# Patient Record
Sex: FEMALE | Race: WHITE | Employment: OTHER | URBAN - METROPOLITAN AREA
[De-identification: names, ages, dates, MRNs, and addresses within clinical notes are randomized per-mention and may not be internally consistent; named-entity substitution may affect disease eponyms.]

---

## 2018-03-12 ENCOUNTER — TELEPHONE (OUTPATIENT)
Dept: FAMILY MEDICINE CLINIC | Age: 73
End: 2018-03-12

## 2018-05-10 ENCOUNTER — OFFICE VISIT (OUTPATIENT)
Dept: FAMILY MEDICINE CLINIC | Age: 73
End: 2018-05-10
Payer: MEDICARE

## 2018-05-10 VITALS
RESPIRATION RATE: 16 BRPM | SYSTOLIC BLOOD PRESSURE: 130 MMHG | HEART RATE: 87 BPM | HEIGHT: 61 IN | WEIGHT: 117 LBS | BODY MASS INDEX: 22.09 KG/M2 | OXYGEN SATURATION: 98 % | DIASTOLIC BLOOD PRESSURE: 68 MMHG

## 2018-05-10 DIAGNOSIS — F41.9 ANXIETY: ICD-10-CM

## 2018-05-10 DIAGNOSIS — R07.9 CHEST PAIN, UNSPECIFIED TYPE: Primary | ICD-10-CM

## 2018-05-10 DIAGNOSIS — R00.2 PALPITATIONS: ICD-10-CM

## 2018-05-10 DIAGNOSIS — Z12.31 ENCOUNTER FOR SCREENING MAMMOGRAM FOR MALIGNANT NEOPLASM OF BREAST: ICD-10-CM

## 2018-05-10 PROCEDURE — 1090F PRES/ABSN URINE INCON ASSESS: CPT | Performed by: FAMILY MEDICINE

## 2018-05-10 PROCEDURE — 99213 OFFICE O/P EST LOW 20 MIN: CPT | Performed by: FAMILY MEDICINE

## 2018-05-10 PROCEDURE — 4040F PNEUMOC VAC/ADMIN/RCVD: CPT | Performed by: FAMILY MEDICINE

## 2018-05-10 PROCEDURE — 3017F COLORECTAL CA SCREEN DOC REV: CPT | Performed by: FAMILY MEDICINE

## 2018-05-10 PROCEDURE — G8399 PT W/DXA RESULTS DOCUMENT: HCPCS | Performed by: FAMILY MEDICINE

## 2018-05-10 PROCEDURE — G8427 DOCREV CUR MEDS BY ELIG CLIN: HCPCS | Performed by: FAMILY MEDICINE

## 2018-05-10 PROCEDURE — G8420 CALC BMI NORM PARAMETERS: HCPCS | Performed by: FAMILY MEDICINE

## 2018-05-10 PROCEDURE — 1036F TOBACCO NON-USER: CPT | Performed by: FAMILY MEDICINE

## 2018-05-10 PROCEDURE — 1123F ACP DISCUSS/DSCN MKR DOCD: CPT | Performed by: FAMILY MEDICINE

## 2018-05-10 RX ORDER — BUSPIRONE HYDROCHLORIDE 7.5 MG/1
7.5 TABLET ORAL 2 TIMES DAILY
Qty: 60 TABLET | Refills: 5 | Status: SHIPPED | OUTPATIENT
Start: 2018-05-10 | End: 2018-06-07

## 2018-05-10 ASSESSMENT — PATIENT HEALTH QUESTIONNAIRE - PHQ9
2. FEELING DOWN, DEPRESSED OR HOPELESS: 0
SUM OF ALL RESPONSES TO PHQ9 QUESTIONS 1 & 2: 0
1. LITTLE INTEREST OR PLEASURE IN DOING THINGS: 0
SUM OF ALL RESPONSES TO PHQ QUESTIONS 1-9: 0

## 2018-05-15 ASSESSMENT — ENCOUNTER SYMPTOMS
STRIDOR: 0
CHOKING: 0
BLOOD IN STOOL: 0
SORE THROAT: 0
EYE PAIN: 0
CONSTIPATION: 0
RECTAL PAIN: 0
BACK PAIN: 0
VOMITING: 0
EYE ITCHING: 0
ABDOMINAL DISTENTION: 0
WHEEZING: 0
TROUBLE SWALLOWING: 0
APNEA: 0
COLOR CHANGE: 0
PHOTOPHOBIA: 0
ANAL BLEEDING: 0
SHORTNESS OF BREATH: 0
VOICE CHANGE: 0
EYE DISCHARGE: 0
COUGH: 0
EYE REDNESS: 0
CHEST TIGHTNESS: 0
DIARRHEA: 0
ABDOMINAL PAIN: 0
SINUS PRESSURE: 0
RHINORRHEA: 0
NAUSEA: 0
FACIAL SWELLING: 0

## 2018-05-15 ASSESSMENT — PATIENT HEALTH QUESTIONNAIRE - PHQ9
SUM OF ALL RESPONSES TO PHQ9 QUESTIONS 1 & 2: 0
SUM OF ALL RESPONSES TO PHQ QUESTIONS 1-9: 0
1. LITTLE INTEREST OR PLEASURE IN DOING THINGS: 0
2. FEELING DOWN, DEPRESSED OR HOPELESS: 0

## 2018-06-07 ENCOUNTER — OFFICE VISIT (OUTPATIENT)
Dept: CARDIOLOGY CLINIC | Age: 73
End: 2018-06-07
Payer: MEDICARE

## 2018-06-07 VITALS
SYSTOLIC BLOOD PRESSURE: 132 MMHG | HEART RATE: 63 BPM | WEIGHT: 117 LBS | HEIGHT: 61 IN | DIASTOLIC BLOOD PRESSURE: 60 MMHG | BODY MASS INDEX: 22.09 KG/M2

## 2018-06-07 DIAGNOSIS — E78.00 PURE HYPERCHOLESTEROLEMIA: ICD-10-CM

## 2018-06-07 DIAGNOSIS — R07.2 PRECORDIAL PAIN: ICD-10-CM

## 2018-06-07 DIAGNOSIS — R00.2 PALPITATIONS: Primary | ICD-10-CM

## 2018-06-07 PROCEDURE — 99204 OFFICE O/P NEW MOD 45 MIN: CPT | Performed by: INTERNAL MEDICINE

## 2018-06-07 PROCEDURE — 3017F COLORECTAL CA SCREEN DOC REV: CPT | Performed by: INTERNAL MEDICINE

## 2018-06-07 PROCEDURE — 1090F PRES/ABSN URINE INCON ASSESS: CPT | Performed by: INTERNAL MEDICINE

## 2018-06-07 PROCEDURE — 93000 ELECTROCARDIOGRAM COMPLETE: CPT | Performed by: INTERNAL MEDICINE

## 2018-06-07 PROCEDURE — 4040F PNEUMOC VAC/ADMIN/RCVD: CPT | Performed by: INTERNAL MEDICINE

## 2018-06-07 PROCEDURE — 1123F ACP DISCUSS/DSCN MKR DOCD: CPT | Performed by: INTERNAL MEDICINE

## 2018-06-07 PROCEDURE — G8420 CALC BMI NORM PARAMETERS: HCPCS | Performed by: INTERNAL MEDICINE

## 2018-06-07 PROCEDURE — 1036F TOBACCO NON-USER: CPT | Performed by: INTERNAL MEDICINE

## 2018-06-07 PROCEDURE — G8427 DOCREV CUR MEDS BY ELIG CLIN: HCPCS | Performed by: INTERNAL MEDICINE

## 2018-06-07 PROCEDURE — G8399 PT W/DXA RESULTS DOCUMENT: HCPCS | Performed by: INTERNAL MEDICINE

## 2018-06-21 ENCOUNTER — HOSPITAL ENCOUNTER (OUTPATIENT)
Dept: MAMMOGRAPHY | Age: 73
Discharge: HOME | End: 2018-06-23
Payer: MEDICARE

## 2018-06-21 DIAGNOSIS — Z12.31 ENCOUNTER FOR SCREENING MAMMOGRAM FOR MALIGNANT NEOPLASM OF BREAST: ICD-10-CM

## 2018-06-21 PROCEDURE — 77063 BREAST TOMOSYNTHESIS BI: CPT

## 2018-06-26 ENCOUNTER — HOSPITAL ENCOUNTER (OUTPATIENT)
Dept: CARDIOLOGY | Age: 73
Discharge: HOME | End: 2018-06-26
Payer: MEDICARE

## 2018-06-26 VITALS
DIASTOLIC BLOOD PRESSURE: 80 MMHG | HEIGHT: 61 IN | WEIGHT: 117 LBS | HEART RATE: 71 BPM | OXYGEN SATURATION: 97 % | BODY MASS INDEX: 22.09 KG/M2 | SYSTOLIC BLOOD PRESSURE: 142 MMHG

## 2018-06-26 DIAGNOSIS — R00.2 PALPITATIONS: ICD-10-CM

## 2018-06-26 DIAGNOSIS — R07.2 PRECORDIAL PAIN: ICD-10-CM

## 2018-06-26 PROCEDURE — 93017 CV STRESS TEST TRACING ONLY: CPT

## 2018-08-16 ENCOUNTER — OFFICE VISIT (OUTPATIENT)
Dept: FAMILY MEDICINE CLINIC | Age: 73
End: 2018-08-16
Payer: MEDICARE

## 2018-08-16 ENCOUNTER — HOSPITAL ENCOUNTER (OUTPATIENT)
Age: 73
Discharge: HOME | End: 2018-08-18
Payer: MEDICARE

## 2018-08-16 VITALS
HEART RATE: 73 BPM | RESPIRATION RATE: 18 BRPM | DIASTOLIC BLOOD PRESSURE: 68 MMHG | OXYGEN SATURATION: 93 % | HEIGHT: 61 IN | BODY MASS INDEX: 22.28 KG/M2 | SYSTOLIC BLOOD PRESSURE: 132 MMHG | WEIGHT: 118 LBS

## 2018-08-16 DIAGNOSIS — R53.83 FATIGUE, UNSPECIFIED TYPE: ICD-10-CM

## 2018-08-16 DIAGNOSIS — R53.83 FATIGUE, UNSPECIFIED TYPE: Primary | ICD-10-CM

## 2018-08-16 DIAGNOSIS — E78.00 PURE HYPERCHOLESTEROLEMIA: ICD-10-CM

## 2018-08-16 DIAGNOSIS — R73.01 IFG (IMPAIRED FASTING GLUCOSE): ICD-10-CM

## 2018-08-16 LAB
BASOPHILS ABSOLUTE: 0.14 E9/L (ref 0–0.2)
BASOPHILS RELATIVE PERCENT: 1.4 % (ref 0–2)
EOSINOPHILS ABSOLUTE: 0.19 E9/L (ref 0.05–0.5)
EOSINOPHILS RELATIVE PERCENT: 1.9 % (ref 0–6)
HCT VFR BLD CALC: 41.2 % (ref 34–48)
HEMOGLOBIN: 13.1 G/DL (ref 11.5–15.5)
IMMATURE GRANULOCYTES #: 0.04 E9/L
IMMATURE GRANULOCYTES %: 0.4 % (ref 0–5)
LYMPHOCYTES ABSOLUTE: 2 E9/L (ref 1.5–4)
LYMPHOCYTES RELATIVE PERCENT: 20.4 % (ref 20–42)
MCH RBC QN AUTO: 30.3 PG (ref 26–35)
MCHC RBC AUTO-ENTMCNC: 31.8 % (ref 32–34.5)
MCV RBC AUTO: 95.4 FL (ref 80–99.9)
MONOCYTES ABSOLUTE: 0.77 E9/L (ref 0.1–0.95)
MONOCYTES RELATIVE PERCENT: 7.9 % (ref 2–12)
NEUTROPHILS ABSOLUTE: 6.65 E9/L (ref 1.8–7.3)
NEUTROPHILS RELATIVE PERCENT: 68 % (ref 43–80)
PDW BLD-RTO: 13.3 FL (ref 11.5–15)
PLATELET # BLD: 391 E9/L (ref 130–450)
PMV BLD AUTO: 11.2 FL (ref 7–12)
RBC # BLD: 4.32 E12/L (ref 3.5–5.5)
WBC # BLD: 9.8 E9/L (ref 4.5–11.5)

## 2018-08-16 PROCEDURE — 80061 LIPID PANEL: CPT

## 2018-08-16 PROCEDURE — 84443 ASSAY THYROID STIM HORMONE: CPT

## 2018-08-16 PROCEDURE — 1036F TOBACCO NON-USER: CPT | Performed by: FAMILY MEDICINE

## 2018-08-16 PROCEDURE — 1090F PRES/ABSN URINE INCON ASSESS: CPT | Performed by: FAMILY MEDICINE

## 2018-08-16 PROCEDURE — 85025 COMPLETE CBC W/AUTO DIFF WBC: CPT

## 2018-08-16 PROCEDURE — G8420 CALC BMI NORM PARAMETERS: HCPCS | Performed by: FAMILY MEDICINE

## 2018-08-16 PROCEDURE — 80053 COMPREHEN METABOLIC PANEL: CPT

## 2018-08-16 PROCEDURE — 83036 HEMOGLOBIN GLYCOSYLATED A1C: CPT

## 2018-08-16 PROCEDURE — G8427 DOCREV CUR MEDS BY ELIG CLIN: HCPCS | Performed by: FAMILY MEDICINE

## 2018-08-16 PROCEDURE — 4040F PNEUMOC VAC/ADMIN/RCVD: CPT | Performed by: FAMILY MEDICINE

## 2018-08-16 PROCEDURE — G8399 PT W/DXA RESULTS DOCUMENT: HCPCS | Performed by: FAMILY MEDICINE

## 2018-08-16 PROCEDURE — 99213 OFFICE O/P EST LOW 20 MIN: CPT | Performed by: FAMILY MEDICINE

## 2018-08-16 PROCEDURE — 1123F ACP DISCUSS/DSCN MKR DOCD: CPT | Performed by: FAMILY MEDICINE

## 2018-08-16 PROCEDURE — 1101F PT FALLS ASSESS-DOCD LE1/YR: CPT | Performed by: FAMILY MEDICINE

## 2018-08-16 PROCEDURE — 3017F COLORECTAL CA SCREEN DOC REV: CPT | Performed by: FAMILY MEDICINE

## 2018-08-16 ASSESSMENT — ENCOUNTER SYMPTOMS
VOICE CHANGE: 0
CONSTIPATION: 0
ANAL BLEEDING: 0
NAUSEA: 0
PHOTOPHOBIA: 0
STRIDOR: 0
FACIAL SWELLING: 0
RECTAL PAIN: 0
TROUBLE SWALLOWING: 0
EYE PAIN: 0
RHINORRHEA: 0
BACK PAIN: 0
EYE ITCHING: 0
EYE REDNESS: 0
CHOKING: 0
SORE THROAT: 0
VOMITING: 0
COLOR CHANGE: 0
SHORTNESS OF BREATH: 0
SINUS PRESSURE: 0
ABDOMINAL DISTENTION: 0
WHEEZING: 0
BLOOD IN STOOL: 0
ABDOMINAL PAIN: 0
EYE DISCHARGE: 0
COUGH: 0
DIARRHEA: 0
APNEA: 0
CHEST TIGHTNESS: 1

## 2018-08-16 ASSESSMENT — PATIENT HEALTH QUESTIONNAIRE - PHQ9
2. FEELING DOWN, DEPRESSED OR HOPELESS: 0
2. FEELING DOWN, DEPRESSED OR HOPELESS: 0
1. LITTLE INTEREST OR PLEASURE IN DOING THINGS: 0
SUM OF ALL RESPONSES TO PHQ9 QUESTIONS 1 & 2: 0
SUM OF ALL RESPONSES TO PHQ QUESTIONS 1-9: 0
2. FEELING DOWN, DEPRESSED OR HOPELESS: 0
SUM OF ALL RESPONSES TO PHQ QUESTIONS 1-9: 0
1. LITTLE INTEREST OR PLEASURE IN DOING THINGS: 0
SUM OF ALL RESPONSES TO PHQ QUESTIONS 1-9: 0
SUM OF ALL RESPONSES TO PHQ9 QUESTIONS 1 & 2: 0
SUM OF ALL RESPONSES TO PHQ9 QUESTIONS 1 & 2: 0
SUM OF ALL RESPONSES TO PHQ QUESTIONS 1-9: 0
1. LITTLE INTEREST OR PLEASURE IN DOING THINGS: 0

## 2018-08-17 LAB
ALBUMIN SERPL-MCNC: 4 G/DL (ref 3.5–5.2)
ALP BLD-CCNC: 69 U/L (ref 35–104)
ALT SERPL-CCNC: 13 U/L (ref 0–32)
ANION GAP SERPL CALCULATED.3IONS-SCNC: 14 MMOL/L (ref 7–16)
AST SERPL-CCNC: 19 U/L (ref 0–31)
BILIRUB SERPL-MCNC: <0.2 MG/DL (ref 0–1.2)
BUN BLDV-MCNC: 12 MG/DL (ref 8–23)
CALCIUM SERPL-MCNC: 8.8 MG/DL (ref 8.6–10.2)
CHLORIDE BLD-SCNC: 97 MMOL/L (ref 98–107)
CHOLESTEROL, TOTAL: 252 MG/DL (ref 0–199)
CO2: 25 MMOL/L (ref 22–29)
CREAT SERPL-MCNC: 0.6 MG/DL (ref 0.5–1)
GFR AFRICAN AMERICAN: >60
GFR NON-AFRICAN AMERICAN: >60 ML/MIN/1.73
GLUCOSE BLD-MCNC: 106 MG/DL (ref 74–109)
HBA1C MFR BLD: 5.6 % (ref 4–5.6)
HDLC SERPL-MCNC: 50 MG/DL
LDL CHOLESTEROL CALCULATED: 159 MG/DL (ref 0–99)
POTASSIUM SERPL-SCNC: 3.9 MMOL/L (ref 3.5–5)
SODIUM BLD-SCNC: 136 MMOL/L (ref 132–146)
TOTAL PROTEIN: 7 G/DL (ref 6.4–8.3)
TRIGL SERPL-MCNC: 216 MG/DL (ref 0–149)
TSH SERPL DL<=0.05 MIU/L-ACNC: 2.19 UIU/ML (ref 0.27–4.2)
VLDLC SERPL CALC-MCNC: 43 MG/DL

## 2018-08-17 NOTE — PROGRESS NOTES
Subjective:      Patient ID: Anthony Valenzuela is a 74 y.o. female. Fatigue   This is a recurrent problem. The current episode started more than 1 year ago. The problem occurs intermittently. The problem has been rapidly improving. Associated symptoms include fatigue. Pertinent negatives include no abdominal pain, arthralgias, chest pain, chills, congestion, coughing, diaphoresis, fever, headaches, joint swelling, myalgias, nausea, neck pain, numbness, rash, sore throat, vomiting or weakness. Nothing aggravates the symptoms. She has tried nothing for the symptoms. The treatment provided no relief. Other   This is a new (anxiety) problem. The current episode started 1 to 4 weeks ago. The problem occurs intermittently. Associated symptoms include fatigue. Pertinent negatives include no abdominal pain, arthralgias, chest pain, chills, congestion, coughing, diaphoresis, fever, headaches, joint swelling, myalgias, nausea, neck pain, numbness, rash, sore throat, vomiting or weakness. Nothing aggravates the symptoms. She has tried nothing for the symptoms. The treatment provided no relief. Review of Systems   Constitutional: Positive for fatigue. Negative for activity change, appetite change, chills, diaphoresis, fever and unexpected weight change. HENT: Negative for congestion, dental problem, drooling, ear discharge, ear pain, facial swelling, hearing loss, mouth sores, nosebleeds, postnasal drip, rhinorrhea, sinus pressure, sneezing, sore throat, tinnitus, trouble swallowing and voice change. Eyes: Negative for photophobia, pain, discharge, redness, itching and visual disturbance. Respiratory: Positive for chest tightness. Negative for apnea, cough, choking, shortness of breath, wheezing and stridor. Cardiovascular: Negative for chest pain, palpitations and leg swelling.    Gastrointestinal: Negative for abdominal distention, abdominal pain, anal bleeding, blood in stool, constipation, diarrhea, nausea, rectal pain and vomiting. Endocrine: Negative for cold intolerance, heat intolerance, polydipsia, polyphagia and polyuria. Genitourinary: Negative for decreased urine volume, difficulty urinating, dyspareunia, dysuria, enuresis, flank pain, frequency, genital sores, hematuria, menstrual problem, pelvic pain, urgency, vaginal bleeding, vaginal discharge and vaginal pain. Musculoskeletal: Negative for arthralgias, back pain, gait problem, joint swelling, myalgias, neck pain and neck stiffness. Skin: Negative for color change, pallor, rash and wound. Allergic/Immunologic: Negative for environmental allergies, food allergies and immunocompromised state. Neurological: Negative for dizziness, tremors, seizures, syncope, facial asymmetry, speech difficulty, weakness, light-headedness, numbness and headaches. Hematological: Negative for adenopathy. Does not bruise/bleed easily. Psychiatric/Behavioral: Negative for agitation, behavioral problems, confusion, decreased concentration, dysphoric mood, hallucinations, self-injury, sleep disturbance and suicidal ideas. The patient is nervous/anxious. The patient is not hyperactive. Past Medical History:   Diagnosis Date    Arthritis     Asymptomatic PVCs 4/2012    pt felt flutter, no other sx, holter + pvc's, few runs of SVT   [facility] [place]    Hyperlipidemia     Macular degeneration     Nausea & vomiting     Pelvic prolapse        Social History   Substance Use Topics    Smoking status: Former Smoker     Quit date: 6/17/1994    Smokeless tobacco: Never Used    Alcohol use No      Comment: Coffee 1 cup a day        History reviewed. No pertinent family history.     Current Outpatient Prescriptions   Medication Sig Dispense Refill    levothyroxine (SYNTHROID) 25 MCG tablet Take 1 tablet by mouth  daily 90 tablet 5    sertraline (ZOLOFT) 50 MG tablet Take 1 tablet by mouth daily 90 tablet 5    Red Yeast Rice Extract (RED YEAST RICE PO) Take by mouth daily       therapeutic multivitamin-minerals (THERAGRAN-M) tablet Take 1 tablet by mouth daily.  Cranberry 300 MG TABS Take 1 tablet by mouth daily        No current facility-administered medications for this visit. No Known Allergies    I have reviewed Alanna's allergies, medications, problem list, medical, social and family history and have updated as needed in the electronic medical record. ROS: negative other what was mentioned above. Objective:   Physical Exam   Constitutional: She is oriented to person, place, and time. She appears well-developed and well-nourished. No distress. HENT:   Head: Normocephalic and atraumatic. Right Ear: External ear normal.   Left Ear: External ear normal.   Nose: Nose normal.   Mouth/Throat: Oropharynx is clear and moist. No oropharyngeal exudate. Eyes: Pupils are equal, round, and reactive to light. Conjunctivae and EOM are normal. Right eye exhibits no discharge. Left eye exhibits no discharge. No scleral icterus. Neck: Normal range of motion. Neck supple. No JVD present. No tracheal deviation present. No thyromegaly present. Cardiovascular: Normal rate, regular rhythm, normal heart sounds and intact distal pulses. Exam reveals no gallop and no friction rub. No murmur heard. Pulmonary/Chest: Effort normal and breath sounds normal. No stridor. No respiratory distress. She has no wheezes. She has no rales. She exhibits no tenderness. Abdominal: Soft. Bowel sounds are normal. She exhibits no distension and no mass. There is no tenderness. There is no rebound and no guarding. Genitourinary:   Genitourinary Comments: Will follow with own gynecologist for gynecological and breast care. Musculoskeletal: Normal range of motion. She exhibits no edema or tenderness. Lymphadenopathy:     She has no cervical adenopathy. Neurological: She is alert and oriented to person, place, and time. She has normal reflexes. No cranial nerve deficit.

## 2019-09-12 ENCOUNTER — OFFICE VISIT (OUTPATIENT)
Dept: FAMILY MEDICINE CLINIC | Age: 74
End: 2019-09-12
Payer: MEDICARE

## 2019-09-12 VITALS
BODY MASS INDEX: 22.47 KG/M2 | OXYGEN SATURATION: 90 % | HEIGHT: 61 IN | HEART RATE: 59 BPM | SYSTOLIC BLOOD PRESSURE: 126 MMHG | WEIGHT: 119 LBS | RESPIRATION RATE: 18 BRPM | DIASTOLIC BLOOD PRESSURE: 78 MMHG

## 2019-09-12 DIAGNOSIS — H81.13 BENIGN PAROXYSMAL POSITIONAL VERTIGO DUE TO BILATERAL VESTIBULAR DISORDER: ICD-10-CM

## 2019-09-12 DIAGNOSIS — F34.1 DYSTHYMIA: ICD-10-CM

## 2019-09-12 DIAGNOSIS — R73.01 IFG (IMPAIRED FASTING GLUCOSE): ICD-10-CM

## 2019-09-12 DIAGNOSIS — R53.83 FATIGUE, UNSPECIFIED TYPE: ICD-10-CM

## 2019-09-12 DIAGNOSIS — Z00.00 ROUTINE GENERAL MEDICAL EXAMINATION AT A HEALTH CARE FACILITY: ICD-10-CM

## 2019-09-12 DIAGNOSIS — M76.32 ILIOTIBIAL BAND SYNDROME AFFECTING LEFT LOWER LEG: ICD-10-CM

## 2019-09-12 DIAGNOSIS — E03.9 ACQUIRED HYPOTHYROIDISM: Primary | ICD-10-CM

## 2019-09-12 PROCEDURE — 1123F ACP DISCUSS/DSCN MKR DOCD: CPT | Performed by: FAMILY MEDICINE

## 2019-09-12 PROCEDURE — 3017F COLORECTAL CA SCREEN DOC REV: CPT | Performed by: FAMILY MEDICINE

## 2019-09-12 PROCEDURE — 4040F PNEUMOC VAC/ADMIN/RCVD: CPT | Performed by: FAMILY MEDICINE

## 2019-09-12 PROCEDURE — G0438 PPPS, INITIAL VISIT: HCPCS | Performed by: FAMILY MEDICINE

## 2019-09-12 RX ORDER — SERTRALINE HYDROCHLORIDE 100 MG/1
100 TABLET, FILM COATED ORAL DAILY
Qty: 90 TABLET | Refills: 1
Start: 2019-09-12 | End: 2019-10-28 | Stop reason: SDUPTHER

## 2019-09-12 RX ORDER — MECLIZINE HYDROCHLORIDE 25 MG/1
25 TABLET ORAL NIGHTLY
Qty: 90 TABLET | Refills: 5 | Status: SHIPPED | OUTPATIENT
Start: 2019-09-12 | End: 2019-10-12

## 2019-09-12 ASSESSMENT — PATIENT HEALTH QUESTIONNAIRE - PHQ9
1. LITTLE INTEREST OR PLEASURE IN DOING THINGS: 0
2. FEELING DOWN, DEPRESSED OR HOPELESS: 0
SUM OF ALL RESPONSES TO PHQ9 QUESTIONS 1 & 2: 0
SUM OF ALL RESPONSES TO PHQ QUESTIONS 1-9: 0
SUM OF ALL RESPONSES TO PHQ QUESTIONS 1-9: 0

## 2019-09-14 NOTE — PROGRESS NOTES
TSH without Reflex; Future  -     T4, Free; Future  -     MICROALBUMIN, UR; Future    Dysthymia  -     sertraline (ZOLOFT) 100 MG tablet; Take 1 tablet by mouth daily  -     Hemoglobin A1C; Future  -     Comprehensive Metabolic Panel; Future  -     CBC Auto Differential; Future  -     Lipid Panel; Future  -     Vitamin B12 & Folate; Future  -     TSH without Reflex; Future  -     T4, Free; Future  -     MICROALBUMIN, UR; Future    IFG (impaired fasting glucose)  -     Hemoglobin A1C; Future  -     Comprehensive Metabolic Panel; Future  -     CBC Auto Differential; Future  -     Lipid Panel; Future  -     Vitamin B12 & Folate; Future  -     TSH without Reflex; Future  -     T4, Free; Future  -     MICROALBUMIN, UR; Future    Iliotibial band syndrome affecting left lower leg  -     diclofenac sodium 1 % GEL; Apply 4 g topically 4 times daily    Benign paroxysmal positional vertigo due to bilateral vestibular disorder  -     meclizine (ANTIVERT) 25 MG tablet; Take 1 tablet by mouth nightly    Routine general medical examination at a health care facility              reviewed health maintenance report. Patient is aware of deficiencies and suggested preventative tests.

## 2019-09-16 ENCOUNTER — HOSPITAL ENCOUNTER (OUTPATIENT)
Age: 74
Discharge: HOME | End: 2019-09-18
Payer: MEDICARE

## 2019-09-16 DIAGNOSIS — R53.83 FATIGUE, UNSPECIFIED TYPE: ICD-10-CM

## 2019-09-16 DIAGNOSIS — R73.01 IFG (IMPAIRED FASTING GLUCOSE): ICD-10-CM

## 2019-09-16 DIAGNOSIS — E03.9 ACQUIRED HYPOTHYROIDISM: ICD-10-CM

## 2019-09-16 DIAGNOSIS — F34.1 DYSTHYMIA: ICD-10-CM

## 2019-09-16 LAB
ALBUMIN SERPL-MCNC: 4.3 G/DL (ref 3.5–5.2)
ALP BLD-CCNC: 62 U/L (ref 35–104)
ALT SERPL-CCNC: 11 U/L (ref 0–32)
ANION GAP SERPL CALCULATED.3IONS-SCNC: 15 MMOL/L (ref 7–16)
AST SERPL-CCNC: 20 U/L (ref 0–31)
BASOPHILS ABSOLUTE: 0.13 E9/L (ref 0–0.2)
BASOPHILS RELATIVE PERCENT: 1.6 % (ref 0–2)
BILIRUB SERPL-MCNC: 0.3 MG/DL (ref 0–1.2)
BUN BLDV-MCNC: 13 MG/DL (ref 8–23)
CALCIUM SERPL-MCNC: 9 MG/DL (ref 8.6–10.2)
CHLORIDE BLD-SCNC: 97 MMOL/L (ref 98–107)
CHOLESTEROL, TOTAL: 252 MG/DL (ref 0–199)
CO2: 24 MMOL/L (ref 22–29)
CREAT SERPL-MCNC: 0.8 MG/DL (ref 0.5–1)
EOSINOPHILS ABSOLUTE: 0.21 E9/L (ref 0.05–0.5)
EOSINOPHILS RELATIVE PERCENT: 2.6 % (ref 0–6)
FOLATE: 17.2 NG/ML (ref 4.8–24.2)
GFR AFRICAN AMERICAN: >60
GFR NON-AFRICAN AMERICAN: >60 ML/MIN/1.73
GLUCOSE BLD-MCNC: 82 MG/DL (ref 74–99)
HBA1C MFR BLD: 5.4 % (ref 4–5.6)
HCT VFR BLD CALC: 43.7 % (ref 34–48)
HDLC SERPL-MCNC: 58 MG/DL
HEMOGLOBIN: 13.9 G/DL (ref 11.5–15.5)
IMMATURE GRANULOCYTES #: 0.02 E9/L
IMMATURE GRANULOCYTES %: 0.3 % (ref 0–5)
LDL CHOLESTEROL CALCULATED: 170 MG/DL (ref 0–99)
LYMPHOCYTES ABSOLUTE: 2.01 E9/L (ref 1.5–4)
LYMPHOCYTES RELATIVE PERCENT: 25.3 % (ref 20–42)
MCH RBC QN AUTO: 31.2 PG (ref 26–35)
MCHC RBC AUTO-ENTMCNC: 31.8 % (ref 32–34.5)
MCV RBC AUTO: 98 FL (ref 80–99.9)
MICROALBUMIN UR-MCNC: <12 MG/L
MONOCYTES ABSOLUTE: 0.62 E9/L (ref 0.1–0.95)
MONOCYTES RELATIVE PERCENT: 7.8 % (ref 2–12)
NEUTROPHILS ABSOLUTE: 4.97 E9/L (ref 1.8–7.3)
NEUTROPHILS RELATIVE PERCENT: 62.4 % (ref 43–80)
PDW BLD-RTO: 13.4 FL (ref 11.5–15)
PLATELET # BLD: 383 E9/L (ref 130–450)
PMV BLD AUTO: 11 FL (ref 7–12)
POTASSIUM SERPL-SCNC: 4.3 MMOL/L (ref 3.5–5)
RBC # BLD: 4.46 E12/L (ref 3.5–5.5)
SODIUM BLD-SCNC: 136 MMOL/L (ref 132–146)
T4 FREE: 1.06 NG/DL (ref 0.93–1.7)
TOTAL PROTEIN: 7.6 G/DL (ref 6.4–8.3)
TRIGL SERPL-MCNC: 118 MG/DL (ref 0–149)
TSH SERPL DL<=0.05 MIU/L-ACNC: 2.37 UIU/ML (ref 0.27–4.2)
VITAMIN B-12: 609 PG/ML (ref 211–946)
VLDLC SERPL CALC-MCNC: 24 MG/DL
WBC # BLD: 8 E9/L (ref 4.5–11.5)

## 2019-09-16 PROCEDURE — 80053 COMPREHEN METABOLIC PANEL: CPT

## 2019-09-16 PROCEDURE — 84443 ASSAY THYROID STIM HORMONE: CPT

## 2019-09-16 PROCEDURE — 83036 HEMOGLOBIN GLYCOSYLATED A1C: CPT

## 2019-09-16 PROCEDURE — 82044 UR ALBUMIN SEMIQUANTITATIVE: CPT

## 2019-09-16 PROCEDURE — 85025 COMPLETE CBC W/AUTO DIFF WBC: CPT

## 2019-09-16 PROCEDURE — 80061 LIPID PANEL: CPT

## 2019-09-16 PROCEDURE — 82607 VITAMIN B-12: CPT

## 2019-09-16 PROCEDURE — 82746 ASSAY OF FOLIC ACID SERUM: CPT

## 2019-09-16 PROCEDURE — 36415 COLL VENOUS BLD VENIPUNCTURE: CPT

## 2019-09-16 PROCEDURE — 84439 ASSAY OF FREE THYROXINE: CPT

## 2019-10-28 ENCOUNTER — PATIENT MESSAGE (OUTPATIENT)
Dept: FAMILY MEDICINE CLINIC | Age: 74
End: 2019-10-28

## 2019-10-28 DIAGNOSIS — F34.1 DYSTHYMIA: ICD-10-CM

## 2019-10-28 RX ORDER — SERTRALINE HYDROCHLORIDE 100 MG/1
100 TABLET, FILM COATED ORAL DAILY
Qty: 90 TABLET | Refills: 1 | Status: SHIPPED
Start: 2019-10-28 | End: 2020-03-11

## 2020-03-11 RX ORDER — SERTRALINE HYDROCHLORIDE 100 MG/1
100 TABLET, FILM COATED ORAL DAILY
Qty: 90 TABLET | Refills: 1 | Status: SHIPPED
Start: 2020-03-11 | End: 2020-08-20

## 2020-08-21 RX ORDER — SERTRALINE HYDROCHLORIDE 100 MG/1
100 TABLET, FILM COATED ORAL DAILY
Qty: 90 TABLET | Refills: 0 | Status: SHIPPED
Start: 2020-08-21 | End: 2020-11-28

## 2020-09-10 ENCOUNTER — TELEPHONE (OUTPATIENT)
Dept: ADMINISTRATIVE | Age: 75
End: 2020-09-10

## 2020-09-16 ENCOUNTER — OFFICE VISIT (OUTPATIENT)
Dept: FAMILY MEDICINE CLINIC | Age: 75
End: 2020-09-16
Payer: MEDICARE

## 2020-09-16 ENCOUNTER — HOSPITAL ENCOUNTER (OUTPATIENT)
Age: 75
Discharge: HOME | End: 2020-09-18
Payer: MEDICARE

## 2020-09-16 VITALS
HEIGHT: 61 IN | DIASTOLIC BLOOD PRESSURE: 68 MMHG | RESPIRATION RATE: 18 BRPM | SYSTOLIC BLOOD PRESSURE: 118 MMHG | WEIGHT: 116 LBS | OXYGEN SATURATION: 95 % | HEART RATE: 60 BPM | BODY MASS INDEX: 21.9 KG/M2

## 2020-09-16 LAB
ALBUMIN SERPL-MCNC: 4.5 G/DL (ref 3.5–5.2)
ALP BLD-CCNC: 73 U/L (ref 35–104)
ALT SERPL-CCNC: 11 U/L (ref 0–32)
ANION GAP SERPL CALCULATED.3IONS-SCNC: 13 MMOL/L (ref 7–16)
AST SERPL-CCNC: 19 U/L (ref 0–31)
BILIRUB SERPL-MCNC: 0.2 MG/DL (ref 0–1.2)
BUN BLDV-MCNC: 15 MG/DL (ref 8–23)
CALCIUM SERPL-MCNC: 9.6 MG/DL (ref 8.6–10.2)
CHLORIDE BLD-SCNC: 98 MMOL/L (ref 98–107)
CO2: 26 MMOL/L (ref 22–29)
CREAT SERPL-MCNC: 0.7 MG/DL (ref 0.5–1)
GFR AFRICAN AMERICAN: >60
GFR NON-AFRICAN AMERICAN: >60 ML/MIN/1.73
GLUCOSE BLD-MCNC: 80 MG/DL (ref 74–99)
POTASSIUM SERPL-SCNC: 4.1 MMOL/L (ref 3.5–5)
SODIUM BLD-SCNC: 137 MMOL/L (ref 132–146)
TOTAL PROTEIN: 7.8 G/DL (ref 6.4–8.3)

## 2020-09-16 PROCEDURE — 36415 COLL VENOUS BLD VENIPUNCTURE: CPT

## 2020-09-16 PROCEDURE — 99214 OFFICE O/P EST MOD 30 MIN: CPT | Performed by: FAMILY MEDICINE

## 2020-09-16 PROCEDURE — 80053 COMPREHEN METABOLIC PANEL: CPT

## 2020-09-16 PROCEDURE — 1111F DSCHRG MED/CURRENT MED MERGE: CPT | Performed by: FAMILY MEDICINE

## 2020-09-16 RX ORDER — ASPIRIN 81 MG/1
TABLET ORAL
Qty: 90 TABLET | Refills: 5 | Status: SHIPPED
Start: 2020-09-16 | End: 2021-08-16 | Stop reason: ALTCHOICE

## 2020-09-16 RX ORDER — ATORVASTATIN CALCIUM 20 MG/1
TABLET, FILM COATED ORAL
COMMUNITY
Start: 2020-09-09 | End: 2020-09-16 | Stop reason: SDUPTHER

## 2020-09-16 RX ORDER — ATORVASTATIN CALCIUM 20 MG/1
TABLET, FILM COATED ORAL
Qty: 90 TABLET | Refills: 5 | Status: SHIPPED
Start: 2020-09-16 | End: 2020-10-19 | Stop reason: SDUPTHER

## 2020-09-16 RX ORDER — ASPIRIN 81 MG/1
TABLET, COATED ORAL
COMMUNITY
Start: 2020-09-09 | End: 2020-09-16 | Stop reason: SDUPTHER

## 2020-09-16 ASSESSMENT — PATIENT HEALTH QUESTIONNAIRE - PHQ9
SUM OF ALL RESPONSES TO PHQ QUESTIONS 1-9: 0
2. FEELING DOWN, DEPRESSED OR HOPELESS: 0
1. LITTLE INTEREST OR PLEASURE IN DOING THINGS: 0
SUM OF ALL RESPONSES TO PHQ QUESTIONS 1-9: 0
SUM OF ALL RESPONSES TO PHQ9 QUESTIONS 1 & 2: 0

## 2020-09-27 NOTE — PROGRESS NOTES
Post-Discharge Transitional Care Management Services or Hospital Follow Up      Cristopher Wilson   YOB: 1945    Date of Office Visit:  9/16/2020  Date of Hospital Admission: 9/4/13  Date of Hospital Discharge: 9/6/13  Risk of hospital readmission (high >=14%. Medium >=10%) :No data recorded    Care management risk score Rising risk (score 2-5) and Complex Care (Scores >=6): 0     Non face to face  following discharge, date last encounter closed (first attempt may have been earlier): *No documented post hospital discharge outreach found in the last 14 days    Call initiated 2 business days of discharge: *No response recorded in the last 14 days    Patient Active Problem List   Diagnosis    Pelvic prolapse    Hyperlipidemia    GERD (gastroesophageal reflux disease)    PUD (peptic ulcer disease)    Urinary incontinence    Osteoarthritis    Palpitations    Precordial pain       No Known Allergies    Medications listed as ordered at the time of discharge from hospital   [id number] Medication Instructions CHANDRAKANT:    Printed on:09/27/20 2255   Medication Information                      aspirin (ASPIRIN LOW DOSE) 81 MG EC tablet  TAKE ONE TABLET BY MOUTH DAILY             atorvastatin (LIPITOR) 20 MG tablet  TAKE ONE TABLET BY MOUTH EVERY EVENING             Cranberry 300 MG TABS  Take 1 tablet by mouth daily              diclofenac sodium 1 % GEL  Apply 4 g topically 4 times daily             levothyroxine (SYNTHROID) 25 MCG tablet  Take 1 tablet by mouth Daily             sertraline (ZOLOFT) 100 MG tablet  TAKE 1 TABLET BY MOUTH  DAILY             therapeutic multivitamin-minerals (THERAGRAN-M) tablet  Take 1 tablet by mouth daily.                    Medications marked \"taking\" at this time  Outpatient Medications Marked as Taking for the 9/16/20 encounter (Office Visit) with Shun Turner DO   Medication Sig Dispense Refill    aspirin (ASPIRIN LOW DOSE) 81 MG EC tablet TAKE ONE TABLET BY MOUTH DAILY 90 tablet 5    atorvastatin (LIPITOR) 20 MG tablet TAKE ONE TABLET BY MOUTH EVERY EVENING 90 tablet 5    sertraline (ZOLOFT) 100 MG tablet TAKE 1 TABLET BY MOUTH  DAILY 90 tablet 0    levothyroxine (SYNTHROID) 25 MCG tablet Take 1 tablet by mouth Daily 90 tablet 5    diclofenac sodium 1 % GEL Apply 4 g topically 4 times daily 3 Tube 5    therapeutic multivitamin-minerals (THERAGRAN-M) tablet Take 1 tablet by mouth daily.  Cranberry 300 MG TABS Take 1 tablet by mouth daily           Medications patient taking as of now reconciled against medications ordered at time of hospital discharge: Yes    Chief Complaint   Patient presents with   [address] from Hospital       History of Present illness - Follow up of Hospital diagnosis(es): TIA    Inpatient course: Discharge summary reviewed- see chart. Interval history/Current status: improved    A comprehensive review of systems was negative except for what was noted in the HPI. Vitals:    09/16/20 1601   BP: 118/68   Site: Left Upper Arm   Position: Sitting   Pulse: 60   Resp: 18   SpO2: 95%   Weight: 116 lb (52.6 kg)   Height: 5' 1\" (1.549 m)     Body mass index is 21.92 kg/m².    Wt Readings from Last 3 Encounters:   09/16/20 116 lb (52.6 kg)   09/12/19 119 lb (54 kg)   08/16/18 118 lb (53.5 kg)     BP Readings from Last 3 Encounters:   09/16/20 118/68   09/12/19 126/78   08/16/18 132/68        Physical Exam:  General Appearance: alert and oriented to person, place and time, well developed and well- nourished, in no acute distress  Skin: warm and dry, no rash or erythema  Head: normocephalic and atraumatic  Eyes: pupils equal, round, and reactive to light, extraocular eye movements intact, conjunctivae normal  ENT: tympanic membrane, external ear and ear canal normal bilaterally, nose without deformity, nasal mucosa and turbinates normal without polyps  Neck: supple and non-tender without mass, no thyromegaly or thyroid nodules, no cervical lymphadenopathy  Pulmonary/Chest: clear to auscultation bilaterally- no wheezes, rales or rhonchi, normal air movement, no respiratory distress  Cardiovascular: normal rate, regular rhythm, normal S1 and S2, no murmurs, rubs, clicks, or gallops, distal pulses intact, no carotid bruits  Abdomen: soft, non-tender, non-distended, normal bowel sounds, no masses or organomegaly  Extremities: no cyanosis, clubbing or edema  Musculoskeletal: normal range of motion, no joint swelling, deformity or tenderness  Neurologic: reflexes normal and symmetric, no cranial nerve deficit, gait, coordination and speech normal    Assessment/Plan:  1. TIA (transient ischemic attack)    - aspirin (ASPIRIN LOW DOSE) 81 MG EC tablet; TAKE ONE TABLET BY MOUTH DAILY  Dispense: 90 tablet; Refill: 5  - atorvastatin (LIPITOR) 20 MG tablet; TAKE ONE TABLET BY MOUTH EVERY EVENING  Dispense: 90 tablet; Refill: 5  - COMPREHENSIVE METABOLIC PANEL; Future  - Thrombophilia Panel  - CTA HEAD W CONTRAST; Future  - CTA NECK W CONTRAST;  Future  - Jazzy Traylor MD, Neurology, Dania Herndon MD, Cardiology, Sharyle Lis  - NV DISCHARGE MEDS RECONCILED W/ CURRENT OUTPATIENT MED LIST        Medical Decision Making: moderate complexity

## 2020-10-06 ENCOUNTER — HOSPITAL ENCOUNTER (OUTPATIENT)
Dept: CT IMAGING | Age: 75
Discharge: HOME | End: 2020-10-06
Payer: MEDICARE

## 2020-10-06 PROCEDURE — 70496 CT ANGIOGRAPHY HEAD: CPT

## 2020-10-06 PROCEDURE — 6360000004 HC RX CONTRAST MEDICATION: Performed by: RADIOLOGY

## 2020-10-06 PROCEDURE — 70498 CT ANGIOGRAPHY NECK: CPT

## 2020-10-06 RX ADMIN — IOPAMIDOL 75 ML: 755 INJECTION, SOLUTION INTRAVENOUS at 09:52

## 2020-10-08 ENCOUNTER — NURSE ONLY (OUTPATIENT)
Dept: CARDIOLOGY CLINIC | Age: 75
End: 2020-10-08

## 2020-10-08 ENCOUNTER — OFFICE VISIT (OUTPATIENT)
Dept: CARDIOLOGY CLINIC | Age: 75
End: 2020-10-08
Payer: MEDICARE

## 2020-10-08 VITALS
HEIGHT: 61 IN | WEIGHT: 118 LBS | SYSTOLIC BLOOD PRESSURE: 128 MMHG | RESPIRATION RATE: 16 BRPM | DIASTOLIC BLOOD PRESSURE: 64 MMHG | HEART RATE: 66 BPM | BODY MASS INDEX: 22.28 KG/M2

## 2020-10-08 PROCEDURE — G8427 DOCREV CUR MEDS BY ELIG CLIN: HCPCS | Performed by: INTERNAL MEDICINE

## 2020-10-08 PROCEDURE — 1090F PRES/ABSN URINE INCON ASSESS: CPT | Performed by: INTERNAL MEDICINE

## 2020-10-08 PROCEDURE — G8484 FLU IMMUNIZE NO ADMIN: HCPCS | Performed by: INTERNAL MEDICINE

## 2020-10-08 PROCEDURE — 99214 OFFICE O/P EST MOD 30 MIN: CPT | Performed by: INTERNAL MEDICINE

## 2020-10-08 PROCEDURE — 1123F ACP DISCUSS/DSCN MKR DOCD: CPT | Performed by: INTERNAL MEDICINE

## 2020-10-08 PROCEDURE — G8420 CALC BMI NORM PARAMETERS: HCPCS | Performed by: INTERNAL MEDICINE

## 2020-10-08 PROCEDURE — 4040F PNEUMOC VAC/ADMIN/RCVD: CPT | Performed by: INTERNAL MEDICINE

## 2020-10-08 PROCEDURE — 93000 ELECTROCARDIOGRAM COMPLETE: CPT | Performed by: INTERNAL MEDICINE

## 2020-10-08 PROCEDURE — 1036F TOBACCO NON-USER: CPT | Performed by: INTERNAL MEDICINE

## 2020-10-08 PROCEDURE — G8399 PT W/DXA RESULTS DOCUMENT: HCPCS | Performed by: INTERNAL MEDICINE

## 2020-10-08 NOTE — PROGRESS NOTES
OFFICE VISIT     PRIMARY CARE PHYSICIAN:      Hope Dickson DO       ALLERGIES / SENSITIVITIES:      No Known Allergies       REVIEWED MEDICATIONS:        Current Outpatient Medications:     aspirin (ASPIRIN LOW DOSE) 81 MG EC tablet, TAKE ONE TABLET BY MOUTH DAILY, Disp: 90 tablet, Rfl: 5    atorvastatin (LIPITOR) 20 MG tablet, TAKE ONE TABLET BY MOUTH EVERY EVENING, Disp: 90 tablet, Rfl: 5    sertraline (ZOLOFT) 100 MG tablet, TAKE 1 TABLET BY MOUTH  DAILY, Disp: 90 tablet, Rfl: 0    levothyroxine (SYNTHROID) 25 MCG tablet, Take 1 tablet by mouth Daily, Disp: 90 tablet, Rfl: 5    therapeutic multivitamin-minerals (THERAGRAN-M) tablet, Take 1 tablet by mouth daily. , Disp: , Rfl:     Cranberry 300 MG TABS, Take 1 tablet by mouth daily , Disp: , Rfl:     diclofenac sodium 1 % GEL, Apply 4 g topically 4 times daily (Patient not taking: Reported on 10/8/2020), Disp: 3 Tube, Rfl: 5      S: REASON FOR VISIT:       Chief Complaint   Patient presents with    Transient Ischemic Attack          History of Present Illness:       Consult for cardiac evaluation due to 'TIA\"   66 yr pt with dyslipidemia, palpitations came for evaluation   Seen by Dr Lianet Dhillon in 2018. Had TIA in September with slurred speech, weakness on right, treated at [facility] had VHD; , / treated with tPA   Had CTA head, neck done 10/6/20   C/o occ heart racing, 1-2 a month, No dizzy or syncope   No hospitalizations or surgeries since last visit   Patient is compliant with all medications   Faizan any exertional chest pain or short of breath   No dizzy or syncope.    Active at home   No orthopnea   Try to watch diet          Past Medical History:   Diagnosis Date    Arthritis     Asymptomatic PVCs 4/2012    pt felt flutter, no other sx, holter + pvc's, few runs of SVT   [facility] [place]    Hyperlipidemia     Macular degeneration     Nausea & vomiting     Pelvic prolapse             Past Surgical History:   Procedure Laterality Date    COLONOSCOPY     ENDOSCOPY, COLON, DIAGNOSTIC      EYE SURGERY      macular hole    HERNIA REPAIR      inguinal    HYSTERECTOMY      OTHER SURGICAL HISTORY  2013    ant elevatetransobturator sling rectocele and cystocele enterocele    SKIN BIOPSY      arms        History reviewed. No pertinent family history. Social History     Tobacco Use    Smoking status: Former Smoker     Last attempt to quit: 1994     Years since quittin.3    Smokeless tobacco: Never Used   Substance Use Topics    Alcohol use: No     Comment: Coffee 1 cup a day          Review of Systems:  Constitutional: negative for fever and chills, or significant weight loss  HEENT: negative for acute visual symptoms or auditory problems, no dysphagia  Respiratory: negative for cough, wheezing, or hemoptysis  Cardiovascular: negative for chest pain, palpitations, and dyspnea  Gastrointestinal: negative for abdominal pain, diarrhea, nausea and vomiting  Endocrine: Negative for polyuria and polydyspsia  Genitourinary:negative for dysuria and hematuria  Derm: negative for rash and skin lesion(s)  Neurological: negative for tingling, numbness, weakness, seizures and tremors  Endocrine: negative for polydipsia and polyuria  Musculoskeletal: negative for pain or tenderness  Psychiatric: negative for anxiety, depression, or suicidal ideations         O:  COMPLETE PHYSICAL EXAM:       /64   Pulse 66   Resp 16   Ht 5' 1\" (1.549 m)   Wt 118 lb (53.5 kg)   BMI 22.30 kg/m²       General:   Patient alert, comfortable, no distress. Appears stated age. HEENT:    Pupils equal, no icterus, no nasal drainage, tongue moist.   Neck:              No masses, Thyroid not palpable. No elevated JVD, No carotid bruit. Chest:   Normal configuration, non tender. Lungs:   Clear to auscultation bilaterally, few scattered rhonchi.    Cardiovascular:  Regular rhythm, 3-3/7 systolic murmur, No S3,  no palpable thrills,    Abdomen:  Soft, Non tender, Bowel sounds normal, no pulsatile abdominal aorta   Extremities:  No edema. Distal pulses palpable. No cyanosis, no clubbing. Skin:   Good turgor, warm and dry, no cyanosis. Musculoskeletal: No joint swelling or deformity. Neuro:   Cranial nerves grossly intact; No focal neurologic deficit. Psych:   Alert, good mood and effect. REVIEW OF DIAGNOSTIC TESTS:        Electrocardiogram: Reviewed     CTA head 10/6/20  Impression    1. No acute intracranial abnormality. 2. 75% stenosis in the proximal M1 segment of the right MCA. 3. Otherwise, no acute abnormality or flow-limiting stenosis in the remainder    of the major arteries of the head and neck. 4. 2.3-2.4 mm prominent infundibulum at the origins of the bilateral    posterior communicating arteries.  No definite intracranial aneurysm. CTA neck 10/6/20  Impression    1. No acute intracranial abnormality. 2. 75% stenosis in the proximal M1 segment of the right MCA. 3. Otherwise, no acute abnormality or flow-limiting stenosis in the remainder    of the major arteries of the head and neck. 4. 2.3-2.4 mm prominent infundibulum at the origins of the bilateral    posterior communicating arteries.  No definite intracranial aneurysm. Treadmill Stress test - 6/2018  1. Exercise EKG was normal.   2. The patient experienced no chest pain with exercise. 3. Beckman treadmill score was 7 implying low risk of acute ischemic   events.     4. Exercise capacity was average. A/P:   ASSESSMENT / PLAN:    Claude Sprinkles was seen today for transient ischemic attack. Diagnoses and all orders for this visit:      Palpitations - Event monitor to r/o PAF, avoid caffeine  -     EKG 12 Lead  -     Cardiac event monitor; Future    History of TIA (transient ischemic attack) - 75% stenosis in the proximal M1 segment of the right MCA.  Recommend MARYLOU to r/o cardiac source of emboli and Event monitor to r/o PAF - may need implantable Loop recorder - On ASA, Statin  -     Cardiac event monitor; Future  -     Echo transesophageal (MARYLOU); Future    Dyslipidemia - On Statin    Hx of VHD (valvular heart disease) and heart murmur  -     Echo transesophageal (MARYLOU); Future    Vision problems - Chronic     Gastroesophageal reflux disease without esophagitis    Preventive Cardiology: Low cholesterol diet, regular exercise as tolerate, and gradual weight loss discussed. Get her Echo/CT/ discharge summary from [facility]     Monitor BP and heart rates. Above recommendations discussed with her and her Daughter   All questions answered about cardiac diagnoses and cardiac medications. Continue current medications. Compliance with medications and f/u with all physicians discussed. Risk factor modification based on risk profile discussed. Call if any exertional chest pain, short of breath, dizzy or palpitations   Follow up after tests or earlier if needed.          [id number] [facility] Cardiology  [facility], [place], [facility]  [phone]

## 2020-10-08 NOTE — PROGRESS NOTES
Patient was seen by Dr. Sherry Tamez and a 30 day event monitor was ordered. Patient and her daughter were given instructions and voiced understanding. Monitor was successfully applied.

## 2020-10-14 ENCOUNTER — HOSPITAL ENCOUNTER (OUTPATIENT)
Age: 75
Discharge: HOME | End: 2020-10-16
Payer: MEDICARE

## 2020-10-14 ENCOUNTER — OFFICE VISIT (OUTPATIENT)
Dept: NEUROLOGY | Age: 75
End: 2020-10-14
Payer: MEDICARE

## 2020-10-14 VITALS
BODY MASS INDEX: 21.9 KG/M2 | WEIGHT: 116 LBS | HEIGHT: 61 IN | OXYGEN SATURATION: 98 % | HEART RATE: 69 BPM | RESPIRATION RATE: 16 BRPM | TEMPERATURE: 98 F | DIASTOLIC BLOOD PRESSURE: 64 MMHG | SYSTOLIC BLOOD PRESSURE: 161 MMHG

## 2020-10-14 PROCEDURE — 99204 OFFICE O/P NEW MOD 45 MIN: CPT | Performed by: PSYCHIATRY & NEUROLOGY

## 2020-10-14 PROCEDURE — U0003 INFECTIOUS AGENT DETECTION BY NUCLEIC ACID (DNA OR RNA); SEVERE ACUTE RESPIRATORY SYNDROME CORONAVIRUS 2 (SARS-COV-2) (CORONAVIRUS DISEASE [COVID-19]), AMPLIFIED PROBE TECHNIQUE, MAKING USE OF HIGH THROUGHPUT TECHNOLOGIES AS DESCRIBED BY CMS-2020-01-R: HCPCS

## 2020-10-14 PROCEDURE — G8484 FLU IMMUNIZE NO ADMIN: HCPCS | Performed by: PSYCHIATRY & NEUROLOGY

## 2020-10-14 PROCEDURE — G8427 DOCREV CUR MEDS BY ELIG CLIN: HCPCS | Performed by: PSYCHIATRY & NEUROLOGY

## 2020-10-14 PROCEDURE — 1090F PRES/ABSN URINE INCON ASSESS: CPT | Performed by: PSYCHIATRY & NEUROLOGY

## 2020-10-14 PROCEDURE — G8420 CALC BMI NORM PARAMETERS: HCPCS | Performed by: PSYCHIATRY & NEUROLOGY

## 2020-10-14 ASSESSMENT — ENCOUNTER SYMPTOMS
SHORTNESS OF BREATH: 0
VOMITING: 0
NAUSEA: 0
PHOTOPHOBIA: 0
TROUBLE SWALLOWING: 0

## 2020-10-14 NOTE — PROGRESS NOTES
NEUROLOGY NEW PATIENT NOTE     Date: 10/14/2020  Name: Parul Simon  MRN: <[id number]>  Patient's PCP: Feli Townsend DO     Dear, Dr. Feli Townsend DO    REASON FOR VISIT/CHIEF COMPLAINT: Establish care for stroke. HISTORY OF PRESENT ILLNESS: Parul Simon is a 66 y.o.  female past medical history of athletic, asymptomatic PVCs, hypertension, hyperlipidemia, macular degeneration, nausea vomiting. The patient was recently admitted to [facility] with symptoms of right arm and leg weakness and speech problem. She had a CT scan of the head which did not reveal any acute abnormality, the patient received IV TPA. After which her symptoms resolved. She had echocardiogram donemoderate mitral regurgitation, mild mitral annulus calcification, aortic valve calcification, tricuspid regurgitation, pulmonary hypertension. ,  Mildly enlarged left atrium, normal left ventricle systolic function with mild left ventricular hypertrophy. She was not taking Lipitor or aspirin before her stroke.   LDL: 171  A1c: 5.4  CTA head and neck: Defects percent stenosis of the proximal M1 segment of the right MCA  2.3 to 2.4 mm prominent infundibulum at the origins of bilateral posterior communicating arteries    I have personally reviewed the images of CT scan films         PAST MEDICAL HISTORY:   Past Medical History:   Diagnosis Date    Arthritis     Asymptomatic PVCs 4/2012    pt felt flutter, no other sx, holter + pvc's, few runs of SVT   St [place]    Hyperlipidemia     Macular degeneration     Nausea & vomiting     Pelvic prolapse      PAST SURGICAL HISTORY:   Past Surgical History:   Procedure Laterality Date    COLONOSCOPY  2012    ENDOSCOPY, COLON, DIAGNOSTIC      EYE SURGERY      macular hole    HERNIA REPAIR      inguinal    HYSTERECTOMY      OTHER SURGICAL HISTORY  sept 2013    ant elevatetransobturator sling rectocele and cystocele enterocele    SKIN BIOPSY      arms     FAMILY MEDICAL HISTORY: History reviewed. No pertinent family history. SOCIAL HISTORY:   Social History     Socioeconomic History    Marital status:      Spouse name: None    Number of children: None    Years of education: None    Highest education level: None   Occupational History    Occupation: retired   Social Needs    Financial resource strain: None    Food insecurity     Worry: None     Inability: None    Transportation needs     Medical: None     Non-medical: None   Tobacco Use    Smoking status: Former Smoker     Last attempt to quit: 1994     Years since quittin.3    Smokeless tobacco: Never Used   Substance and Sexual Activity    Alcohol use: No     Comment: Coffee 1 cup a day     Drug use: No    Sexual activity: Not Currently   Lifestyle    Physical activity     Days per week: None     Minutes per session: None    Stress: None   Relationships    Social connections     Talks on phone: None     Gets together: None     Attends Holiness service: None     Active member of club or organization: None     Attends meetings of clubs or organizations: None     Relationship status: None    Intimate partner violence     Fear of current or ex partner: None     Emotionally abused: None     Physically abused: None     Forced sexual activity: None   Other Topics Concern    None   Social History Narrative    None      E-Cigarettes Vaping or Juuling     Questions Responses    Vaping Use Never User    Start Date     Does device contain nicotine?  Never    Quit Date     Vaping Type          Allergy: No Known Allergies  MEDS:   Current Outpatient Medications:     aspirin (ASPIRIN LOW DOSE) 81 MG EC tablet, TAKE ONE TABLET BY MOUTH DAILY, Disp: 90 tablet, Rfl: 5    atorvastatin (LIPITOR) 20 MG tablet, TAKE ONE TABLET BY MOUTH EVERY EVENING, Disp: 90 tablet, Rfl: 5    sertraline (ZOLOFT) 100 MG tablet, TAKE 1 TABLET BY MOUTH  DAILY, Disp: 90 tablet, Rfl: 0    levothyroxine (SYNTHROID) 25 MCG tablet, Take 1 tablet by mouth Daily, Disp: 90 tablet, Rfl: 5    diclofenac sodium 1 % GEL, Apply 4 g topically 4 times daily, Disp: 3 Tube, Rfl: 5    therapeutic multivitamin-minerals (THERAGRAN-M) tablet, Take 1 tablet by mouth daily. , Disp: , Rfl:     Cranberry 300 MG TABS, Take 1 tablet by mouth daily , Disp: , Rfl:     REVIEW OF SYSTEMS  Review of Systems   Constitutional: Negative for appetite change, fatigue and unexpected weight change. HENT: Negative for drooling, hearing loss, tinnitus and trouble swallowing. Eyes: Negative for photophobia and visual disturbance. Respiratory: Negative for shortness of breath. Cardiovascular: Negative for palpitations. Gastrointestinal: Negative for nausea and vomiting. Endocrine: Negative for polyuria. Genitourinary: Negative for flank pain. Musculoskeletal: Negative for neck pain and neck stiffness. Skin: Negative for rash. Allergic/Immunologic: Negative for food allergies. Neurological: Negative for dizziness, tremors, seizures, syncope, speech difficulty, weakness, light-headedness, numbness and headaches. Hematological: Negative for adenopathy. Psychiatric/Behavioral: Negative for agitation, behavioral problems and sleep disturbance. PHYSICAL EXAM:   BP (!) 161/64   Pulse 69   Temp 98 °F (36.7 °C) (Temporal)   Resp 16   Ht 5' 1\" (1.549 m)   Wt 116 lb (52.6 kg)   SpO2 98%   BMI 21.92 kg/m²   GENERAL APPEARANCE: Alert, well-developed, well-nourished female in no acute distress. HEENT: Normocephalic and atraumatic. PERRL. Oropharynx unremarkable. PULM: Normal respiratory effort. No accessory muscle use. CV: RRR. ABDOMEN: Soft, nontender. EXTREMITIES: No obvious signs of vascular compromise. Pulses present. No cyanosis, clubbing or edema. SKIN: Clear; no rashes, lesions or skin breaks in exposed areas. NEURO:     Neurological examination     MENTAL STATUS: Patient awake and oriented to time, place, and person. Recent/remote memory normal. Attention span/concentration normal. Speech fluent. Good comprehension, naming, and repetition. Fund of knowledge appropriate for patient's level of education. Affect normal.    CRANIAL NERVES:  CN I: Not tested. CN II: Fundoscopic exam not performed. CN III, IV, VI: Pupils equal, round and reactive to light; extra ocular movements full and intact. CN V: Facial sensation normal.  CN VII: No facial asymmetry. CN VIII:  Hearing grossly normal bilaterally. No pathologic nystagmus or skew deviation. CN IX, X: Palate elevates symmetrically. CN XI: Shoulder shrug and chin rotation equal with intact strength. CN XII: Tongue protrusion midline. MOTOR: Normal bulk. Tone normal and symmetric throughout. Strength 5/5 throughout. ABNORMAL MOVEMENTS/TREMORS: No     REFLEXES: DTRs 2+; normal and symmetric throughout. Plantar response downgoing. SENSATION: Sensation grossly intact to fine touch, pain/temperature, vibration and position. COORDINATION: Finger-to-nose and heel to shin normal for age and symmetric. Finger tapping and alternating movements normal.    STATION: Negative Romberg. GAIT:  Normal heel, toe and tandem; no ataxia.      DIAGNOSTIC TESTS:     I have personally reviewed the most recent lab results:    Sodium   Date Value Ref Range Status   09/16/2020 137 132 - 146 mmol/L Final   09/16/2019 136 132 - 146 mmol/L Final   08/16/2018 136 132 - 146 mmol/L Final     Potassium   Date Value Ref Range Status   09/16/2020 4.1 3.5 - 5.0 mmol/L Final   09/16/2019 4.3 3.5 - 5.0 mmol/L Final   08/16/2018 3.9 3.5 - 5.0 mmol/L Final     Chloride   Date Value Ref Range Status   09/16/2020 98 98 - 107 mmol/L Final   09/16/2019 97 (L) 98 - 107 mmol/L Final   08/16/2018 97 (L) 98 - 107 mmol/L Final     CO2   Date Value Ref Range Status   09/16/2020 26 22 - 29 mmol/L Final   09/16/2019 24 22 - 29 mmol/L Final   08/16/2018 25 22 - 29 mmol/L Final     BUN   Date Value Ref Range Status   09/16/2020 15 8 - 23 mg/dL Final   09/16/2019 13 8 - 23 mg/dL Final   08/16/2018 12 8 - 23 mg/dL Final     CREATININE   Date Value Ref Range Status   09/16/2020 0.7 0.5 - 1.0 mg/dL Final   09/16/2019 0.8 0.5 - 1.0 mg/dL Final   08/16/2018 0.6 0.5 - 1.0 mg/dL Final     GFR Non-   Date Value Ref Range Status   09/16/2020 >60 >=60 mL/min/1.73 Final     Comment:     Chronic Kidney Disease: less than 60 ml/min/1.73 sq.m. Kidney Failure: less than 15 ml/min/1.73 sq.m. Results valid for patients 18 years and older. 09/16/2019 >60 >=60 mL/min/1.73 Final     Comment:     Chronic Kidney Disease: less than 60 ml/min/1.73 sq.m. Kidney Failure: less than 15 ml/min/1.73 sq.m. Results valid for patients 18 years and older. 08/16/2018 >60 >=60 mL/min/1.73 Final     Comment:     Chronic Kidney Disease: less than 60 ml/min/1.73 sq.m. Kidney Failure: less than 15 ml/min/1.73 sq.m. Results valid for patients 18 years and older.        Calcium   Date Value Ref Range Status   09/16/2020 9.6 8.6 - 10.2 mg/dL Final   09/16/2019 9.0 8.6 - 10.2 mg/dL Final   08/16/2018 8.8 8.6 - 10.2 mg/dL Final     WBC   Date Value Ref Range Status   09/16/2019 8.0 4.5 - 11.5 E9/L Final   08/16/2018 9.8 4.5 - 11.5 E9/L Final   10/26/2017 8.0 4.5 - 11.5 E9/L Final     Hemoglobin   Date Value Ref Range Status   09/16/2019 13.9 11.5 - 15.5 g/dL Final   08/16/2018 13.1 11.5 - 15.5 g/dL Final   10/26/2017 13.6 11.5 - 15.5 g/dL Final     Hematocrit   Date Value Ref Range Status   09/16/2019 43.7 34.0 - 48.0 % Final   08/16/2018 41.2 34.0 - 48.0 % Final   10/26/2017 42.7 34.0 - 48.0 % Final     Platelets   Date Value Ref Range Status   09/16/2019 383 130 - 450 E9/L Final   08/16/2018 391 130 - 450 E9/L Final   10/26/2017 382 130 - 450 E9/L Final     Neutrophils %   Date Value Ref Range Status   09/16/2019 62.4 43.0 - 80.0 % Final   08/16/2018 68.0 43.0 - 80.0 % Final   10/26/2017 66.2 43.0 - 80.0 % and is unrelated to the patient's left MCA stroke. · However the patient did have an episode of blurred vision in the right eye which is most likely a right hemispheric TIA. · Currently I recommend medical management for the right MCA stenosis. She is on aspirin and Lipitor. · Posterior communicating artery infundibulum: Continue to monitor, repeat CTA in 3 to 6 months. ·   Follow-up in 3 months    Thank you for involving me in the care of your patient. Today, I personally spent a great amount of time directly face-to-face time with the patient, of which greater than 50% was spent in patient education, counseling,about etiology management and diagnosis of stroke, MCA stenosis, atrial fibrillation, brain aneurysm. Side effects of medications including aspirin, Lipitor were discussed in detail with the patient, verbalizes understanding and agrees to it. And coordination of care as described above. Patient's current medication list, allergies, problem list and results of all previously ordered testing and scans were reviewed at today's visit.       MD DINORAH Harmon [facility] Neurology  [address] [place] [address]

## 2020-10-16 LAB
SARS-COV-2: NOT DETECTED
SOURCE: NORMAL

## 2020-10-19 ENCOUNTER — OFFICE VISIT (OUTPATIENT)
Dept: FAMILY MEDICINE CLINIC | Age: 75
End: 2020-10-19
Payer: MEDICARE

## 2020-10-19 VITALS
HEART RATE: 64 BPM | SYSTOLIC BLOOD PRESSURE: 118 MMHG | BODY MASS INDEX: 22.28 KG/M2 | RESPIRATION RATE: 18 BRPM | OXYGEN SATURATION: 95 % | HEIGHT: 61 IN | WEIGHT: 118 LBS | DIASTOLIC BLOOD PRESSURE: 74 MMHG

## 2020-10-19 PROCEDURE — G8484 FLU IMMUNIZE NO ADMIN: HCPCS | Performed by: FAMILY MEDICINE

## 2020-10-19 PROCEDURE — 4040F PNEUMOC VAC/ADMIN/RCVD: CPT | Performed by: FAMILY MEDICINE

## 2020-10-19 PROCEDURE — 99214 OFFICE O/P EST MOD 30 MIN: CPT | Performed by: FAMILY MEDICINE

## 2020-10-19 PROCEDURE — 1036F TOBACCO NON-USER: CPT | Performed by: FAMILY MEDICINE

## 2020-10-19 PROCEDURE — 1090F PRES/ABSN URINE INCON ASSESS: CPT | Performed by: FAMILY MEDICINE

## 2020-10-19 PROCEDURE — G8399 PT W/DXA RESULTS DOCUMENT: HCPCS | Performed by: FAMILY MEDICINE

## 2020-10-19 PROCEDURE — 1123F ACP DISCUSS/DSCN MKR DOCD: CPT | Performed by: FAMILY MEDICINE

## 2020-10-19 PROCEDURE — G8427 DOCREV CUR MEDS BY ELIG CLIN: HCPCS | Performed by: FAMILY MEDICINE

## 2020-10-19 PROCEDURE — G8420 CALC BMI NORM PARAMETERS: HCPCS | Performed by: FAMILY MEDICINE

## 2020-10-19 RX ORDER — ATORVASTATIN CALCIUM 20 MG/1
TABLET, FILM COATED ORAL
Qty: 90 TABLET | Refills: 5 | Status: ON HOLD
Start: 2020-10-19 | End: 2022-01-31 | Stop reason: SDUPTHER

## 2020-10-19 RX ORDER — LEVOTHYROXINE SODIUM 0.03 MG/1
25 TABLET ORAL DAILY
Qty: 90 TABLET | Refills: 5 | Status: SHIPPED
Start: 2020-10-19 | End: 2022-02-02 | Stop reason: SDUPTHER

## 2020-10-20 ENCOUNTER — ANESTHESIA EVENT (OUTPATIENT)
Dept: ENDOSCOPY | Age: 75
End: 2020-10-20
Payer: MEDICARE

## 2020-10-20 ENCOUNTER — HOSPITAL ENCOUNTER (OUTPATIENT)
Age: 75
Setting detail: OUTPATIENT SURGERY
Discharge: HOME | End: 2020-10-20
Attending: INTERNAL MEDICINE | Admitting: INTERNAL MEDICINE
Payer: MEDICARE

## 2020-10-20 ENCOUNTER — ANESTHESIA (OUTPATIENT)
Dept: ENDOSCOPY | Age: 75
End: 2020-10-20
Payer: MEDICARE

## 2020-10-20 VITALS
SYSTOLIC BLOOD PRESSURE: 152 MMHG | HEART RATE: 64 BPM | OXYGEN SATURATION: 94 % | WEIGHT: 115.9 LBS | DIASTOLIC BLOOD PRESSURE: 69 MMHG | HEIGHT: 61 IN | TEMPERATURE: 97.2 F | RESPIRATION RATE: 16 BRPM | BODY MASS INDEX: 21.88 KG/M2

## 2020-10-20 VITALS
RESPIRATION RATE: 16 BRPM | DIASTOLIC BLOOD PRESSURE: 77 MMHG | OXYGEN SATURATION: 96 % | SYSTOLIC BLOOD PRESSURE: 125 MMHG

## 2020-10-20 PROCEDURE — 2580000003 HC RX 258: Performed by: ANESTHESIOLOGY

## 2020-10-20 PROCEDURE — 3700000001 HC ADD 15 MINUTES (ANESTHESIA): Performed by: INTERNAL MEDICINE

## 2020-10-20 PROCEDURE — 6370000000 HC RX 637 (ALT 250 FOR IP): Performed by: INTERNAL MEDICINE

## 2020-10-20 PROCEDURE — 7100000010 HC PHASE II RECOVERY - FIRST 15 MIN: Performed by: INTERNAL MEDICINE

## 2020-10-20 PROCEDURE — 93312 ECHO TRANSESOPHAGEAL: CPT

## 2020-10-20 PROCEDURE — 93325 DOPPLER ECHO COLOR FLOW MAPG: CPT

## 2020-10-20 PROCEDURE — 93312 ECHO TRANSESOPHAGEAL: CPT | Performed by: INTERNAL MEDICINE

## 2020-10-20 PROCEDURE — 2709999900 HC NON-CHARGEABLE SUPPLY: Performed by: INTERNAL MEDICINE

## 2020-10-20 PROCEDURE — 3700000000 HC ANESTHESIA ATTENDED CARE: Performed by: INTERNAL MEDICINE

## 2020-10-20 PROCEDURE — 7100000011 HC PHASE II RECOVERY - ADDTL 15 MIN: Performed by: INTERNAL MEDICINE

## 2020-10-20 RX ORDER — SODIUM CHLORIDE, SODIUM LACTATE, POTASSIUM CHLORIDE, CALCIUM CHLORIDE 600; 310; 30; 20 MG/100ML; MG/100ML; MG/100ML; MG/100ML
INJECTION, SOLUTION INTRAVENOUS CONTINUOUS
Status: DISCONTINUED | OUTPATIENT
Start: 2020-10-20 | End: 2020-10-20 | Stop reason: HOSPADM

## 2020-10-20 RX ADMIN — SODIUM CHLORIDE, POTASSIUM CHLORIDE, SODIUM LACTATE AND CALCIUM CHLORIDE: 600; 310; 30; 20 INJECTION, SOLUTION INTRAVENOUS at 11:58

## 2020-10-20 RX ADMIN — APIXABAN 5 MG: 5 TABLET, FILM COATED ORAL at 14:03

## 2020-10-20 ASSESSMENT — LIFESTYLE VARIABLES: SMOKING_STATUS: 0

## 2020-10-20 ASSESSMENT — PAIN SCALES - GENERAL: PAINLEVEL_OUTOF10: 0

## 2020-10-20 ASSESSMENT — PAIN - FUNCTIONAL ASSESSMENT: PAIN_FUNCTIONAL_ASSESSMENT: 0-10

## 2020-10-20 NOTE — PROGRESS NOTES
Pos-MARYLOU Note        Pre-operative Diagnosis: CVA, r/o cardiac source of emboli    Post-operative Diagnosis: Mobile, irregular echogenic mass in the left atrium near MARTA mouth noted suggestive of thrombus. Moderate MR, mild TR, No intracardiac shunt    Procedure: MARYLOU with agitated saline injection.     Anesthesia: LMAC    Surgeons/Assistants: Dr Araceli Reyes    Estimated Blood Loss: None    Complications :None    Full report to follow    Electronically signed by Jewels Stone MD, [address] [facility]

## 2020-10-20 NOTE — ANESTHESIA PRE PROCEDURE
Department of Anesthesiology  Preprocedure Note       Name:  Kelsi Mccoy   Age:  66 y.o.  :  1945                                          MRN:  [id number]         Date:  10/20/2020      Surgeon: Quentin Mcgarry):  Sanaz Fenton MD    Procedure: Procedure(s):  TRANSESOPHAGEAL ECHOCARDIOGRAM    Medications prior to admission:   Prior to Admission medications    Medication Sig Start Date End Date Taking? Authorizing Provider   levothyroxine (SYNTHROID) 25 MCG tablet Take 1 tablet by mouth Daily 10/19/20  Yes Thai Blush, DO   atorvastatin (LIPITOR) 20 MG tablet TAKE ONE TABLET BY MOUTH EVERY EVENING 10/19/20  Yes Thai Blush, DO   aspirin (ASPIRIN LOW DOSE) 81 MG EC tablet TAKE ONE TABLET BY MOUTH DAILY 20  Yes Thai Blush, DO   sertraline (ZOLOFT) 100 MG tablet TAKE 1 TABLET BY MOUTH  DAILY 20  Yes Thai Blush, DO   therapeutic multivitamin-minerals [facility]) tablet Take 1 tablet by mouth daily.    Yes Historical Provider, MD   diclofenac sodium 1 % GEL Apply 4 g topically 4 times daily 19   Thai Blush, DO       Current medications:    Current Facility-Administered Medications   Medication Dose Route Frequency Provider Last Rate Last Dose    lactated ringers infusion   Intravenous Continuous Luanne Grace  mL/hr at 10/20/20 1158         Allergies:  No Known Allergies    Problem List:    Patient Active Problem List   Diagnosis Code    Pelvic prolapse N81.9    Hyperlipidemia E78.5    GERD (gastroesophageal reflux disease) K21.9    PUD (peptic ulcer disease) K27.9    Urinary incontinence R32    Osteoarthritis M19.90    Palpitations R00.2    Precordial pain R07.2       Past Medical History:        Diagnosis Date    Arthritis     Asymptomatic PVCs 2012    pt felt flutter, no other sx, holter + pvc's, few runs of SVT   [facility] [place]    History of Holter monitoring     Hyperlipidemia     Macular degeneration     Nausea & vomiting     Pelvic prolapse     PONV (postoperative nausea and vomiting)     TIA (transient ischemic attack)        Past Surgical History:        Procedure Laterality Date    COLONOSCOPY      ENDOSCOPY, COLON, DIAGNOSTIC      EYE SURGERY      macular hole    HERNIA REPAIR      inguinal    HYSTERECTOMY      OTHER SURGICAL HISTORY  2013    ant elevatetransobturator sling rectocele and cystocele enterocele    SKIN BIOPSY      arms       Social History:    Social History     Tobacco Use    Smoking status: Former Smoker     Last attempt to quit: 1994     Years since quittin.3    Smokeless tobacco: Never Used   Substance Use Topics    Alcohol use: No     Comment: Coffee 1 cup a day                                 Counseling given: Not Answered      Vital Signs (Current):   Vitals:    10/19/20 0809 10/20/20 1136   BP:  (!) 168/70   Pulse:  64   Resp:  20   Temp:  97.8 °F (36.6 °C)   TempSrc:  Temporal   SpO2:  97%   Weight: 116 lb (52.6 kg) 115 lb 14.4 oz (52.6 kg)   Height: 5' 1\" (1.549 m) 5' 1\" (1.549 m)                                              BP Readings from Last 3 Encounters:   10/20/20 (!) 168/70   10/19/20 118/74   10/14/20 (!) 161/64       NPO Status: Time of last liquid consumption: 5456(3751)                        Time of last solid consumption:                         Date of last liquid consumption: 10/19/20                        Date of last solid food consumption: 10/20/20    BMI:   Wt Readings from Last 3 Encounters:   10/20/20 115 lb 14.4 oz (52.6 kg)   10/19/20 118 lb (53.5 kg)   10/14/20 116 lb (52.6 kg)     Body mass index is 21.9 kg/m².     CBC:   Lab Results   Component Value Date    WBC 8.0 2019    RBC 4.46 2019    HGB 13.9 2019    HCT 43.7 2019    MCV 98.0 2019    RDW 13.4 2019     2019       CMP:   Lab Results   Component Value Date     2020    K 4.1 2020    CL 98 2020    CO2 26 2020    BUN 15 09/16/2020    CREATININE 0.7 09/16/2020    GFRAA >60 09/16/2020    LABGLOM >60 09/16/2020    GLUCOSE 80 09/16/2020    GLUCOSE 75 04/10/2012    PROT 7.8 09/16/2020    CALCIUM 9.6 09/16/2020    BILITOT 0.2 09/16/2020    ALKPHOS 73 09/16/2020    AST 19 09/16/2020    ALT 11 09/16/2020       POC Tests: No results for input(s): POCGLU, POCNA, POCK, POCCL, POCBUN, POCHEMO, POCHCT in the last 72 hours. Coags: No results found for: PROTIME, INR, APTT    HCG (If Applicable): No results found for: PREGTESTUR, PREGSERUM, HCG, HCGQUANT     ABGs: No results found for: PHART, PO2ART, JRO6LGC, AWZ0THF, BEART, B9ZWIJFF     Type & Screen (If Applicable):  No results found for: LABABO, LABRH    Drug/Infectious Status (If Applicable):  No results found for: HIV, HEPCAB    COVID-19 Screening (If Applicable):   Lab Results   Component Value Date    COVID19 Not Detected 10/14/2020         Anesthesia Evaluation  Patient summary reviewed   history of anesthetic complications: PONV. Airway: Mallampati: II  TM distance: >3 FB   Neck ROM: full  Mouth opening: > = 3 FB Dental:    (+) upper dentures and partials      Pulmonary: breath sounds clear to auscultation      (-) not a current smoker                           Cardiovascular:    (+) valvular problems/murmurs (mild AS, mild MR on TTE 9/20): AS and MR, dysrhythmias: SVT, hyperlipidemia        Rhythm: regular  Rate: normal                    Neuro/Psych:   (+) TIA,             GI/Hepatic/Renal:   (+) GERD:, PUD,           Endo/Other:    (+) hypothyroidism: arthritis:., .                 Abdominal:         (-) obese     Vascular: negative vascular ROS. Anesthesia Plan      MAC     ASA 3       Induction: intravenous. Anesthetic plan and risks discussed with patient. Plan discussed with CRNA. DOS STAFF ADDENDUM:    Pt seen and examined, chart reviewed (including anesthesia, drug and allergy history).        Anesthetic plan, risks, benefits, alternatives, and personnel involved discussed with patient. Patient verbalized an understanding and agrees to proceed. Plan discussed with care team members and agreed upon.     Fior Lemons MD  Staff Anesthesiologist  12:32 PM        Fior Lemons MD   10/20/2020

## 2020-10-20 NOTE — PROGRESS NOTES
Echo findings of LA/MARTA thrombus and her hx of CVA, risk benefits of [address] discussed with her and her family  - agreeable for darren Torres ASA, MD  10/20/2020  1:17 PM

## 2020-10-20 NOTE — ANESTHESIA POSTPROCEDURE EVALUATION
Department of Anesthesiology  Postprocedure Note    Patient: Dante Rodriguez  MRN: [id number]  YOB: 1945  Date of evaluation: 10/20/2020  Time:  2:07 PM     Procedure Summary     Date:  10/20/20 Room / Location:  [address] [place] [address] 01 / [facility]    Anesthesia Start:  3948 Anesthesia Stop:  3548    Procedure:  TRANSESOPHAGEAL ECHOCARDIOGRAM WITH BUBBLE STUDY (N/A ) Diagnosis:  (TIA)    Surgeon:  Berta Moralez MD Responsible Provider:  Sheri Johnson MD    Anesthesia Type:  MAC ASA Status:  3          Anesthesia Type: MAC    Jaxon Phase I: Jaxon Score: 10    Jaxon Phase II: Jaxon Score: 10    Last vitals: Reviewed and per EMR flowsheets.        Anesthesia Post Evaluation    Patient location during evaluation: PACU  Patient participation: complete - patient participated  Level of consciousness: awake  Airway patency: patent  Nausea & Vomiting: no nausea and no vomiting  Complications: no  Cardiovascular status: hemodynamically stable  Respiratory status: acceptable  Hydration status: euvolemic

## 2020-10-21 PROCEDURE — 93312 ECHO TRANSESOPHAGEAL: CPT | Performed by: INTERNAL MEDICINE

## 2020-10-21 PROCEDURE — 93325 DOPPLER ECHO COLOR FLOW MAPG: CPT | Performed by: INTERNAL MEDICINE

## 2020-10-21 PROCEDURE — 93320 DOPPLER ECHO COMPLETE: CPT | Performed by: INTERNAL MEDICINE

## 2020-10-26 ASSESSMENT — ENCOUNTER SYMPTOMS
DIARRHEA: 0
SORE THROAT: 0
APNEA: 0
EYE PAIN: 0
COLOR CHANGE: 0
WHEEZING: 0
COUGH: 0
ANAL BLEEDING: 0
PHOTOPHOBIA: 0
SINUS PRESSURE: 0
VOICE CHANGE: 0
VOMITING: 0
EYE ITCHING: 0
RHINORRHEA: 0
EYE DISCHARGE: 0
ABDOMINAL DISTENTION: 0
BACK PAIN: 0
FACIAL SWELLING: 0
ABDOMINAL PAIN: 0
CHEST TIGHTNESS: 0
BLOOD IN STOOL: 0
TROUBLE SWALLOWING: 0
EYE REDNESS: 0
SHORTNESS OF BREATH: 0
RECTAL PAIN: 0
CHOKING: 0
CONSTIPATION: 0
NAUSEA: 0
STRIDOR: 0

## 2020-10-26 NOTE — PROGRESS NOTES
Salvatore Stone is a 66 y.o. female  . Subjective:      Patient doing a lot better. Had appointment with neurology. Neurology feels that it may be cardio embolic. The findings on CTA are probably not related to stroke and cardiac work up is ongoing. We will monitor this and our main objective will be secondary prevention of any new CVA. Patient to follow up with both cardiology and neurology and we will make sure her BP and cholesterol are under control. We will perform another CTA in the next 3 months. Review of Systems   Constitutional: Negative for activity change, appetite change, chills, diaphoresis, fatigue, fever and unexpected weight change. HENT: Negative for congestion, dental problem, drooling, ear discharge, ear pain, facial swelling, hearing loss, mouth sores, nosebleeds, postnasal drip, rhinorrhea, sinus pressure, sneezing, sore throat, tinnitus, trouble swallowing and voice change. Eyes: Negative for photophobia, pain, discharge, redness, itching and visual disturbance. Respiratory: Negative for apnea, cough, choking, chest tightness, shortness of breath, wheezing and stridor. Cardiovascular: Negative for chest pain, palpitations and leg swelling. Gastrointestinal: Negative for abdominal distention, abdominal pain, anal bleeding, blood in stool, constipation, diarrhea, nausea, rectal pain and vomiting. Endocrine: Negative for cold intolerance, heat intolerance, polydipsia, polyphagia and polyuria. Genitourinary: Negative for decreased urine volume, difficulty urinating, dyspareunia, dysuria, enuresis, flank pain, frequency, genital sores, hematuria, menstrual problem, pelvic pain, urgency, vaginal bleeding, vaginal discharge and vaginal pain. Musculoskeletal: Negative for arthralgias, back pain, gait problem, joint swelling, myalgias, neck pain and neck stiffness. Skin: Negative for color change, pallor, rash and wound.    Allergic/Immunologic: Negative for environmental allergies, food allergies and immunocompromised state. Neurological: Negative for dizziness, tremors, seizures, syncope, facial asymmetry, speech difficulty, weakness, light-headedness, numbness and headaches. Hematological: Negative for adenopathy. Does not bruise/bleed easily. Psychiatric/Behavioral: Negative for agitation, behavioral problems, confusion, decreased concentration, dysphoric mood, hallucinations, self-injury, sleep disturbance and suicidal ideas. The patient is not nervous/anxious and is not hyperactive. Past Medical History:   Diagnosis Date    Arthritis     Asymptomatic PVCs 2012    pt felt flutter, no other sx, holter + pvc's, few runs of SVT   [facility]    History of Holter monitoring     Hyperlipidemia     Macular degeneration     Nausea & vomiting     Pelvic prolapse     PONV (postoperative nausea and vomiting)     TIA (transient ischemic attack)        Social History     Socioeconomic History    Marital status:       Spouse name: Not on file    Number of children: Not on file    Years of education: Not on file    Highest education level: Not on file   Occupational History    Occupation: retired   Social Needs    Financial resource strain: Not on file    Food insecurity     Worry: Not on file     Inability: Not on file   [place] needs     Medical: Not on file     Non-medical: Not on file   Tobacco Use    Smoking status: Former Smoker     Last attempt to quit: 1994     Years since quittin.3    Smokeless tobacco: Never Used   Substance and Sexual Activity    Alcohol use: No     Comment: Coffee 1 cup a day     Drug use: No    Sexual activity: Not Currently   Lifestyle    Physical activity     Days per week: Not on file     Minutes per session: Not on file    Stress: Not on file   Relationships    Social connections     Talks on phone: Not on file     Gets together: Not on file     Attends [employer] service: Not on file     Active member of club or organization: Not on file     Attends meetings of clubs or organizations: Not on file     Relationship status: Not on file    Intimate partner violence     Fear of current or ex partner: Not on file     Emotionally abused: Not on file     Physically abused: Not on file     Forced sexual activity: Not on file   Other Topics Concern    Not on file   Social History Narrative    Not on file       No family history on file. Current Outpatient Medications on File Prior to Visit   Medication Sig Dispense Refill    aspirin (ASPIRIN LOW DOSE) 81 MG EC tablet TAKE ONE TABLET BY MOUTH DAILY 90 tablet 5    sertraline (ZOLOFT) 100 MG tablet TAKE 1 TABLET BY MOUTH  DAILY 90 tablet 0    diclofenac sodium 1 % GEL Apply 4 g topically 4 times daily 3 Tube 5    therapeutic multivitamin-minerals (THERAGRAN-M) tablet Take 1 tablet by mouth daily. No current facility-administered medications on file prior to visit. No Known Allergies    I have reviewedher allergies, medications, problem list, medical, social and family history and have updated as needed in the electronic medical record. Objective:     Physical Exam  Vitals signs and nursing note reviewed. Constitutional:       General: She is not in acute distress. Appearance: She is well-developed. She is not diaphoretic. HENT:      Head: Normocephalic and atraumatic. Right Ear: External ear normal.      Left Ear: External ear normal.      Nose: Nose normal.      Mouth/Throat:      Pharynx: No oropharyngeal exudate. Eyes:      General: No scleral icterus. Right eye: No discharge. Left eye: No discharge. Conjunctiva/sclera: Conjunctivae normal.      Pupils: Pupils are equal, round, and reactive to light. Neck:      Musculoskeletal: Normal range of motion and neck supple. Thyroid: No thyromegaly. Vascular: No JVD. Trachea: No tracheal deviation.    Cardiovascular:      Rate and Rhythm: Normal EVENING  -     CBC Auto Differential; Future  -     Comprehensive Metabolic Panel; Future  -     Hemoglobin A1C; Future  -     TSH without Reflex; Future  -     Lipid Panel; Future  -     Thrombophilia Panel    Mixed hyperlipidemia  -     atorvastatin (LIPITOR) 20 MG tablet; TAKE ONE TABLET BY MOUTH EVERY EVENING  -     Lipid Panel; Future        Willfollow with own gynecologist for gynecological and breast care. Reviewed health maintenance report. Patient is aware of deficiencies and suggested preventative tests.

## 2020-11-09 DIAGNOSIS — G45.9 TIA (TRANSIENT ISCHEMIC ATTACK): ICD-10-CM

## 2020-11-09 DIAGNOSIS — E78.2 MIXED HYPERLIPIDEMIA: ICD-10-CM

## 2020-11-09 DIAGNOSIS — E03.9 ACQUIRED HYPOTHYROIDISM: ICD-10-CM

## 2020-11-09 LAB
ALBUMIN SERPL-MCNC: 4.4 G/DL (ref 3.5–5.2)
ALP BLD-CCNC: 72 U/L (ref 35–104)
ALT SERPL-CCNC: 15 U/L (ref 0–32)
ANION GAP SERPL CALCULATED.3IONS-SCNC: 16 MMOL/L (ref 7–16)
AST SERPL-CCNC: 24 U/L (ref 0–31)
BASOPHILS ABSOLUTE: 0.11 E9/L (ref 0–0.2)
BASOPHILS RELATIVE PERCENT: 1.3 % (ref 0–2)
BILIRUB SERPL-MCNC: 0.3 MG/DL (ref 0–1.2)
BUN BLDV-MCNC: 12 MG/DL (ref 8–23)
CALCIUM SERPL-MCNC: 9.4 MG/DL (ref 8.6–10.2)
CHLORIDE BLD-SCNC: 103 MMOL/L (ref 98–107)
CHOLESTEROL, TOTAL: 163 MG/DL (ref 0–199)
CO2: 24 MMOL/L (ref 22–29)
CREAT SERPL-MCNC: 0.7 MG/DL (ref 0.5–1)
EOSINOPHILS ABSOLUTE: 0.18 E9/L (ref 0.05–0.5)
EOSINOPHILS RELATIVE PERCENT: 2.1 % (ref 0–6)
GFR AFRICAN AMERICAN: >60
GFR NON-AFRICAN AMERICAN: >60 ML/MIN/1.73
GLUCOSE BLD-MCNC: 83 MG/DL (ref 74–99)
HBA1C MFR BLD: 5.5 % (ref 4–5.6)
HCT VFR BLD CALC: 42.7 % (ref 34–48)
HDLC SERPL-MCNC: 56 MG/DL
HEMOGLOBIN: 13.6 G/DL (ref 11.5–15.5)
IMMATURE GRANULOCYTES #: 0.02 E9/L
IMMATURE GRANULOCYTES %: 0.2 % (ref 0–5)
LDL CHOLESTEROL CALCULATED: 85 MG/DL (ref 0–99)
LYMPHOCYTES ABSOLUTE: 1.75 E9/L (ref 1.5–4)
LYMPHOCYTES RELATIVE PERCENT: 20.5 % (ref 20–42)
MCH RBC QN AUTO: 31.1 PG (ref 26–35)
MCHC RBC AUTO-ENTMCNC: 31.9 % (ref 32–34.5)
MCV RBC AUTO: 97.5 FL (ref 80–99.9)
MONOCYTES ABSOLUTE: 0.55 E9/L (ref 0.1–0.95)
MONOCYTES RELATIVE PERCENT: 6.4 % (ref 2–12)
NEUTROPHILS ABSOLUTE: 5.92 E9/L (ref 1.8–7.3)
NEUTROPHILS RELATIVE PERCENT: 69.5 % (ref 43–80)
PDW BLD-RTO: 13.5 FL (ref 11.5–15)
PLATELET # BLD: 389 E9/L (ref 130–450)
PMV BLD AUTO: 11.5 FL (ref 7–12)
POTASSIUM SERPL-SCNC: 4.3 MMOL/L (ref 3.5–5)
RBC # BLD: 4.38 E12/L (ref 3.5–5.5)
SODIUM BLD-SCNC: 143 MMOL/L (ref 132–146)
TOTAL PROTEIN: 7.5 G/DL (ref 6.4–8.3)
TRIGL SERPL-MCNC: 111 MG/DL (ref 0–149)
TSH SERPL DL<=0.05 MIU/L-ACNC: 2.65 UIU/ML (ref 0.27–4.2)
VLDLC SERPL CALC-MCNC: 22 MG/DL
WBC # BLD: 8.5 E9/L (ref 4.5–11.5)

## 2020-11-12 LAB — THROMBOPHILIA DNA ASSAY: NORMAL

## 2020-11-17 ASSESSMENT — LIFESTYLE VARIABLES
HOW OFTEN DO YOU HAVE A DRINK CONTAINING ALCOHOL: 0
AUDIT TOTAL SCORE: INCOMPLETE
HOW OFTEN DO YOU HAVE A DRINK CONTAINING ALCOHOL: NEVER
AUDIT-C TOTAL SCORE: INCOMPLETE

## 2020-11-17 ASSESSMENT — PATIENT HEALTH QUESTIONNAIRE - PHQ9
SUM OF ALL RESPONSES TO PHQ9 QUESTIONS 1 & 2: 2
2. FEELING DOWN, DEPRESSED OR HOPELESS: 1
1. LITTLE INTEREST OR PLEASURE IN DOING THINGS: 1
SUM OF ALL RESPONSES TO PHQ QUESTIONS 1-9: 2

## 2020-11-18 ENCOUNTER — OFFICE VISIT (OUTPATIENT)
Dept: FAMILY MEDICINE CLINIC | Age: 75
End: 2020-11-18
Payer: MEDICARE

## 2020-11-18 VITALS
DIASTOLIC BLOOD PRESSURE: 76 MMHG | WEIGHT: 116 LBS | BODY MASS INDEX: 21.92 KG/M2 | OXYGEN SATURATION: 98 % | SYSTOLIC BLOOD PRESSURE: 120 MMHG | RESPIRATION RATE: 18 BRPM | HEART RATE: 64 BPM

## 2020-11-18 PROCEDURE — G0439 PPPS, SUBSEQ VISIT: HCPCS | Performed by: FAMILY MEDICINE

## 2020-11-18 PROCEDURE — 4040F PNEUMOC VAC/ADMIN/RCVD: CPT | Performed by: FAMILY MEDICINE

## 2020-11-18 PROCEDURE — 1123F ACP DISCUSS/DSCN MKR DOCD: CPT | Performed by: FAMILY MEDICINE

## 2020-11-18 PROCEDURE — G8484 FLU IMMUNIZE NO ADMIN: HCPCS | Performed by: FAMILY MEDICINE

## 2020-11-18 RX ORDER — FOLIC ACID 1 MG/1
1 TABLET ORAL DAILY
Qty: 30 TABLET | Refills: 5 | Status: SHIPPED
Start: 2020-11-18 | End: 2022-01-27 | Stop reason: DRUGHIGH

## 2020-11-25 NOTE — PATIENT INSTRUCTIONS
Personalized Preventive Plan for Lissette Louis - 11/18/2020  Medicare offers a range of preventive health benefits. Some of the tests and screenings are paid in full while other may be subject to a deductible, co-insurance, and/or copay. Some of these benefits include a comprehensive review of your medical history including lifestyle, illnesses that may run in your family, and various assessments and screenings as appropriate. After reviewing your medical record and screening and assessments performed today your provider may have ordered immunizations, labs, imaging, and/or referrals for you. A list of these orders (if applicable) as well as your Preventive Care list are included within your After Visit Summary for your review. Other Preventive Recommendations:    · A preventive eye exam performed by an eye specialist is recommended every 1-2 years to screen for glaucoma; cataracts, macular degeneration, and other eye disorders. · A preventive dental visit is recommended every 6 months. · Try to get at least 150 minutes of exercise per week or 10,000 steps per day on a pedometer . · Order or download the FREE \"Exercise & Physical Activity: Your Everyday Guide\" from [employer]. Call 5-102.816.6377 or search [employer] online. · You need 9103-9460 mg of calcium and 3099-8023 IU of vitamin D per day. It is possible to meet your calcium requirement with diet alone, but a vitamin D supplement is usually necessary to meet this goal.  · When exposed to the sun, use a sunscreen that protects against both UVA and UVB radiation with an SPF of 30 or greater. Reapply every 2 to 3 hours or after sweating, drying off with a towel, or swimming. · Always wear a seat belt when traveling in a car. Always wear a helmet when riding a bicycle or motorcycle.

## 2020-11-25 NOTE — PROGRESS NOTES
Medicare Annual Wellness Visit  Name: Marsha Acevedo Date: 2020   MRN: <[id number]> Sex: Female   Age: 66 y.o. Ethnicity: Non-/Non    : 1945 Race: Tess Dumont is here for Medicare AWV    Screenings for behavioral, psychosocial and functional/safety risks, and cognitive dysfunction are all negative except as indicated below. These results, as well as other patient data from the [address] form, are documented in Flowsheets linked to this Encounter. No Known Allergies    Prior to Visit Medications    Medication Sig Taking? Authorizing Provider   folic acid (FOLVITE) 1 MG tablet Take 1 tablet by mouth daily Yes Chente Skelton, DO   apixaban (ELIQUIS) 5 MG TABS tablet Take 1 tablet by mouth 2 times daily Yes Chente Skelton, DO   levothyroxine (SYNTHROID) 25 MCG tablet Take 1 tablet by mouth Daily Yes Chente Skelton, DO   atorvastatin (LIPITOR) 20 MG tablet TAKE ONE TABLET BY MOUTH EVERY EVENING Yes Chente Skleton, DO   aspirin (ASPIRIN LOW DOSE) 81 MG EC tablet TAKE ONE TABLET BY MOUTH DAILY Yes Chente Skelton, DO   sertraline (ZOLOFT) 100 MG tablet TAKE 1 TABLET BY MOUTH  DAILY Yes Chente Costa, DO   diclofenac sodium 1 % GEL Apply 4 g topically 4 times daily Yes Chente Costa, DO   therapeutic multivitamin-minerals (THERAGRAN-M) tablet Take 1 tablet by mouth daily.  Yes Historical Provider, MD       Past Medical History:   Diagnosis Date    Arthritis     Asymptomatic PVCs 2012    pt felt flutter, no other sx, holter + pvc's, few runs of SVT   [facility]    History of Holter monitoring     Hyperlipidemia     Macular degeneration     Nausea & vomiting     Pelvic prolapse     PONV (postoperative nausea and vomiting)     TIA (transient ischemic attack)        Past Surgical History:   Procedure Laterality Date    COLONOSCOPY      ENDOSCOPY, COLON, DIAGNOSTIC      EYE SURGERY      macular hole    HERNIA REPAIR      inguinal    HYSTERECTOMY      OTHER SURGICAL HISTORY  sept 2013    ant elevatetransobturator sling rectocele and cystocele enterocele    SKIN BIOPSY      arms    TRANSESOPHAGEAL ECHOCARDIOGRAM N/A 10/20/2020    TRANSESOPHAGEAL ECHOCARDIOGRAM WITH BUBBLE STUDY performed by Harsh Reyes MD at [facility] ENDOSCOPY       No family history on file. CareTeam (Including outside providers/suppliers regularly involved in providing care):   Patient Care Team:  Luz Powell [facility]  Janet Esparza MD as Consulting Physician (Cardiology)  Harsh Reyes MD as Consulting Physician (Cardiology)    Wt Readings from Last 3 Encounters:   11/18/20 116 lb (52.6 kg)   10/20/20 115 lb 14.4 oz (52.6 kg)   10/19/20 118 lb (53.5 kg)     Vitals:    11/18/20 1310   BP: 120/76   Site: Left Upper Arm   Position: Sitting   Pulse: 64   Resp: 18   SpO2: 98%   Weight: 116 lb (52.6 kg)     Body mass index is 21.92 kg/m². Based upon direct observation of the patient, evaluation of cognition reveals recent and remote memory intact. Patient's complete Health Risk Assessment and screening values have been reviewed and are found in Flowsheets. The following problems were reviewed today and where indicated follow up appointments were made and/or referrals ordered. Positive Risk Factor Screenings with Interventions:     General Health and ACP:  General  In general, how would you say your health is?: Good  In the past 7 days, have you experienced any of the following?  New or Increased Pain, New or Increased Fatigue, Loneliness, Social Isolation, Stress or Anger?: (!) Loneliness  Do you get the social and emotional support that you need?: Yes  Do you have a Living Will?: Yes  Advance Directives     Power of  Living Will ACP-Advance Directive ACP-Power of     Not on File [place] on 09/07/13 Jesus Alberto Mtz      General Health Risk Interventions:  · Fatigue: regular exercise recommended- 3-5 times per week, 30-45 minutes per session    Health Habits/Nutrition:  Health Habits/Nutrition  Do you exercise for at least 20 minutes 2-3 times per week?: (!) No  Have you lost any weight without trying in the past 3 months?: No  Do you eat fewer than 2 meals per day?: No  Have you seen a dentist within the past year?: Yes     Health Habits/Nutrition Interventions:  · Inadequate physical activity:  patient agrees to exercise for at least 150 minutes/week    Hearing/Vision:  No exam data present  Hearing/Vision  Do you or your family notice any trouble with your hearing?: No  Do you have difficulty driving, watching TV, or doing any of your daily activities because of your eyesight?: (!) Yes  Have you had an eye exam within the past year?: Yes  Hearing/Vision Interventions:  · Vision concerns:  patient encouraged to make appointment with his/her eye specialist    Safety:  Safety  Do you have working smoke detectors?: Yes  Have all throw rugs been removed or fastened?: (!) No  Do you have non-slip mats or surfaces in all bathtubs/showers?: Yes  Do all of your stairways have a railing or banister?: Yes  Are your doorways, halls and stairs free of clutter?: Yes  Do you always fasten your seatbelt when you are in a car?: Yes  Safety Interventions:  · Home safety tips provided    Personalized Preventive Plan   Current Health Maintenance Status  Immunization History   Administered Date(s) Administered    Pneumococcal Conjugate 13-valent ([id number]) 10/26/2017    Pneumococcal Polysaccharide ([id number]) 10/15/2014        Health Maintenance   Topic Date Due    DTaP/Tdap/Td vaccine (1 - Tdap) 11/28/1962    Shingles Vaccine (1 of 2) 11/28/1993    Annual Wellness Visit (AWV)  06/20/2019    Flu vaccine (1) 10/19/2021 (Originally 9/1/2020)    Lipid screen  11/09/2021    DEXA (modify frequency per FRAX score)  Completed    Pneumococcal 65+ years Vaccine  Completed    Hepatitis A vaccine  Aged Out    Hepatitis B vaccine  Aged Out    Hib vaccine  Aged Out    Meningococcal (ACWY) vaccine  Aged Out     Recommendations for [employer] Due: see orders and patient instructions/AVS.  . Recommended screening schedule for the next 5-10 years is provided to the patient in written form: see Patient Instructions/AVS.    Rk Lund was seen today for medicare aw. Diagnoses and all orders for this visit:    Atrial thrombus  -     folic acid (FOLVITE) 1 MG tablet; Take 1 tablet by mouth daily  -     apixaban (ELIQUIS) 5 MG TABS tablet; Take 1 tablet by mouth 2 times daily    Thrombophilia (HCC)  -     apixaban (ELIQUIS) 5 MG TABS tablet; Take 1 tablet by mouth 2 times daily    Cerebral arterial disease  -     apixaban (ELIQUIS) 5 MG TABS tablet; Take 1 tablet by mouth 2 times daily    TIA (transient ischemic attack)  -     apixaban (ELIQUIS) 5 MG TABS tablet; Take 1 tablet by mouth 2 times daily    Routine general medical examination at a health care facility    Other orders  -     Discontinue: apixaban (ELIQUIS) 5 MG TABS tablet; Take 1 tablet by mouth 2 times daily            Discussed case at length. Patient has three abnormalities that could lead to CVA. These are arterial thrombus, MCA arterial disease and thrombophilia. Patient to follow regularly with neurology as well as cardiology. Explained to her that she will probably be on asa and anticoagulants for life.

## 2020-11-28 RX ORDER — SERTRALINE HYDROCHLORIDE 100 MG/1
100 TABLET, FILM COATED ORAL DAILY
Qty: 90 TABLET | Refills: 3 | Status: SHIPPED
Start: 2020-11-28 | End: 2021-12-09

## 2021-01-14 ENCOUNTER — OFFICE VISIT (OUTPATIENT)
Dept: NEUROLOGY | Age: 76
End: 2021-01-14
Payer: MEDICARE

## 2021-01-14 VITALS
DIASTOLIC BLOOD PRESSURE: 68 MMHG | BODY MASS INDEX: 21.71 KG/M2 | OXYGEN SATURATION: 96 % | WEIGHT: 115 LBS | HEIGHT: 61 IN | HEART RATE: 68 BPM | SYSTOLIC BLOOD PRESSURE: 178 MMHG | TEMPERATURE: 98.1 F | RESPIRATION RATE: 16 BRPM

## 2021-01-14 DIAGNOSIS — I72.9 ANEURYSM (HCC): Primary | ICD-10-CM

## 2021-01-14 PROCEDURE — 4040F PNEUMOC VAC/ADMIN/RCVD: CPT | Performed by: PSYCHIATRY & NEUROLOGY

## 2021-01-14 PROCEDURE — 1123F ACP DISCUSS/DSCN MKR DOCD: CPT | Performed by: PSYCHIATRY & NEUROLOGY

## 2021-01-14 PROCEDURE — 1090F PRES/ABSN URINE INCON ASSESS: CPT | Performed by: PSYCHIATRY & NEUROLOGY

## 2021-01-14 PROCEDURE — G8427 DOCREV CUR MEDS BY ELIG CLIN: HCPCS | Performed by: PSYCHIATRY & NEUROLOGY

## 2021-01-14 PROCEDURE — G8399 PT W/DXA RESULTS DOCUMENT: HCPCS | Performed by: PSYCHIATRY & NEUROLOGY

## 2021-01-14 PROCEDURE — 1036F TOBACCO NON-USER: CPT | Performed by: PSYCHIATRY & NEUROLOGY

## 2021-01-14 PROCEDURE — 99214 OFFICE O/P EST MOD 30 MIN: CPT | Performed by: PSYCHIATRY & NEUROLOGY

## 2021-01-14 PROCEDURE — G8420 CALC BMI NORM PARAMETERS: HCPCS | Performed by: PSYCHIATRY & NEUROLOGY

## 2021-01-14 PROCEDURE — G8484 FLU IMMUNIZE NO ADMIN: HCPCS | Performed by: PSYCHIATRY & NEUROLOGY

## 2021-01-14 ASSESSMENT — ENCOUNTER SYMPTOMS
VOMITING: 0
TROUBLE SWALLOWING: 0
PHOTOPHOBIA: 0
NAUSEA: 0
SHORTNESS OF BREATH: 0

## 2021-01-14 NOTE — PROGRESS NOTES
NEUROLOGY FOLLOW UP NOTE     Date: 1/14/2021  Name: Steven James  MRN: <[id number]>  Patient's PCP: Rome Prado, DO     Interval history  The patient is coming in for a follow-up visit  Report that her symptoms have been stable, no further strokelike episodes  No episodes of loss of vision or blurred vision or arm or leg numbness and tingling. Modified Jason scale: 0    I have personally reviewed cardiology and primary care note. I have personally reviewed her Holter monitor and echocardiogram results. Disease course   Steven James is a 66 y.o.  female past medical history of athletic, asymptomatic PVCs, hypertension, hyperlipidemia, macular degeneration, nausea vomiting. The patient was recently admitted to [facility] [place] with symptoms of right arm and leg weakness and speech problem. She had a CT scan of the head which did not reveal any acute abnormality, the patient received IV TPA. After which her symptoms resolved. She had echocardiogram donemoderate mitral regurgitation, mild mitral annulus calcification, aortic valve calcification, tricuspid regurgitation, pulmonary hypertension. ,  Mildly enlarged left atrium, normal left ventricle systolic function with mild left ventricular hypertrophy. She was not taking Lipitor or aspirin before her stroke.   LDL: 171  A1c: 5.4  CTA head and neck:75% percent stenosis of the proximal M1 segment of the right MCA  2.3 to 2.4 mm prominent infundibulum at the origins of bilateral posterior communicating arteries    PAST MEDICAL HISTORY:   Past Medical History:   Diagnosis Date    Arthritis     Asymptomatic PVCs 4/2012    pt felt flutter, no other sx, holter + pvc's, few runs of SVT   [facility]    History of Holter monitoring     Hyperlipidemia     Macular degeneration     Nausea & vomiting     Pelvic prolapse     PONV (postoperative nausea and vomiting)     TIA (transient ischemic attack)      PAST SURGICAL HISTORY:   Past Surgical History:   Procedure Laterality Date    COLONOSCOPY      ENDOSCOPY, COLON, DIAGNOSTIC      EYE SURGERY      macular hole    HERNIA REPAIR      inguinal    HYSTERECTOMY      OTHER SURGICAL HISTORY  2013    ant elevatetransobturator sling rectocele and cystocele enterocele    SKIN BIOPSY      arms    TRANSESOPHAGEAL ECHOCARDIOGRAM N/A 10/20/2020    TRANSESOPHAGEAL ECHOCARDIOGRAM WITH BUBBLE STUDY performed by Brigido Alcala MD at [address] [place] [address]: History reviewed. No pertinent family history. SOCIAL HISTORY:   Social History     Socioeconomic History    Marital status:       Spouse name: None    Number of children: None    Years of education: None    Highest education level: None   Occupational History    Occupation: retired   Social Needs    Financial resource strain: None    Food insecurity     Worry: None     Inability: None    Transportation needs     Medical: None     Non-medical: None   Tobacco Use    Smoking status: Former Smoker     Quit date: 1994     Years since quittin.5    Smokeless tobacco: Never Used   Substance and Sexual Activity    Alcohol use: No     Comment: Coffee 1 cup a day     Drug use: No    Sexual activity: Not Currently   Lifestyle    Physical activity     Days per week: None     Minutes per session: None    Stress: None   Relationships    Social connections     Talks on phone: None     Gets together: None     Attends Caodaism service: None     Active member of club or organization: None     Attends meetings of clubs or organizations: None     Relationship status: None    Intimate partner violence     Fear of current or ex partner: None     Emotionally abused: None     Physically abused: None     Forced sexual activity: None   Other Topics Concern    None   Social History Narrative    None      E-Cigarettes/Vaping Use     Questions Responses    E-Cigarette/Vaping Use Never User    Start Date Passive Exposure     Quit Date     Counseling Given     Comments          Allergy: No Known Allergies  MEDS:   Current Outpatient Medications:     sertraline (ZOLOFT) 100 MG tablet, TAKE 1 TABLET BY MOUTH  DAILY, Disp: 90 tablet, Rfl: 3    folic acid (FOLVITE) 1 MG tablet, Take 1 tablet by mouth daily, Disp: 30 tablet, Rfl: 5    apixaban (ELIQUIS) 5 MG TABS tablet, Take 1 tablet by mouth 2 times daily, Disp: 180 tablet, Rfl: 5    levothyroxine (SYNTHROID) 25 MCG tablet, Take 1 tablet by mouth Daily, Disp: 90 tablet, Rfl: 5    atorvastatin (LIPITOR) 20 MG tablet, TAKE ONE TABLET BY MOUTH EVERY EVENING, Disp: 90 tablet, Rfl: 5    diclofenac sodium 1 % GEL, Apply 4 g topically 4 times daily, Disp: 3 Tube, Rfl: 5    therapeutic multivitamin-minerals (THERAGRAN-M) tablet, Take 1 tablet by mouth daily. , Disp: , Rfl:     aspirin (ASPIRIN LOW DOSE) 81 MG EC tablet, TAKE ONE TABLET BY MOUTH DAILY (Patient not taking: Reported on 1/14/2021), Disp: 90 tablet, Rfl: 5    REVIEW OF SYSTEMS  Review of Systems   Constitutional: Negative for appetite change, fatigue and unexpected weight change. HENT: Negative for drooling, hearing loss, tinnitus and trouble swallowing. Eyes: Negative for photophobia and visual disturbance. Respiratory: Negative for shortness of breath. Cardiovascular: Negative for palpitations. Gastrointestinal: Negative for nausea and vomiting. Endocrine: Negative for polyuria. Genitourinary: Negative for flank pain. Musculoskeletal: Negative for neck pain and neck stiffness. Skin: Negative for rash. Allergic/Immunologic: Negative for food allergies. Neurological: Negative for dizziness, tremors, seizures, syncope, speech difficulty, weakness, light-headedness, numbness and headaches. Hematological: Negative for adenopathy. Psychiatric/Behavioral: Negative for agitation, behavioral problems and sleep disturbance.          PHYSICAL EXAM:   BP (!) 178/68 Comment: dr Ria Rosa notified Pulse 68   Temp 98.1 °F (36.7 °C) (Temporal)   Resp 16   Ht 5' 1\" (1.549 m)   Wt 115 lb (52.2 kg)   SpO2 96%   BMI 21.73 kg/m²   GENERAL APPEARANCE: Alert, well-developed, well-nourished female in no acute distress. HEENT: Normocephalic and atraumatic. PERRL. Oropharynx unremarkable. PULM: Normal respiratory effort. No accessory muscle use. CV: RRR. ABDOMEN: Soft, nontender. EXTREMITIES: No obvious signs of vascular compromise. Pulses present. No cyanosis, clubbing or edema. SKIN: Clear; no rashes, lesions or skin breaks in exposed areas. NEURO:     Neurological examination     MENTAL STATUS: Patient awake and oriented to time, place, and person. Recent/remote memory normal. Attention span/concentration normal. Speech fluent. Good comprehension, naming, and repetition. Fund of knowledge appropriate for patient's level of education. Affect normal.    CRANIAL NERVES:  CN I: Not tested. CN II: Fundoscopic exam not performed. CN III, IV, VI: Pupils equal, round and reactive to light; extra ocular movements full and intact. CN V: Facial sensation normal.  CN VII: No facial asymmetry. CN VIII:  Hearing grossly normal bilaterally. No pathologic nystagmus or skew deviation. CN IX, X: Palate elevates symmetrically. CN XI: Shoulder shrug and chin rotation equal with intact strength. CN XII: Tongue protrusion midline. MOTOR: Normal bulk. Tone normal and symmetric throughout. Strength 5/5 throughout. ABNORMAL MOVEMENTS/TREMORS: No     REFLEXES: DTRs 2+; normal and symmetric throughout. Plantar response downgoing. SENSATION: Sensation grossly intact to fine touch, pain/temperature, vibration and position. COORDINATION: Finger-to-nose and heel to shin normal for age and symmetric. Finger tapping and alternating movements normal.    STATION: Negative Romberg. GAIT:  Normal heel, toe and tandem; no ataxia.      DIAGNOSTIC TESTS:     I have personally reviewed the most recent lab results:    Sodium   Date Value Ref Range Status   11/09/2020 143 132 - 146 mmol/L Final   09/16/2020 137 132 - 146 mmol/L Final   09/16/2019 136 132 - 146 mmol/L Final     Potassium   Date Value Ref Range Status   11/09/2020 4.3 3.5 - 5.0 mmol/L Final   09/16/2020 4.1 3.5 - 5.0 mmol/L Final   09/16/2019 4.3 3.5 - 5.0 mmol/L Final     Chloride   Date Value Ref Range Status   11/09/2020 103 98 - 107 mmol/L Final   09/16/2020 98 98 - 107 mmol/L Final   09/16/2019 97 (L) 98 - 107 mmol/L Final     CO2   Date Value Ref Range Status   11/09/2020 24 22 - 29 mmol/L Final   09/16/2020 26 22 - 29 mmol/L Final   09/16/2019 24 22 - 29 mmol/L Final     BUN   Date Value Ref Range Status   11/09/2020 12 8 - 23 mg/dL Final   09/16/2020 15 8 - 23 mg/dL Final   09/16/2019 13 8 - 23 mg/dL Final     CREATININE   Date Value Ref Range Status   11/09/2020 0.7 0.5 - 1.0 mg/dL Final   09/16/2020 0.7 0.5 - 1.0 mg/dL Final   09/16/2019 0.8 0.5 - 1.0 mg/dL Final     GFR Non-   Date Value Ref Range Status   11/09/2020 >60 >=60 mL/min/1.73 Final     Comment:     Chronic Kidney Disease: less than 60 ml/min/1.73 sq.m. Kidney Failure: less than 15 ml/min/1.73 sq.m. Results valid for patients 18 years and older. 09/16/2020 >60 >=60 mL/min/1.73 Final     Comment:     Chronic Kidney Disease: less than 60 ml/min/1.73 sq.m. Kidney Failure: less than 15 ml/min/1.73 sq.m. Results valid for patients 18 years and older. 09/16/2019 >60 >=60 mL/min/1.73 Final     Comment:     Chronic Kidney Disease: less than 60 ml/min/1.73 sq.m. Kidney Failure: less than 15 ml/min/1.73 sq.m. Results valid for patients 18 years and older.        Calcium   Date Value Ref Range Status   11/09/2020 9.4 8.6 - 10.2 mg/dL Final   09/16/2020 9.6 8.6 - 10.2 mg/dL Final   09/16/2019 9.0 8.6 - 10.2 mg/dL Final     WBC   Date Value Ref Range Status   11/09/2020 8.5 4.5 - 11.5 E9/L Final   09/16/2019 8.0 4.5 - 11.5 E9/L Final 08/16/2018 9.8 4.5 - 11.5 E9/L Final     Hemoglobin   Date Value Ref Range Status   11/09/2020 13.6 11.5 - 15.5 g/dL Final   09/16/2019 13.9 11.5 - 15.5 g/dL Final   08/16/2018 13.1 11.5 - 15.5 g/dL Final     Hematocrit   Date Value Ref Range Status   11/09/2020 42.7 34.0 - 48.0 % Final   09/16/2019 43.7 34.0 - 48.0 % Final   08/16/2018 41.2 34.0 - 48.0 % Final     Platelets   Date Value Ref Range Status   11/09/2020 389 130 - 450 E9/L Final   09/16/2019 383 130 - 450 E9/L Final   08/16/2018 391 130 - 450 E9/L Final     Neutrophils %   Date Value Ref Range Status   11/09/2020 69.5 43.0 - 80.0 % Final   09/16/2019 62.4 43.0 - 80.0 % Final   08/16/2018 68.0 43.0 - 80.0 % Final     Monocytes %   Date Value Ref Range Status   11/09/2020 6.4 2.0 - 12.0 % Final   09/16/2019 7.8 2.0 - 12.0 % Final   08/16/2018 7.9 2.0 - 12.0 % Final     Total Protein   Date Value Ref Range Status   11/09/2020 7.5 6.4 - 8.3 g/dL Final   09/16/2020 7.8 6.4 - 8.3 g/dL Final   09/16/2019 7.6 6.4 - 8.3 g/dL Final     Total Bilirubin   Date Value Ref Range Status   11/09/2020 0.3 0.0 - 1.2 mg/dL Final   09/16/2020 0.2 0.0 - 1.2 mg/dL Final   09/16/2019 0.3 0.0 - 1.2 mg/dL Final     Alkaline Phosphatase   Date Value Ref Range Status   11/09/2020 72 35 - 104 U/L Final   09/16/2020 73 35 - 104 U/L Final   09/16/2019 62 35 - 104 U/L Final     ALT   Date Value Ref Range Status   11/09/2020 15 0 - 32 U/L Final   09/16/2020 11 0 - 32 U/L Final   09/16/2019 11 0 - 32 U/L Final     AST   Date Value Ref Range Status   11/09/2020 24 0 - 31 U/L Final   09/16/2020 19 0 - 31 U/L Final   09/16/2019 20 0 - 31 U/L Final     No results found for: PTT, INR  Lab Results   Component Value Date    TRIG 111 11/09/2020    HDL 56 11/09/2020     No components found for: HGBA1C  No results found for: PROTEINCSF, GLUCCSF, WBCCSF    Controlled Substance Monitoring:    Acute and Chronic Pain Monitoring:   No flowsheet data found.     MEDICAL DECISION

## 2021-01-28 DIAGNOSIS — I72.9 ANEURYSM (HCC): ICD-10-CM

## 2021-01-28 LAB
ANION GAP SERPL CALCULATED.3IONS-SCNC: 12 MMOL/L (ref 7–16)
BUN BLDV-MCNC: 10 MG/DL (ref 8–23)
CALCIUM SERPL-MCNC: 9.1 MG/DL (ref 8.6–10.2)
CHLORIDE BLD-SCNC: 104 MMOL/L (ref 98–107)
CO2: 24 MMOL/L (ref 22–29)
CREAT SERPL-MCNC: 0.7 MG/DL (ref 0.5–1)
GFR AFRICAN AMERICAN: >60
GFR NON-AFRICAN AMERICAN: >60 ML/MIN/1.73
GLUCOSE BLD-MCNC: 83 MG/DL (ref 74–99)
POTASSIUM SERPL-SCNC: 4.8 MMOL/L (ref 3.5–5)
SODIUM BLD-SCNC: 140 MMOL/L (ref 132–146)

## 2021-02-01 ENCOUNTER — HOSPITAL ENCOUNTER (OUTPATIENT)
Dept: CT IMAGING | Age: 76
Discharge: HOME | End: 2021-02-01
Payer: MEDICARE

## 2021-02-01 DIAGNOSIS — I72.9 ANEURYSM (HCC): ICD-10-CM

## 2021-02-01 PROCEDURE — 6360000004 HC RX CONTRAST MEDICATION: Performed by: RADIOLOGY

## 2021-02-01 PROCEDURE — 70496 CT ANGIOGRAPHY HEAD: CPT

## 2021-02-01 RX ADMIN — IOPAMIDOL 75 ML: 755 INJECTION, SOLUTION INTRAVENOUS at 09:08

## 2021-02-02 ENCOUNTER — TELEPHONE (OUTPATIENT)
Dept: NEUROLOGY | Age: 76
End: 2021-02-02

## 2021-02-02 NOTE — TELEPHONE ENCOUNTER
----- Message from Sj Godoy MD sent at 2/1/2021 12:44 PM EST -----  Inform the patient that her CT is stable compared to the last and there is no change.

## 2021-02-02 NOTE — TELEPHONE ENCOUNTER
Left message for patient and informed her that her CT is stable compared to the last and there is no change.

## 2021-02-15 ENCOUNTER — OFFICE VISIT (OUTPATIENT)
Dept: FAMILY MEDICINE CLINIC | Age: 76
End: 2021-02-15
Payer: MEDICARE

## 2021-02-15 VITALS
DIASTOLIC BLOOD PRESSURE: 84 MMHG | WEIGHT: 116 LBS | RESPIRATION RATE: 18 BRPM | HEART RATE: 69 BPM | SYSTOLIC BLOOD PRESSURE: 136 MMHG | HEIGHT: 61 IN | OXYGEN SATURATION: 98 % | BODY MASS INDEX: 21.9 KG/M2

## 2021-02-15 DIAGNOSIS — D68.59 THROMBOPHILIA (HCC): ICD-10-CM

## 2021-02-15 DIAGNOSIS — I51.3 ATRIAL THROMBUS: ICD-10-CM

## 2021-02-15 DIAGNOSIS — E78.2 MIXED HYPERLIPIDEMIA: ICD-10-CM

## 2021-02-15 DIAGNOSIS — I67.9 CEREBRAL ARTERIAL DISEASE: Primary | ICD-10-CM

## 2021-02-15 PROCEDURE — G8420 CALC BMI NORM PARAMETERS: HCPCS | Performed by: FAMILY MEDICINE

## 2021-02-15 PROCEDURE — G8427 DOCREV CUR MEDS BY ELIG CLIN: HCPCS | Performed by: FAMILY MEDICINE

## 2021-02-15 PROCEDURE — 1090F PRES/ABSN URINE INCON ASSESS: CPT | Performed by: FAMILY MEDICINE

## 2021-02-15 PROCEDURE — 99214 OFFICE O/P EST MOD 30 MIN: CPT | Performed by: FAMILY MEDICINE

## 2021-02-15 PROCEDURE — G8484 FLU IMMUNIZE NO ADMIN: HCPCS | Performed by: FAMILY MEDICINE

## 2021-02-15 PROCEDURE — 1036F TOBACCO NON-USER: CPT | Performed by: FAMILY MEDICINE

## 2021-02-15 PROCEDURE — 1123F ACP DISCUSS/DSCN MKR DOCD: CPT | Performed by: FAMILY MEDICINE

## 2021-02-15 PROCEDURE — G8399 PT W/DXA RESULTS DOCUMENT: HCPCS | Performed by: FAMILY MEDICINE

## 2021-02-15 PROCEDURE — 4040F PNEUMOC VAC/ADMIN/RCVD: CPT | Performed by: FAMILY MEDICINE

## 2021-02-15 ASSESSMENT — PATIENT HEALTH QUESTIONNAIRE - PHQ9
SUM OF ALL RESPONSES TO PHQ9 QUESTIONS 1 & 2: 0
2. FEELING DOWN, DEPRESSED OR HOPELESS: 0

## 2021-02-16 ASSESSMENT — ENCOUNTER SYMPTOMS
PHOTOPHOBIA: 0
WHEEZING: 0
SINUS PRESSURE: 0
RHINORRHEA: 0
ABDOMINAL DISTENTION: 0
CHOKING: 0
SORE THROAT: 0
ABDOMINAL PAIN: 0
DIARRHEA: 0
SHORTNESS OF BREATH: 0
BACK PAIN: 0
COLOR CHANGE: 0
ANAL BLEEDING: 0
VOMITING: 0
BLOOD IN STOOL: 0
TROUBLE SWALLOWING: 0
CONSTIPATION: 0
RECTAL PAIN: 0
CHEST TIGHTNESS: 0
STRIDOR: 0
VOICE CHANGE: 0
EYE PAIN: 0
FACIAL SWELLING: 0
NAUSEA: 0
EYE ITCHING: 0
EYE REDNESS: 0
COUGH: 0
APNEA: 0
EYE DISCHARGE: 0

## 2021-02-16 NOTE — PROGRESS NOTES
Hussein Goff is a 66 y.o. female  . Subjective:      Patient has recently followed up with neurology. He recommends to stay on eliquis . Patient is to take fall precautions due to eliquis . Patient will follow up with cardiology. Patient has had no TIA type symptoms. We will continue secondary prevention of stroke. Fatigue  This is a chronic problem. The current episode started more than 1 year ago. The problem occurs daily. The problem has been waxing and waning. Associated symptoms include fatigue. Pertinent negatives include no abdominal pain, arthralgias, chest pain, chills, congestion, coughing, diaphoresis, fever, headaches, joint swelling, myalgias, nausea, neck pain, numbness, rash, sore throat, vomiting or weakness. Exacerbated by: hypothyroidism  Treatments tried: thyroid treatment. The treatment provided moderate relief. Hyperlipidemia  This is a chronic problem. The current episode started more than 1 year ago. The problem is resistant. Recent lipid tests were reviewed and are variable. Pertinent negatives include no chest pain, myalgias or shortness of breath. Current antihyperlipidemic treatment includes statins. The current treatment provides no improvement of lipids. There are no compliance problems. Risk factors for coronary artery disease include dyslipidemia and post-menopausal.   Other  Chronicity: Right MCA stenosis  Associated symptoms include fatigue. Pertinent negatives include no abdominal pain, arthralgias, chest pain, chills, congestion, coughing, diaphoresis, fever, headaches, joint swelling, myalgias, nausea, neck pain, numbness, rash, sore throat, vomiting or weakness. Review of Systems   Constitutional: Positive for fatigue. Negative for activity change, appetite change, chills, diaphoresis, fever and unexpected weight change.    HENT: Negative for congestion, dental problem, drooling, ear discharge, ear pain, facial swelling, hearing loss, mouth sores, nosebleeds, postnasal drip, rhinorrhea, sinus pressure, sneezing, sore throat, tinnitus, trouble swallowing and voice change. Eyes: Negative for photophobia, pain, discharge, redness, itching and visual disturbance. Respiratory: Negative for apnea, cough, choking, chest tightness, shortness of breath, wheezing and stridor. Cardiovascular: Negative for chest pain, palpitations and leg swelling. Gastrointestinal: Negative for abdominal distention, abdominal pain, anal bleeding, blood in stool, constipation, diarrhea, nausea, rectal pain and vomiting. Endocrine: Negative for cold intolerance, heat intolerance, polydipsia, polyphagia and polyuria. Genitourinary: Negative for decreased urine volume, difficulty urinating, dyspareunia, dysuria, enuresis, flank pain, frequency, genital sores, hematuria, menstrual problem, pelvic pain, urgency, vaginal bleeding, vaginal discharge and vaginal pain. Musculoskeletal: Negative for arthralgias, back pain, gait problem, joint swelling, myalgias, neck pain and neck stiffness. Skin: Negative for color change, pallor, rash and wound. Allergic/Immunologic: Negative for environmental allergies, food allergies and immunocompromised state. Neurological: Negative for dizziness, tremors, seizures, syncope, facial asymmetry, speech difficulty, weakness, light-headedness, numbness and headaches. Hematological: Negative for adenopathy. Does not bruise/bleed easily. Psychiatric/Behavioral: Negative for agitation, behavioral problems, confusion, decreased concentration, dysphoric mood, hallucinations, self-injury, sleep disturbance and suicidal ideas. The patient is not nervous/anxious and is not hyperactive.         Past Medical History:   Diagnosis Date    Arthritis     Asymptomatic PVCs 4/2012    pt felt flutter, no other sx, holter + pvc's, few runs of SVT   [facility]    History of Holter monitoring     Hyperlipidemia     Macular degeneration     Nausea & vomiting     Pelvic prolapse     PONV (postoperative nausea and vomiting)     TIA (transient ischemic attack)        Social History     Socioeconomic History    Marital status:      Spouse name: Not on file    Number of children: Not on file    Years of education: Not on file    Highest education level: Not on file   Occupational History    Occupation: retired   Social Needs    Financial resource strain: Not on file    Food insecurity     Worry: Not on file     Inability: Not on file   [employer] needs     Medical: Not on file     Non-medical: Not on file   Tobacco Use    Smoking status: Former Smoker     Quit date: 1994     Years since quittin.6    Smokeless tobacco: Never Used   Substance and Sexual Activity    Alcohol use: No     Comment: Coffee 1 cup a day     Drug use: No    Sexual activity: Not Currently   Lifestyle    Physical activity     Days per week: Not on file     Minutes per session: Not on file    Stress: Not on file   Relationships    Social connections     Talks on phone: Not on file     Gets together: Not on file     Attends Gnosticism service: Not on file     Active member of club or organization: Not on file     Attends meetings of clubs or organizations: Not on file     Relationship status: Not on file    Intimate partner violence     Fear of current or ex partner: Not on file     Emotionally abused: Not on file     Physically abused: Not on file     Forced sexual activity: Not on file   Other Topics Concern    Not on file   Social History Narrative    Not on file       No family history on file.     Current Outpatient Medications on File Prior to Visit   Medication Sig Dispense Refill    sertraline (ZOLOFT) 100 MG tablet TAKE 1 TABLET BY MOUTH  DAILY 90 tablet 3    folic acid (FOLVITE) 1 MG tablet Take 1 tablet by mouth daily 30 tablet 5    apixaban (ELIQUIS) 5 MG TABS tablet Take 1 tablet by mouth 2 times daily 180 tablet 5    levothyroxine (SYNTHROID) 25 MCG tablet Take 1 tablet by mouth Daily 90 tablet 5    atorvastatin (LIPITOR) 20 MG tablet TAKE ONE TABLET BY MOUTH EVERY EVENING 90 tablet 5    aspirin (ASPIRIN LOW DOSE) 81 MG EC tablet TAKE ONE TABLET BY MOUTH DAILY 90 tablet 5    diclofenac sodium 1 % GEL Apply 4 g topically 4 times daily 3 Tube 5    therapeutic multivitamin-minerals (THERAGRAN-M) tablet Take 1 tablet by mouth daily. No current facility-administered medications on file prior to visit. No Known Allergies    I have reviewedher allergies, medications, problem list, medical, social and family history and have updated as needed in the electronic medical record. Objective:     Physical Exam  Constitutional:       General: She is not in acute distress. Appearance: Normal appearance. She is normal weight. She is not ill-appearing, toxic-appearing or diaphoretic. HENT:      Head: Normocephalic and atraumatic. Right Ear: Tympanic membrane normal.      Left Ear: Tympanic membrane normal.      Nose: Nose normal.      Mouth/Throat:      Mouth: Mucous membranes are dry. Pharynx: Oropharynx is clear. Eyes:      Extraocular Movements: Extraocular movements intact. Conjunctiva/sclera: Conjunctivae normal.      Pupils: Pupils are equal, round, and reactive to light. Neck:      Musculoskeletal: Normal range of motion and neck supple. Cardiovascular:      Rate and Rhythm: Normal rate and regular rhythm. Pulses: Normal pulses. Heart sounds: Normal heart sounds. Pulmonary:      Effort: Pulmonary effort is normal. No respiratory distress. Breath sounds: Normal breath sounds. No stridor. No wheezing, rhonchi or rales. Chest:      Chest wall: No tenderness. Musculoskeletal: Normal range of motion. Skin:     General: Skin is warm and dry. Neurological:      General: No focal deficit present. Mental Status: She is alert and oriented to person, place, and time.    Psychiatric:         Mood and Affect: Mood normal.         Behavior: Behavior normal.         Thought Content: Thought content normal.         Judgment: Judgment normal.         Assessment / Plan:   Sugar Bejarano was seen today for 3 month follow-up. Diagnoses and all orders for this visit:    Cerebral arterial disease    Thrombophilia (HCC)  -     apixaban (ELIQUIS) 5 MG TABS tablet; Take 1 tablet by mouth 2 times daily    Mixed hyperlipidemia    Atrial thrombus      Patient to follow with cardiology. We will perform CTA again in one year. Will monitor lipids and BP. Will perform blood work prior to 6 month appointment. Discussed covid vaccine and prevention of dementia at length. Extended visit. Over 50% of time spent counseling patient. Willfollow with own gynecologist for gynecological and breast care. Reviewed health maintenance report. Patient is aware of deficiencies and suggested preventative tests.

## 2021-03-12 ENCOUNTER — IMMUNIZATION (OUTPATIENT)
Dept: PRIMARY CARE CLINIC | Age: 76
End: 2021-03-12
Payer: MEDICARE

## 2021-03-12 PROCEDURE — 91301 COVID-19, MODERNA VACCINE 100MCG/0.5ML DOSE: CPT | Performed by: NURSE PRACTITIONER

## 2021-03-12 PROCEDURE — 0011A COVID-19, MODERNA VACCINE 100MCG/0.5ML DOSE: CPT | Performed by: NURSE PRACTITIONER

## 2021-04-12 ENCOUNTER — IMMUNIZATION (OUTPATIENT)
Dept: PRIMARY CARE CLINIC | Age: 76
End: 2021-04-12
Payer: MEDICARE

## 2021-04-12 PROCEDURE — 0012A COVID-19, MODERNA VACCINE 100MCG/0.5ML DOSE: CPT | Performed by: NURSE PRACTITIONER

## 2021-04-12 PROCEDURE — 91301 COVID-19, MODERNA VACCINE 100MCG/0.5ML DOSE: CPT | Performed by: NURSE PRACTITIONER

## 2021-08-03 DIAGNOSIS — E78.00 PURE HYPERCHOLESTEROLEMIA: Primary | ICD-10-CM

## 2021-08-03 DIAGNOSIS — I10 BENIGN ESSENTIAL HTN: ICD-10-CM

## 2021-08-03 DIAGNOSIS — R73.01 IFG (IMPAIRED FASTING GLUCOSE): ICD-10-CM

## 2021-08-03 DIAGNOSIS — R53.82 CHRONIC FATIGUE: ICD-10-CM

## 2021-08-03 DIAGNOSIS — E61.1 IRON DEFICIENCY: ICD-10-CM

## 2021-08-04 DIAGNOSIS — I10 BENIGN ESSENTIAL HTN: ICD-10-CM

## 2021-08-04 DIAGNOSIS — R53.82 CHRONIC FATIGUE: ICD-10-CM

## 2021-08-04 DIAGNOSIS — E61.1 IRON DEFICIENCY: ICD-10-CM

## 2021-08-04 DIAGNOSIS — R73.01 IFG (IMPAIRED FASTING GLUCOSE): ICD-10-CM

## 2021-08-04 DIAGNOSIS — E78.00 PURE HYPERCHOLESTEROLEMIA: ICD-10-CM

## 2021-08-04 LAB
ALBUMIN SERPL-MCNC: 4.2 G/DL (ref 3.5–5.2)
ALP BLD-CCNC: 72 U/L (ref 35–104)
ALT SERPL-CCNC: 11 U/L (ref 0–32)
ANION GAP SERPL CALCULATED.3IONS-SCNC: 11 MMOL/L (ref 7–16)
AST SERPL-CCNC: 22 U/L (ref 0–31)
BASOPHILS ABSOLUTE: 0.11 E9/L (ref 0–0.2)
BASOPHILS RELATIVE PERCENT: 1.3 % (ref 0–2)
BILIRUB SERPL-MCNC: 0.2 MG/DL (ref 0–1.2)
BUN BLDV-MCNC: 11 MG/DL (ref 6–23)
CALCIUM SERPL-MCNC: 8.9 MG/DL (ref 8.6–10.2)
CHLORIDE BLD-SCNC: 101 MMOL/L (ref 98–107)
CHOLESTEROL, TOTAL: 235 MG/DL (ref 0–199)
CO2: 25 MMOL/L (ref 22–29)
CREAT SERPL-MCNC: 0.7 MG/DL (ref 0.5–1)
EOSINOPHILS ABSOLUTE: 0.19 E9/L (ref 0.05–0.5)
EOSINOPHILS RELATIVE PERCENT: 2.3 % (ref 0–6)
FOLATE: 14.5 NG/ML (ref 4.8–24.2)
GFR AFRICAN AMERICAN: >60
GFR NON-AFRICAN AMERICAN: >60 ML/MIN/1.73
GLUCOSE BLD-MCNC: 85 MG/DL (ref 74–99)
HBA1C MFR BLD: 5.2 % (ref 4–5.6)
HCT VFR BLD CALC: 42.8 % (ref 34–48)
HDLC SERPL-MCNC: 54 MG/DL
HEMOGLOBIN: 13.7 G/DL (ref 11.5–15.5)
IMMATURE GRANULOCYTES #: 0.02 E9/L
IMMATURE GRANULOCYTES %: 0.2 % (ref 0–5)
IRON SATURATION: 50 % (ref 15–50)
IRON: 95 MCG/DL (ref 37–145)
LDL CHOLESTEROL CALCULATED: 160 MG/DL (ref 0–99)
LYMPHOCYTES ABSOLUTE: 2.15 E9/L (ref 1.5–4)
LYMPHOCYTES RELATIVE PERCENT: 26.1 % (ref 20–42)
MCH RBC QN AUTO: 30.9 PG (ref 26–35)
MCHC RBC AUTO-ENTMCNC: 32 % (ref 32–34.5)
MCV RBC AUTO: 96.4 FL (ref 80–99.9)
MICROALBUMIN UR-MCNC: 51.3 MG/L
MONOCYTES ABSOLUTE: 0.73 E9/L (ref 0.1–0.95)
MONOCYTES RELATIVE PERCENT: 8.8 % (ref 2–12)
NEUTROPHILS ABSOLUTE: 5.05 E9/L (ref 1.8–7.3)
NEUTROPHILS RELATIVE PERCENT: 61.3 % (ref 43–80)
PDW BLD-RTO: 13.5 FL (ref 11.5–15)
PLATELET # BLD: 384 E9/L (ref 130–450)
PMV BLD AUTO: 11.3 FL (ref 7–12)
POTASSIUM SERPL-SCNC: 4.8 MMOL/L (ref 3.5–5)
RBC # BLD: 4.44 E12/L (ref 3.5–5.5)
SODIUM BLD-SCNC: 137 MMOL/L (ref 132–146)
TOTAL IRON BINDING CAPACITY: 191 MCG/DL (ref 250–450)
TOTAL PROTEIN: 7.3 G/DL (ref 6.4–8.3)
TRIGL SERPL-MCNC: 104 MG/DL (ref 0–149)
TSH SERPL DL<=0.05 MIU/L-ACNC: 2.9 UIU/ML (ref 0.27–4.2)
VITAMIN B-12: 469 PG/ML (ref 211–946)
VLDLC SERPL CALC-MCNC: 21 MG/DL
WBC # BLD: 8.3 E9/L (ref 4.5–11.5)

## 2021-08-11 ENCOUNTER — TELEPHONE (OUTPATIENT)
Dept: FAMILY MEDICINE CLINIC | Age: 76
End: 2021-08-11

## 2021-08-11 NOTE — TELEPHONE ENCOUNTER
Patient's Son-In-Law, Bina Fisher, called asking if Dr. Sunday Fothergill would call him or his wife, Bernardo Alcantara, regarding patient flying. Binakrish Pardok stated patient has an aneurysm and they are concerned about patient flying because of her condition. Bina Fisher stated they are aware of patient's appt 8/16/21, but don't know if this will be discussed.   [phone]    Last seen 2/15/2021  Next appt 8/16/2021

## 2021-08-16 ENCOUNTER — OFFICE VISIT (OUTPATIENT)
Dept: FAMILY MEDICINE CLINIC | Age: 76
End: 2021-08-16
Payer: MEDICARE

## 2021-08-16 VITALS
BODY MASS INDEX: 22.09 KG/M2 | OXYGEN SATURATION: 95 % | SYSTOLIC BLOOD PRESSURE: 118 MMHG | HEART RATE: 64 BPM | RESPIRATION RATE: 18 BRPM | WEIGHT: 117 LBS | HEIGHT: 61 IN | DIASTOLIC BLOOD PRESSURE: 76 MMHG

## 2021-08-16 DIAGNOSIS — F01.50 VASCULAR DEMENTIA WITHOUT BEHAVIORAL DISTURBANCE (HCC): Primary | ICD-10-CM

## 2021-08-16 DIAGNOSIS — R80.9 MICROALBUMINURIA: ICD-10-CM

## 2021-08-16 DIAGNOSIS — R73.01 IFG (IMPAIRED FASTING GLUCOSE): ICD-10-CM

## 2021-08-16 DIAGNOSIS — I51.3 ATRIAL THROMBUS: ICD-10-CM

## 2021-08-16 DIAGNOSIS — E78.2 MIXED HYPERLIPIDEMIA: ICD-10-CM

## 2021-08-16 DIAGNOSIS — D68.59 THROMBOPHILIA (HCC): ICD-10-CM

## 2021-08-16 DIAGNOSIS — I67.9 CEREBRAL ARTERIAL DISEASE: ICD-10-CM

## 2021-08-16 DIAGNOSIS — R53.82 CHRONIC FATIGUE: ICD-10-CM

## 2021-08-16 DIAGNOSIS — R41.3 MEMORY LOSS: ICD-10-CM

## 2021-08-16 DIAGNOSIS — B07.0 PLANTAR WART: ICD-10-CM

## 2021-08-16 PROBLEM — G30.9 ALZHEIMER'S DISEASE, UNSPECIFIED (CODE) (HCC): Status: ACTIVE | Noted: 2021-08-16

## 2021-08-16 PROCEDURE — 1036F TOBACCO NON-USER: CPT | Performed by: FAMILY MEDICINE

## 2021-08-16 PROCEDURE — 1090F PRES/ABSN URINE INCON ASSESS: CPT | Performed by: FAMILY MEDICINE

## 2021-08-16 PROCEDURE — G8399 PT W/DXA RESULTS DOCUMENT: HCPCS | Performed by: FAMILY MEDICINE

## 2021-08-16 PROCEDURE — 99215 OFFICE O/P EST HI 40 MIN: CPT | Performed by: FAMILY MEDICINE

## 2021-08-16 PROCEDURE — G8427 DOCREV CUR MEDS BY ELIG CLIN: HCPCS | Performed by: FAMILY MEDICINE

## 2021-08-16 PROCEDURE — 4040F PNEUMOC VAC/ADMIN/RCVD: CPT | Performed by: FAMILY MEDICINE

## 2021-08-16 PROCEDURE — G8420 CALC BMI NORM PARAMETERS: HCPCS | Performed by: FAMILY MEDICINE

## 2021-08-16 PROCEDURE — 1123F ACP DISCUSS/DSCN MKR DOCD: CPT | Performed by: FAMILY MEDICINE

## 2021-08-16 RX ORDER — DONEPEZIL HYDROCHLORIDE 5 MG/1
5 TABLET, FILM COATED ORAL NIGHTLY
Qty: 30 TABLET | Refills: 5 | Status: SHIPPED
Start: 2021-08-16 | End: 2021-11-11 | Stop reason: SDUPTHER

## 2021-08-17 ASSESSMENT — ENCOUNTER SYMPTOMS
VOICE CHANGE: 0
COLOR CHANGE: 0
EYE ITCHING: 0
ANAL BLEEDING: 0
BACK PAIN: 1
NAUSEA: 0
WHEEZING: 0
ABDOMINAL DISTENTION: 0
COUGH: 0
RECTAL PAIN: 0
PHOTOPHOBIA: 0
ABDOMINAL PAIN: 0
STRIDOR: 0
VOMITING: 0
SINUS PRESSURE: 0
TROUBLE SWALLOWING: 0
SHORTNESS OF BREATH: 0
CHOKING: 0
ROS SKIN COMMENTS: PLANTAR WART
BLOOD IN STOOL: 0
DIARRHEA: 0
APNEA: 0
RHINORRHEA: 0
CHEST TIGHTNESS: 0
CONSTIPATION: 0
EYE PAIN: 0
SORE THROAT: 0
EYE DISCHARGE: 0
EYE REDNESS: 0
FACIAL SWELLING: 0

## 2021-08-17 NOTE — PROGRESS NOTES
Maciej Chávez is a 66 y.o. female  . Subjective:      Discussed cerebrovascular disease and secondary prevention of stroke . Discussed last neurology consult, we will repeat CTA in February  head and she will follow with neurology. They discussed MCA stenosis, atrial thrombosis and possible cerebral aneurysm. Dr Odalys Stanley agreed with Eliquis. She is to report any TIA symptoms. She did show some cerebrovascular white matter disease and volume loss. Patient complains of worsening of short term memory. This may indeed be early vascular dementia. . Blood pressure has been perfect and again patient is on Eliquis. We will start low dosage of Aricept. Memory issues are minimal and this may help. Patient was concerned about flying. This should not be an issue, although there is higher risk of hypoxia, I feel one flight should be fine, I don't recommend frequent traveling however. If Aricept does not help at this dose, we will increase and even consider Namenda as add on. I recommend that she follow with cardiology and repeat Echo .patient complains of what appears to be calloused plantar wart. Feels like walking on a stone. We will consult podiatry. Due for blood work. Will consider increasing statin if no improvement in 3 months      Review of Systems   Constitutional: Positive for fatigue. Negative for activity change, appetite change, chills, diaphoresis, fever and unexpected weight change. HENT: Negative for congestion, dental problem, drooling, ear discharge, ear pain, facial swelling, hearing loss, mouth sores, nosebleeds, postnasal drip, rhinorrhea, sinus pressure, sneezing, sore throat, tinnitus, trouble swallowing and voice change. Eyes: Negative for photophobia, pain, discharge, redness, itching and visual disturbance. Respiratory: Negative for apnea, cough, choking, chest tightness, shortness of breath, wheezing and stridor. Cardiovascular: Negative for chest pain, palpitations and leg swelling. Gastrointestinal: Negative for abdominal distention, abdominal pain, anal bleeding, blood in stool, constipation, diarrhea, nausea, rectal pain and vomiting. Endocrine: Negative for cold intolerance, heat intolerance, polydipsia, polyphagia and polyuria. Genitourinary: Negative for decreased urine volume, difficulty urinating, dyspareunia, dysuria, enuresis, flank pain, frequency, genital sores, hematuria, menstrual problem, pelvic pain, urgency, vaginal bleeding, vaginal discharge and vaginal pain. Musculoskeletal: Positive for arthralgias and back pain. Negative for gait problem, joint swelling, myalgias, neck pain and neck stiffness. Skin: Negative for color change, pallor, rash and wound. Plantar wart    Allergic/Immunologic: Negative for environmental allergies, food allergies and immunocompromised state. Neurological: Negative for dizziness, tremors, seizures, syncope, facial asymmetry, speech difficulty, weakness, light-headedness, numbness and headaches. Memory issues   Hematological: Negative for adenopathy. Does not bruise/bleed easily. Psychiatric/Behavioral: Negative for agitation, behavioral problems, confusion, decreased concentration, dysphoric mood, hallucinations, self-injury, sleep disturbance and suicidal ideas. The patient is not nervous/anxious and is not hyperactive. Past Medical History:   Diagnosis Date    Arthritis     Asymptomatic PVCs 4/2012    pt felt flutter, no other sx, holter + pvc's, few runs of SVT   [facility]    History of Holter monitoring     Hyperlipidemia     Macular degeneration     Nausea & vomiting     Pelvic prolapse     PONV (postoperative nausea and vomiting)     TIA (transient ischemic attack)        Social History     Socioeconomic History    Marital status:       Spouse name: Not on file    Number of children: Not on file    Years of education: Not on file    Highest education level: Not on file   Occupational History  Occupation: retired   Tobacco Use    Smoking status: Former Smoker     Quit date: 1994     Years since quittin.1    Smokeless tobacco: Never Used   Vaping Use    Vaping Use: Never used   Substance and Sexual Activity    Alcohol use: No     Comment: Coffee 1 cup a day     Drug use: No    Sexual activity: Not Currently   Other Topics Concern    Not on file   Social History Narrative    Not on file     Social Determinants of Health     Financial Resource Strain:     Difficulty of Paying Living Expenses:    Food Insecurity:     Worried About Running Out of Food in the Last Year:     [address] in the Last Year:    Transportation Needs:     Lack of Transportation (Medical):  Lack of Transportation (Non-Medical):    Physical Activity:     Days of Exercise per Week:     Minutes of Exercise per Session:    Stress:     Feeling of Stress :    Social Connections:     Frequency of Communication with Friends and Family:     Frequency of Social Gatherings with Friends and Family:     Attends [employer] Services:     Active Member of Clubs or Organizations:     Attends Club or Organization Meetings:     Marital Status:    Intimate Partner Violence:     Fear of Current or Ex-Partner:     Emotionally Abused:     Physically Abused:     Sexually Abused:        History reviewed. No pertinent family history.     Current Outpatient Medications on File Prior to Visit   Medication Sig Dispense Refill    apixaban (ELIQUIS) 5 MG TABS tablet Take 1 tablet by mouth 2 times daily 180 tablet 5    sertraline (ZOLOFT) 100 MG tablet TAKE 1 TABLET BY MOUTH  DAILY 90 tablet 3    folic acid (FOLVITE) 1 MG tablet Take 1 tablet by mouth daily 30 tablet 5    apixaban (ELIQUIS) 5 MG TABS tablet Take 1 tablet by mouth 2 times daily 180 tablet 5    levothyroxine (SYNTHROID) 25 MCG tablet Take 1 tablet by mouth Daily 90 tablet 5    atorvastatin (LIPITOR) 20 MG tablet TAKE ONE TABLET BY MOUTH EVERY EVENING 90 tablet 5    diclofenac sodium 1 % GEL Apply 4 g topically 4 times daily 3 Tube 5    therapeutic multivitamin-minerals (THERAGRAN-M) tablet Take 1 tablet by mouth daily. No current facility-administered medications on file prior to visit. No Known Allergies    I have reviewedher allergies, medications, problem list, medical, social and family history and have updated as needed in the electronic medical record. Objective:     Physical Exam  Vitals and nursing note reviewed. Constitutional:       General: She is not in acute distress. Appearance: She is well-developed. She is not diaphoretic. HENT:      Head: Normocephalic and atraumatic. Right Ear: External ear normal.      Left Ear: External ear normal.      Nose: Nose normal.      Mouth/Throat:      Pharynx: No oropharyngeal exudate. Eyes:      General: No scleral icterus. Right eye: No discharge. Left eye: No discharge. Conjunctiva/sclera: Conjunctivae normal.      Pupils: Pupils are equal, round, and reactive to light. Neck:      Thyroid: No thyromegaly. Vascular: No JVD. Trachea: No tracheal deviation. Cardiovascular:      Rate and Rhythm: Normal rate and regular rhythm. Heart sounds: Normal heart sounds. No murmur heard. No friction rub. No gallop. Pulmonary:      Effort: Pulmonary effort is normal. No respiratory distress. Breath sounds: Normal breath sounds. No stridor. No wheezing or rales. Chest:      Chest wall: No tenderness. Abdominal:      General: Bowel sounds are normal. There is no distension. Palpations: Abdomen is soft. There is no mass. Tenderness: There is no abdominal tenderness. There is no guarding or rebound. Genitourinary:     Comments: Will follow with own gynecologist for gynecological and breast care. Musculoskeletal:         General: No tenderness. Normal range of motion. Cervical back: Normal range of motion and neck supple. Lymphadenopathy:      Cervical: No cervical adenopathy. Skin:     General: Skin is warm and dry. Coloration: Skin is not pale. Findings: No erythema or rash. Comments: Calloused plantar wart suspect   Neurological:      Mental Status: She is alert and oriented to person, place, and time. Cranial Nerves: No cranial nerve deficit. Motor: No abnormal muscle tone. Coordination: Coordination normal.      Deep Tendon Reflexes: Reflexes are normal and symmetric. Reflexes normal.   Psychiatric:         Behavior: Behavior normal.         Thought Content: Thought content normal.         Judgment: Judgment normal.         Assessment / Plan:   Melissa Newell was seen today for 6 month follow-up. Diagnoses and all orders for this visit:    Vascular dementia without behavioral disturbance ([facility] [address]  -     donepezil (ARICEPT) 5 MG tablet; Take 1 tablet by mouth nightly    Plantar wart  -     [facility] [place], Podiatry, Solomon Pulling    Memory loss  -     donepezil (ARICEPT) 5 MG tablet; Take 1 tablet by mouth nightly  -     Lipid Panel; Future  -     Comprehensive Metabolic Panel; Future  -     CBC Auto Differential; Future  -     TSH without Reflex; Future    Microalbuminuria    Mixed hyperlipidemia  -     Lipid Panel; Future    Thrombophilia (HCC)    Atrial thrombus    Cerebral arterial disease  -     Lipid Panel; Future    IFG (impaired fasting glucose)  -     Hemoglobin A1C; Future  -     MICROALBUMIN, UR; Future    Chronic fatigue  -     Comprehensive Metabolic Panel; Future  -     CBC Auto Differential; Future  -     TSH without Reflex; Future  -     Hemoglobin A1C; Future     Extended visit. Over 50% of time spent counseling patient and daughter    Melina Alvarez with own gynecologist for gynecological and breast care. Reviewed health maintenance report. Patient is aware of deficiencies and suggested preventative tests.

## 2021-08-26 ENCOUNTER — OFFICE VISIT (OUTPATIENT)
Dept: PODIATRY | Age: 76
End: 2021-08-26
Payer: MEDICARE

## 2021-08-26 VITALS — BODY MASS INDEX: 22.09 KG/M2 | WEIGHT: 117 LBS | HEIGHT: 61 IN

## 2021-08-26 DIAGNOSIS — I73.9 PVD (PERIPHERAL VASCULAR DISEASE) (HCC): ICD-10-CM

## 2021-08-26 DIAGNOSIS — D23.71 BENIGN NEOPLASM OF SKIN OF RIGHT FOOT: Primary | ICD-10-CM

## 2021-08-26 DIAGNOSIS — M79.671 PAIN OF RIGHT FOOT: ICD-10-CM

## 2021-08-26 PROCEDURE — G8420 CALC BMI NORM PARAMETERS: HCPCS | Performed by: PODIATRIST

## 2021-08-26 PROCEDURE — 4040F PNEUMOC VAC/ADMIN/RCVD: CPT | Performed by: PODIATRIST

## 2021-08-26 PROCEDURE — 1036F TOBACCO NON-USER: CPT | Performed by: PODIATRIST

## 2021-08-26 PROCEDURE — 1090F PRES/ABSN URINE INCON ASSESS: CPT | Performed by: PODIATRIST

## 2021-08-26 PROCEDURE — 99203 OFFICE O/P NEW LOW 30 MIN: CPT | Performed by: PODIATRIST

## 2021-08-26 PROCEDURE — G8427 DOCREV CUR MEDS BY ELIG CLIN: HCPCS | Performed by: PODIATRIST

## 2021-08-26 PROCEDURE — G8399 PT W/DXA RESULTS DOCUMENT: HCPCS | Performed by: PODIATRIST

## 2021-08-26 PROCEDURE — 1123F ACP DISCUSS/DSCN MKR DOCD: CPT | Performed by: PODIATRIST

## 2021-08-26 NOTE — PROGRESS NOTES
Patient is in today for evaluation of right foot plantar wart.  Patient has had them in the past. pcp is Maru Loja DO  Last ov 8/16/21

## 2021-08-26 NOTE — LETTER
[address] [place] [address] Brina Arnett  Phone: [phone]  Fax: [phone]    Rickey So     Linette Fisher  <[id number]>   1945 8/26/2021      Dear Medford Habermann,    I would like to thank you for the kind referral of Linette Fisher. She presented to the office today for evaluation regarding painful neoplasm right foot. We did discuss multiple conservative care options with patient today. We did utilize a topical medication to treat the neoplasm site. We will have continued follow-up until issues are resolved. If you should have any questions concerning her visit today, please do not hesitate to contact me.     Sincerely,    Codi Carreno DPM

## 2021-08-26 NOTE — PROGRESS NOTES
21     Lissette Finder    : 1945 Sex: female   Age: 66 y.o. Patient was referred by: Ekta Gonsales DO  Patient's PCP/Provider is:  Ekta Gonsales DO    Subjective:    Patient is seen today for evaluation regarding painful neoplasm right foot. Chief Complaint   Patient presents with    Foot Pain     right foot- plantar wart        HPI: Stated the issues been present for several years and progressively gotten worse. She states with certain activities during the day she does get a sharp pain in the area. Previous conservative options have not improved symptoms. Patient wanted to discuss other potential treatment options available at this time. No other additional abnormalities noted. ROS:  Const: Positives and pertinent negatives as per HPI. Musculo: Denies symptoms other than stated above. Neuro: Denies symptoms other than stated above. Skin: Denies symptoms other than stated above. Current Medications:    Current Outpatient Medications:     donepezil (ARICEPT) 5 MG tablet, Take 1 tablet by mouth nightly, Disp: 30 tablet, Rfl: 5    apixaban (ELIQUIS) 5 MG TABS tablet, Take 1 tablet by mouth 2 times daily, Disp: 180 tablet, Rfl: 5    sertraline (ZOLOFT) 100 MG tablet, TAKE 1 TABLET BY MOUTH  DAILY, Disp: 90 tablet, Rfl: 3    folic acid (FOLVITE) 1 MG tablet, Take 1 tablet by mouth daily, Disp: 30 tablet, Rfl: 5    apixaban (ELIQUIS) 5 MG TABS tablet, Take 1 tablet by mouth 2 times daily, Disp: 180 tablet, Rfl: 5    levothyroxine (SYNTHROID) 25 MCG tablet, Take 1 tablet by mouth Daily, Disp: 90 tablet, Rfl: 5    atorvastatin (LIPITOR) 20 MG tablet, TAKE ONE TABLET BY MOUTH EVERY EVENING, Disp: 90 tablet, Rfl: 5    diclofenac sodium 1 % GEL, Apply 4 g topically 4 times daily, Disp: 3 Tube, Rfl: 5    therapeutic multivitamin-minerals (THERAGRAN-M) tablet, Take 1 tablet by mouth daily. , Disp: , Rfl:     Allergies:  No Known Allergies    Vitals:    21 1134   Weight: 117 lb (53.1 kg)   Height: 5' 1\" (1.549 m)        Past Medical History:   Diagnosis Date    Arthritis     Asymptomatic PVCs 2012    pt felt flutter, no other sx, holter + pvc's, few runs of SVT   [facility]    History of Holter monitoring     Hyperlipidemia     Macular degeneration     Nausea & vomiting     Pelvic prolapse     PONV (postoperative nausea and vomiting)     TIA (transient ischemic attack)      No family history on file. Past Surgical History:   Procedure Laterality Date    COLONOSCOPY      ENDOSCOPY, COLON, DIAGNOSTIC      EYE SURGERY      macular hole    HERNIA REPAIR      inguinal    HYSTERECTOMY      OTHER SURGICAL HISTORY  2013    ant elevatetransobturator sling rectocele and cystocele enterocele    SKIN BIOPSY      arms    TRANSESOPHAGEAL ECHOCARDIOGRAM N/A 10/20/2020    TRANSESOPHAGEAL ECHOCARDIOGRAM WITH BUBBLE STUDY performed by Kumar Esteban MD at [address] History     Tobacco Use    Smoking status: Former Smoker     Quit date: 1994     Years since quittin.2    Smokeless tobacco: Never Used   Vaping Use    Vaping Use: Never used   Substance Use Topics    Alcohol use: No     Comment: Coffee 1 cup a day     Drug use: No           Diagnostic studies:    No results found. Procedures:    None    Exam:  VASCULAR: Pedal pulses palpable but diminished to palpation right foot. Capillary refill time delayed digits 1 through 5 right foot. Minimal hair growth noted right lower extremity  NEUROLOGICAL: Epicritic sensations intact right foot  DERMATOLOGICAL: Neoplasm noted plantar right second MTPJ region measuring approximately 0.3 cm in diameter. Tenderness noted to direct palpation. No edema or ecchymotic skin changes present plantar right forefoot region. No signs of infection noted right foot. MUSCULOSKELETAL: Adequate range of motion digital regions MTPJ areas right foot.     Plan Per Assessment  Valentine was seen today for foot pain.    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 New patient evaluation and management  2. Partial thickness, nonexcisional debridement performed neoplasm site right foot to patient tolerance. Topical medication was applied which patient was advised on utilizing daily. 3. Patient was given additional products to be utilized daily to treat the neoplasm site. Shoe recommendations were discussed with patient today as well. 4. Patient will be followed up in 1 week's time for continued evaluation and care. Seen By:    Benitez Patterson DPM    Electronically signed by Benitez Patterson DPM on 8/26/2021 at 12:09 PM      This note was created using voice recognition software. The note was reviewed however may contain grammatical errors.

## 2021-09-02 ENCOUNTER — OFFICE VISIT (OUTPATIENT)
Dept: PODIATRY | Age: 76
End: 2021-09-02
Payer: MEDICARE

## 2021-09-02 VITALS — HEIGHT: 61 IN | WEIGHT: 117 LBS | BODY MASS INDEX: 22.09 KG/M2

## 2021-09-02 DIAGNOSIS — I73.9 PVD (PERIPHERAL VASCULAR DISEASE) (HCC): ICD-10-CM

## 2021-09-02 DIAGNOSIS — D23.71 BENIGN NEOPLASM OF SKIN OF RIGHT FOOT: Primary | ICD-10-CM

## 2021-09-02 PROCEDURE — 99213 OFFICE O/P EST LOW 20 MIN: CPT | Performed by: PODIATRIST

## 2021-09-02 PROCEDURE — G8427 DOCREV CUR MEDS BY ELIG CLIN: HCPCS | Performed by: PODIATRIST

## 2021-09-02 PROCEDURE — 1123F ACP DISCUSS/DSCN MKR DOCD: CPT | Performed by: PODIATRIST

## 2021-09-02 PROCEDURE — G8420 CALC BMI NORM PARAMETERS: HCPCS | Performed by: PODIATRIST

## 2021-09-02 PROCEDURE — 1036F TOBACCO NON-USER: CPT | Performed by: PODIATRIST

## 2021-09-02 PROCEDURE — 4040F PNEUMOC VAC/ADMIN/RCVD: CPT | Performed by: PODIATRIST

## 2021-09-02 PROCEDURE — G8399 PT W/DXA RESULTS DOCUMENT: HCPCS | Performed by: PODIATRIST

## 2021-09-02 PROCEDURE — 1090F PRES/ABSN URINE INCON ASSESS: CPT | Performed by: PODIATRIST

## 2021-09-02 RX ORDER — AMMONIUM LACTATE 12 G/100G
LOTION TOPICAL
Qty: 222 ML | Refills: 5 | Status: SHIPPED
Start: 2021-09-02 | End: 2022-01-27 | Stop reason: ALTCHOICE

## 2021-09-02 NOTE — PROGRESS NOTES
Patient is in today for 1 week follow up of right foot plantar wart. Patient has no other issues at this time.  pcp is Roly Catalan DO  Last ov 8/16/21

## 2021-09-03 NOTE — PROGRESS NOTES
21     Osito Blandon    : 1945   Sex: female    Age: 66 y.o. Patient's PCP/Provider is:  Maciej Bobo DO    Subjective:  Patient is seen today for follow-up regarding continued care regarding painful neoplasm right foot. Overall patient has noticed significant improvement with current care. She denies any additional issues at this time. Chief Complaint   Patient presents with    Foot Pain     right foot- plantar wart       ROS:  Const: Positives and pertinent negatives as per HPI. Musculo: Denies symptoms other than stated above. Neuro: Denies symptoms other than stated above. Skin: Denies symptoms other than stated above. Current Medications:    Current Outpatient Medications:     ammonium lactate (LAC-HYDRIN) 12 % lotion, Apply topically daily. , Disp: 222 mL, Rfl: 5    donepezil (ARICEPT) 5 MG tablet, Take 1 tablet by mouth nightly, Disp: 30 tablet, Rfl: 5    apixaban (ELIQUIS) 5 MG TABS tablet, Take 1 tablet by mouth 2 times daily, Disp: 180 tablet, Rfl: 5    sertraline (ZOLOFT) 100 MG tablet, TAKE 1 TABLET BY MOUTH  DAILY, Disp: 90 tablet, Rfl: 3    folic acid (FOLVITE) 1 MG tablet, Take 1 tablet by mouth daily, Disp: 30 tablet, Rfl: 5    apixaban (ELIQUIS) 5 MG TABS tablet, Take 1 tablet by mouth 2 times daily, Disp: 180 tablet, Rfl: 5    levothyroxine (SYNTHROID) 25 MCG tablet, Take 1 tablet by mouth Daily, Disp: 90 tablet, Rfl: 5    atorvastatin (LIPITOR) 20 MG tablet, TAKE ONE TABLET BY MOUTH EVERY EVENING, Disp: 90 tablet, Rfl: 5    diclofenac sodium 1 % GEL, Apply 4 g topically 4 times daily, Disp: 3 Tube, Rfl: 5    therapeutic multivitamin-minerals (THERAGRAN-M) tablet, Take 1 tablet by mouth daily. , Disp: , Rfl:     Allergies:  No Known Allergies    Vitals:    21 1445   Weight: 117 lb (53.1 kg)   Height: 5' 1\" (1.549 m)       Exam:  Neurovascular status unchanged. Neoplasm site resolved after partial thickness debridement performed with a #15 blade. No underlying ulceration or any signs of infection noted right foot. Diagnostic Studies:     No results found. Procedures:    None    Plan Per Assessment  Maira Marsh was seen today for foot pain. 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 PVD (peripheral vascular disease) ([facility] [address]      1. Evaluation and management  2. Advised the patient on appropriate skin care techniques to prevent reoccurrence. 3. Patient was given prescription for topical Lac-Hydrin lotion to be applied daily as needed. 4. Patient will be followed up at a later date if any further Podiatric issues arise. Seen By:    Kushal Cyr DPM    Electronically signed by Kushal Cyr DPM on 9/2/2021 at 9:07 PM    This note was created using voice recognition software. The note was reviewed however may contain grammatical errors.

## 2021-11-03 DIAGNOSIS — R73.01 IFG (IMPAIRED FASTING GLUCOSE): ICD-10-CM

## 2021-11-03 DIAGNOSIS — E78.2 MIXED HYPERLIPIDEMIA: ICD-10-CM

## 2021-11-03 DIAGNOSIS — R41.3 MEMORY LOSS: ICD-10-CM

## 2021-11-03 DIAGNOSIS — R53.82 CHRONIC FATIGUE: ICD-10-CM

## 2021-11-03 DIAGNOSIS — I67.9 CEREBRAL ARTERIAL DISEASE: ICD-10-CM

## 2021-11-03 LAB
ALBUMIN SERPL-MCNC: 4 G/DL (ref 3.5–5.2)
ALP BLD-CCNC: 83 U/L (ref 35–104)
ALT SERPL-CCNC: 15 U/L (ref 0–32)
ANION GAP SERPL CALCULATED.3IONS-SCNC: 9 MMOL/L (ref 7–16)
AST SERPL-CCNC: 23 U/L (ref 0–31)
BASOPHILS ABSOLUTE: 0.13 E9/L (ref 0–0.2)
BASOPHILS RELATIVE PERCENT: 1.4 % (ref 0–2)
BILIRUB SERPL-MCNC: 0.3 MG/DL (ref 0–1.2)
BUN BLDV-MCNC: 13 MG/DL (ref 6–23)
CALCIUM SERPL-MCNC: 8.7 MG/DL (ref 8.6–10.2)
CHLORIDE BLD-SCNC: 101 MMOL/L (ref 98–107)
CHOLESTEROL, TOTAL: 182 MG/DL (ref 0–199)
CO2: 27 MMOL/L (ref 22–29)
CREAT SERPL-MCNC: 0.7 MG/DL (ref 0.5–1)
EOSINOPHILS ABSOLUTE: 0.25 E9/L (ref 0.05–0.5)
EOSINOPHILS RELATIVE PERCENT: 2.7 % (ref 0–6)
GFR AFRICAN AMERICAN: >60
GFR NON-AFRICAN AMERICAN: >60 ML/MIN/1.73
GLUCOSE BLD-MCNC: 81 MG/DL (ref 74–99)
HBA1C MFR BLD: 5.5 % (ref 4–5.6)
HCT VFR BLD CALC: 40.6 % (ref 34–48)
HDLC SERPL-MCNC: 56 MG/DL
HEMOGLOBIN: 12.8 G/DL (ref 11.5–15.5)
IMMATURE GRANULOCYTES #: 0.04 E9/L
IMMATURE GRANULOCYTES %: 0.4 % (ref 0–5)
LDL CHOLESTEROL CALCULATED: 106 MG/DL (ref 0–99)
LYMPHOCYTES ABSOLUTE: 2.32 E9/L (ref 1.5–4)
LYMPHOCYTES RELATIVE PERCENT: 24.9 % (ref 20–42)
MCH RBC QN AUTO: 30.2 PG (ref 26–35)
MCHC RBC AUTO-ENTMCNC: 31.5 % (ref 32–34.5)
MCV RBC AUTO: 95.8 FL (ref 80–99.9)
MICROALBUMIN UR-MCNC: <12 MG/L
MONOCYTES ABSOLUTE: 0.75 E9/L (ref 0.1–0.95)
MONOCYTES RELATIVE PERCENT: 8.1 % (ref 2–12)
NEUTROPHILS ABSOLUTE: 5.82 E9/L (ref 1.8–7.3)
NEUTROPHILS RELATIVE PERCENT: 62.5 % (ref 43–80)
PDW BLD-RTO: 13.8 FL (ref 11.5–15)
PLATELET # BLD: 402 E9/L (ref 130–450)
PMV BLD AUTO: 11.4 FL (ref 7–12)
POTASSIUM SERPL-SCNC: 4.7 MMOL/L (ref 3.5–5)
RBC # BLD: 4.24 E12/L (ref 3.5–5.5)
SODIUM BLD-SCNC: 137 MMOL/L (ref 132–146)
TOTAL PROTEIN: 6.6 G/DL (ref 6.4–8.3)
TRIGL SERPL-MCNC: 98 MG/DL (ref 0–149)
TSH SERPL DL<=0.05 MIU/L-ACNC: 2.6 UIU/ML (ref 0.27–4.2)
VLDLC SERPL CALC-MCNC: 20 MG/DL
WBC # BLD: 9.3 E9/L (ref 4.5–11.5)

## 2021-11-11 ENCOUNTER — OFFICE VISIT (OUTPATIENT)
Dept: FAMILY MEDICINE CLINIC | Age: 76
End: 2021-11-11
Payer: MEDICARE

## 2021-11-11 VITALS
OXYGEN SATURATION: 93 % | SYSTOLIC BLOOD PRESSURE: 114 MMHG | RESPIRATION RATE: 18 BRPM | HEART RATE: 55 BPM | DIASTOLIC BLOOD PRESSURE: 74 MMHG | WEIGHT: 113 LBS | BODY MASS INDEX: 21.34 KG/M2 | HEIGHT: 61 IN

## 2021-11-11 DIAGNOSIS — R41.3 MEMORY LOSS: ICD-10-CM

## 2021-11-11 DIAGNOSIS — R25.2 NOCTURNAL FOOT CRAMPS: Primary | ICD-10-CM

## 2021-11-11 DIAGNOSIS — F01.50 VASCULAR DEMENTIA WITHOUT BEHAVIORAL DISTURBANCE (HCC): ICD-10-CM

## 2021-11-11 PROCEDURE — G8427 DOCREV CUR MEDS BY ELIG CLIN: HCPCS | Performed by: FAMILY MEDICINE

## 2021-11-11 PROCEDURE — 99213 OFFICE O/P EST LOW 20 MIN: CPT | Performed by: FAMILY MEDICINE

## 2021-11-11 PROCEDURE — 1090F PRES/ABSN URINE INCON ASSESS: CPT | Performed by: FAMILY MEDICINE

## 2021-11-11 PROCEDURE — G8420 CALC BMI NORM PARAMETERS: HCPCS | Performed by: FAMILY MEDICINE

## 2021-11-11 PROCEDURE — 1036F TOBACCO NON-USER: CPT | Performed by: FAMILY MEDICINE

## 2021-11-11 PROCEDURE — G8484 FLU IMMUNIZE NO ADMIN: HCPCS | Performed by: FAMILY MEDICINE

## 2021-11-11 PROCEDURE — 1123F ACP DISCUSS/DSCN MKR DOCD: CPT | Performed by: FAMILY MEDICINE

## 2021-11-11 PROCEDURE — 4040F PNEUMOC VAC/ADMIN/RCVD: CPT | Performed by: FAMILY MEDICINE

## 2021-11-11 PROCEDURE — G8399 PT W/DXA RESULTS DOCUMENT: HCPCS | Performed by: FAMILY MEDICINE

## 2021-11-11 RX ORDER — DONEPEZIL HYDROCHLORIDE 10 MG/1
10 TABLET, FILM COATED ORAL DAILY
Qty: 30 TABLET | Refills: 5 | Status: SHIPPED
Start: 2021-11-11 | End: 2021-12-13 | Stop reason: SDUPTHER

## 2021-11-11 SDOH — ECONOMIC STABILITY: FOOD INSECURITY: WITHIN THE PAST 12 MONTHS, YOU WORRIED THAT YOUR FOOD WOULD RUN OUT BEFORE YOU GOT MONEY TO BUY MORE.: NEVER TRUE

## 2021-11-11 SDOH — ECONOMIC STABILITY: FOOD INSECURITY: WITHIN THE PAST 12 MONTHS, THE FOOD YOU BOUGHT JUST DIDN'T LAST AND YOU DIDN'T HAVE MONEY TO GET MORE.: NEVER TRUE

## 2021-11-11 ASSESSMENT — SOCIAL DETERMINANTS OF HEALTH (SDOH): HOW HARD IS IT FOR YOU TO PAY FOR THE VERY BASICS LIKE FOOD, HOUSING, MEDICAL CARE, AND HEATING?: NOT HARD AT ALL

## 2021-11-14 ASSESSMENT — ENCOUNTER SYMPTOMS
BACK PAIN: 1
ABDOMINAL PAIN: 0
EYE DISCHARGE: 0
CHEST TIGHTNESS: 0
SHORTNESS OF BREATH: 0
NAUSEA: 0
BLOOD IN STOOL: 0
PHOTOPHOBIA: 0
WHEEZING: 0
ROS SKIN COMMENTS: PLANTAR WART
FACIAL SWELLING: 0
COUGH: 0
RHINORRHEA: 0
COLOR CHANGE: 0
EYE REDNESS: 0
VOICE CHANGE: 0
CONSTIPATION: 0
STRIDOR: 0
VOMITING: 0
DIARRHEA: 0
APNEA: 0
SINUS PRESSURE: 0
ANAL BLEEDING: 0
EYE ITCHING: 0
RECTAL PAIN: 0
CHOKING: 0
SORE THROAT: 0
EYE PAIN: 0
ABDOMINAL DISTENTION: 0
TROUBLE SWALLOWING: 0

## 2021-11-14 NOTE — PROGRESS NOTES
Samy Tidwell is a 66 y.o. female  . Subjective:      Discussed blood work at length . Discussed starting medication for memeory loss. Complains of leg cramps at night. We will start some magnesium. Patient to call if no improvement. Review of Systems   Constitutional: Positive for fatigue. Negative for activity change, appetite change, chills, diaphoresis, fever and unexpected weight change. HENT: Negative for congestion, dental problem, drooling, ear discharge, ear pain, facial swelling, hearing loss, mouth sores, nosebleeds, postnasal drip, rhinorrhea, sinus pressure, sneezing, sore throat, tinnitus, trouble swallowing and voice change. Eyes: Negative for photophobia, pain, discharge, redness, itching and visual disturbance. Respiratory: Negative for apnea, cough, choking, chest tightness, shortness of breath, wheezing and stridor. Cardiovascular: Negative for chest pain, palpitations and leg swelling. Gastrointestinal: Negative for abdominal distention, abdominal pain, anal bleeding, blood in stool, constipation, diarrhea, nausea, rectal pain and vomiting. Endocrine: Negative for cold intolerance, heat intolerance, polydipsia, polyphagia and polyuria. Genitourinary: Negative for decreased urine volume, difficulty urinating, dyspareunia, dysuria, enuresis, flank pain, frequency, genital sores, hematuria, menstrual problem, pelvic pain, urgency, vaginal bleeding, vaginal discharge and vaginal pain. Musculoskeletal: Positive for arthralgias and back pain. Negative for gait problem, joint swelling, myalgias, neck pain and neck stiffness. Skin: Negative for color change, pallor, rash and wound. Plantar wart    Allergic/Immunologic: Negative for environmental allergies, food allergies and immunocompromised state. Neurological: Negative for dizziness, tremors, seizures, syncope, facial asymmetry, speech difficulty, weakness, light-headedness, numbness and headaches.         Memory issues   Hematological: Negative for adenopathy. Does not bruise/bleed easily. Psychiatric/Behavioral: Negative for agitation, behavioral problems, confusion, decreased concentration, dysphoric mood, hallucinations, self-injury, sleep disturbance and suicidal ideas. The patient is not nervous/anxious and is not hyperactive. Past Medical History:   Diagnosis Date    Arthritis     Asymptomatic PVCs 2012    pt felt flutter, no other sx, holter + pvc's, few runs of SVT   [facility]    History of Holter monitoring     Hyperlipidemia     Macular degeneration     Nausea & vomiting     Pelvic prolapse     PONV (postoperative nausea and vomiting)     TIA (transient ischemic attack)        Social History     Socioeconomic History    Marital status:      Spouse name: Not on file    Number of children: Not on file    Years of education: Not on file    Highest education level: Not on file   Occupational History    Occupation: retired   Tobacco Use    Smoking status: Former Smoker     Quit date: 1994     Years since quittin.4    Smokeless tobacco: Never Used   Vaping Use    Vaping Use: Never used   Substance and Sexual Activity    Alcohol use: No     Comment: Coffee 1 cup a day     Drug use: No    Sexual activity: Not Currently   Other Topics Concern    Not on file   Social History Narrative    Not on file     Social Determinants of Health     Financial Resource Strain: Low Risk     Difficulty of Paying Living Expenses: Not hard at all   Food Insecurity: No Food Insecurity    Worried About [address] in the Last Year: Never true    [address] in the Last Year: Never true   Transportation Needs:     Lack of Transportation (Medical): Not on file    Lack of Transportation (Non-Medical):  Not on file   Physical Activity:     Days of Exercise per Week: Not on file    Minutes of Exercise per Session: Not on file   Stress:     Feeling of Stress : Not on file   Social Connections:     Frequency of Communication with Friends and Family: Not on file    Frequency of Social Gatherings with Friends and Family: Not on file    Attends Restorationist Services: Not on file    Active Member of Clubs or Organizations: Not on file    Attends Club or Organization Meetings: Not on file    Marital Status: Not on file   Intimate Partner Violence:     Fear of Current or Ex-Partner: Not on file    Emotionally Abused: Not on file    Physically Abused: Not on file    Sexually Abused: Not on file   Housing Stability:     Unable to Pay for Housing in the Last Year: Not on file    Number of [place] in the Last Year: Not on file    Unstable Housing in the Last Year: Not on file       No family history on file. Current Outpatient Medications on File Prior to Visit   Medication Sig Dispense Refill    ammonium lactate (LAC-HYDRIN) 12 % lotion Apply topically daily. 222 mL 5    apixaban (ELIQUIS) 5 MG TABS tablet Take 1 tablet by mouth 2 times daily 180 tablet 5    sertraline (ZOLOFT) 100 MG tablet TAKE 1 TABLET BY MOUTH  DAILY 90 tablet 3    folic acid (FOLVITE) 1 MG tablet Take 1 tablet by mouth daily 30 tablet 5    apixaban (ELIQUIS) 5 MG TABS tablet Take 1 tablet by mouth 2 times daily 180 tablet 5    levothyroxine (SYNTHROID) 25 MCG tablet Take 1 tablet by mouth Daily 90 tablet 5    atorvastatin (LIPITOR) 20 MG tablet TAKE ONE TABLET BY MOUTH EVERY EVENING 90 tablet 5    diclofenac sodium 1 % GEL Apply 4 g topically 4 times daily 3 Tube 5    therapeutic multivitamin-minerals (THERAGRAN-M) tablet Take 1 tablet by mouth daily. No current facility-administered medications on file prior to visit. No Known Allergies    I have reviewedher allergies, medications, problem list, medical, social and family history and have updated as needed in the electronic medical record. Objective:     Physical Exam  Vitals and nursing note reviewed.    Constitutional:       General: She is not in acute distress. Appearance: She is well-developed. She is not diaphoretic. HENT:      Head: Normocephalic and atraumatic. Right Ear: External ear normal.      Left Ear: External ear normal.      Nose: Nose normal.      Mouth/Throat:      Pharynx: No oropharyngeal exudate. Eyes:      General: No scleral icterus. Right eye: No discharge. Left eye: No discharge. Conjunctiva/sclera: Conjunctivae normal.      Pupils: Pupils are equal, round, and reactive to light. Neck:      Thyroid: No thyromegaly. Vascular: No JVD. Trachea: No tracheal deviation. Cardiovascular:      Rate and Rhythm: Normal rate and regular rhythm. Heart sounds: Normal heart sounds. No murmur heard. No friction rub. No gallop. Pulmonary:      Effort: Pulmonary effort is normal. No respiratory distress. Breath sounds: Normal breath sounds. No stridor. No wheezing or rales. Chest:      Chest wall: No tenderness. Abdominal:      General: Bowel sounds are normal. There is no distension. Palpations: Abdomen is soft. There is no mass. Tenderness: There is no abdominal tenderness. There is no guarding or rebound. Genitourinary:     Comments: Will follow with own gynecologist for gynecological and breast care. Musculoskeletal:         General: No tenderness. Normal range of motion. Cervical back: Normal range of motion and neck supple. Lymphadenopathy:      Cervical: No cervical adenopathy. Skin:     General: Skin is warm and dry. Coloration: Skin is not pale. Findings: No erythema or rash. Comments: Calloused plantar wart suspect   Neurological:      Mental Status: She is alert and oriented to person, place, and time. Cranial Nerves: No cranial nerve deficit. Motor: No abnormal muscle tone. Coordination: Coordination normal.      Deep Tendon Reflexes: Reflexes are normal and symmetric.  Reflexes normal.   Psychiatric:         Behavior: Behavior normal.         Thought Content: Thought content normal.         Judgment: Judgment normal.         Assessment / Plan:   Becky Theodore was seen today for follow-up. Diagnoses and all orders for this visit:    Nocturnal foot cramps  -     Magnesium 100 MG CAPS; Take 200 mg by mouth nightly    Memory loss  -     donepezil (ARICEPT) 10 MG tablet; Take 1 tablet by mouth daily    Vascular dementia without behavioral disturbance (HCC)  -     donepezil (ARICEPT) 10 MG tablet; Take 1 tablet by mouth daily      Will repeat CVA of MRA at next visit . Patient to follow with cardiology. Willfollow with own gynecologist for gynecological and breast care. Reviewed health maintenance report. Patient is aware of deficiencies and suggested preventative tests.

## 2021-12-09 DIAGNOSIS — F34.1 DYSTHYMIA: ICD-10-CM

## 2021-12-09 RX ORDER — SERTRALINE HYDROCHLORIDE 100 MG/1
100 TABLET, FILM COATED ORAL DAILY
Qty: 90 TABLET | Refills: 3 | Status: ON HOLD
Start: 2021-12-09 | End: 2022-02-23 | Stop reason: HOSPADM

## 2021-12-13 DIAGNOSIS — F01.50 VASCULAR DEMENTIA WITHOUT BEHAVIORAL DISTURBANCE (HCC): ICD-10-CM

## 2021-12-13 DIAGNOSIS — R41.3 MEMORY LOSS: ICD-10-CM

## 2021-12-13 RX ORDER — DONEPEZIL HYDROCHLORIDE 10 MG/1
10 TABLET, FILM COATED ORAL DAILY
Qty: 90 TABLET | Refills: 1 | Status: SHIPPED
Start: 2021-12-13 | End: 2022-02-02 | Stop reason: SDUPTHER

## 2021-12-23 ENCOUNTER — APPOINTMENT (OUTPATIENT)
Dept: GENERAL RADIOLOGY | Age: 76
DRG: 309 | End: 2021-12-23
Payer: MEDICARE

## 2021-12-23 ENCOUNTER — OFFICE VISIT (OUTPATIENT)
Dept: FAMILY MEDICINE CLINIC | Age: 76
End: 2021-12-23
Payer: MEDICARE

## 2021-12-23 ENCOUNTER — HOSPITAL ENCOUNTER (INPATIENT)
Age: 76
LOS: 4 days | Discharge: HOME | DRG: 309 | End: 2021-12-27
Attending: STUDENT IN AN ORGANIZED HEALTH CARE EDUCATION/TRAINING PROGRAM | Admitting: INTERNAL MEDICINE
Payer: MEDICARE

## 2021-12-23 VITALS
HEART RATE: 147 BPM | WEIGHT: 113 LBS | DIASTOLIC BLOOD PRESSURE: 76 MMHG | HEIGHT: 61 IN | SYSTOLIC BLOOD PRESSURE: 135 MMHG | TEMPERATURE: 97.2 F | OXYGEN SATURATION: 95 % | RESPIRATION RATE: 17 BRPM | BODY MASS INDEX: 21.34 KG/M2

## 2021-12-23 DIAGNOSIS — R06.02 SHORTNESS OF BREATH: ICD-10-CM

## 2021-12-23 DIAGNOSIS — I48.91 ATRIAL FIBRILLATION WITH RVR (HCC): Primary | ICD-10-CM

## 2021-12-23 DIAGNOSIS — I49.9 IRREGULAR HEART BEAT: ICD-10-CM

## 2021-12-23 DIAGNOSIS — I48.91 NEW ONSET A-FIB (HCC): Primary | ICD-10-CM

## 2021-12-23 LAB
ALBUMIN SERPL-MCNC: 3.9 G/DL (ref 3.5–5.2)
ALP BLD-CCNC: 80 U/L (ref 35–104)
ALT SERPL-CCNC: 17 U/L (ref 0–32)
ANION GAP SERPL CALCULATED.3IONS-SCNC: 11 MMOL/L (ref 7–16)
AST SERPL-CCNC: 21 U/L (ref 0–31)
BASOPHILS ABSOLUTE: 0.1 E9/L (ref 0–0.2)
BASOPHILS RELATIVE PERCENT: 0.8 % (ref 0–2)
BILIRUB SERPL-MCNC: 0.4 MG/DL (ref 0–1.2)
BUN BLDV-MCNC: 13 MG/DL (ref 6–23)
CALCIUM SERPL-MCNC: 8.3 MG/DL (ref 8.6–10.2)
CHLORIDE BLD-SCNC: 102 MMOL/L (ref 98–107)
CO2: 23 MMOL/L (ref 22–29)
CREAT SERPL-MCNC: 0.8 MG/DL (ref 0.5–1)
EKG ATRIAL RATE: 136 BPM
EKG Q-T INTERVAL: 312 MS
EKG QRS DURATION: 86 MS
EKG QTC CALCULATION (BAZETT): 477 MS
EKG R AXIS: 21 DEGREES
EKG T AXIS: 11 DEGREES
EKG VENTRICULAR RATE: 141 BPM
EOSINOPHILS ABSOLUTE: 0.16 E9/L (ref 0.05–0.5)
EOSINOPHILS RELATIVE PERCENT: 1.2 % (ref 0–6)
GFR AFRICAN AMERICAN: >60
GFR NON-AFRICAN AMERICAN: >60 ML/MIN/1.73
GLUCOSE BLD-MCNC: 88 MG/DL (ref 74–99)
HCT VFR BLD CALC: 38.3 % (ref 34–48)
HEMOGLOBIN: 12.4 G/DL (ref 11.5–15.5)
IMMATURE GRANULOCYTES #: 0.04 E9/L
IMMATURE GRANULOCYTES %: 0.3 % (ref 0–5)
INFLUENZA A BY PCR: NOT DETECTED
INFLUENZA B BY PCR: NOT DETECTED
LYMPHOCYTES ABSOLUTE: 1.87 E9/L (ref 1.5–4)
LYMPHOCYTES RELATIVE PERCENT: 14.4 % (ref 20–42)
MAGNESIUM: 2.2 MG/DL (ref 1.6–2.6)
MCH RBC QN AUTO: 30.7 PG (ref 26–35)
MCHC RBC AUTO-ENTMCNC: 32.4 % (ref 32–34.5)
MCV RBC AUTO: 94.8 FL (ref 80–99.9)
MONOCYTES ABSOLUTE: 1.27 E9/L (ref 0.1–0.95)
MONOCYTES RELATIVE PERCENT: 9.8 % (ref 2–12)
NEUTROPHILS ABSOLUTE: 9.58 E9/L (ref 1.8–7.3)
NEUTROPHILS RELATIVE PERCENT: 73.5 % (ref 43–80)
PDW BLD-RTO: 13.2 FL (ref 11.5–15)
PLATELET # BLD: 361 E9/L (ref 130–450)
PMV BLD AUTO: 11.1 FL (ref 7–12)
POTASSIUM REFLEX MAGNESIUM: 3.9 MMOL/L (ref 3.5–5)
PRO-BNP: 4986 PG/ML (ref 0–450)
RBC # BLD: 4.04 E12/L (ref 3.5–5.5)
SODIUM BLD-SCNC: 136 MMOL/L (ref 132–146)
TOTAL PROTEIN: 6.8 G/DL (ref 6.4–8.3)
TROPONIN, HIGH SENSITIVITY: 10 NG/L (ref 0–9)
TROPONIN, HIGH SENSITIVITY: 13 NG/L (ref 0–9)
TSH SERPL DL<=0.05 MIU/L-ACNC: 2.23 UIU/ML (ref 0.27–4.2)
WBC # BLD: 13 E9/L (ref 4.5–11.5)

## 2021-12-23 PROCEDURE — 93000 ELECTROCARDIOGRAM COMPLETE: CPT | Performed by: NURSE PRACTITIONER

## 2021-12-23 PROCEDURE — 1090F PRES/ABSN URINE INCON ASSESS: CPT | Performed by: NURSE PRACTITIONER

## 2021-12-23 PROCEDURE — 96374 THER/PROPH/DIAG INJ IV PUSH: CPT

## 2021-12-23 PROCEDURE — 4040F PNEUMOC VAC/ADMIN/RCVD: CPT | Performed by: NURSE PRACTITIONER

## 2021-12-23 PROCEDURE — 84443 ASSAY THYROID STIM HORMONE: CPT

## 2021-12-23 PROCEDURE — 2500000003 HC RX 250 WO HCPCS: Performed by: STUDENT IN AN ORGANIZED HEALTH CARE EDUCATION/TRAINING PROGRAM

## 2021-12-23 PROCEDURE — G8427 DOCREV CUR MEDS BY ELIG CLIN: HCPCS | Performed by: NURSE PRACTITIONER

## 2021-12-23 PROCEDURE — G8420 CALC BMI NORM PARAMETERS: HCPCS | Performed by: NURSE PRACTITIONER

## 2021-12-23 PROCEDURE — 80053 COMPREHEN METABOLIC PANEL: CPT

## 2021-12-23 PROCEDURE — 0202U NFCT DS 22 TRGT SARS-COV-2: CPT

## 2021-12-23 PROCEDURE — 99284 EMERGENCY DEPT VISIT MOD MDM: CPT

## 2021-12-23 PROCEDURE — 71045 X-RAY EXAM CHEST 1 VIEW: CPT

## 2021-12-23 PROCEDURE — 87502 INFLUENZA DNA AMP PROBE: CPT

## 2021-12-23 PROCEDURE — 96376 TX/PRO/DX INJ SAME DRUG ADON: CPT

## 2021-12-23 PROCEDURE — 93005 ELECTROCARDIOGRAM TRACING: CPT | Performed by: STUDENT IN AN ORGANIZED HEALTH CARE EDUCATION/TRAINING PROGRAM

## 2021-12-23 PROCEDURE — G8484 FLU IMMUNIZE NO ADMIN: HCPCS | Performed by: NURSE PRACTITIONER

## 2021-12-23 PROCEDURE — 1036F TOBACCO NON-USER: CPT | Performed by: NURSE PRACTITIONER

## 2021-12-23 PROCEDURE — 96361 HYDRATE IV INFUSION ADD-ON: CPT

## 2021-12-23 PROCEDURE — 2060000000 HC ICU INTERMEDIATE R&B

## 2021-12-23 PROCEDURE — 83735 ASSAY OF MAGNESIUM: CPT

## 2021-12-23 PROCEDURE — 6370000000 HC RX 637 (ALT 250 FOR IP): Performed by: INTERNAL MEDICINE

## 2021-12-23 PROCEDURE — 2580000003 HC RX 258: Performed by: INTERNAL MEDICINE

## 2021-12-23 PROCEDURE — 83880 ASSAY OF NATRIURETIC PEPTIDE: CPT

## 2021-12-23 PROCEDURE — 85025 COMPLETE CBC W/AUTO DIFF WBC: CPT

## 2021-12-23 PROCEDURE — 99213 OFFICE O/P EST LOW 20 MIN: CPT | Performed by: NURSE PRACTITIONER

## 2021-12-23 PROCEDURE — 1123F ACP DISCUSS/DSCN MKR DOCD: CPT | Performed by: NURSE PRACTITIONER

## 2021-12-23 PROCEDURE — G8399 PT W/DXA RESULTS DOCUMENT: HCPCS | Performed by: NURSE PRACTITIONER

## 2021-12-23 PROCEDURE — 84484 ASSAY OF TROPONIN QUANT: CPT

## 2021-12-23 PROCEDURE — 2580000003 HC RX 258: Performed by: STUDENT IN AN ORGANIZED HEALTH CARE EDUCATION/TRAINING PROGRAM

## 2021-12-23 RX ORDER — DONEPEZIL HYDROCHLORIDE 10 MG/1
10 TABLET, FILM COATED ORAL DAILY
Status: DISCONTINUED | OUTPATIENT
Start: 2021-12-24 | End: 2021-12-27 | Stop reason: HOSPADM

## 2021-12-23 RX ORDER — POLYETHYLENE GLYCOL 3350 17 G/17G
17 POWDER, FOR SOLUTION ORAL DAILY PRN
Status: DISCONTINUED | OUTPATIENT
Start: 2021-12-23 | End: 2021-12-27 | Stop reason: HOSPADM

## 2021-12-23 RX ORDER — NICOTINE POLACRILEX 4 MG
15 LOZENGE BUCCAL PRN
Status: DISCONTINUED | OUTPATIENT
Start: 2021-12-23 | End: 2021-12-27 | Stop reason: HOSPADM

## 2021-12-23 RX ORDER — SODIUM CHLORIDE 9 MG/ML
25 INJECTION, SOLUTION INTRAVENOUS PRN
Status: DISCONTINUED | OUTPATIENT
Start: 2021-12-23 | End: 2021-12-27 | Stop reason: HOSPADM

## 2021-12-23 RX ORDER — SODIUM CHLORIDE 0.9 % (FLUSH) 0.9 %
5-40 SYRINGE (ML) INJECTION EVERY 12 HOURS SCHEDULED
Status: DISCONTINUED | OUTPATIENT
Start: 2021-12-23 | End: 2021-12-27 | Stop reason: HOSPADM

## 2021-12-23 RX ORDER — ACETAMINOPHEN 325 MG/1
650 TABLET ORAL EVERY 6 HOURS PRN
Status: DISCONTINUED | OUTPATIENT
Start: 2021-12-23 | End: 2021-12-27 | Stop reason: HOSPADM

## 2021-12-23 RX ORDER — ACETAMINOPHEN 650 MG/1
650 SUPPOSITORY RECTAL EVERY 6 HOURS PRN
Status: DISCONTINUED | OUTPATIENT
Start: 2021-12-23 | End: 2021-12-27 | Stop reason: HOSPADM

## 2021-12-23 RX ORDER — LEVOTHYROXINE SODIUM 0.03 MG/1
25 TABLET ORAL DAILY
Status: DISCONTINUED | OUTPATIENT
Start: 2021-12-24 | End: 2021-12-27 | Stop reason: HOSPADM

## 2021-12-23 RX ORDER — DILTIAZEM HYDROCHLORIDE 120 MG/1
120 CAPSULE, COATED, EXTENDED RELEASE ORAL DAILY
Qty: 7 CAPSULE | Refills: 0 | Status: SHIPPED | OUTPATIENT
Start: 2021-12-23 | End: 2021-12-27 | Stop reason: HOSPADM

## 2021-12-23 RX ORDER — 0.9 % SODIUM CHLORIDE 0.9 %
1000 INTRAVENOUS SOLUTION INTRAVENOUS ONCE
Status: COMPLETED | OUTPATIENT
Start: 2021-12-23 | End: 2021-12-23

## 2021-12-23 RX ORDER — SERTRALINE HYDROCHLORIDE 100 MG/1
100 TABLET, FILM COATED ORAL DAILY
Status: DISCONTINUED | OUTPATIENT
Start: 2021-12-24 | End: 2021-12-23 | Stop reason: CLARIF

## 2021-12-23 RX ORDER — SODIUM CHLORIDE 0.9 % (FLUSH) 0.9 %
5-40 SYRINGE (ML) INJECTION PRN
Status: DISCONTINUED | OUTPATIENT
Start: 2021-12-23 | End: 2021-12-27 | Stop reason: HOSPADM

## 2021-12-23 RX ORDER — DILTIAZEM HYDROCHLORIDE 5 MG/ML
10 INJECTION INTRAVENOUS ONCE
Status: COMPLETED | OUTPATIENT
Start: 2021-12-23 | End: 2021-12-23

## 2021-12-23 RX ORDER — DEXTROSE MONOHYDRATE 25 G/50ML
12.5 INJECTION, SOLUTION INTRAVENOUS PRN
Status: DISCONTINUED | OUTPATIENT
Start: 2021-12-23 | End: 2021-12-27 | Stop reason: HOSPADM

## 2021-12-23 RX ORDER — DEXTROSE MONOHYDRATE 50 MG/ML
100 INJECTION, SOLUTION INTRAVENOUS PRN
Status: DISCONTINUED | OUTPATIENT
Start: 2021-12-23 | End: 2021-12-27 | Stop reason: HOSPADM

## 2021-12-23 RX ADMIN — APIXABAN 5 MG: 5 TABLET, FILM COATED ORAL at 22:42

## 2021-12-23 RX ADMIN — DILTIAZEM HYDROCHLORIDE 10 MG: 5 INJECTION, SOLUTION INTRAVENOUS at 21:27

## 2021-12-23 RX ADMIN — Medication 10 ML: at 22:43

## 2021-12-23 RX ADMIN — DILTIAZEM HYDROCHLORIDE 10 MG: 5 INJECTION, SOLUTION INTRAVENOUS at 19:19

## 2021-12-23 RX ADMIN — SODIUM CHLORIDE 1000 ML: 9 INJECTION, SOLUTION INTRAVENOUS at 18:53

## 2021-12-23 RX ADMIN — SODIUM CHLORIDE 1000 ML: 9 INJECTION, SOLUTION INTRAVENOUS at 16:03

## 2021-12-23 RX ADMIN — DILTIAZEM HYDROCHLORIDE 5 MG/HR: 5 INJECTION INTRAVENOUS at 21:34

## 2021-12-23 ASSESSMENT — PATIENT HEALTH QUESTIONNAIRE - PHQ9
SUM OF ALL RESPONSES TO PHQ9 QUESTIONS 1 & 2: 0
SUM OF ALL RESPONSES TO PHQ QUESTIONS 1-9: 0
1. LITTLE INTEREST OR PLEASURE IN DOING THINGS: 0
SUM OF ALL RESPONSES TO PHQ QUESTIONS 1-9: 0
SUM OF ALL RESPONSES TO PHQ QUESTIONS 1-9: 0
SUM OF ALL RESPONSES TO PHQ9 QUESTIONS 1 & 2: 0
2. FEELING DOWN, DEPRESSED OR HOPELESS: 0
SUM OF ALL RESPONSES TO PHQ QUESTIONS 1-9: 0
1. LITTLE INTEREST OR PLEASURE IN DOING THINGS: 0
2. FEELING DOWN, DEPRESSED OR HOPELESS: 0

## 2021-12-23 ASSESSMENT — ENCOUNTER SYMPTOMS
DIARRHEA: 0
WHEEZING: 0
NAUSEA: 0
SHORTNESS OF BREATH: 1
CONSTIPATION: 0
CHEST TIGHTNESS: 1
VOMITING: 0
COUGH: 0

## 2021-12-23 NOTE — PROGRESS NOTES
Chief Complaint   Patient presents with    Cough     sinus congestion and drainage , chest tightness and shortness of breath, low grade fever started yesterday        HPI:  Patient presents today for  Complaints of chest tightness, occasional shortness of breath. She also has a cough and shortness with laying down flat. She reports that this started 2 days ago. Also reports runny nose and a low grade fever. Prior to Visit Medications    Medication Sig Taking? Authorizing Provider   donepezil (ARICEPT) 10 MG tablet Take 1 tablet by mouth daily  Ta Meraz DO   sertraline (ZOLOFT) 100 MG tablet TAKE 1 TABLET BY MOUTH  DAILY  Ta Meraz DO   Magnesium 100 MG CAPS Take 200 mg by mouth nightly  Ta Meraz,    ammonium lactate (LAC-HYDRIN) 12 % lotion Apply topically daily. Eddi Hurtado., DPM   apixaban (ELIQUIS) 5 MG TABS tablet Take 1 tablet by mouth 2 times daily  Ta Meraz DO   folic acid (FOLVITE) 1 MG tablet Take 1 tablet by mouth daily  Ta Meraz DO   levothyroxine (SYNTHROID) 25 MCG tablet Take 1 tablet by mouth Daily  Ta Meraz DO   atorvastatin (LIPITOR) 20 MG tablet TAKE ONE TABLET BY MOUTH EVERY EVENING  Ta Meraz DO   diclofenac sodium 1 % GEL Apply 4 g topically 4 times daily  Ta Meraz DO   therapeutic multivitamin-minerals (THERAGRAN-M) tablet Take 1 tablet by mouth daily. Historical Provider, MD         No Known Allergies      Review of Systems  Review of Systems   Constitutional: Negative for chills and fever. HENT: Negative for congestion and nosebleeds. Respiratory: Positive for chest tightness and shortness of breath. Negative for cough and wheezing. Cardiovascular: Negative for chest pain, palpitations and leg swelling. Gastrointestinal: Negative for constipation, diarrhea, nausea and vomiting. Genitourinary: Negative for dysuria and urgency. Musculoskeletal: Negative for neck pain.    Neurological: Negative for headaches. VS:  /76 (Site: Left Upper Arm, Position: Sitting, Cuff Size: Medium Adult)   Pulse 147   Temp 97.2 °F (36.2 °C) (Temporal)   Resp 17   Ht 5' 1\" (1.549 m)   Wt 113 lb (51.3 kg)   SpO2 95%   BMI 21.35 kg/m²     Patient's medical, social, and family history reviewed      Physical Exam  Physical Exam  Constitutional:       Appearance: She is well-developed. HENT:      Head: Normocephalic. Eyes:      Pupils: Pupils are equal, round, and reactive to light. Neck:      Thyroid: No thyromegaly. Cardiovascular:      Rate and Rhythm: Normal rate and regular rhythm. Pulmonary:      Effort: Pulmonary effort is normal.      Breath sounds: Normal breath sounds. Abdominal:      General: Bowel sounds are normal.      Palpations: Abdomen is soft. Musculoskeletal:         General: Normal range of motion. Cervical back: Normal range of motion and neck supple. Lymphadenopathy:      Cervical: No cervical adenopathy. Skin:     General: Skin is warm and dry. Neurological:      Mental Status: She is alert and oriented to person, place, and time. Psychiatric:         Behavior: Behavior normal.           Assessment/Plan:      1. New onset a-fib [facility])  Patient on eliquis, however symptomatic due to HR  Further labs need done to evaluate for myocardial injury    2. Shortness of breath    - EKG 12 lead    3. Irregular heart beat    - EKG 12 lead    Patient instructed to go to ED for further work-up of this. Patient verbalized understanding. Daughter verbalized understanding. Return if symptoms worsen or fail to improve.         Arely Quijano, APRN - CNP

## 2021-12-23 NOTE — ED PROVIDER NOTES
26-year-old female presents today to the emergency department for evaluation of tachycardia. Symptoms are moderate in severity,, constant in nature and have been progressively getting worse. She states that her symptoms onset was today. She reports associated palpitations. She denies any associated shortness of breath. She denies any aggravating or alleviating factors. She states that she was seen earlier today at urgent care for evaluation of URI symptoms. At that time her main complaint was cough and congestion. She is also complaining of body aches and feeling fatigued over the past couple days. Patient is vaccinated for COVID-19. She was told to come to the emergency department due to an EKG that was done at urgent care that showed A. fib. Patient denies a history of A. fib. She is on blood thinners but is not on any rate control medication. Review of Systems   Constitutional: Negative for diaphoresis and fever. HENT: Negative for ear pain, hearing loss, rhinorrhea, sinus pain, sore throat and trouble swallowing. Eyes: Negative for pain. Respiratory: Negative for cough, shortness of breath and wheezing. Cardiovascular: Positive for palpitations. Negative for chest pain. Gastrointestinal: Negative for abdominal pain, diarrhea, nausea and vomiting. Endocrine: Negative for polyuria. Genitourinary: Negative for flank pain, frequency, hematuria and urgency. Musculoskeletal: Negative for back pain, neck pain and neck stiffness. Neurological: Negative for dizziness, speech difficulty, weakness, light-headedness and numbness. Psychiatric/Behavioral: Negative for confusion. The patient is not nervous/anxious. Physical Exam  Constitutional:       General: She is not in acute distress. Appearance: Normal appearance. She is not ill-appearing, toxic-appearing or diaphoretic. HENT:      Head: Normocephalic and atraumatic. Nose: No rhinorrhea.    Eyes: General: No scleral icterus. Extraocular Movements: Extraocular movements intact. Pupils: Pupils are equal, round, and reactive to light. Cardiovascular:      Rate and Rhythm: Tachycardia present. Rhythm irregular. Heart sounds: Normal heart sounds. No murmur heard. No friction rub. No gallop. Pulmonary:      Breath sounds: Normal breath sounds. No wheezing, rhonchi or rales. Abdominal:      Palpations: Abdomen is soft. Tenderness: There is no abdominal tenderness. Musculoskeletal:         General: No swelling. Cervical back: Normal range of motion. No rigidity or tenderness. Right lower leg: No edema. Left lower leg: No edema. Lymphadenopathy:      Cervical: No cervical adenopathy. Skin:     General: Skin is warm and dry. Coloration: Skin is not jaundiced. Neurological:      General: No focal deficit present. Mental Status: She is alert and oriented to person, place, and time. Cranial Nerves: No cranial nerve deficit. Sensory: No sensory deficit. Motor: No weakness. Psychiatric:         Mood and Affect: Mood normal.         Behavior: Behavior normal.         Thought Content: Thought content normal.         Judgment: Judgment normal.          Procedures     MDM  Number of Diagnoses or Management Options  Atrial fibrillation with RVR ([facility] [address]  Diagnosis management comments: This is a 25-year-old female who presents today from urgent care for evaluation of tachycardia. Pt had an EKG at urgent care which showed A. fib while in urgent care. Pt is already anticoagulated but is not on any rate control medication  Patient was complaining of cough, congestion, body aches. Patient had a negative Covid test.  CBC was ordered and is unremarkable CMP is also unremarkable. Magnesium is normal.  EKG shows A. fib RVR with a heart rate of 141. Patient was given a dose of diltiazem which did not improve her symptoms.   She was given another dose which did not improve her symptoms as well. She was started on a Cardizem drip. Patient was admitted to medicine for uncontrolled A. fib. ED Course as of 12/28/21 1614   Thu Dec 23, 2021   1555 ATTENDING PROVIDER ATTESTATION:     I have personally performed and/or participated in the history, exam, medical decision making, and procedures and agree with all pertinent clinical information unless otherwise noted. I have also reviewed and agree with the past medical, family and social history unless otherwise noted. I have discussed this patient in detail with the resident, and provided the instruction and education regarding the patient. My findings/plan:   64year old female who presents for evaluation of concern for atrial fibrillation. The patient states she had not been feeling well the last few days she has had cough, body aches, muscle aches. She was at urgent care where she was found to be in atrial fibrillation. She was told to present here for further evaluation and treatment. Patient is laying in bed in no acute distress. She denies any palpitations. She is irregularly irregular,   Lungs clear to auscultation,  Abdomen, soft, non-distended, non-tender. Plan for EKG, labs, imaging    Patient denies any history of atrial fibrillation, she is however anticoagulated secondary to history of strokes in the past.       [BB]   1759 Evaluated the patient. She is still tachycardic in the 130s to 140s. Discussed with the patient that her initial EKG was indeterminate. [NS]   1936 Bernice with Dr. Janny Desai, cardiologist who agreed to starting the patient on diltiazem. She will follow up with him in his office. [NS]   2116 EKG: This EKG is signed and interpreted by me.     Rate: 141  Rhythm: Atrial fibrillation  Interpretation: non-specific EKG, no ST-Elevations or Depressions; QRS 86, QTc 477  Comparison: changes compared to previous EKG    [BB]   2135 Patient given second dose of cardizem, continues to be in RVR.  Drip started. [BB]      ED Course User Index  [BB] Lela Nielson DO  [NS] Vishal StantontitaDO     ED Course as of 12/28/21 1614   Th Dec 23, 2021   1557 ATTENDING PROVIDER ATTESTATION:     I have personally performed and/or participated in the history, exam, medical decision making, and procedures and agree with all pertinent clinical information unless otherwise noted. I have also reviewed and agree with the past medical, family and social history unless otherwise noted. I have discussed this patient in detail with the resident, and provided the instruction and education regarding the patient. My findings/plan:   64year old female who presents for evaluation of concern for atrial fibrillation. The patient states she had not been feeling well the last few days she has had cough, body aches, muscle aches. She was at urgent care where she was found to be in atrial fibrillation. She was told to present here for further evaluation and treatment. Patient is laying in bed in no acute distress. She denies any palpitations. She is irregularly irregular,   Lungs clear to auscultation,  Abdomen, soft, non-distended, non-tender. Plan for EKG, labs, imaging    Patient denies any history of atrial fibrillation, she is however anticoagulated secondary to history of strokes in the past.       [BB]   1759 Evaluated the patient. She is still tachycardic in the 130s to 140s. Discussed with the patient that her initial EKG was indeterminate. [NS]   1936 Bernice with Dr. Avis Garibay, cardiologist who agreed to starting the patient on diltiazem. She will follow up with him in his office. [NS]   2116 EKG: This EKG is signed and interpreted by me. Rate: 141  Rhythm: Atrial fibrillation  Interpretation: non-specific EKG, no ST-Elevations or Depressions; QRS 86, QTc 477  Comparison: changes compared to previous EKG    [BB]   2135 Patient given second dose of cardizem, continues to be in RVR. Drip started.  [BB]      ED Course User Index  [BB] Eriktylor Ehsan   [NS] Rosalynd Lanes, DO       --------------------------------------------- PAST HISTORY ---------------------------------------------  Past Medical History:  has a past medical history of Arthritis, Asymptomatic PVCs, History of Holter monitoring, Hyperlipidemia, Macular degeneration, Nausea & vomiting, Pelvic prolapse, PONV (postoperative nausea and vomiting), and TIA (transient ischemic attack). Past Surgical History:  has a past surgical history that includes Colonoscopy (2012); Endoscopy, colon, diagnostic; hernia repair; Hysterectomy; eye surgery; skin biopsy; other surgical history (sept 2013); and transesophageal echocardiogram (N/A, 10/20/2020). Social History:  reports that she quit smoking about 27 years ago. She smoked 0.50 packs per day. She has never used smokeless tobacco. She reports that she does not drink alcohol and does not use drugs. Family History: family history is not on file. The patients home medications have been reviewed. Allergies: Patient has no known allergies.     -------------------------------------------------- RESULTS -------------------------------------------------    LABS:  Results for orders placed or performed during the hospital encounter of 12/23/21   RAPID INFLUENZA A/B ANTIGENS    Specimen: Nasopharyngeal   Result Value Ref Range    Influenza A by PCR Not Detected Not Detected    Influenza B by PCR Not Detected Not Detected   Respiratory Panel, Molecular, with COVID-19 (Restricted: peds pts or suitable admitted adults)    Specimen: Nasopharyngeal   Result Value Ref Range    Adenovirus by PCR Not Detected Not Detected    Bordetella parapertussis by PCR Not Detected Not Detected    Bordetella pertussis by PCR Not Detected Not Detected    Chlamydophilia pneumoniae by PCR Not Detected Not Detected    Coronavirus 229E by PCR Not Detected Not Detected    Coronavirus HKU1 by PCR Not Detected Not Detected    Coronavirus NL63 by PCR Not Detected Not Detected    Coronavirus OC43 by PCR Not Detected Not Detected    SARS-CoV-2, PCR Not Detected Not Detected    Human Metapneumovirus by PCR Not Detected Not Detected    Human Rhinovirus/Enterovirus by PCR Not Detected Not Detected    Influenza A by PCR Not Detected Not Detected    Influenza B by PCR Not Detected Not Detected    Mycoplasma pneumoniae by PCR Not Detected Not Detected    Parainfluenza Virus 1 by PCR Not Detected Not Detected    Parainfluenza Virus 2 by PCR Not Detected Not Detected    Parainfluenza Virus 3 by PCR Not Detected Not Detected    Parainfluenza Virus 4 by PCR Not Detected Not Detected    Respiratory Syncytial Virus by PCR Not Detected Not Detected   Culture, Blood 1    Specimen: Blood   Result Value Ref Range    Blood Culture, Routine 24 Hours no growth    Culture, Blood 2    Specimen: Blood   Result Value Ref Range    Culture, Blood 2 24 Hours no growth    CBC Auto Differential   Result Value Ref Range    WBC 13.0 (H) 4.5 - 11.5 E9/L    RBC 4.04 3.50 - 5.50 E12/L    Hemoglobin 12.4 11.5 - 15.5 g/dL    Hematocrit 38.3 34.0 - 48.0 %    MCV 94.8 80.0 - 99.9 fL    MCH 30.7 26.0 - 35.0 pg    MCHC 32.4 32.0 - 34.5 %    RDW 13.2 11.5 - 15.0 fL    Platelets 544 837 - 532 E9/L    MPV 11.1 7.0 - 12.0 fL    Neutrophils % 73.5 43.0 - 80.0 %    Immature Granulocytes % 0.3 0.0 - 5.0 %    Lymphocytes % 14.4 (L) 20.0 - 42.0 %    Monocytes % 9.8 2.0 - 12.0 %    Eosinophils % 1.2 0.0 - 6.0 %    Basophils % 0.8 0.0 - 2.0 %    Neutrophils Absolute 9.58 (H) 1.80 - 7.30 E9/L    Immature Granulocytes # 0.04 E9/L    Lymphocytes Absolute 1.87 1.50 - 4.00 E9/L    Monocytes Absolute 1.27 (H) 0.10 - 0.95 E9/L    Eosinophils Absolute 0.16 0.05 - 0.50 E9/L    Basophils Absolute 0.10 0.00 - 0.20 E9/L   Comprehensive Metabolic Panel w/ Reflex to MG   Result Value Ref Range    Sodium 136 132 - 146 mmol/L    Potassium reflex Magnesium 3.9 3.5 - 5.0 mmol/L    Chloride 102 98 - 107 mmol/L    CO2 23 22 - 29 mmol/L    Anion Gap 11 7 - 16 mmol/L    Glucose 88 74 - 99 mg/dL    BUN 13 6 - 23 mg/dL    CREATININE 0.8 0.5 - 1.0 mg/dL    GFR Non-African American >60 >=60 mL/min/1.73    GFR African American >60     Calcium 8.3 (L) 8.6 - 10.2 mg/dL    Total Protein 6.8 6.4 - 8.3 g/dL    Albumin 3.9 3.5 - 5.2 g/dL    Total Bilirubin 0.4 0.0 - 1.2 mg/dL    Alkaline Phosphatase 80 35 - 104 U/L    ALT 17 0 - 32 U/L    AST 21 0 - 31 U/L   Troponin   Result Value Ref Range    Troponin, High Sensitivity 13 (H) 0 - 9 ng/L   Brain Natriuretic Peptide   Result Value Ref Range    Pro-BNP 4,986 (H) 0 - 450 pg/mL   Magnesium   Result Value Ref Range    Magnesium 2.2 1.6 - 2.6 mg/dL   Troponin   Result Value Ref Range    Troponin, High Sensitivity 10 (H) 0 - 9 ng/L   TSH without Reflex   Result Value Ref Range    TSH 2.230 0.270 - 4.200 uIU/mL   T4, Free   Result Value Ref Range    T4 Free 1.22 0.93 - 1.70 ng/dL   Phosphorus   Result Value Ref Range    Phosphorus 2.7 2.5 - 4.5 mg/dL   Magnesium   Result Value Ref Range    Magnesium 2.0 1.6 - 2.6 mg/dL   CBC Auto Differential   Result Value Ref Range    WBC 10.9 4.5 - 11.5 E9/L    RBC 3.80 3.50 - 5.50 E12/L    Hemoglobin 11.7 11.5 - 15.5 g/dL    Hematocrit 36.2 34.0 - 48.0 %    MCV 95.3 80.0 - 99.9 fL    MCH 30.8 26.0 - 35.0 pg    MCHC 32.3 32.0 - 34.5 %    RDW 13.4 11.5 - 15.0 fL    Platelets 529 623 - 650 E9/L    MPV 11.3 7.0 - 12.0 fL    Neutrophils % 76.3 43.0 - 80.0 %    Immature Granulocytes % 0.3 0.0 - 5.0 %    Lymphocytes % 12.3 (L) 20.0 - 42.0 %    Monocytes % 9.1 2.0 - 12.0 %    Eosinophils % 1.3 0.0 - 6.0 %    Basophils % 0.7 0.0 - 2.0 %    Neutrophils Absolute 8.33 (H) 1.80 - 7.30 E9/L    Immature Granulocytes # 0.03 E9/L    Lymphocytes Absolute 1.34 (L) 1.50 - 4.00 E9/L    Monocytes Absolute 0.99 (H) 0.10 - 0.95 E9/L    Eosinophils Absolute 0.14 0.05 - 0.50 E9/L    Basophils Absolute 0.08 0.00 - 0.20 E9/L   Comprehensive Metabolic Panel   Result Value Ref Range    Sodium mL/min/1.73    GFR African American >60     Calcium 8.5 (L) 8.6 - 10.2 mg/dL    Total Protein 6.9 6.4 - 8.3 g/dL    Albumin 3.8 3.5 - 5.2 g/dL    Total Bilirubin 0.4 0.0 - 1.2 mg/dL    Alkaline Phosphatase 75 35 - 104 U/L    ALT 16 0 - 32 U/L    AST 20 0 - 31 U/L   CBC Auto Differential   Result Value Ref Range    WBC 11.0 4.5 - 11.5 E9/L    RBC 4.22 3.50 - 5.50 E12/L    Hemoglobin 12.9 11.5 - 15.5 g/dL    Hematocrit 40.1 34.0 - 48.0 %    MCV 95.0 80.0 - 99.9 fL    MCH 30.6 26.0 - 35.0 pg    MCHC 32.2 32.0 - 34.5 %    RDW 13.2 11.5 - 15.0 fL    Platelets 376 284 - 860 E9/L    MPV 11.4 7.0 - 12.0 fL    Neutrophils % 74.7 43.0 - 80.0 %    Immature Granulocytes % 0.5 0.0 - 5.0 %    Lymphocytes % 13.6 (L) 20.0 - 42.0 %    Monocytes % 7.7 2.0 - 12.0 %    Eosinophils % 2.4 0.0 - 6.0 %    Basophils % 1.1 0.0 - 2.0 %    Neutrophils Absolute 8.20 (H) 1.80 - 7.30 E9/L    Immature Granulocytes # 0.05 E9/L    Lymphocytes Absolute 1.49 (L) 1.50 - 4.00 E9/L    Monocytes Absolute 0.85 0.10 - 0.95 E9/L    Eosinophils Absolute 0.26 0.05 - 0.50 E9/L    Basophils Absolute 0.12 0.00 - 0.20 E9/L   Comprehensive Metabolic Panel   Result Value Ref Range    Sodium 136 132 - 146 mmol/L    Potassium 4.6 3.5 - 5.0 mmol/L    Chloride 101 98 - 107 mmol/L    CO2 24 22 - 29 mmol/L    Anion Gap 11 7 - 16 mmol/L    Glucose 90 74 - 99 mg/dL    BUN 9 6 - 23 mg/dL    CREATININE 0.7 0.5 - 1.0 mg/dL    GFR Non-African American >60 >=60 mL/min/1.73    GFR African American >60     Calcium 8.8 8.6 - 10.2 mg/dL    Total Protein 6.7 6.4 - 8.3 g/dL    Albumin 3.8 3.5 - 5.2 g/dL    Total Bilirubin 0.4 0.0 - 1.2 mg/dL    Alkaline Phosphatase 72 35 - 104 U/L    ALT 15 0 - 32 U/L    AST 26 0 - 31 U/L   CBC Auto Differential   Result Value Ref Range    WBC 9.5 4.5 - 11.5 E9/L    RBC 4.39 3.50 - 5.50 E12/L    Hemoglobin 13.4 11.5 - 15.5 g/dL    Hematocrit 41.4 34.0 - 48.0 %    MCV 94.3 80.0 - 99.9 fL    MCH 30.5 26.0 - 35.0 pg    MCHC 32.4 32.0 - 34.5 %    RDW 12.9 11.5 - 15.0 fL    Platelets 112 (H) 513 - 450 E9/L    MPV 11.0 7.0 - 12.0 fL    Neutrophils % 71.8 43.0 - 80.0 %    Immature Granulocytes % 0.4 0.0 - 5.0 %    Lymphocytes % 17.5 (L) 20.0 - 42.0 %    Monocytes % 7.3 2.0 - 12.0 %    Eosinophils % 2.0 0.0 - 6.0 %    Basophils % 1.0 0.0 - 2.0 %    Neutrophils Absolute 6.79 1.80 - 7.30 E9/L    Immature Granulocytes # 0.04 E9/L    Lymphocytes Absolute 1.66 1.50 - 4.00 E9/L    Monocytes Absolute 0.69 0.10 - 0.95 E9/L    Eosinophils Absolute 0.19 0.05 - 0.50 E9/L    Basophils Absolute 0.09 0.00 - 0.20 E9/L   Comprehensive Metabolic Panel   Result Value Ref Range    Sodium 135 132 - 146 mmol/L    Potassium 4.2 3.5 - 5.0 mmol/L    Chloride 99 98 - 107 mmol/L    CO2 25 22 - 29 mmol/L    Anion Gap 11 7 - 16 mmol/L    Glucose 85 74 - 99 mg/dL    BUN 12 6 - 23 mg/dL    CREATININE 0.7 0.5 - 1.0 mg/dL    GFR Non-African American >60 >=60 mL/min/1.73    GFR African American >60     Calcium 8.7 8.6 - 10.2 mg/dL    Total Protein 6.6 6.4 - 8.3 g/dL    Albumin 3.6 3.5 - 5.2 g/dL    Total Bilirubin 0.4 0.0 - 1.2 mg/dL    Alkaline Phosphatase 68 35 - 104 U/L    ALT 16 0 - 32 U/L    AST 20 0 - 31 U/L   EKG 12 Lead   Result Value Ref Range    Ventricular Rate 141 BPM    Atrial Rate 136 BPM    QRS Duration 86 ms    Q-T Interval 312 ms    QTc Calculation (Bazett) 477 ms    R Axis 21 degrees    T Axis 11 degrees   EKG 12 Lead   Result Value Ref Range    Ventricular Rate 136 BPM    Atrial Rate 136 BPM    QRS Duration 88 ms    Q-T Interval 326 ms    QTc Calculation (Bazett) 490 ms    R Axis 7 degrees    T Axis 41 degrees       RADIOLOGY:  NM Cardiac Stress Test Nuclear Imaging   Final Result      The myocardial perfusion imaging was normal.      Overall left ventricular systolic function was normal without regional   wall motion abnormalities. Low risk study. XR CHEST 1 VIEW   Final Result   Coarsened markings at the bases which may relate to either acute or chronic   disease. See above. EKG:  This EKG is signed and interpreted by me. Rate: 141  Rhythm: Atrial fibrillation  Interpretation: No ST elevations or ST depressions. Comparison: no previous EKG available      ------------------------- NURSING NOTES AND VITALS REVIEWED ---------------------------  Date / Time Roomed:  12/23/2021  3:23 PM  ED Bed Assignment:  [id number]    The nursing notes within the ED encounter and vital signs as below have been reviewed. No data found. Oxygen Saturation Interpretation: Normal    ------------------------------------------ PROGRESS NOTES ------------------------------------------  Re-evaluation(s):  Time: 2135  Patients symptoms show no change  Repeat physical examination is not changed    Counseling:  I have spoken with the patient and discussed todays results, in addition to providing specific details for the plan of care and counseling regarding the diagnosis and prognosis. Their questions are answered at this time and they are agreeable with the plan of admission.    --------------------------------- ADDITIONAL PROVIDER NOTES ---------------------------------  Consultations: This patient's ED course included: a personal history and physicial examination, re-evaluation prior to disposition, multiple bedside re-evaluations, IV medications, cardiac monitoring, continuous pulse oximetry and complex medical decision making and emergency management    This patient has remained hemodynamically stable during their ED course. Diagnosis:  1. Atrial fibrillation with RVR (HCC)        Disposition:  Patient's disposition: Admit to med/surg floor  Patient's condition is stable.          Margarette Serna DO  Resident  12/29/21 [id number]

## 2021-12-24 LAB
ADENOVIRUS BY PCR: NOT DETECTED
ALBUMIN SERPL-MCNC: 3.5 G/DL (ref 3.5–5.2)
ALP BLD-CCNC: 65 U/L (ref 35–104)
ALT SERPL-CCNC: 15 U/L (ref 0–32)
ANION GAP SERPL CALCULATED.3IONS-SCNC: 11 MMOL/L (ref 7–16)
AST SERPL-CCNC: 34 U/L (ref 0–31)
BASOPHILS ABSOLUTE: 0.08 E9/L (ref 0–0.2)
BASOPHILS RELATIVE PERCENT: 0.7 % (ref 0–2)
BILIRUB SERPL-MCNC: 0.6 MG/DL (ref 0–1.2)
BORDETELLA PARAPERTUSSIS BY PCR: NOT DETECTED
BORDETELLA PERTUSSIS BY PCR: NOT DETECTED
BUN BLDV-MCNC: 9 MG/DL (ref 6–23)
CALCIUM SERPL-MCNC: 8.4 MG/DL (ref 8.6–10.2)
CHLAMYDOPHILIA PNEUMONIAE BY PCR: NOT DETECTED
CHLORIDE BLD-SCNC: 106 MMOL/L (ref 98–107)
CO2: 21 MMOL/L (ref 22–29)
CORONAVIRUS 229E BY PCR: NOT DETECTED
CORONAVIRUS HKU1 BY PCR: NOT DETECTED
CORONAVIRUS NL63 BY PCR: NOT DETECTED
CORONAVIRUS OC43 BY PCR: NOT DETECTED
CREAT SERPL-MCNC: 0.7 MG/DL (ref 0.5–1)
D DIMER: 219 NG/ML DDU
EKG ATRIAL RATE: 136 BPM
EKG Q-T INTERVAL: 326 MS
EKG QRS DURATION: 88 MS
EKG QTC CALCULATION (BAZETT): 490 MS
EKG R AXIS: 7 DEGREES
EKG T AXIS: 41 DEGREES
EKG VENTRICULAR RATE: 136 BPM
EOSINOPHILS ABSOLUTE: 0.14 E9/L (ref 0.05–0.5)
EOSINOPHILS RELATIVE PERCENT: 1.3 % (ref 0–6)
GFR AFRICAN AMERICAN: >60
GFR NON-AFRICAN AMERICAN: >60 ML/MIN/1.73
GLUCOSE BLD-MCNC: 95 MG/DL (ref 74–99)
HCT VFR BLD CALC: 36.2 % (ref 34–48)
HEMOGLOBIN: 11.7 G/DL (ref 11.5–15.5)
HUMAN METAPNEUMOVIRUS BY PCR: NOT DETECTED
HUMAN RHINOVIRUS/ENTEROVIRUS BY PCR: NOT DETECTED
IMMATURE GRANULOCYTES #: 0.03 E9/L
IMMATURE GRANULOCYTES %: 0.3 % (ref 0–5)
INFLUENZA A BY PCR: NOT DETECTED
INFLUENZA B BY PCR: NOT DETECTED
INR BLD: 1.5
LYMPHOCYTES ABSOLUTE: 1.34 E9/L (ref 1.5–4)
LYMPHOCYTES RELATIVE PERCENT: 12.3 % (ref 20–42)
MAGNESIUM: 2 MG/DL (ref 1.6–2.6)
MCH RBC QN AUTO: 30.8 PG (ref 26–35)
MCHC RBC AUTO-ENTMCNC: 32.3 % (ref 32–34.5)
MCV RBC AUTO: 95.3 FL (ref 80–99.9)
MONOCYTES ABSOLUTE: 0.99 E9/L (ref 0.1–0.95)
MONOCYTES RELATIVE PERCENT: 9.1 % (ref 2–12)
MYCOPLASMA PNEUMONIAE BY PCR: NOT DETECTED
NEUTROPHILS ABSOLUTE: 8.33 E9/L (ref 1.8–7.3)
NEUTROPHILS RELATIVE PERCENT: 76.3 % (ref 43–80)
PARAINFLUENZA VIRUS 1 BY PCR: NOT DETECTED
PARAINFLUENZA VIRUS 2 BY PCR: NOT DETECTED
PARAINFLUENZA VIRUS 3 BY PCR: NOT DETECTED
PARAINFLUENZA VIRUS 4 BY PCR: NOT DETECTED
PDW BLD-RTO: 13.4 FL (ref 11.5–15)
PHOSPHORUS: 2.7 MG/DL (ref 2.5–4.5)
PLATELET # BLD: 349 E9/L (ref 130–450)
PMV BLD AUTO: 11.3 FL (ref 7–12)
POTASSIUM SERPL-SCNC: 4.1 MMOL/L (ref 3.5–5)
PROCALCITONIN: 0.05 NG/ML (ref 0–0.08)
PROTHROMBIN TIME: 17.3 SEC (ref 9.3–12.4)
RBC # BLD: 3.8 E12/L (ref 3.5–5.5)
RESPIRATORY SYNCYTIAL VIRUS BY PCR: NOT DETECTED
SARS-COV-2, PCR: NOT DETECTED
SODIUM BLD-SCNC: 138 MMOL/L (ref 132–146)
T4 FREE: 1.22 NG/DL (ref 0.93–1.7)
TOTAL PROTEIN: 6.3 G/DL (ref 6.4–8.3)
WBC # BLD: 10.9 E9/L (ref 4.5–11.5)

## 2021-12-24 PROCEDURE — 6370000000 HC RX 637 (ALT 250 FOR IP): Performed by: INTERNAL MEDICINE

## 2021-12-24 PROCEDURE — 85610 PROTHROMBIN TIME: CPT

## 2021-12-24 PROCEDURE — 93010 ELECTROCARDIOGRAM REPORT: CPT | Performed by: INTERNAL MEDICINE

## 2021-12-24 PROCEDURE — 87040 BLOOD CULTURE FOR BACTERIA: CPT

## 2021-12-24 PROCEDURE — 2060000000 HC ICU INTERMEDIATE R&B

## 2021-12-24 PROCEDURE — 80053 COMPREHEN METABOLIC PANEL: CPT

## 2021-12-24 PROCEDURE — 85025 COMPLETE CBC W/AUTO DIFF WBC: CPT

## 2021-12-24 PROCEDURE — 6360000002 HC RX W HCPCS: Performed by: INTERNAL MEDICINE

## 2021-12-24 PROCEDURE — 84100 ASSAY OF PHOSPHORUS: CPT

## 2021-12-24 PROCEDURE — 2580000003 HC RX 258: Performed by: INTERNAL MEDICINE

## 2021-12-24 PROCEDURE — 99222 1ST HOSP IP/OBS MODERATE 55: CPT | Performed by: INTERNAL MEDICINE

## 2021-12-24 PROCEDURE — 2500000003 HC RX 250 WO HCPCS: Performed by: INTERNAL MEDICINE

## 2021-12-24 PROCEDURE — 83735 ASSAY OF MAGNESIUM: CPT

## 2021-12-24 PROCEDURE — 85378 FIBRIN DEGRADE SEMIQUANT: CPT

## 2021-12-24 PROCEDURE — 84145 PROCALCITONIN (PCT): CPT

## 2021-12-24 PROCEDURE — 84439 ASSAY OF FREE THYROXINE: CPT

## 2021-12-24 RX ORDER — METOPROLOL SUCCINATE 50 MG/1
50 TABLET, EXTENDED RELEASE ORAL 2 TIMES DAILY
Status: DISCONTINUED | OUTPATIENT
Start: 2021-12-24 | End: 2021-12-25

## 2021-12-24 RX ORDER — DIGOXIN 0.25 MG/ML
250 INJECTION INTRAMUSCULAR; INTRAVENOUS ONCE
Status: COMPLETED | OUTPATIENT
Start: 2021-12-24 | End: 2021-12-24

## 2021-12-24 RX ADMIN — LEVOTHYROXINE SODIUM 25 MCG: 25 TABLET ORAL at 07:47

## 2021-12-24 RX ADMIN — METOPROLOL SUCCINATE 50 MG: 50 TABLET, EXTENDED RELEASE ORAL at 12:13

## 2021-12-24 RX ADMIN — DILTIAZEM HYDROCHLORIDE 7.5 MG/HR: 5 INJECTION INTRAVENOUS at 19:40

## 2021-12-24 RX ADMIN — DONEPEZIL HYDROCHLORIDE 10 MG: 10 TABLET, FILM COATED ORAL at 09:56

## 2021-12-24 RX ADMIN — APIXABAN 5 MG: 5 TABLET, FILM COATED ORAL at 19:59

## 2021-12-24 RX ADMIN — METOPROLOL SUCCINATE 50 MG: 50 TABLET, EXTENDED RELEASE ORAL at 19:59

## 2021-12-24 RX ADMIN — DIGOXIN 250 MCG: 250 INJECTION, SOLUTION INTRAMUSCULAR; INTRAVENOUS; PARENTERAL at 11:37

## 2021-12-24 RX ADMIN — APIXABAN 5 MG: 5 TABLET, FILM COATED ORAL at 07:47

## 2021-12-24 RX ADMIN — DILTIAZEM HYDROCHLORIDE 5 MG/HR: 5 INJECTION INTRAVENOUS at 20:16

## 2021-12-24 RX ADMIN — Medication 10 ML: at 07:34

## 2021-12-24 RX ADMIN — DILTIAZEM HYDROCHLORIDE 2.5 MG/HR: 5 INJECTION INTRAVENOUS at 21:23

## 2021-12-24 RX ADMIN — DILTIAZEM HYDROCHLORIDE 10 MG/HR: 5 INJECTION INTRAVENOUS at 12:07

## 2021-12-24 ASSESSMENT — PAIN SCALES - GENERAL
PAINLEVEL_OUTOF10: 0

## 2021-12-24 NOTE — PROGRESS NOTES
Internal Medicine Progress Note    [facility]    [facility]. Dash Barragan, CHRISTIANO.A.CJinnyOJinnyI. Gerson Rabago D.O., PATY Gallagher D.O. Vitaly Adams, MSN, APRN, NP-C  Jeneen Schilder. Kirstin Mota, MSN, APRN-CNP     Primary Care Physician: Sherry Hsieh DO   Admitting Physician:  Shawn Goff DO  Admission date and time: 12/23/2021  3:23 PM    Room:  [address] [place] [address]  Admitting diagnosis: Atrial fibrillation with RVR [facility]) [I48.91]    Patient Name: Samy Tidwell  MRN: [id number]    Date of Service: 12/24/2021     Subjective:  Becky Theodore is a 64 y.o. female who was seen and examined today,12/24/2021, at the bedside. Patient heart rate still tachycardic. Underlying atrial fibrillation on Cardizem drip. We will add beta-blocker and dose Lanoxin. Continue anticoagulant therapy. Cardiology consult pending. Patient denied chest pain. Does not appear to be dyspneic        No family present during my examination. Review of System:   Constitutional:   Denies fever or chills, weight loss or gain, fatigue or malaise. HEENT:   Denies ear pain, sore throat, sinus or eye problems. Cardiovascular:   Complains of palpitation irregular heartbeat  Respiratory:   Denies shortness of breath, coughing, sputum production, hemoptysis, or wheezing. Gastrointestinal:   Denies nausea, vomiting, diarrhea, or constipation. Denies any abdominal pain. Genitourinary:    Denies any urgency, frequency, hematuria. Voiding  without difficulty. Extremities:   Denies lower extremity swelling, edema or cyanosis. Neurology:    Denies any headache or focal neurological deficits, Denies generalized weakness or memory difficulty. Psch:   Denies being anxious or depressed. Musculoskeletal:    Denies  myalgias, joint complaints or back pain. Integumentary:   Denies any rashes, ulcers, or excoriations. Denies bruising. Hematologic/Lymphatic:  Denies bruising or bleeding.     Physical Exam:  No intake/output data recorded. No intake or output data in the 24 hours ending 12/24/21 1430No intake/output data recorded. Patient Vitals for the past 96 hrs (Last 3 readings):   Weight   12/24/21 0645 120 lb 9.5 oz (54.7 kg)     Vital Signs:   Blood pressure 126/60, pulse 98, temperature 97.6 °F (36.4 °C), temperature source Oral, resp. rate 18, height 5' 1\" (1.549 m), weight 120 lb 9.5 oz (54.7 kg), SpO2 94 %, not currently breastfeeding. General appearance:  Alert, responsive, oriented to person, place, and time. Well preserved, alert, no distress. Head:  Normocephalic. No masses, lesions or tenderness. Eyes:  PERRLA. EOMI. Sclera clear. Buccal mucosa moist.  ENT:  Ears normal. Mucosa normal.  Neck:    Supple. Trachea midline. No thyromegaly. No JVD. No bruits. Heart:    Irregular rhythm, tachycardic 1/6 systolic ejection murmur. Lungs:    Symmetrical. Clear to auscultation bilaterally. No wheezes. No rhonchi. No rales. Abdomen:   Soft. Non-tender. Non-distended. Bowel sounds positive. No organomegaly or masses. No pain on palpation. Extremities:    Peripheral pulses present. No peripheral edema. No ulcers. No cyanosis. No clubbing. Neurologic:    Alert x 3. No focal deficit. Cranial nerves grossly intact. No focal weakness. Psych:   Behavior is normal. Mood appears normal. Speech is not rapid and/or pressured. Musculoskeletal:   Spine ROM normal. Muscular strength intact. Gait not assessed. Integumentary:  No rashes  Skin normal color and texture.   Genitalia/Breast:  Deferred    Medication:  Scheduled Meds:   metoprolol succinate  50 mg Oral BID    apixaban  5 mg Oral BID    donepezil  10 mg Oral Daily    levothyroxine  25 mcg Oral Daily    sodium chloride flush  5-40 mL IntraVENous 2 times per day    [Held by provider] sertraline  100 mg Oral Daily     Continuous Infusions:   dilTIAZem 10 mg/hr (12/24/21 1207)    dextrose      sodium chloride         Objective Data:  CBC with Differential:    Lab Results   Component Value Date    WBC 10.9 12/24/2021    RBC 3.80 12/24/2021    HGB 11.7 12/24/2021    HCT 36.2 12/24/2021     12/24/2021    MCV 95.3 12/24/2021    MCH 30.8 12/24/2021    MCHC 32.3 12/24/2021    RDW 13.4 12/24/2021    SEGSPCT 81 09/06/2013    LYMPHOPCT 12.3 12/24/2021    MONOPCT 9.1 12/24/2021    BASOPCT 0.7 12/24/2021    MONOSABS 0.99 12/24/2021    LYMPHSABS 1.34 12/24/2021    EOSABS 0.14 12/24/2021    BASOSABS 0.08 12/24/2021     CMP:    Lab Results   Component Value Date     12/24/2021    K 4.1 12/24/2021    K 3.9 12/23/2021     12/24/2021    CO2 21 12/24/2021    BUN 9 12/24/2021    CREATININE 0.7 12/24/2021    GFRAA >60 12/24/2021    LABGLOM >60 12/24/2021    GLUCOSE 95 12/24/2021    GLUCOSE 75 04/10/2012    PROT 6.3 12/24/2021    LABALBU 3.5 12/24/2021    LABALBU 4.7 04/10/2012    CALCIUM 8.4 12/24/2021    BILITOT 0.6 12/24/2021    ALKPHOS 65 12/24/2021    AST 34 12/24/2021    ALT 15 12/24/2021       Wound Documentation:   Incision 09/04/13 Perineum (Active)   Number of days: 6751       Assessment:    · Chronic persistent atrial fibrillation rapid ventricular response  · Chest pain possibly secondary to demand ischemia  · Hyperlipidemia on statin agent  · Macular degeneration  · Moderate to severe mitral regurgitation  · History of tobacco abuse  · History of TIAs  · Gastroesophageal reflux disease              Plan:       · Heart rate still tachycardic. Pulse dose Lanoxin. [place] beta-blockers and wean Cardizem  · Continue anticoagulant therapy  · Cycle troponin level  · Continue statin agents  · Behavior modification  · Outpatient cardioversion    More than 50% of my  time was spent at the bedside counseling/coordinating care with the patient and/or family with face to face contact. This time was spent reviewing notes and laboratory data as well as instructing and counseling the patient.  Time I spent with the family or surrogate(s) is included only

## 2021-12-24 NOTE — CONSULTS
CHIEF COMPLAINT: Afib    HISTORY OF PRESENT ILLNESS: Patient is a 64 y.o. female seen at the request of Gilford Saba, DO and followed at our office by Dr. Elizabeth Castillo. Patient presented with chest symptoms. Noted to be in afib with RVR. Chest symptoms resolved with rate controlling her afib. Known PAF. On eliquis. Currently no CP or SOB.      Past Medical History:   Diagnosis Date    Arthritis     Asymptomatic PVCs 4/2012    pt felt flutter, no other sx, holter + pvc's, few runs of SVT   [facility]    History of Holter monitoring     Hyperlipidemia     Macular degeneration     Nausea & vomiting     Pelvic prolapse     PONV (postoperative nausea and vomiting)     TIA (transient ischemic attack)        Patient Active Problem List   Diagnosis    Pelvic prolapse    Hyperlipidemia    GERD (gastroesophageal reflux disease)    PUD (peptic ulcer disease)    Urinary incontinence    Osteoarthritis    Palpitations    Precordial pain    Thrombophilia ([facility] [address]    Alzheimer's disease, unspecified    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, DO 10 mL/hr at 12/24/21 0648 10 mg/hr at 12/24/21 0648    apixaban (ELIQUIS) tablet 5 mg  5 mg Oral BID [employer], DO   5 mg at 12/24/21 0747    donepezil (ARICEPT) tablet 10 mg  10 mg Oral Daily [employer], DO   10 mg at 12/24/21 4319    levothyroxine (SYNTHROID) tablet 25 mcg  25 mcg Oral Daily [employer], DO   25 mcg at 12/24/21 0747    glucose (GLUTOSE) 40 % oral gel 15 g  15 g Oral PRN [employer], DO        dextrose 50 % IV solution  12.5 g IntraVENous PRN [employer], DO        glucagon (rDNA) injection 1 mg  1 mg IntraMUSCular PRN Eddy Langford, DO        dextrose 5 % solution  100 mL/hr IntraVENous PRN Eddy Langford DO        sodium chloride flush 0.9 % injection 5-40 mL  5-40 mL IntraVENous 2 times per day Eddy Langford DO   10 mL at 21 0734    sodium chloride flush 0.9 % injection 5-40 mL  5-40 mL IntraVENous PRN Eddy Langford, DO        0.9 % sodium chloride infusion  25 mL IntraVENous PRN Eddy Langford, DO        polyethylene glycol (GLYCOLAX) packet 17 g  17 g Oral Daily PRN Eddy Langford, DO        acetaminophen (TYLENOL) tablet 650 mg  650 mg Oral Q6H PRN Eddy Langford DO        Or    acetaminophen (TYLENOL) suppository 650 mg  650 mg Rectal Q6H PRN Eddy Langford, DO        [Held by provider] sertraline (ZOLOFT) tablet 100 mg  100 mg Oral Daily Eddy Langford, DO           Social History     Socioeconomic History    Marital status:      Spouse name: Not on file    Number of children: Not on file    Years of education: Not on file    Highest education level: Not on file   Occupational History    Occupation: retired   Tobacco Use    Smoking status: Former Smoker     Packs/day: 0.50     Quit date: 1994     Years since quittin.5    Smokeless tobacco: Never Used   Vaping Use    Vaping Use: Never used   Substance and Sexual Activity    Alcohol use: No     Comment: Coffee 1 cup a day     Drug use: No    Sexual activity: Not Currently   Other Topics Concern    Not on file   Social History Narrative    Not on file     Social Determinants of Health     Financial Resource Strain: Low Risk     Difficulty of Paying Living Expenses: Not hard at all   Food Insecurity: No Food Insecurity    Worried About [address] in the Last Year: Never true    [facility] in the Last Year: Never true   Transportation Needs:     Lack of Transportation (Medical): Not on file    Lack of Transportation (Non-Medical):  Not on file   Physical Activity:     Days of Exercise per Week: Not on file   [employer] of Exercise per Session: Not on file   Stress:     Feeling of Stress : Not on file   Social Connections:     Frequency of Communication with Friends and Family: Not on file    Frequency of Social Gatherings with Friends and Family: Not on file    Attends [employer]ist Services: Not on file    Active Member of Clubs or Organizations: Not on file    Attends Club or Organization Meetings: Not on file    Marital Status: Not on file   Intimate Partner Violence:     Fear of Current or Ex-Partner: Not on file    Emotionally Abused: Not on file    Physically Abused: Not on file    Sexually Abused: Not on file   Housing Stability:     Unable to Pay for Housing in the Last Year: Not on file    Number of [place] in the Last Year: Not on file    Unstable Housing in the Last Year: Not on file       History reviewed. No pertinent family history. Review of Systems:   Heart: as above   Lungs: as above   Eyes: denies changes in vision or discharge. Ears: denies changes in hearing or pain. Nose: denies epistaxis or masses   Throat: denies sore throat or trouble swallowing. Neuro: denies numbness, tingling, tremors. Skin: denies rashes or itching. : denies hematuria, dysuria   GI: denies vomiting, diarrhea   Psych: denies mood changed, anxiety, depression. all others negative. Physical Exam   /60   Pulse 98   Temp 97.6 °F (36.4 °C) (Oral)   Resp 18   Ht 5' 1\" (1.549 m)   Wt 120 lb 9.5 oz (54.7 kg)   SpO2 94%   BMI 22.79 kg/m²   Constitutional: Oriented to person, place, and time. Well-developed and well-nourished. No distress. Head: Normocephalic and atraumatic. Eyes: EOM are normal. Pupils are equal, round, and reactive to light. Neck: Normal range of motion. Neck supple. No hepatojugular reflux and no JVD present. Carotid bruit is not present. No tracheal deviation present. No thyromegaly present.    Cardiovascular: Normal rate, regular rhythm, normal heart sounds severe central jet of mitral regurgitation - ERO 0.3cm2, PISA 0.6cm, RV 76cc. Normal left ventricular chamber size. Normal left ventricular systolic function. Left atrium is enlarged. Interatrial septum intact. Agitated saline injection showed no right to left shunt. Normal right ventricle size and function. No mitral valve prolapse. There is trace aortic regurgitation. There is mild tricuspid regurgitation. Normal aortic root size. No evidence of pericardial effusion. Pericardium appears normal.   No comparison study available in the local [facility]. 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 Afib: On eliquis. Rate control. Start and titrate BB. Wean off cardizem gtt. Dig dosed. If reasonable rate control can be achieved patient can be discharged on BB and eliquis for outpatient cardioversion.      2. Chest symptoms: In context of afib with RVR. Labs and EKGs reviewed. Troponin marginal.    Low risk ETT 6/26/2018. 3. VHD: Moderate to severe MR by MARYLOU 10/20/2020. Medically manage. 4. Hx of TIA: Eliquis/statin. 5. Lipids: Stain. 6. GERD     Krishna Delvalle D.O.   Cardiologist  Cardiology, [facility]

## 2021-12-24 NOTE — ED NOTES
Bed: 13  Expected date:   Expected time:   Means of arrival:   Comments:  Pt in [facility], [facility]  12/23/21 4808

## 2021-12-24 NOTE — H&P
Department of Internal Medicine  History and Physical    PCP: Bryce Frankel DO  Admitting Physician: Dr. Jamia Rubio  Consultants: Dr. Madie Conklin  Date of Service: 12/23/2021    CHIEF COMPLAINT:  Chest pain     HISTORY OF PRESENT ILLNESS:    Patient is 78-year-old female presented due to chest pain. Patient states that she had not been feeling well for the last few days. She has admits to generalized body aches and pain. Patient experience chest discomfort. States that she started with Covid. Associated that evaluated as an outpatient. However she was found to be in A. fib RVR. States she was sent over to the ED for further evaluation management. Patient states she has no new rate control medication patient has noticed on episodes of a racing heartbeat/palpitations in the past.  She is diagnosed to atrial fibrillation and is on Eliquis. Otherwise she states she has not changed her left cell significantly. She does admit to caffeine intake which is usual for her. PAST MEDICAL Hx:  Past Medical History:   Diagnosis Date    Arthritis     Asymptomatic PVCs 4/2012    pt felt flutter, no other sx, holter + pvc's, few runs of SVT   [facility]    History of Holter monitoring     Hyperlipidemia     Macular degeneration     Nausea & vomiting     Pelvic prolapse     PONV (postoperative nausea and vomiting)     TIA (transient ischemic attack)        PAST SURGICAL Hx:   Past Surgical History:   Procedure Laterality Date    COLONOSCOPY  2012    ENDOSCOPY, COLON, DIAGNOSTIC      EYE SURGERY      macular hole    HERNIA REPAIR      inguinal    HYSTERECTOMY      OTHER SURGICAL HISTORY  sept 2013    ant elevatetransobturator sling rectocele and cystocele enterocele    SKIN BIOPSY      arms    TRANSESOPHAGEAL ECHOCARDIOGRAM N/A 10/20/2020    TRANSESOPHAGEAL ECHOCARDIOGRAM WITH BUBBLE STUDY performed by Aileen Greenwood MD at [facility] Hx:  History reviewed.  No pertinent family Not on file   Social History Narrative    Not on file     Social Determinants of Health     Financial Resource Strain: Low Risk     Difficulty of Paying Living Expenses: Not hard at all   Food Insecurity: No Food Insecurity    Worried About Running Out of Food in the Last Year: Never true    [address] in the Last Year: Never true   Transportation Needs:     Lack of Transportation (Medical): Not on file    Lack of Transportation (Non-Medical): Not on file   Physical Activity:     Days of Exercise per Week: Not on file    Minutes of Exercise per Session: Not on file   Stress:     Feeling of Stress : Not on file   Social Connections:     Frequency of Communication with Friends and Family: Not on file    Frequency of Social Gatherings with Friends and Family: Not on file    Attends Voodoo Services: Not on file    Active Member of [address] [place] [address] or Organizations: Not on file    Attends Club or Organization Meetings: Not on file    Marital Status: Not on file   Intimate Partner Violence:     Fear of Current or Ex-Partner: Not on file    Emotionally Abused: Not on file    Physically Abused: Not on file    Sexually Abused: Not on file   Housing Stability:     Unable to Pay for Housing in the Last Year: Not on file    Number of [place] in the Last Year: Not on file    Unstable Housing in the Last Year: Not on file       ROS: Positive in bold  General:   Denies chills, fatigue, fever, malaise, night sweats or weight loss    Psychological:   Denies anxiety, disorientation or hallucinations    ENT:    Denies epistaxis, headaches, vertigo or visual changes    Cardiovascular:   Denies any chest pain, irregular heartbeats, or palpitations. No paroxysmal nocturnal dyspnea. Respiratory:   Denies shortness of breath, coughing, sputum production, hemoptysis, or wheezing. No orthopnea. Gastrointestinal:   Denies nausea, vomiting, diarrhea, or constipation. Denies any abdominal pain.   Denies change in bowel habits or stools. Genito-Urinary:    Denies any urgency, frequency, hematuria. Voiding without difficulty. Musculoskeletal:   Denies joint pain, joint stiffness, joint swelling or muscle pain    Neurology:    Denies any headache or focal neurological deficits. No weakness or paresthesia. Derm:    Denies any rashes, ulcers, or excoriations. Denies bruising. Extremities:   Denies any lower extremity swelling or edema. PHYSICAL EXAM: Abnormal findings noted  VITALS:  Vitals:    12/24/21 0124   BP: 120/78   Pulse: 115   Resp: 17   Temp:    SpO2: 94%         CONSTITUTIONAL:    Awake, alert, cooperative, no apparent distress, and appears stated age    EYES:     EOMI, sclera clear, conjunctiva normal    ENT:    Normocephalic, atraumatic,  External ears without lesions. NECK:    Supple, symmetrical, trachea midline, , no JVD    HEMATOLOGIC/LYMPHATICS:    No cervical lymphadenopathy and no supraclavicular lymphadenopathy    LUNGS:    Symmetric. No increased work of breathing, good air exchange, clear to auscultation bilaterally, no wheezes, rhonchi, or rales,     CARDIOVASCULAR:    Normal apical impulse, regular rate and rhythm, normal S1 and S2, no S3 or S4, and no murmur noted    ABDOMEN:    soft, non-distended, non-tender    MUSCULOSKELETAL:    There is no redness, warmth, or swelling of the joints. NEUROLOGIC:    Awake, alert, oriented to name, place and time. SKIN:    No bruising or bleeding. No redness, warmth, or swelling    EXTREMITIES:    Peripheral pulses present. No edema, cyanosis, or swelling.     LINES/CATHETERS     LABORATORY DATA:  CBC with Differential:    Lab Results   Component Value Date    WBC 13.0 12/23/2021    RBC 4.04 12/23/2021    HGB 12.4 12/23/2021    HCT 38.3 12/23/2021     12/23/2021    MCV 94.8 12/23/2021    MCH 30.7 12/23/2021    MCHC 32.4 12/23/2021    RDW 13.2 12/23/2021    SEGSPCT 81 09/06/2013    LYMPHOPCT 14.4 12/23/2021    MONOPCT 9.8 12/23/2021    BASOPCT 0.8 12/23/2021    MONOSABS 1.27 12/23/2021    LYMPHSABS 1.87 12/23/2021    EOSABS 0.16 12/23/2021    BASOSABS 0.10 12/23/2021     CMP:    Lab Results   Component Value Date     12/23/2021    K 3.9 12/23/2021     12/23/2021    CO2 23 12/23/2021    BUN 13 12/23/2021    CREATININE 0.8 12/23/2021    GFRAA >60 12/23/2021    LABGLOM >60 12/23/2021    GLUCOSE 88 12/23/2021    GLUCOSE 75 04/10/2012    PROT 6.8 12/23/2021    LABALBU 3.9 12/23/2021    LABALBU 4.7 04/10/2012    CALCIUM 8.3 12/23/2021    BILITOT 0.4 12/23/2021    ALKPHOS 80 12/23/2021    AST 21 12/23/2021    ALT 17 12/23/2021       ASSESSMENT/PLAN:  1. Atrial fibrillation with RVR. 2. Chest pain  3. Leukocytosis  4. History of left atrial appendage thrombus  5. Hyperlipidemia  6. Macular degeneration  7. History of TIA  8. History of tobacco abuse  9. Moderate severe mitral valve regurgitation      Patient presented due to chest discomfort. She was found to have a history of A. fib and is a RVR. She received 2 boluses of Cardizem. However his mood was not improved. She was placed on Cardizem drip for rate control. Cardiology will be consulted. Patient will resume Eliquis. There are findings on chest x-ray which apparently was chronic. This will be evaluated further. Obtain procalcitonin. she will have respiratory viral panel obtained.       Kaleb Ellison  4:25 AM  12/24/2021    Electronically signed by Jose Guadalupe Ellis DO on 12/23/21 at 9:44 PM EST

## 2021-12-25 LAB
ALBUMIN SERPL-MCNC: 3.8 G/DL (ref 3.5–5.2)
ALP BLD-CCNC: 75 U/L (ref 35–104)
ALT SERPL-CCNC: 16 U/L (ref 0–32)
ANION GAP SERPL CALCULATED.3IONS-SCNC: 9 MMOL/L (ref 7–16)
AST SERPL-CCNC: 20 U/L (ref 0–31)
BASOPHILS ABSOLUTE: 0.11 E9/L (ref 0–0.2)
BASOPHILS RELATIVE PERCENT: 0.9 % (ref 0–2)
BILIRUB SERPL-MCNC: 0.4 MG/DL (ref 0–1.2)
BUN BLDV-MCNC: 8 MG/DL (ref 6–23)
CALCIUM SERPL-MCNC: 8.5 MG/DL (ref 8.6–10.2)
CHLORIDE BLD-SCNC: 103 MMOL/L (ref 98–107)
CO2: 24 MMOL/L (ref 22–29)
CREAT SERPL-MCNC: 0.7 MG/DL (ref 0.5–1)
EOSINOPHILS ABSOLUTE: 0.26 E9/L (ref 0.05–0.5)
EOSINOPHILS RELATIVE PERCENT: 2.2 % (ref 0–6)
GFR AFRICAN AMERICAN: >60
GFR NON-AFRICAN AMERICAN: >60 ML/MIN/1.73
GLUCOSE BLD-MCNC: 97 MG/DL (ref 74–99)
HCT VFR BLD CALC: 41.2 % (ref 34–48)
HEMOGLOBIN: 13.2 G/DL (ref 11.5–15.5)
IMMATURE GRANULOCYTES #: 0.04 E9/L
IMMATURE GRANULOCYTES %: 0.3 % (ref 0–5)
LYMPHOCYTES ABSOLUTE: 1.89 E9/L (ref 1.5–4)
LYMPHOCYTES RELATIVE PERCENT: 16.1 % (ref 20–42)
MCH RBC QN AUTO: 31.1 PG (ref 26–35)
MCHC RBC AUTO-ENTMCNC: 32 % (ref 32–34.5)
MCV RBC AUTO: 96.9 FL (ref 80–99.9)
MONOCYTES ABSOLUTE: 0.94 E9/L (ref 0.1–0.95)
MONOCYTES RELATIVE PERCENT: 8 % (ref 2–12)
NEUTROPHILS ABSOLUTE: 8.53 E9/L (ref 1.8–7.3)
NEUTROPHILS RELATIVE PERCENT: 72.5 % (ref 43–80)
PDW BLD-RTO: 13.5 FL (ref 11.5–15)
PLATELET # BLD: 409 E9/L (ref 130–450)
PMV BLD AUTO: 11.4 FL (ref 7–12)
POTASSIUM SERPL-SCNC: 4.5 MMOL/L (ref 3.5–5)
RBC # BLD: 4.25 E12/L (ref 3.5–5.5)
SODIUM BLD-SCNC: 136 MMOL/L (ref 132–146)
TOTAL PROTEIN: 6.9 G/DL (ref 6.4–8.3)
WBC # BLD: 11.8 E9/L (ref 4.5–11.5)

## 2021-12-25 PROCEDURE — 6370000000 HC RX 637 (ALT 250 FOR IP): Performed by: INTERNAL MEDICINE

## 2021-12-25 PROCEDURE — 85025 COMPLETE CBC W/AUTO DIFF WBC: CPT

## 2021-12-25 PROCEDURE — 2580000003 HC RX 258: Performed by: INTERNAL MEDICINE

## 2021-12-25 PROCEDURE — 36415 COLL VENOUS BLD VENIPUNCTURE: CPT

## 2021-12-25 PROCEDURE — 6360000002 HC RX W HCPCS: Performed by: INTERNAL MEDICINE

## 2021-12-25 PROCEDURE — 2060000000 HC ICU INTERMEDIATE R&B

## 2021-12-25 PROCEDURE — 99233 SBSQ HOSP IP/OBS HIGH 50: CPT | Performed by: INTERNAL MEDICINE

## 2021-12-25 PROCEDURE — 80053 COMPREHEN METABOLIC PANEL: CPT

## 2021-12-25 RX ORDER — ONDANSETRON 2 MG/ML
2 INJECTION INTRAMUSCULAR; INTRAVENOUS EVERY 8 HOURS PRN
Status: DISCONTINUED | OUTPATIENT
Start: 2021-12-25 | End: 2021-12-27 | Stop reason: HOSPADM

## 2021-12-25 RX ORDER — DIGOXIN 0.25 MG/ML
500 INJECTION INTRAMUSCULAR; INTRAVENOUS ONCE
Status: COMPLETED | OUTPATIENT
Start: 2021-12-25 | End: 2021-12-25

## 2021-12-25 RX ORDER — METOPROLOL SUCCINATE 50 MG/1
100 TABLET, EXTENDED RELEASE ORAL 2 TIMES DAILY
Status: DISCONTINUED | OUTPATIENT
Start: 2021-12-25 | End: 2021-12-27 | Stop reason: HOSPADM

## 2021-12-25 RX ADMIN — LEVOTHYROXINE SODIUM 25 MCG: 25 TABLET ORAL at 06:23

## 2021-12-25 RX ADMIN — Medication 10 ML: at 20:58

## 2021-12-25 RX ADMIN — APIXABAN 5 MG: 5 TABLET, FILM COATED ORAL at 09:13

## 2021-12-25 RX ADMIN — METOPROLOL SUCCINATE 100 MG: 50 TABLET, EXTENDED RELEASE ORAL at 20:58

## 2021-12-25 RX ADMIN — SERTRALINE 100 MG: 50 TABLET, FILM COATED ORAL at 09:13

## 2021-12-25 RX ADMIN — DONEPEZIL HYDROCHLORIDE 10 MG: 10 TABLET, FILM COATED ORAL at 09:13

## 2021-12-25 RX ADMIN — ONDANSETRON 2 MG: 2 INJECTION INTRAMUSCULAR; INTRAVENOUS at 12:12

## 2021-12-25 RX ADMIN — METOPROLOL SUCCINATE 75 MG: 25 TABLET, FILM COATED, EXTENDED RELEASE ORAL at 09:13

## 2021-12-25 RX ADMIN — DIGOXIN 500 MCG: 250 INJECTION, SOLUTION INTRAMUSCULAR; INTRAVENOUS; PARENTERAL at 12:12

## 2021-12-25 RX ADMIN — APIXABAN 5 MG: 5 TABLET, FILM COATED ORAL at 20:58

## 2021-12-25 ASSESSMENT — PAIN SCALES - GENERAL
PAINLEVEL_OUTOF10: 0
PAINLEVEL_OUTOF10: 0

## 2021-12-25 NOTE — PROGRESS NOTES
IntraVENous PRN Ples Salvage, DO        glucagon (rDNA) injection 1 mg  1 mg IntraMUSCular PRN Ples Salvage, DO        dextrose 5 % solution  100 mL/hr IntraVENous PRN Ples Salvage, DO        sodium chloride flush 0.9 % injection 5-40 mL  5-40 mL IntraVENous 2 times per day Ples Salvage, DO   10 mL at 21 0734    sodium chloride flush 0.9 % injection 5-40 mL  5-40 mL IntraVENous PRN Ples Salvage, DO        0.9 % sodium chloride infusion  25 mL IntraVENous PRN Ples Salvage, DO        polyethylene glycol (GLYCOLAX) packet 17 g  17 g Oral Daily PRN Ples Salvage, DO        acetaminophen (TYLENOL) tablet 650 mg  650 mg Oral Q6H PRN Ples Salvage, DO        Or    acetaminophen (TYLENOL) suppository 650 mg  650 mg Rectal Q6H PRN Ples Salvage, DO        sertraline (ZOLOFT) tablet 100 mg  100 mg Oral Daily Ples Salvage, DO   100 mg at 21 9544       Social History     Socioeconomic History    Marital status:      Spouse name: Not on file    Number of children: Not on file    Years of education: Not on file    Highest education level: Not on file   Occupational History    Occupation: retired   Tobacco Use    Smoking status: Former Smoker     Packs/day: 0.50     Quit date: 1994     Years since quittin.5    Smokeless tobacco: Never Used   Vaping Use    Vaping Use: Never used   Substance and Sexual Activity    Alcohol use: No     Comment: Coffee 1 cup a day     Drug use: No    Sexual activity: Not Currently   Other Topics Concern    Not on file   Social History Narrative    Not on file     Social Determinants of Health     Financial Resource Strain: Low Risk     Difficulty of Paying Living Expenses: Not hard at all   Food Insecurity: No Food Insecurity    Worried About [address] [employer] in the Last Year: Never true    [facility] in the Last Year: Never true   Transportation Needs:     Lack of Transportation (Medical):  Not on file    Lack of Transportation (Non-Medical): Not on file   Physical Activity:     Days of Exercise per Week: Not on file    Minutes of Exercise per Session: Not on file   Stress:     Feeling of Stress : Not on file   Social Connections:     Frequency of Communication with Friends and Family: Not on file    Frequency of Social Gatherings with Friends and Family: Not on file    Attends Jain Services: Not on file    Active Member of [employer] or Organizations: Not on file    Attends Club or Organization Meetings: Not on file    Marital Status: Not on file   Intimate Partner Violence:     Fear of Current or Ex-Partner: Not on file    Emotionally Abused: Not on file    Physically Abused: Not on file    Sexually Abused: Not on file   Housing Stability:     Unable to Pay for Housing in the Last Year: Not on file    Number of [place] in the Last Year: Not on file    Unstable Housing in the Last Year: Not on file       History reviewed. No pertinent family history. Review of Systems:   Heart: as above   Lungs: as above   Eyes: denies changes in vision or discharge. Ears: denies changes in hearing or pain. Nose: denies epistaxis or masses   Throat: denies sore throat or trouble swallowing. Neuro: denies numbness, tingling, tremors. Skin: denies rashes or itching. : denies hematuria, dysuria   GI: denies vomiting, diarrhea   Psych: denies mood changed, anxiety, depression. all others negative. Physical Exam   /71   Pulse 111   Temp 97.7 °F (36.5 °C) (Oral)   Resp 22   Ht 5' 1\" (1.549 m)   Wt 120 lb 9.5 oz (54.7 kg)   SpO2 95%   BMI 22.79 kg/m²   Constitutional: Oriented to person, place, and time. Well-developed and well-nourished. No distress. Head: Normocephalic and atraumatic. Eyes: EOM are normal. Pupils are equal, round, and reactive to light. Neck: Normal range of motion. Neck supple. No hepatojugular reflux and no JVD present. Carotid bruit is not present.  No tracheal noted in the left atrial appendage   and left atrium near mouth of MARTA suggestive of thrombus. Moderate to severe central jet of mitral regurgitation - ERO 0.3cm2, PISA 0.6cm, RV 76cc. Normal left ventricular chamber size. Normal left ventricular systolic function. Left atrium is enlarged. Interatrial septum intact. Agitated saline injection showed no right to left shunt. Normal right ventricle size and function. No mitral valve prolapse. There is trace aortic regurgitation. There is mild tricuspid regurgitation. Normal aortic root size. No evidence of pericardial effusion. Pericardium appears normal.   No comparison study available in the local [facility]. ASSESSMENT AND PLAN:  Patient Active Problem List   Diagnosis    Pelvic prolapse    Hyperlipidemia    GERD (gastroesophageal reflux disease)    PUD (peptic ulcer disease)    Urinary incontinence    Osteoarthritis    Palpitations    Precordial pain    Thrombophilia ([address]    Alzheimer's disease, unspecified    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    Pain of right foot    Atrial fibrillation with RVR ([address]     1. Afib: On eliquis. Rate control. Titrate BB. Wean off cardizem gtt. Dig dosed.      2. Chest symptoms: In context of afib with RVR. Labs and EKGs reviewed. Troponin marginal.    Low risk ETT 6/26/2018. Pharm stress in am.    3. VHD: Moderate to severe MR by MARYLOU 10/20/2020. Medically manage. 4. Hx of TIA: Eliquis/statin. 5. Lipids: Stain. 6. JAH Mcmahon D.O.   Cardiologist  Cardiology, [facility]

## 2021-12-25 NOTE — PROGRESS NOTES
Internal Medicine Progress Note    [facility]    Storm Cosme. [facility]., F.A.C.OJinnyI. Haydee Sheridan D.O., SHANTANUO.I. [employer], D.O. Leonora Huger, MSN, APRN, NP-C  Rahul Trotter. Dinh Barreto, MSN, APRN-CNP     Primary Care Physician: Bryce Frankel DO   Admitting Physician:  Bunny Hatchet, DO  Admission date and time: 12/23/2021  3:23 PM    Room:  [address] [place] [address]  Admitting diagnosis: Atrial fibrillation with RVR [facility]) [I48.91]    Patient Name: Espinoza Harris  MRN: [id number]    Date of Service: 12/25/2021     Subjective:  Oneida Shi is a 64 y.o. female who was seen and examined today,12/25/2021, at the bedside. Patient heart rate still tachycardic. Beta-blockers were started yesterday and currently off Cardizem drip. Will increase beta-blocker due to tachycardia. Denies chest pain or shortness of breath. Otherwise feels fine      No family present during my examination. Review of System:   Constitutional:   Denies fever or chills, weight loss or gain, fatigue or malaise. HEENT:   Denies ear pain, sore throat, sinus or eye problems. Cardiovascular:   Complains of palpitation irregular heartbeat  Respiratory:   Denies shortness of breath, coughing, sputum production, hemoptysis, or wheezing. Gastrointestinal:   Denies nausea, vomiting, diarrhea, or constipation. Denies any abdominal pain. Genitourinary:    Denies any urgency, frequency, hematuria. Voiding  without difficulty. Extremities:   Denies lower extremity swelling, edema or cyanosis. Neurology:    Denies any headache or focal neurological deficits, Denies generalized weakness or memory difficulty. Psch:   Denies being anxious or depressed. Musculoskeletal:    Denies  myalgias, joint complaints or back pain. Integumentary:   Denies any rashes, ulcers, or excoriations. Denies bruising. Hematologic/Lymphatic:  Denies bruising or bleeding. Physical Exam:  No intake/output data recorded.   No WBC 11.8 12/25/2021    RBC 4.25 12/25/2021    HGB 13.2 12/25/2021    HCT 41.2 12/25/2021     12/25/2021    MCV 96.9 12/25/2021    MCH 31.1 12/25/2021    MCHC 32.0 12/25/2021    RDW 13.5 12/25/2021    SEGSPCT 81 09/06/2013    LYMPHOPCT 16.1 12/25/2021    MONOPCT 8.0 12/25/2021    BASOPCT 0.9 12/25/2021    MONOSABS 0.94 12/25/2021    LYMPHSABS 1.89 12/25/2021    EOSABS 0.26 12/25/2021    BASOSABS 0.11 12/25/2021     CMP:    Lab Results   Component Value Date     12/25/2021    K 4.5 12/25/2021    K 3.9 12/23/2021     12/25/2021    CO2 24 12/25/2021    BUN 8 12/25/2021    CREATININE 0.7 12/25/2021    GFRAA >60 12/25/2021    LABGLOM >60 12/25/2021    GLUCOSE 97 12/25/2021    GLUCOSE 75 04/10/2012    PROT 6.9 12/25/2021    LABALBU 3.8 12/25/2021    LABALBU 4.7 04/10/2012    CALCIUM 8.5 12/25/2021    BILITOT 0.4 12/25/2021    ALKPHOS 75 12/25/2021    AST 20 12/25/2021    ALT 16 12/25/2021       Wound Documentation:   Incision 09/04/13 Perineum (Active)   Number of days: 0941       Assessment:    · Chronic persistent atrial fibrillation rapid ventricular response  · Chest pain possibly secondary to demand ischemia  · Hyperlipidemia on statin agent  · Macular degeneration  · Moderate to severe mitral regurgitation  · History of tobacco abuse  · History of TIAs  · Gastroesophageal reflux disease              Plan:       · Heart rate still mildly tachycardic but off Cardizem  · Increase beta-blockers  · Continue anticoagulant therapy  · Recommendation of cardiology appreciated    More than 50% of my  time was spent at the bedside counseling/coordinating care with the patient and/or family with face to face contact. This time was spent reviewing notes and laboratory data as well as instructing and counseling the patient. Time I spent with the family or surrogate(s) is included only if the patient was incapable of providing the necessary information or participating in medical decisions.  I also discussed the differential diagnosis and all of the proposed management plans with the patient and individuals accompanying the patient. Valencia Baxter requires this high level of physician care and nursing on the [facility] unit due the complexity of decision management and chance of rapid decline or death. Continued cardiac monitoring and higher level of nursing are required. I am readily available for any further decision-making and intervention.      Jessie Leach DO, CHRISTIANO.DANIELE.C.O.I.  12/25/2021  12:16 PM

## 2021-12-26 ENCOUNTER — APPOINTMENT (OUTPATIENT)
Dept: NUCLEAR MEDICINE | Age: 76
DRG: 309 | End: 2021-12-26
Payer: MEDICARE

## 2021-12-26 LAB
ALBUMIN SERPL-MCNC: 3.8 G/DL (ref 3.5–5.2)
ALP BLD-CCNC: 72 U/L (ref 35–104)
ALT SERPL-CCNC: 15 U/L (ref 0–32)
ANION GAP SERPL CALCULATED.3IONS-SCNC: 11 MMOL/L (ref 7–16)
AST SERPL-CCNC: 26 U/L (ref 0–31)
BASOPHILS ABSOLUTE: 0.12 E9/L (ref 0–0.2)
BASOPHILS RELATIVE PERCENT: 1.1 % (ref 0–2)
BILIRUB SERPL-MCNC: 0.4 MG/DL (ref 0–1.2)
BUN BLDV-MCNC: 9 MG/DL (ref 6–23)
CALCIUM SERPL-MCNC: 8.8 MG/DL (ref 8.6–10.2)
CHLORIDE BLD-SCNC: 101 MMOL/L (ref 98–107)
CO2: 24 MMOL/L (ref 22–29)
CREAT SERPL-MCNC: 0.7 MG/DL (ref 0.5–1)
EOSINOPHILS ABSOLUTE: 0.26 E9/L (ref 0.05–0.5)
EOSINOPHILS RELATIVE PERCENT: 2.4 % (ref 0–6)
GFR AFRICAN AMERICAN: >60
GFR NON-AFRICAN AMERICAN: >60 ML/MIN/1.73
GLUCOSE BLD-MCNC: 90 MG/DL (ref 74–99)
HCT VFR BLD CALC: 40.1 % (ref 34–48)
HEMOGLOBIN: 12.9 G/DL (ref 11.5–15.5)
IMMATURE GRANULOCYTES #: 0.05 E9/L
IMMATURE GRANULOCYTES %: 0.5 % (ref 0–5)
LV EF: 53 %
LVEF MODALITY: NORMAL
LYMPHOCYTES ABSOLUTE: 1.49 E9/L (ref 1.5–4)
LYMPHOCYTES RELATIVE PERCENT: 13.6 % (ref 20–42)
MCH RBC QN AUTO: 30.6 PG (ref 26–35)
MCHC RBC AUTO-ENTMCNC: 32.2 % (ref 32–34.5)
MCV RBC AUTO: 95 FL (ref 80–99.9)
MONOCYTES ABSOLUTE: 0.85 E9/L (ref 0.1–0.95)
MONOCYTES RELATIVE PERCENT: 7.7 % (ref 2–12)
NEUTROPHILS ABSOLUTE: 8.2 E9/L (ref 1.8–7.3)
NEUTROPHILS RELATIVE PERCENT: 74.7 % (ref 43–80)
PDW BLD-RTO: 13.2 FL (ref 11.5–15)
PLATELET # BLD: 438 E9/L (ref 130–450)
PMV BLD AUTO: 11.4 FL (ref 7–12)
POTASSIUM SERPL-SCNC: 4.6 MMOL/L (ref 3.5–5)
RBC # BLD: 4.22 E12/L (ref 3.5–5.5)
SODIUM BLD-SCNC: 136 MMOL/L (ref 132–146)
TOTAL PROTEIN: 6.7 G/DL (ref 6.4–8.3)
WBC # BLD: 11 E9/L (ref 4.5–11.5)

## 2021-12-26 PROCEDURE — 99233 SBSQ HOSP IP/OBS HIGH 50: CPT | Performed by: INTERNAL MEDICINE

## 2021-12-26 PROCEDURE — 6360000002 HC RX W HCPCS: Performed by: INTERNAL MEDICINE

## 2021-12-26 PROCEDURE — 85025 COMPLETE CBC W/AUTO DIFF WBC: CPT

## 2021-12-26 PROCEDURE — 3430000000 HC RX DIAGNOSTIC RADIOPHARMACEUTICAL: Performed by: RADIOLOGY

## 2021-12-26 PROCEDURE — 93016 CV STRESS TEST SUPVJ ONLY: CPT | Performed by: INTERNAL MEDICINE

## 2021-12-26 PROCEDURE — 2060000000 HC ICU INTERMEDIATE R&B

## 2021-12-26 PROCEDURE — 6370000000 HC RX 637 (ALT 250 FOR IP): Performed by: INTERNAL MEDICINE

## 2021-12-26 PROCEDURE — 80053 COMPREHEN METABOLIC PANEL: CPT

## 2021-12-26 PROCEDURE — 78452 HT MUSCLE IMAGE SPECT MULT: CPT | Performed by: INTERNAL MEDICINE

## 2021-12-26 PROCEDURE — 93018 CV STRESS TEST I&R ONLY: CPT | Performed by: INTERNAL MEDICINE

## 2021-12-26 PROCEDURE — 78452 HT MUSCLE IMAGE SPECT MULT: CPT

## 2021-12-26 PROCEDURE — A9500 TC99M SESTAMIBI: HCPCS | Performed by: RADIOLOGY

## 2021-12-26 PROCEDURE — 36415 COLL VENOUS BLD VENIPUNCTURE: CPT

## 2021-12-26 PROCEDURE — 2580000003 HC RX 258: Performed by: INTERNAL MEDICINE

## 2021-12-26 PROCEDURE — 93017 CV STRESS TEST TRACING ONLY: CPT

## 2021-12-26 RX ADMIN — REGADENOSON 0.4 MG: 0.08 INJECTION, SOLUTION INTRAVENOUS at 09:56

## 2021-12-26 RX ADMIN — Medication 30 MILLICURIE: at 10:00

## 2021-12-26 RX ADMIN — DONEPEZIL HYDROCHLORIDE 10 MG: 10 TABLET, FILM COATED ORAL at 12:03

## 2021-12-26 RX ADMIN — APIXABAN 5 MG: 5 TABLET, FILM COATED ORAL at 20:37

## 2021-12-26 RX ADMIN — METOPROLOL SUCCINATE 100 MG: 50 TABLET, EXTENDED RELEASE ORAL at 20:38

## 2021-12-26 RX ADMIN — METOPROLOL SUCCINATE 100 MG: 50 TABLET, EXTENDED RELEASE ORAL at 12:03

## 2021-12-26 RX ADMIN — Medication 10 MILLICURIE: at 08:14

## 2021-12-26 RX ADMIN — SERTRALINE 100 MG: 50 TABLET, FILM COATED ORAL at 12:03

## 2021-12-26 RX ADMIN — Medication 10 ML: at 12:04

## 2021-12-26 RX ADMIN — LEVOTHYROXINE SODIUM 25 MCG: 25 TABLET ORAL at 06:34

## 2021-12-26 RX ADMIN — APIXABAN 5 MG: 5 TABLET, FILM COATED ORAL at 12:03

## 2021-12-26 RX ADMIN — Medication 10 ML: at 20:38

## 2021-12-26 ASSESSMENT — PAIN SCALES - GENERAL
PAINLEVEL_OUTOF10: 0
PAINLEVEL_OUTOF10: 0

## 2021-12-26 NOTE — PROGRESS NOTES
CHIEF COMPLAINT: Afib    HISTORY OF PRESENT ILLNESS: Patient is a 64 y.o. female seen at the request of Brayan Mina DO and followed at our office by Dr. Vitaly Shah. Patient presented with chest symptoms. Noted to be in afib with RVR. Chest symptoms resolved with rate controlling her afib. Known PAF. On eliquis. No CP and SOB.      Past Medical History:   Diagnosis Date    Arthritis     Asymptomatic PVCs 4/2012    pt felt flutter, no other sx, holter + pvc's, few runs of SVT   [place]    History of Holter monitoring     Hyperlipidemia     Macular degeneration     Nausea & vomiting     Pelvic prolapse     PONV (postoperative nausea and vomiting)     TIA (transient ischemic attack)        Patient Active Problem List   Diagnosis    Pelvic prolapse    Hyperlipidemia    GERD (gastroesophageal reflux disease)    PUD (peptic ulcer disease)    Urinary incontinence    Osteoarthritis    Palpitations    Precordial pain    Thromb[address] [address]    Alzheimer's disease, unspecified    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    Pain of right foot    Atrial fibrillation with RVR (HCC)       No Known Allergies    Current Facility-Administered Medications   Medication Dose Route Frequency Provider Last Rate Last Admin    metoprolol succinate (TOPROL XL) extended release tablet 100 mg  100 mg Oral BID [place] Fake, DO   100 mg at 12/25/21 2058    regadenoson (LEXISCAN) injection 0.4 mg  0.4 mg IntraVENous ONCE PRN [place] Fake, DO        [facility]etron [facility]) injection 2 mg  2 mg IntraVENous Q8H PRN [place] Fake, DO   2 mg at 12/25/21 1212    apixaban (ELIQUIS) tablet 5 mg  5 mg Oral BID Makenzie Crooked, DO   5 mg at 12/25/21 2058    donepezil (ARICEPT) tablet 10 mg  10 mg Oral Daily Makenzie Crooked, DO   10 mg at 12/25/21 0913    levothyroxine (SYNTHROID) tablet 25 mcg  25 mcg Oral Daily Makenzie Crooked, DO   25 mcg at 12/26/21 0634    glucose (GLUTOSE) 40 % oral gel 15 g  15 g Oral PRN Inflamed seborrheic keratosis  - Primary T40.7    Folliculitis  G66.7    Dermatofibroma  D23.9    History of nonmelanoma skin cancer  Z85.828      Ov 5/23/2019 codes Laura Kurtis, DO        dextrose 50 % IV solution  12.5 g IntraVENous PRN Laura Kurtis, DO        glucagon (rDNA) injection 1 mg  1 mg IntraMUSCular PRN Laura Kurtis, DO        dextrose 5 % solution  100 mL/hr IntraVENous PRN Laura Kurtis, DO        sodium chloride flush 0.9 % injection 5-40 mL  5-40 mL IntraVENous 2 times per day Laura Kurtis, DO   10 mL at 21    sodium chloride flush 0.9 % injection 5-40 mL  5-40 mL IntraVENous PRN Laura Kurtis, DO        0.9 % sodium chloride infusion  25 mL IntraVENous PRN Laura Kurtis, DO        polyethylene glycol (GLYCOLAX) packet 17 g  17 g Oral Daily PRN Laura Kurtis, DO        acetaminophen (TYLENOL) tablet 650 mg  650 mg Oral Q6H PRN Laura Kurtis, DO        Or    acetaminophen (TYLENOL) suppository 650 mg  650 mg Rectal Q6H PRN Laura Kurtis, DO        sertraline (ZOLOFT) tablet 100 mg  100 mg Oral Daily Laura Hull, DO   100 mg at 21 1514       Social History     Socioeconomic History    Marital status:       Spouse name: Not on file    Number of children: Not on file    Years of education: Not on file    Highest education level: Not on file   Occupational History    Occupation: retired   Tobacco Use    Smoking status: Former Smoker     Packs/day: 0.50     Quit date: 1994     Years since quittin.5    Smokeless tobacco: Never Used   Vaping Use    Vaping Use: Never used   Substance and Sexual Activity    Alcohol use: No     Comment: Coffee 1 cup a day     Drug use: No    Sexual activity: Not Currently   Other Topics Concern    Not on file   Social History Narrative    Not on file     Social Determinants of Health     Financial Resource Strain: Low Risk     Difficulty of Paying Living Expenses: Not hard at all   Food Insecurity: No Food Insecurity    Worried About [facility] in the Last Year: Never true    Chris [employer] in the Last Year: Never true   Transportation Needs:  Lack of Transportation (Medical): Not on file    Lack of Transportation (Non-Medical): Not on file   Physical Activity:     Days of Exercise per Week: Not on file    Minutes of Exercise per Session: Not on file   Stress:     Feeling of Stress : Not on file   Social Connections:     Frequency of Communication with Friends and Family: Not on file    Frequency of Social Gatherings with Friends and Family: Not on file    Attends Oriental orthodox Services: Not on file    Active Member of [email or web address] or Organizations: Not on file    Attends Club or Organization Meetings: Not on file    Marital Status: Not on file   Intimate Partner Violence:     Fear of Current or Ex-Partner: Not on file    Emotionally Abused: Not on file    Physically Abused: Not on file    Sexually Abused: Not on file   Housing Stability:     Unable to Pay for Housing in the Last Year: Not on file    Number of [place] in the Last Year: Not on file    Unstable Housing in the Last Year: Not on file       History reviewed. No pertinent family history. Review of Systems:   Heart: as above   Lungs: as above   Eyes: denies changes in vision or discharge. Ears: denies changes in hearing or pain. Nose: denies epistaxis or masses   Throat: denies sore throat or trouble swallowing. Neuro: denies numbness, tingling, tremors. Skin: denies rashes or itching. : denies hematuria, dysuria   GI: denies vomiting, diarrhea   Psych: denies mood changed, anxiety, depression. all others negative. Physical Exam   BP (!) 140/61   Pulse 85   Temp 97.6 °F (36.4 °C) (Oral)   Resp 16   Ht 5' 1\" (1.549 m)   Wt 120 lb 9.5 oz (54.7 kg)   SpO2 97%   BMI 22.79 kg/m²   Constitutional: Oriented to person, place, and time. Well-developed and well-nourished. No distress. Head: Normocephalic and atraumatic. Eyes: EOM are normal. Pupils are equal, round, and reactive to light. Neck: Normal range of motion. Neck supple.  No hepatojugular reflux and no JVD present. Carotid bruit is not present. No tracheal deviation present. No thyromegaly present. Cardiovascular: Normal rate, regular rhythm, normal heart sounds and intact distal pulses. Exam reveals no gallop and no friction rub. No murmur heard. Pulmonary/Chest: Effort normal and breath sounds normal. No respiratory distress. No wheezes. No rales. No tenderness. Abdominal: Soft. Bowel sounds are normal. No distension and no mass. No tenderness. No rebound and no guarding. Musculoskeletal: Normal range of motion. No edema and no tenderness. Lymphadenopathy:   No cervical adenopathy. No groin adenopathy. Neurological: Alert and oriented to person, place, and time. Skin: Skin is warm and dry. No rash noted. Not diaphoretic. No erythema. Psychiatric: Normal mood and affect. Behavior is normal.     CBC:   Lab Results   Component Value Date    WBC 11.0 12/26/2021    RBC 4.22 12/26/2021    HGB 12.9 12/26/2021    HCT 40.1 12/26/2021    MCV 95.0 12/26/2021    MCH 30.6 12/26/2021    MCHC 32.2 12/26/2021    RDW 13.2 12/26/2021     12/26/2021    MPV 11.4 12/26/2021     BMP:   Lab Results   Component Value Date     12/26/2021    K 4.6 12/26/2021    K 3.9 12/23/2021     12/26/2021    CO2 24 12/26/2021    BUN 9 12/26/2021    LABALBU 3.8 12/26/2021    LABALBU 4.7 04/10/2012    CREATININE 0.7 12/26/2021    CALCIUM 8.8 12/26/2021    GFRAA >60 12/26/2021    LABGLOM >60 12/26/2021     Magnesium:    Lab Results   Component Value Date    MG 2.0 12/24/2021     Cardiac Enzymes:   Lab Results   Component Value Date    CKTOTAL 107 09/06/2013    CKMB 1.4 09/06/2013    TROPHS 10 (H) 12/23/2021    TROPHS 13 (H) 12/23/2021      PT/INR:    Lab Results   Component Value Date    PROTIME 17.3 12/24/2021    INR 1.5 12/24/2021     TSH:    Lab Results   Component Value Date    TSH 2.230 12/23/2021     Rhythm Strip: atrial fibrillation. EKG:  nonspecific ST and T waves changes, atrial fibrillation.     MARYLOU Summary 10/21/2020:   Large irregular mobile echogenic mass noted in the left atrial appendage   and left atrium near mouth of MARTA suggestive of thrombus. Moderate to severe central jet of mitral regurgitation - ERO 0.3cm2, PISA 0.6cm, RV 76cc. Normal left ventricular chamber size. Normal left ventricular systolic function. Left atrium is enlarged. Interatrial septum intact. Agitated saline injection showed no right to left shunt. Normal right ventricle size and function. No mitral valve prolapse. There is trace aortic regurgitation. There is mild tricuspid regurgitation. Normal aortic root size. No evidence of pericardial effusion. Pericardium appears normal.   No comparison study available in the local [facility]. ASSESSMENT AND PLAN:  Patient Active Problem List   Diagnosis    Pelvic prolapse    Hyperlipidemia    GERD (gastroesophageal reflux disease)    PUD (peptic ulcer disease)    Urinary incontinence    Osteoarthritis    Palpitations    Precordial pain    Thrombophilia ([address]    Alzheimer's disease, unspecified    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    Pain of right foot    Atrial fibrillation with RVR ([address]     1. Afib: On eliquis. Rate control. Titrate BB. Wean off cardizem gtt. Dig dosed.      2. Chest symptoms: In context of afib with RVR. Labs and EKGs reviewed. Troponin marginal.    Low risk ETT 6/26/2018. Pharm stress done and pending. 3. VHD: Moderate to severe MR by MARYLOU 10/20/2020. Medically manage. 4. Hx of TIA: Eliquis/statin. 5. Lipids: Stain. 6. GERD     Risa Younger D.O.   Cardiologist  Cardiology, [facility]

## 2021-12-26 NOTE — PROGRESS NOTES
Internal Medicine Progress Note    [facility]    [facility]. Sarbjit Matute., F.A.C.O.I. Yissel Oleary D.O., SHANTANUOJinnyIJinny Iyer D.O. Koki Douglas, MSN, APRN, NP-C  Sunny Quach. Tomas Garay, MSN, APRN-CNP     Primary Care Physician: Alaina Covarrubias DO   Admitting Physician:  Trudy Ruiz DO  Admission date and time: 12/23/2021  3:23 PM    Room:  [address] [place] [address]  Admitting diagnosis: Atrial fibrillation with RVR [facility]) [I48.91]    Patient Name: Nena Turner  MRN: [id number]    Date of Service: 12/26/2021     Subjective:  Jose Hilliard is a 64 y.o. female who was seen and examined today,12/26/2021, at the bedside. Patient not feeling well today. Had episode of diaphoresis and some mild tachycardia. Patient scheduled for stress test    No family present during my examination. Review of System:   Constitutional:   Denies fever or chills, weight loss or gain, fatigue or malaise. HEENT:   Denies ear pain, sore throat, sinus or eye problems. Cardiovascular:   Complains of palpitation irregular heartbeat  Respiratory:   Denies shortness of breath, coughing, sputum production, hemoptysis, or wheezing. Gastrointestinal:   Denies nausea, vomiting, diarrhea, or constipation. Denies any abdominal pain. Genitourinary:    Denies any urgency, frequency, hematuria. Voiding  without difficulty. Extremities:   Denies lower extremity swelling, edema or cyanosis. Neurology:    Denies any headache or focal neurological deficits, Denies generalized weakness or memory difficulty. Psch:   Denies being anxious or depressed. Musculoskeletal:    Denies  myalgias, joint complaints or back pain. Integumentary:   Denies any rashes, ulcers, or excoriations. Denies bruising. Hematologic/Lymphatic:  Denies bruising or bleeding. Physical Exam:  No intake/output data recorded.     Intake/Output Summary (Last 24 hours) at 12/26/2021 1156  Last data filed at 12/26/2021 0932  Gross per 24 hour   Intake 10 ml   Output --   Net 10 ml   I/O last 3 completed shifts: In: 10 [I.V.:10]  Out: -   Patient Vitals for the past 96 hrs (Last 3 readings):   Weight   12/24/21 0645 120 lb 9.5 oz (54.7 kg)     Vital Signs:   Blood pressure (!) 140/61, pulse 85, temperature 97.6 °F (36.4 °C), temperature source Oral, resp. rate 16, height 5' 1\" (1.549 m), weight 120 lb 9.5 oz (54.7 kg), SpO2 97 %, not currently breastfeeding. General appearance:  Alert, responsive, oriented to person, place, and time. Well preserved, alert, no distress. Head:  Normocephalic. No masses, lesions or tenderness. Eyes:  PERRLA. EOMI. Sclera clear. Buccal mucosa moist.  ENT:  Ears normal. Mucosa normal.  Neck:    Supple. Trachea midline. No thyromegaly. No JVD. No bruits. Heart:    Irregular rhythm, tachycardic 1/6 systolic ejection murmur. Lungs:    Symmetrical. Clear to auscultation bilaterally. No wheezes. No rhonchi. No rales. Abdomen:   Soft. Non-tender. Non-distended. Bowel sounds positive. No organomegaly or masses. No pain on palpation. Extremities:    Peripheral pulses present. No peripheral edema. No ulcers. No cyanosis. No clubbing. Neurologic:    Alert x 3. No focal deficit. Cranial nerves grossly intact. No focal weakness. Psych:   Behavior is normal. Mood appears normal. Speech is not rapid and/or pressured. Musculoskeletal:   Spine ROM normal. Muscular strength intact. Gait not assessed. Integumentary:  No rashes  Skin normal color and texture.   Genitalia/Breast:  Deferred    Medication:  Scheduled Meds:   metoprolol succinate  100 mg Oral BID    apixaban  5 mg Oral BID    donepezil  10 mg Oral Daily    levothyroxine  25 mcg Oral Daily    sodium chloride flush  5-40 mL IntraVENous 2 times per day    sertraline  100 mg Oral Daily     Continuous Infusions:   dextrose      sodium chloride         Objective Data:  CBC with Differential:    Lab Results   Component Value Date    WBC 11.0 12/26/2021 RBC 4.22 12/26/2021    HGB 12.9 12/26/2021    HCT 40.1 12/26/2021     12/26/2021    MCV 95.0 12/26/2021    MCH 30.6 12/26/2021    MCHC 32.2 12/26/2021    RDW 13.2 12/26/2021    SEGSPCT 81 09/06/2013    LYMPHOPCT 13.6 12/26/2021    MONOPCT 7.7 12/26/2021    BASOPCT 1.1 12/26/2021    MONOSABS 0.85 12/26/2021    LYMPHSABS 1.49 12/26/2021    EOSABS 0.26 12/26/2021    BASOSABS 0.12 12/26/2021     CMP:    Lab Results   Component Value Date     12/26/2021    K 4.6 12/26/2021    K 3.9 12/23/2021     12/26/2021    CO2 24 12/26/2021    BUN 9 12/26/2021    CREATININE 0.7 12/26/2021    GFRAA >60 12/26/2021    LABGLOM >60 12/26/2021    GLUCOSE 90 12/26/2021    GLUCOSE 75 04/10/2012    PROT 6.7 12/26/2021    LABALBU 3.8 12/26/2021    LABALBU 4.7 04/10/2012    CALCIUM 8.8 12/26/2021    BILITOT 0.4 12/26/2021    ALKPHOS 72 12/26/2021    AST 26 12/26/2021    ALT 15 12/26/2021       Wound Documentation:   Incision 09/04/13 Perineum (Active)   Number of days: 2549       Assessment:    · Chronic persistent atrial fibrillation rapid ventricular response  · Chest pain possibly secondary to demand ischemia  · Hyperlipidemia on statin agent  · Macular degeneration  · Moderate to severe mitral regurgitation  · History of tobacco abuse  · History of TIAs  · Gastroesophageal reflux disease              Plan:       · Heart rate under better control but patient have other cardiac symptoms  · For IV Lexiscan stress test today  · Continue current medication including anticoagulant therapy  · Pending results of stress test possible discharge or MARYLOU with DC cardioversion    More than 50% of my  time was spent at the bedside counseling/coordinating care with the patient and/or family with face to face contact. This time was spent reviewing notes and laboratory data as well as instructing and counseling the patient.  Time I spent with the family or surrogate(s) is included only if the patient was incapable of providing the necessary information or participating in medical decisions. I also discussed the differential diagnosis and all of the proposed management plans with the patient and individuals accompanying the patient. Almaz Mccann requires this high level of physician care and nursing on the [facility] unit due the complexity of decision management and chance of rapid decline or death. Continued cardiac monitoring and higher level of nursing are required. I am readily available for any further decision-making and intervention.      Melissa Montanez DO, F.A.C.O.I.  12/26/2021  11:56 AM

## 2021-12-26 NOTE — PROCEDURES
Lexiscan Stress EKG Report:    Dx CP    Baseline EKG: atrial fibrillation. Stress EKG: No ST-T changes. Arrhythmias: None. Symptoms: None. Summary:  Unremarkable lexiscan stress EKG. See separate report for stress perfusion results. Sara Gilmore D.O.   Cardiologist  Cardiology, [facility]

## 2021-12-26 NOTE — PROCEDURES
Dr. Ivan Tinoco present, Completed Lexiscan Protocol 0.4 mg IV per Right antecubital vein. Patient experienced  mild increased breathing, which resolved post sips of Diet Pepsi.

## 2021-12-27 ENCOUNTER — TELEPHONE (OUTPATIENT)
Dept: FAMILY MEDICINE CLINIC | Age: 76
End: 2021-12-27

## 2021-12-27 VITALS
WEIGHT: 120.59 LBS | HEIGHT: 61 IN | BODY MASS INDEX: 22.77 KG/M2 | DIASTOLIC BLOOD PRESSURE: 75 MMHG | SYSTOLIC BLOOD PRESSURE: 140 MMHG | TEMPERATURE: 97.6 F | RESPIRATION RATE: 16 BRPM | OXYGEN SATURATION: 95 % | HEART RATE: 96 BPM

## 2021-12-27 LAB
ALBUMIN SERPL-MCNC: 3.6 G/DL (ref 3.5–5.2)
ALP BLD-CCNC: 68 U/L (ref 35–104)
ALT SERPL-CCNC: 16 U/L (ref 0–32)
ANION GAP SERPL CALCULATED.3IONS-SCNC: 11 MMOL/L (ref 7–16)
AST SERPL-CCNC: 20 U/L (ref 0–31)
BASOPHILS ABSOLUTE: 0.09 E9/L (ref 0–0.2)
BASOPHILS RELATIVE PERCENT: 1 % (ref 0–2)
BILIRUB SERPL-MCNC: 0.4 MG/DL (ref 0–1.2)
BUN BLDV-MCNC: 12 MG/DL (ref 6–23)
CALCIUM SERPL-MCNC: 8.7 MG/DL (ref 8.6–10.2)
CHLORIDE BLD-SCNC: 99 MMOL/L (ref 98–107)
CO2: 25 MMOL/L (ref 22–29)
CREAT SERPL-MCNC: 0.7 MG/DL (ref 0.5–1)
EOSINOPHILS ABSOLUTE: 0.19 E9/L (ref 0.05–0.5)
EOSINOPHILS RELATIVE PERCENT: 2 % (ref 0–6)
GFR AFRICAN AMERICAN: >60
GFR NON-AFRICAN AMERICAN: >60 ML/MIN/1.73
GLUCOSE BLD-MCNC: 85 MG/DL (ref 74–99)
HCT VFR BLD CALC: 41.4 % (ref 34–48)
HEMOGLOBIN: 13.4 G/DL (ref 11.5–15.5)
IMMATURE GRANULOCYTES #: 0.04 E9/L
IMMATURE GRANULOCYTES %: 0.4 % (ref 0–5)
LYMPHOCYTES ABSOLUTE: 1.66 E9/L (ref 1.5–4)
LYMPHOCYTES RELATIVE PERCENT: 17.5 % (ref 20–42)
MCH RBC QN AUTO: 30.5 PG (ref 26–35)
MCHC RBC AUTO-ENTMCNC: 32.4 % (ref 32–34.5)
MCV RBC AUTO: 94.3 FL (ref 80–99.9)
MONOCYTES ABSOLUTE: 0.69 E9/L (ref 0.1–0.95)
MONOCYTES RELATIVE PERCENT: 7.3 % (ref 2–12)
NEUTROPHILS ABSOLUTE: 6.79 E9/L (ref 1.8–7.3)
NEUTROPHILS RELATIVE PERCENT: 71.8 % (ref 43–80)
PDW BLD-RTO: 12.9 FL (ref 11.5–15)
PLATELET # BLD: 464 E9/L (ref 130–450)
PMV BLD AUTO: 11 FL (ref 7–12)
POTASSIUM SERPL-SCNC: 4.2 MMOL/L (ref 3.5–5)
RBC # BLD: 4.39 E12/L (ref 3.5–5.5)
SODIUM BLD-SCNC: 135 MMOL/L (ref 132–146)
TOTAL PROTEIN: 6.6 G/DL (ref 6.4–8.3)
WBC # BLD: 9.5 E9/L (ref 4.5–11.5)

## 2021-12-27 PROCEDURE — 80053 COMPREHEN METABOLIC PANEL: CPT

## 2021-12-27 PROCEDURE — 6370000000 HC RX 637 (ALT 250 FOR IP): Performed by: INTERNAL MEDICINE

## 2021-12-27 PROCEDURE — 6370000000 HC RX 637 (ALT 250 FOR IP)

## 2021-12-27 PROCEDURE — 85025 COMPLETE CBC W/AUTO DIFF WBC: CPT

## 2021-12-27 PROCEDURE — 2580000003 HC RX 258: Performed by: INTERNAL MEDICINE

## 2021-12-27 PROCEDURE — 36415 COLL VENOUS BLD VENIPUNCTURE: CPT

## 2021-12-27 RX ORDER — METOPROLOL SUCCINATE 100 MG/1
100 TABLET, EXTENDED RELEASE ORAL 2 TIMES DAILY
Qty: 30 TABLET | Refills: 3 | Status: SHIPPED | OUTPATIENT
Start: 2021-12-27 | End: 2022-01-04 | Stop reason: SDUPTHER

## 2021-12-27 RX ORDER — DONEPEZIL HYDROCHLORIDE 5 MG/1
TABLET, FILM COATED ORAL
Status: COMPLETED
Start: 2021-12-27 | End: 2021-12-27

## 2021-12-27 RX ADMIN — LEVOTHYROXINE SODIUM 25 MCG: 25 TABLET ORAL at 05:25

## 2021-12-27 RX ADMIN — SERTRALINE 100 MG: 50 TABLET, FILM COATED ORAL at 09:36

## 2021-12-27 RX ADMIN — DONEPEZIL HYDROCHLORIDE 10 MG: 5 TABLET, FILM COATED ORAL at 09:37

## 2021-12-27 RX ADMIN — METOPROLOL SUCCINATE 100 MG: 50 TABLET, EXTENDED RELEASE ORAL at 09:37

## 2021-12-27 RX ADMIN — DONEPEZIL HYDROCHLORIDE 10 MG: 10 TABLET, FILM COATED ORAL at 09:37

## 2021-12-27 RX ADMIN — Medication 10 ML: at 09:38

## 2021-12-27 RX ADMIN — APIXABAN 5 MG: 5 TABLET, FILM COATED ORAL at 09:00

## 2021-12-27 ASSESSMENT — PAIN SCALES - GENERAL: PAINLEVEL_OUTOF10: 0

## 2021-12-27 NOTE — PROGRESS NOTES
CLINICAL PHARMACY NOTE: MEDS TO BEDS    Total # of Prescriptions Filled: 1   The following medications were delivered to the patient:  · Metoprolol er succinate 100 mg       Additional Documentation:

## 2021-12-27 NOTE — DISCHARGE SUMMARY
Internal Medicine Progress Note     [facility]     Tyrel Emmanuelle. Dash Barragan, MONTRELLCJinnyOIZAIAH Rabago D.O., KIRILL Soto, MSN, APRN, NP-C  Jeneen Schilder. Kirstin Mota, MSN, APRN-CNP       Internal Medicine  Discharge Summary    NAME: Samy Tidwell  :  1945  MRN:  [id number]  PCP:James Lee DO  ADMITTED: 2021      DISCHARGED: 21    ADMITTING PHYSICIAN: Shawn Goff DO    CONSULTANT(S):   IP CONSULT TO CARDIOLOGY     ADMITTING DIAGNOSIS:   Atrial fibrillation with RVR ([facility]) [I48.91]     DISCHARGE DIAGNOSES:   · Chronic persistent atrial fibrillation rapid ventricular response, on chronic Eliquis  · Chest pain possibly secondary to demand ischemia with negative ischemic evaluation  · Hyperlipidemia on statin agent  · Macular degeneration  · Moderate to severe mitral regurgitation  · History of tobacco abuse  · History of TIAs  · Gastroesophageal reflux disease      BRIEF HISTORY OF PRESENT ILLNESS:   Patient is 64-year-old female presented due to chest pain. Patient states that she had not been feeling well for the last few days. She has admits to generalized body aches and pain. Patient experience chest discomfort. States that she started with Covid. Associated that evaluated as an outpatient. However she was found to be in A. fib RVR. States she was sent over to the ED for further evaluation management. Patient states she has no new rate control medication patient has noticed on episodes of a racing heartbeat/palpitations in the past.  She is diagnosed to atrial fibrillation and is on Eliquis. Otherwise she states she has not changed her left cell significantly. She does admit to caffeine intake which is usual for her.     LABS[de-identified]  Lab Results   Component Value Date    WBC 11.0 2021    HGB 12.9 2021    HCT 40.1 2021     2021     2021    K 4.6 2021  12/26/2021    CREATININE 0.7 12/26/2021    BUN 9 12/26/2021    CO2 24 12/26/2021    GLUCOSE 90 12/26/2021    ALT 15 12/26/2021    AST 26 12/26/2021    INR 1.5 12/24/2021     Lab Results   Component Value Date    INR 1.5 12/24/2021    PROTIME 17.3 (H) 12/24/2021      Lab Results   Component Value Date    TSH 2.230 12/23/2021     Lab Results   Component Value Date    TRIG 98 11/03/2021    TRIG 104 08/04/2021    TRIG 111 11/09/2020     Lab Results   Component Value Date    HDL 56 11/03/2021    HDL 54 08/04/2021    HDL 56 11/09/2020     Lab Results   Component Value Date    LDLCALC 106 (H) 11/03/2021    LDLCALC 160 (H) 08/04/2021    LDLCALC 85 11/09/2020     Lab Results   Component Value Date    LABA1C 5.5 11/03/2021       IMAGING:  XR CHEST 1 VIEW    Result Date: 12/23/2021  EXAMINATION: ONE XRAY VIEW OF THE CHEST 12/23/2021 4:35 pm COMPARISON: None. HISTORY: ORDERING SYSTEM PROVIDED HISTORY: a. fib TECHNOLOGIST PROVIDED HISTORY: Reason for exam:->a. fib FINDINGS: Heart and pulmonary vascularity are normal.  Coarsened pulmonary markings and lower lungs may represent acute or chronic disease. These areas have not been imaged previously. Neither costophrenic angle is blunted. Coarsened markings at the bases which may relate to either acute or chronic disease. See above. NM Cardiac Stress Test Nuclear Imaging    Result Date: 12/26/2021  Indication:  Chest Pain. Clinical History:   Patient has no known historyof coronary artery disease. IMAGING: Myocardial perfusion imaging was performed at rest 30-35 minutes following the intravenous injection of 10.2 mCi of (Tc-Sestamibi) followed by 10 ml of Normal Saline. At peak exercise, the patient was injected intravenously with 30. 9mCi of (Tc-Sestamibi) followed by 10 ml of Normal Saline. Gated post-stress tomographic imaging was performed 20-25 minutes after stress. FINDINGS: The overall quality of the study was excellent.  Left ventricular cavity size was palpation    Extremities:  Peripheral pulses present. No peripheral edema. No ulcers. Neurologic:  Alert x 3. No focal deficit. Cranial nerves grossly intact. Skin:  No petechia. No hemorrhage. No wounds. DISPOSITION:  The patient's condition is good. At this time the patient is without objective evidence of an acute process requiring continuing hospitalization or inpatient management. They are stable for discharge with outpatient follow-up. I have spoken with the patient and discussed the results of the current hospitalization, in addition to providing specific details for the plan of care and counseling regarding the diagnosis and prognosis. The plan has been discussed in detail and they are aware of the specific conditions for emergent return, as well as the importance of follow-up. Their questions are answered at this time and they are agreeable with the plan for discharge to home    DISCHARGE MEDICATIONS:   Current Discharge Medication List           Details   metoprolol succinate (TOPROL XL) 100 MG extended release tablet Take 1 tablet by mouth 2 times daily  Qty: 30 tablet, Refills: 3              Details   donepezil (ARICEPT) 10 MG tablet Take 1 tablet by mouth daily  Qty: 90 tablet, Refills: 1    Associated Diagnoses: Memory loss; Vascular dementia without behavioral disturbance (HCC)      sertraline (ZOLOFT) 100 MG tablet TAKE 1 TABLET BY MOUTH  DAILY  Qty: 90 tablet, Refills: 3    Comments: Requesting 1 year supply  Associated Diagnoses: Dysthymia      Magnesium 100 MG CAPS Take 200 mg by mouth nightly  Qty: 30 capsule, Refills: 5    Associated Diagnoses: Nocturnal foot cramps      ammonium lactate (LAC-HYDRIN) 12 % lotion Apply topically daily. 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

## 2021-12-27 NOTE — PROGRESS NOTES
This writer reviewed and discussed discharged paperwork, document signed per policy,there were no voiced concerns. Right ac line d/c'd w/o complication. Family accompanied Byard at bedside during discharge process. No voiced concerns.  Joi thanked staff for services very pleasant mood

## 2021-12-27 NOTE — TELEPHONE ENCOUNTER
----- Message from Sri Dickey sent at 12/27/2021  1:45 PM EST -----  Subject: Appointment Request    Reason for Call: Urgent (Patient Request) Hospital Follow Up    QUESTIONS  Type of Appointment? Established Patient  Reason for appointment request? Available appointments did not meet   patient need  Additional Information for Provider? Pt. was admitted to the hospital for   afib on 12/23/2021 and was released 12/27/2021 and needs a follow up appt. asap  ---------------------------------------------------------------------------  --------------  CALL BACK INFO  What is the best way for the office to contact you? OK to leave message on   voicemail  Preferred Call Back Phone Number? [phone]  ---------------------------------------------------------------------------  --------------  SCRIPT ANSWERS  Relationship to Patient? Other  Representative Name? James Leo  Additional information verified (besides Name and Date of Birth)? Address  (Patient requests to see provider urgently. )? Yes  (Has the patient been discharged from the hospital within 2 business days   AND does not have a Telephone Encounter - Follow Up From Hospital   documented in [facility]?)? No  Do you have any questions for your primary care provider that need to be   answered prior to your appointment? (Use RN Triage if question pertains to   anything on the red flag list)? No  (Patient needs follow up visit after hospital discharge) Book first   available appointment within 7 days OF DISCHARGE, if no appt, proceed to   book the next available time slot within 14 days OF DISCHARGE AND Send   Message to Provider. [address] [facility] [place] Follow Up appointment cannot be booked   beyond 14 Days and should result in a Message to Provider. ? Yes   Have you been diagnosed with, awaiting test results for, or told that you   are suspected of having COVID-19 (Coronavirus)?  (If patient has tested   negative or was tested as a requirement for work, school, or travel and not based on symptoms, answer no)? No  Within the past two weeks have you developed any of the following symptoms   (answer no if symptoms have been present longer than 2 weeks or began   more than 2 weeks ago)? Fever or Chills, Cough, Shortness of breath or   difficulty breathing, Loss of taste or smell, Sore throat, Nasal   congestion, Sneezing or runny nose, Fatigue or generalized body aches   (answer no if pain is specific to a body part e.g. back pain), Diarrhea,   Headache? No  Have you had close contact with someone with COVID-19 in the last 14 days? No  (Service Expert - click yes below to proceed with [employer] Usual   Scheduling)?  Yes

## 2021-12-28 ENCOUNTER — CARE COORDINATION (OUTPATIENT)
Dept: CASE MANAGEMENT | Age: 76
End: 2021-12-28

## 2021-12-28 ASSESSMENT — ENCOUNTER SYMPTOMS
TROUBLE SWALLOWING: 0
SHORTNESS OF BREATH: 0
COUGH: 0
BACK PAIN: 0
NAUSEA: 0
ABDOMINAL PAIN: 0
WHEEZING: 0
SINUS PAIN: 0
RHINORRHEA: 0
SORE THROAT: 0
DIARRHEA: 0
EYE PAIN: 0
VOMITING: 0

## 2021-12-28 NOTE — CARE COORDINATION
Riki [facility] Transitions Initial Follow Up Call    Call within 2 business days of discharge: Yes    Patient: Nikita Nolan Patient : 1945   MRN: [id number]  Reason for Admission: AFib with RVR  Discharge Date: 21 RARS: Readmission Risk Score: 9.5 ( )      Last Discharge [address] [address]       Complaint Diagnosis Description Type Department Provider    21 Atrial Fibrillation Atrial fibrillation with RVR [facility]) ED to Hosp-Admission (Discharged) (ADMITTED) [facility], DO; Husam. .. Attempted to contact patient today 21 for TCM/hospital discharge follow up. Left message on home/mobile number requesting a return call back to CTN and provided contact information. Noted patient had non-qualifying BPCI-A DRG (310).      Angélica Awan, APRN

## 2021-12-29 ENCOUNTER — CARE COORDINATION (OUTPATIENT)
Dept: CASE MANAGEMENT | Age: 76
End: 2021-12-29

## 2021-12-29 LAB
BLOOD CULTURE, ROUTINE: NORMAL
CULTURE, BLOOD 2: NORMAL

## 2021-12-29 NOTE — CARE COORDINATION
Riki [facility] Transitions Initial Follow Up Call    Call within 2 business days of discharge: Yes    Patient: Samy Tidwell Patient : 1945   MRN: [id number]  Reason for Admission: Afib with RVR   Discharge Date: 21 RARS: Readmission Risk Score: 9.5 ( )      Last Discharge [facility]       Complaint Diagnosis Description Type Department Provider    21 Atrial Fibrillation Atrial fibrillation with RVR [facility]) ED to Hosp-Admission (Discharged) (ADMITTED) [facility], DO; Rayshawn Vásquezh. .. Second attempt made today 21 to contact patient for TCM/hospital discharge follow up. Left message requesting a return call back to CTN and provided contact information. CTN signing off if no return call.      ELIO Ledezma

## 2022-01-04 ENCOUNTER — OFFICE VISIT (OUTPATIENT)
Dept: FAMILY MEDICINE CLINIC | Age: 77
End: 2022-01-04
Payer: MEDICARE

## 2022-01-04 VITALS
HEART RATE: 98 BPM | DIASTOLIC BLOOD PRESSURE: 80 MMHG | BODY MASS INDEX: 23.22 KG/M2 | HEIGHT: 61 IN | RESPIRATION RATE: 18 BRPM | SYSTOLIC BLOOD PRESSURE: 120 MMHG | WEIGHT: 123 LBS

## 2022-01-04 DIAGNOSIS — I48.11 LONGSTANDING PERSISTENT ATRIAL FIBRILLATION (HCC): ICD-10-CM

## 2022-01-04 DIAGNOSIS — D68.59 THROMBOPHILIA (HCC): ICD-10-CM

## 2022-01-04 DIAGNOSIS — R06.02 SOB (SHORTNESS OF BREATH): ICD-10-CM

## 2022-01-04 DIAGNOSIS — F01.50 VASCULAR DEMENTIA WITHOUT BEHAVIORAL DISTURBANCE (HCC): ICD-10-CM

## 2022-01-04 DIAGNOSIS — I48.91 ATRIAL FIBRILLATION WITH RAPID VENTRICULAR RESPONSE (HCC): ICD-10-CM

## 2022-01-04 DIAGNOSIS — I72.9 ANEURYSM (HCC): Primary | ICD-10-CM

## 2022-01-04 PROCEDURE — 99495 TRANSJ CARE MGMT MOD F2F 14D: CPT | Performed by: FAMILY MEDICINE

## 2022-01-04 PROCEDURE — 1111F DSCHRG MED/CURRENT MED MERGE: CPT | Performed by: FAMILY MEDICINE

## 2022-01-04 RX ORDER — METOPROLOL SUCCINATE 100 MG/1
100 TABLET, EXTENDED RELEASE ORAL 2 TIMES DAILY
Qty: 180 TABLET | Refills: 5 | Status: ON HOLD
Start: 2022-01-04 | End: 2022-01-31 | Stop reason: HOSPADM

## 2022-01-05 RX ORDER — POTASSIUM CHLORIDE 750 MG/1
10 TABLET, FILM COATED, EXTENDED RELEASE ORAL DAILY
Qty: 30 TABLET | Refills: 0 | Status: SHIPPED
Start: 2022-01-05 | End: 2022-01-25 | Stop reason: SDUPTHER

## 2022-01-05 RX ORDER — FUROSEMIDE 20 MG/1
20 TABLET ORAL DAILY
Qty: 30 TABLET | Refills: 0 | Status: SHIPPED
Start: 2022-01-05 | End: 2022-01-25

## 2022-01-05 NOTE — PROGRESS NOTES
Post-Discharge Transitional Care Management Services or Hospital Follow Up      Shira Estimable   YOB: 1945    Date of Office Visit:  1/4/2022  Date of Hospital Admission: 12/23/21  Date of Hospital Discharge: 12/27/21  Risk of hospital readmission (high >=14%. Medium >=10%) :Readmission Risk Score: 9.5 ( )      Care management risk score Rising risk (score 2-5) and Complex Care (Scores >=6): 0     Non face to face  following discharge, date last encounter closed (first attempt may have been earlier): 12/29/2021  2:21 PM    Call initiated 2 business days of discharge: Yes    Patient Active Problem List   Diagnosis    Pelvic prolapse    Hyperlipidemia    GERD (gastroesophageal reflux disease)    PUD (peptic ulcer disease)    Urinary incontinence    Osteoarthritis    Palpitations    Precordial pain    Thrombophilia ([address]    Alzheimer's disease, unspecified    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 If you have any questions, ask your nurse or doctor. CONTINUE taking these medications    ammonium lactate 12 % lotion  Commonly known as: Lac-Hydrin  Apply topically daily.      apixaban 5 MG Tabs tablet  Commonly known as: Eliquis  Take 1 tablet by mouth 2 times daily     atorvastatin 20 MG tablet  Commonly known as: LIPITOR  TAKE ONE TABLET BY MOUTH EVERY EVENING     diclofenac sodium 1 % Gel  Commonly known as: VOLTAREN  Apply 4 g topically 4 times daily     donepezil 10 MG tablet  Commonly known as: ARICEPT  Take 1 tablet by mouth daily     folic acid 1 MG tablet  Commonly known as: FOLVITE  Take 1 tablet by mouth daily     levothyroxine 25 MCG tablet  Commonly known as: SYNTHROID  Take 1 tablet by mouth Daily     Magnesium 100 MG Caps  Take 200 mg by mouth nightly     metoprolol succinate 100 MG extended release tablet  Commonly known as: TOPROL XL  Take 1 tablet by mouth 2 times daily     sertraline 100 MG tablet  Commonly known as: ZOLOFT  TAKE 1 TABLET BY MOUTH  DAILY     therapeutic multivitamin-minerals tablet           Where to Get Your Medications      These medications were sent to [address] [facility], [address], [address]  [address] [facility], [address] [facility] [address], [facility] [address]    Phone: [phone]   · metoprolol succinate 100 MG extended release tablet           Medications marked \"taking\" at this time  Outpatient Medications Marked as Taking for the 1/4/22 encounter (Office Visit) with Columba Burton, DO   Medication Sig Dispense Refill    metoprolol succinate (TOPROL XL) 100 MG extended release tablet Take 1 tablet by mouth 2 times daily 180 tablet 5    donepezil (ARICEPT) 10 MG tablet Take 1 tablet by mouth daily 90 tablet 1    sertraline (ZOLOFT) 100 MG tablet TAKE 1 TABLET BY MOUTH  DAILY 90 tablet 3    Magnesium 100 MG CAPS Take 200 mg by mouth nightly 30 capsule 5    ammonium lactate (LAC-HYDRIN) 12 % lotion Apply topically daily. 222 mL 5    apixaban (ELIQUIS) 5 MG TABS tablet Take 1 tablet by mouth 2 times daily 317 tablet 5    folic acid (FOLVITE) 1 MG tablet Take 1 tablet by mouth daily 30 tablet 5    levothyroxine (SYNTHROID) 25 MCG tablet Take 1 tablet by mouth Daily 90 tablet 5    atorvastatin (LIPITOR) 20 MG tablet TAKE ONE TABLET BY MOUTH EVERY EVENING 90 tablet 5    diclofenac sodium 1 % GEL Apply 4 g topically 4 times daily 3 Tube 5    therapeutic multivitamin-minerals (THERAGRAN-M) tablet Take 1 tablet by mouth daily.           Medications patient taking as of now reconciled against medications ordered at time of hospital discharge: Yes    Chief Complaint   Patient presents with    Follow-Up from Hospital       History of Present illness - Follow up of Hospital diagnosis(es): atrial fibrillation with RVR    Inpatient course: Discharge summary reviewed- see chart. Interval history/Current status: improved    A comprehensive review of systems was negative except for what was noted in the HPI. Vitals:    01/04/22 1104   BP: 120/80   Site: Left Upper Arm   Position: Sitting   Pulse: 98   Resp: 18   Weight: 123 lb (55.8 kg)   Height: 5' 1\" (1.549 m)     Body mass index is 23.24 kg/m². Wt Readings from Last 3 Encounters:   01/04/22 123 lb (55.8 kg)   12/24/21 120 lb 9.5 oz (54.7 kg)   12/23/21 113 lb (51.3 kg)     BP Readings from Last 3 Encounters:   01/04/22 120/80   12/27/21 (!) 140/75   12/23/21 135/76        Physical Exam:  General Appearance: alert and oriented to person, place and time, well developed and well- nourished, in no acute distress  Skin: warm and dry, no rash or erythema  Head: normocephalic and atraumatic  Eyes: pupils equal, round, and reactive to light, extraocular eye movements intact, conjunctivae normal  ENT: tympanic membrane, external ear and ear canal normal bilaterally, nose without deformity, nasal mucosa and turbinates normal without polyps  Neck: supple and non-tender without mass, no thyromegaly or thyroid nodules, no cervical lymphadenopathy  Pulmonary/Chest: clear to auscultation bilaterally- no wheezes, rales or rhonchi, normal air movement, no respiratory distress  Cardiovascular: normal rate, regular rhythm, normal S1 and S2, no murmurs, rubs, clicks, or gallops, distal pulses intact, no carotid bruits  Abdomen: soft, non-tender, non-distended, normal bowel sounds, no masses or organomegaly  Extremities: no cyanosis, clubbing or edema  Musculoskeletal: normal range of motion, no joint swelling, deformity or tenderness  Neurologic: reflexes normal and symmetric, no cranial nerve deficit, gait, coordination and speech normal    Assessment/Plan:  1.  Aneurysm ([address]      2. Vascular dementia without behavioral disturbance ([address]      3. Longstanding persistent atrial fibrillation ([address]      4. Thrombophilia ([address]      5. Atrial fibrillation with rapid ventricular response [facility])    - Amina Andrews MD, Cardiology, [address] CURRENT OUTPATIENT MED LIST    6. SOB (shortness of breath)    - XR CHEST STANDARD (2 VW); Future  - MD DISCHARGE MEDS RECONCILED W/ CURRENT OUTPATIENT MED LIST      If symptoms worsen or change, she is to have low threshold for going to ER.    Medical Decision Making: moderate complexity

## 2022-01-06 ENCOUNTER — OFFICE VISIT (OUTPATIENT)
Dept: CARDIOLOGY CLINIC | Age: 77
End: 2022-01-06
Payer: MEDICARE

## 2022-01-06 VITALS
WEIGHT: 124 LBS | HEART RATE: 60 BPM | RESPIRATION RATE: 16 BRPM | SYSTOLIC BLOOD PRESSURE: 108 MMHG | HEIGHT: 61 IN | BODY MASS INDEX: 23.41 KG/M2 | DIASTOLIC BLOOD PRESSURE: 60 MMHG

## 2022-01-06 DIAGNOSIS — I34.0 NONRHEUMATIC MITRAL VALVE REGURGITATION: Primary | ICD-10-CM

## 2022-01-06 DIAGNOSIS — I48.0 PAROXYSMAL ATRIAL FIBRILLATION (HCC): ICD-10-CM

## 2022-01-06 DIAGNOSIS — E78.5 DYSLIPIDEMIA: ICD-10-CM

## 2022-01-06 DIAGNOSIS — I51.3 LEFT ATRIAL THROMBUS: ICD-10-CM

## 2022-01-06 DIAGNOSIS — Z86.73 HISTORY OF TIA (TRANSIENT ISCHEMIC ATTACK): ICD-10-CM

## 2022-01-06 PROCEDURE — 93000 ELECTROCARDIOGRAM COMPLETE: CPT | Performed by: INTERNAL MEDICINE

## 2022-01-06 PROCEDURE — G8399 PT W/DXA RESULTS DOCUMENT: HCPCS | Performed by: INTERNAL MEDICINE

## 2022-01-06 PROCEDURE — 1090F PRES/ABSN URINE INCON ASSESS: CPT | Performed by: INTERNAL MEDICINE

## 2022-01-06 PROCEDURE — G8427 DOCREV CUR MEDS BY ELIG CLIN: HCPCS | Performed by: INTERNAL MEDICINE

## 2022-01-06 PROCEDURE — 1123F ACP DISCUSS/DSCN MKR DOCD: CPT | Performed by: INTERNAL MEDICINE

## 2022-01-06 PROCEDURE — G8484 FLU IMMUNIZE NO ADMIN: HCPCS | Performed by: INTERNAL MEDICINE

## 2022-01-06 PROCEDURE — G8420 CALC BMI NORM PARAMETERS: HCPCS | Performed by: INTERNAL MEDICINE

## 2022-01-06 PROCEDURE — 1036F TOBACCO NON-USER: CPT | Performed by: INTERNAL MEDICINE

## 2022-01-06 PROCEDURE — 1111F DSCHRG MED/CURRENT MED MERGE: CPT | Performed by: INTERNAL MEDICINE

## 2022-01-06 PROCEDURE — 4040F PNEUMOC VAC/ADMIN/RCVD: CPT | Performed by: INTERNAL MEDICINE

## 2022-01-06 PROCEDURE — 99214 OFFICE O/P EST MOD 30 MIN: CPT | Performed by: INTERNAL MEDICINE

## 2022-01-06 RX ORDER — DONEPEZIL HYDROCHLORIDE 5 MG/1
5 TABLET, FILM COATED ORAL 2 TIMES DAILY
COMMUNITY
End: 2022-01-27 | Stop reason: ALTCHOICE

## 2022-01-06 RX ORDER — FOLIC ACID 0.8 MG
TABLET ORAL DAILY
COMMUNITY
End: 2022-01-27 | Stop reason: DRUGHIGH

## 2022-01-06 NOTE — PROGRESS NOTES
OFFICE VISIT     PRIMARY CARE PHYSICIAN:      Tyson Carey DO       ALLERGIES / SENSITIVITIES:      No Known Allergies       REVIEWED MEDICATIONS:        Current Outpatient Medications:     Inulin (FIBER CHOICE PO), Take by mouth daily, Disp: , Rfl:     donepezil (ARICEPT) 5 MG tablet, Take 5 mg by mouth 2 times daily, Disp: , Rfl:     Magnesium 500 MG CAPS, Take by mouth daily, Disp: , Rfl:     furosemide (LASIX) 20 MG tablet, Take 1 tablet by mouth daily, Disp: 30 tablet, Rfl: 0    potassium chloride (KLOR-CON) 10 MEQ extended release tablet, Take 1 tablet by mouth daily, Disp: 30 tablet, Rfl: 0    metoprolol succinate (TOPROL XL) 100 MG extended release tablet, Take 1 tablet by mouth 2 times daily, Disp: 180 tablet, Rfl: 5    sertraline (ZOLOFT) 100 MG tablet, TAKE 1 TABLET BY MOUTH  DAILY, Disp: 90 tablet, Rfl: 3    apixaban (ELIQUIS) 5 MG TABS tablet, Take 1 tablet by mouth 2 times daily, Disp: 180 tablet, Rfl: 5    levothyroxine (SYNTHROID) 25 MCG tablet, Take 1 tablet by mouth Daily, Disp: 90 tablet, Rfl: 5    atorvastatin (LIPITOR) 20 MG tablet, TAKE ONE TABLET BY MOUTH EVERY EVENING, Disp: 90 tablet, Rfl: 5    diclofenac sodium 1 % GEL, Apply 4 g topically 4 times daily, Disp: 3 Tube, Rfl: 5    therapeutic multivitamin-minerals (THERAGRAN-M) tablet, Take 1 tablet by mouth daily. , Disp: , Rfl:     donepezil (ARICEPT) 10 MG tablet, Take 1 tablet by mouth daily (Patient not taking: Reported on 1/6/2022), Disp: 90 tablet, Rfl: 1    Magnesium 100 MG CAPS, Take 200 mg by mouth nightly (Patient not taking: Reported on 1/6/2022), Disp: 30 capsule, Rfl: 5    ammonium lactate (LAC-HYDRIN) 12 % lotion, Apply topically daily.  (Patient not taking: Reported on 1/6/2022), Disp: 222 mL, Rfl: 5    folic acid (FOLVITE) 1 MG tablet, Take 1 tablet by mouth daily (Patient not taking: Reported on 1/6/2022), Disp: 30 tablet, Rfl: 5      S: REASON FOR VISIT:       Chief Complaint   Patient presents with   [facility] Follow-up    Atrial Fibrillation          History of Present Illness:       Office Visit for follow up of VHD, A Fib, MARTA thrombus, Post hospital f/u visit   64 yr with Hx of VHD, HTN, MARTA thrombus,TIA came for f/u visit   Admitted for CP in Dec. 2021, Dx with NEW A Fib with RVR, seen by Dr Stanislav Jolly   Had CxR that showed small pleural effusion - Started on Lasix and Kcl   Home BP and HR reviewed, no bradycardia   Patient is compliant with all medications   Faizan any exertional chest pain or short of breath   No palpitations, dizzy or syncope. Active at home   No orthopnea   Try to watch diet          Past Medical History:   Diagnosis Date    Arthritis     Asymptomatic PVCs 2012    pt felt flutter, no other sx, holter + pvc's, few runs of SVT   [place]    History of Holter monitoring     Hyperlipidemia     Macular degeneration     Nausea & vomiting     Pelvic prolapse     PONV (postoperative nausea and vomiting)     TIA (transient ischemic attack)             Past Surgical History:   Procedure Laterality Date    COLONOSCOPY      ENDOSCOPY, COLON, DIAGNOSTIC      EYE SURGERY      macular hole    HERNIA REPAIR      inguinal    HYSTERECTOMY      OTHER SURGICAL HISTORY  2013    ant elevatetransobturator sling rectocele and cystocele enterocele    SKIN BIOPSY      arms    TRANSESOPHAGEAL ECHOCARDIOGRAM N/A 10/20/2020    TRANSESOPHAGEAL ECHOCARDIOGRAM WITH BUBBLE STUDY performed by Rasta Palacios MD at [address] reviewed. No pertinent family history.        Social History     Tobacco Use    Smoking status: Former Smoker     Packs/day: 0.50     Quit date: 1994     Years since quittin.5    Smokeless tobacco: Never Used   Substance Use Topics    Alcohol use: No     Comment: Coffee 1 cup a day          Review of Systems:  Constitutional: negative for fever and chills, or significant weight loss  HEENT: negative for acute visual symptoms or auditory problems, no dysphagia  Respiratory: negative for cough, wheezing, or hemoptysis  Cardiovascular: negative for chest pain, palpitations, and dyspnea  Gastrointestinal: negative for abdominal pain, diarrhea, nausea and vomiting  Endocrine: Negative for polyuria and polydyspsia  Genitourinary:negative for dysuria and hematuria  Derm: negative for rash and skin lesion(s)  Neurological: negative for tingling, numbness, weakness, seizures and tremors  Endocrine: negative for polydipsia and polyuria  Musculoskeletal: negative for pain or tenderness  Psychiatric: negative for anxiety, depression, or suicidal ideations         O:  COMPLETE PHYSICAL EXAM:       /60   Pulse 60   Resp 16   Ht 5' 1\" (1.549 m)   Wt 124 lb (56.2 kg)   BMI 23.43 kg/m²       General:   Patient alert, comfortable, no distress. Appears stated age. HEENT:    Pupils equal, no icterus, no nasal drainage, tongue moist.   Neck:              No masses, Thyroid not palpable. No elevated JVD, No carotid bruit. Chest:   Normal configuration, non tender. Lungs:   Clear to auscultation bilaterally, few scattered rhonchi. Slightly diminished at bases   Cardiovascular:  Regular rhythm, 2/6 systolic murmur, No S3, no palpable thrills,    Abdomen:  Soft, Non tender, Bowel sounds normal, no pulsatile abdominal aorta, no palpable masses. Extremities:  Trace to 1+ edema. Distal pulses palpable. No cyanosis, no clubbing. Skin:   Good turgor, warm and dry, no cyanosis. Musculoskeletal: No joint swelling or deformity. Neuro:   Cranial nerves grossly intact; No focal neurologic deficit. Psych:   Alert, good mood and effect.      REVIEW OF DIAGNOSTIC TESTS:        Electrocardiogram:  Reviewed   Labs - 12/27/21 - Cr 0.7    CxR - 1/4/22 - Small pleural normal  Stress test 12/23/2021  Impression       The myocardial perfusion imaging was normal.       Overall left ventricular systolic function was normal without regional   wall motion abnormalities.       Low risk study. Event - 10/8/21 -11/6/2021- PVCs and PACs     MARYLOU 10/20/21   Summary   Large irregular mobile echogenic mass noted in the left atrial appendage   and left atrium near mouth of MARTA suggestive of thrombus. Moderate to severe central jet of mitral regurgitation - ERO 0.3cm2, PISA   0.6cm, RV 76cc. Normal left ventricular chamber size. Normal left ventricular systolic function. Left atrium is enlarged. Interatrial septum intact. Agitated saline injection showed no right to left shunt. Normal right ventricle size and function. No mitral valve prolapse. There is trace aortic regurgitation. There is mild tricuspid regurgitation. Normal aortic root size. No evidence of pericardial effusion. Pericardium appears normal.   No comparison study available in the local [facility]. A/P:   ASSESSMENT / PLAN:    [place] was seen today for follow-up and atrial fibrillation. Diagnoses and all orders for this visit:    Nonrheumatic mitral valve regurgitation - Moderate to severe; Repeat Echo after next visit or sooner if symptomatic - Low dose ACEi if BP/renal Fn tolerate. Paroxysmal atrial fibrillation (HCC) - Dx 12/23/2021 - High [id number] VASc sccore  - On Eliquis for [address]. Decrease Eliquis to 2.5mg TWICE daily if her Creatinine >1.5    Small Pleural Effusion    Poss. 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 Monitor BP and heart rates. Above recommendations discussed with her and her daughter   All questions answered about cardiac diagnoses and cardiac medications. Continue current medications.                 Compliance with medications and f/u with all physicians discussed. Risk factor modification based on risk profile discussed. Call if any exertional chest pain, short of breath, dizzy or palpitations   Follow up in 6 months or earlier if needed.          [facility]  [address], [place], [address] [facility]  [phone]

## 2022-01-06 NOTE — PATIENT INSTRUCTIONS
Call for heart racing or short of breath  Take Extra Toprol 1/2 tab for rapid heart rates >120  Monitor BP, Wts, and HR.   Decrease Eliquis to 2.5mg TWICE daily if her Creatinine >1.5

## 2022-01-25 ENCOUNTER — OFFICE VISIT (OUTPATIENT)
Dept: FAMILY MEDICINE CLINIC | Age: 77
End: 2022-01-25
Payer: MEDICARE

## 2022-01-25 VITALS — HEART RATE: 56 BPM | SYSTOLIC BLOOD PRESSURE: 124 MMHG | DIASTOLIC BLOOD PRESSURE: 80 MMHG | RESPIRATION RATE: 18 BRPM

## 2022-01-25 DIAGNOSIS — R06.01 ORTHOPNEA: ICD-10-CM

## 2022-01-25 DIAGNOSIS — R63.0 LOSS OF APPETITE: ICD-10-CM

## 2022-01-25 DIAGNOSIS — R11.0 NAUSEA: ICD-10-CM

## 2022-01-25 DIAGNOSIS — R09.89 SUSPECTED CHF (CONGESTIVE HEART FAILURE): ICD-10-CM

## 2022-01-25 DIAGNOSIS — I50.9 CONGESTIVE HEART FAILURE, UNSPECIFIED HF CHRONICITY, UNSPECIFIED HEART FAILURE TYPE (HCC): ICD-10-CM

## 2022-01-25 DIAGNOSIS — R06.02 SOB (SHORTNESS OF BREATH): Primary | ICD-10-CM

## 2022-01-25 DIAGNOSIS — K21.9 GASTROESOPHAGEAL REFLUX DISEASE WITHOUT ESOPHAGITIS: ICD-10-CM

## 2022-01-25 DIAGNOSIS — K59.1 FUNCTIONAL DIARRHEA: ICD-10-CM

## 2022-01-25 DIAGNOSIS — R05.3 CHRONIC COUGH: ICD-10-CM

## 2022-01-25 LAB
ALBUMIN SERPL-MCNC: 3.6 G/DL (ref 3.5–5.2)
ALP BLD-CCNC: 90 U/L (ref 35–104)
ALT SERPL-CCNC: 53 U/L (ref 0–32)
ANION GAP SERPL CALCULATED.3IONS-SCNC: 22 MMOL/L (ref 7–16)
AST SERPL-CCNC: 61 U/L (ref 0–31)
BASOPHILS ABSOLUTE: 0.08 E9/L (ref 0–0.2)
BASOPHILS RELATIVE PERCENT: 0.6 % (ref 0–2)
BILIRUB SERPL-MCNC: 0.6 MG/DL (ref 0–1.2)
BUN BLDV-MCNC: 32 MG/DL (ref 6–23)
CALCIUM SERPL-MCNC: 9.7 MG/DL (ref 8.6–10.2)
CHLORIDE BLD-SCNC: 93 MMOL/L (ref 98–107)
CO2: 11 MMOL/L (ref 22–29)
CREAT SERPL-MCNC: 1.5 MG/DL (ref 0.5–1)
EOSINOPHILS ABSOLUTE: 0.03 E9/L (ref 0.05–0.5)
EOSINOPHILS RELATIVE PERCENT: 0.2 % (ref 0–6)
GFR AFRICAN AMERICAN: 41
GFR NON-AFRICAN AMERICAN: 34 ML/MIN/1.73
GLUCOSE BLD-MCNC: 105 MG/DL (ref 74–99)
HCT VFR BLD CALC: 45.3 % (ref 34–48)
HEMOGLOBIN: 14 G/DL (ref 11.5–15.5)
IMMATURE GRANULOCYTES #: 0.06 E9/L
IMMATURE GRANULOCYTES %: 0.5 % (ref 0–5)
LYMPHOCYTES ABSOLUTE: 1.65 E9/L (ref 1.5–4)
LYMPHOCYTES RELATIVE PERCENT: 13.1 % (ref 20–42)
MCH RBC QN AUTO: 30 PG (ref 26–35)
MCHC RBC AUTO-ENTMCNC: 30.9 % (ref 32–34.5)
MCV RBC AUTO: 97 FL (ref 80–99.9)
MONOCYTES ABSOLUTE: 1.08 E9/L (ref 0.1–0.95)
MONOCYTES RELATIVE PERCENT: 8.6 % (ref 2–12)
NEUTROPHILS ABSOLUTE: 9.69 E9/L (ref 1.8–7.3)
NEUTROPHILS RELATIVE PERCENT: 77 % (ref 43–80)
PDW BLD-RTO: 14.9 FL (ref 11.5–15)
PLATELET # BLD: 407 E9/L (ref 130–450)
PMV BLD AUTO: 12.6 FL (ref 7–12)
POTASSIUM SERPL-SCNC: 6 MMOL/L (ref 3.5–5)
PRO-BNP: ABNORMAL PG/ML (ref 0–450)
RBC # BLD: 4.67 E12/L (ref 3.5–5.5)
SODIUM BLD-SCNC: 126 MMOL/L (ref 132–146)
TOTAL PROTEIN: 7.2 G/DL (ref 6.4–8.3)
WBC # BLD: 12.6 E9/L (ref 4.5–11.5)

## 2022-01-25 PROCEDURE — 99214 OFFICE O/P EST MOD 30 MIN: CPT | Performed by: FAMILY MEDICINE

## 2022-01-25 PROCEDURE — 1123F ACP DISCUSS/DSCN MKR DOCD: CPT | Performed by: FAMILY MEDICINE

## 2022-01-25 PROCEDURE — 1036F TOBACCO NON-USER: CPT | Performed by: FAMILY MEDICINE

## 2022-01-25 PROCEDURE — G8427 DOCREV CUR MEDS BY ELIG CLIN: HCPCS | Performed by: FAMILY MEDICINE

## 2022-01-25 PROCEDURE — 4040F PNEUMOC VAC/ADMIN/RCVD: CPT | Performed by: FAMILY MEDICINE

## 2022-01-25 PROCEDURE — 1111F DSCHRG MED/CURRENT MED MERGE: CPT | Performed by: FAMILY MEDICINE

## 2022-01-25 PROCEDURE — 1090F PRES/ABSN URINE INCON ASSESS: CPT | Performed by: FAMILY MEDICINE

## 2022-01-25 PROCEDURE — G8399 PT W/DXA RESULTS DOCUMENT: HCPCS | Performed by: FAMILY MEDICINE

## 2022-01-25 PROCEDURE — G8420 CALC BMI NORM PARAMETERS: HCPCS | Performed by: FAMILY MEDICINE

## 2022-01-25 PROCEDURE — G8484 FLU IMMUNIZE NO ADMIN: HCPCS | Performed by: FAMILY MEDICINE

## 2022-01-25 RX ORDER — DIPHENOXYLATE HYDROCHLORIDE AND ATROPINE SULFATE 2.5; .025 MG/1; MG/1
1 TABLET ORAL 4 TIMES DAILY PRN
Qty: 120 TABLET | Refills: 5 | Status: SHIPPED | OUTPATIENT
Start: 2022-01-25 | End: 2022-02-24

## 2022-01-25 RX ORDER — POTASSIUM CHLORIDE 750 MG/1
10 TABLET, FILM COATED, EXTENDED RELEASE ORAL DAILY
Qty: 30 TABLET | Refills: 5 | Status: ON HOLD
Start: 2022-01-25 | End: 2022-01-31 | Stop reason: SDUPTHER

## 2022-01-25 RX ORDER — BENZONATATE 200 MG/1
200 CAPSULE ORAL 3 TIMES DAILY PRN
Qty: 30 CAPSULE | Refills: 0 | Status: SHIPPED
Start: 2022-01-25 | End: 2022-02-02 | Stop reason: SDUPTHER

## 2022-01-25 RX ORDER — ONDANSETRON 4 MG/1
4 TABLET, ORALLY DISINTEGRATING ORAL 3 TIMES DAILY PRN
Qty: 90 TABLET | Refills: 3 | Status: SHIPPED | OUTPATIENT
Start: 2022-01-25 | End: 2022-04-25

## 2022-01-25 RX ORDER — FAMOTIDINE 40 MG/1
40 TABLET, FILM COATED ORAL EVERY EVENING
Qty: 30 TABLET | Refills: 5 | Status: SHIPPED
Start: 2022-01-25 | End: 2022-03-10 | Stop reason: SDUPTHER

## 2022-01-25 RX ORDER — BUMETANIDE 2 MG/1
2 TABLET ORAL DAILY
Qty: 30 TABLET | Refills: 5 | Status: ON HOLD
Start: 2022-01-25 | End: 2022-01-31 | Stop reason: HOSPADM

## 2022-01-26 ENCOUNTER — APPOINTMENT (OUTPATIENT)
Dept: GENERAL RADIOLOGY | Age: 77
DRG: 308 | End: 2022-01-26
Payer: MEDICARE

## 2022-01-26 ENCOUNTER — HOSPITAL ENCOUNTER (INPATIENT)
Age: 77
LOS: 5 days | Discharge: HOME HEALTH | DRG: 308 | End: 2022-01-31
Attending: STUDENT IN AN ORGANIZED HEALTH CARE EDUCATION/TRAINING PROGRAM | Admitting: INTERNAL MEDICINE
Payer: MEDICARE

## 2022-01-26 ENCOUNTER — TELEPHONE (OUTPATIENT)
Dept: OTHER | Facility: CLINIC | Age: 77
End: 2022-01-26

## 2022-01-26 ENCOUNTER — APPOINTMENT (OUTPATIENT)
Dept: CT IMAGING | Age: 77
DRG: 308 | End: 2022-01-26
Payer: MEDICARE

## 2022-01-26 DIAGNOSIS — R06.01 ORTHOPNEA: ICD-10-CM

## 2022-01-26 DIAGNOSIS — I48.91 ATRIAL FIBRILLATION WITH RVR (HCC): Primary | ICD-10-CM

## 2022-01-26 DIAGNOSIS — E78.2 MIXED HYPERLIPIDEMIA: ICD-10-CM

## 2022-01-26 DIAGNOSIS — G45.9 TIA (TRANSIENT ISCHEMIC ATTACK): ICD-10-CM

## 2022-01-26 DIAGNOSIS — R10.9 ABDOMINAL PAIN, UNSPECIFIED ABDOMINAL LOCATION: ICD-10-CM

## 2022-01-26 LAB
ACANTHOCYTES: ABNORMAL
ALBUMIN SERPL-MCNC: 4.1 G/DL (ref 3.5–5.2)
ALP BLD-CCNC: 92 U/L (ref 35–104)
ALT SERPL-CCNC: 69 U/L (ref 0–32)
ANION GAP SERPL CALCULATED.3IONS-SCNC: 21 MMOL/L (ref 7–16)
AST SERPL-CCNC: 71 U/L (ref 0–31)
BACTERIA: ABNORMAL /HPF
BASOPHILS ABSOLUTE: 0 E9/L (ref 0–0.2)
BASOPHILS RELATIVE PERCENT: 0.3 % (ref 0–2)
BILIRUB SERPL-MCNC: 0.9 MG/DL (ref 0–1.2)
BILIRUBIN URINE: ABNORMAL
BLOOD, URINE: NEGATIVE
BUN BLDV-MCNC: 42 MG/DL (ref 6–23)
BURR CELLS: ABNORMAL
CALCIUM SERPL-MCNC: 9.5 MG/DL (ref 8.6–10.2)
CHLORIDE BLD-SCNC: 89 MMOL/L (ref 98–107)
CLARITY: CLEAR
CO2: 17 MMOL/L (ref 22–29)
COLOR: YELLOW
CREAT SERPL-MCNC: 1.7 MG/DL (ref 0.5–1)
EKG ATRIAL RATE: 202 BPM
EKG Q-T INTERVAL: 284 MS
EKG QRS DURATION: 98 MS
EKG QTC CALCULATION (BAZETT): 487 MS
EKG R AXIS: 18 DEGREES
EKG T AXIS: 59 DEGREES
EKG VENTRICULAR RATE: 177 BPM
EOSINOPHILS ABSOLUTE: 0 E9/L (ref 0.05–0.5)
EOSINOPHILS RELATIVE PERCENT: 0.2 % (ref 0–6)
EPITHELIAL CELLS, UA: ABNORMAL /HPF
GFR AFRICAN AMERICAN: 35
GFR NON-AFRICAN AMERICAN: 29 ML/MIN/1.73
GLUCOSE BLD-MCNC: 108 MG/DL (ref 74–99)
GLUCOSE URINE: NEGATIVE MG/DL
HCT VFR BLD CALC: 46.6 % (ref 34–48)
HEMOGLOBIN: 14.7 G/DL (ref 11.5–15.5)
HYALINE CASTS: ABNORMAL /LPF (ref 0–2)
KETONES, URINE: NEGATIVE MG/DL
LACTIC ACID: 3.4 MMOL/L (ref 0.5–2.2)
LACTIC ACID: 4.6 MMOL/L (ref 0.5–2.2)
LEUKOCYTE ESTERASE, URINE: NEGATIVE
LIPASE: 67 U/L (ref 13–60)
LYMPHOCYTES ABSOLUTE: 1.31 E9/L (ref 1.5–4)
LYMPHOCYTES RELATIVE PERCENT: 9.6 % (ref 20–42)
MCH RBC QN AUTO: 30.1 PG (ref 26–35)
MCHC RBC AUTO-ENTMCNC: 31.5 % (ref 32–34.5)
MCV RBC AUTO: 95.5 FL (ref 80–99.9)
MONOCYTES ABSOLUTE: 1.05 E9/L (ref 0.1–0.95)
MONOCYTES RELATIVE PERCENT: 7.8 % (ref 2–12)
NEUTROPHILS ABSOLUTE: 10.87 E9/L (ref 1.8–7.3)
NEUTROPHILS RELATIVE PERCENT: 82.6 % (ref 43–80)
NITRITE, URINE: NEGATIVE
PDW BLD-RTO: 14.6 FL (ref 11.5–15)
PH UA: 5 (ref 5–9)
PLATELET # BLD: 427 E9/L (ref 130–450)
PMV BLD AUTO: 11.9 FL (ref 7–12)
POIKILOCYTES: ABNORMAL
POLYCHROMASIA: ABNORMAL
POTASSIUM REFLEX MAGNESIUM: 5.1 MMOL/L (ref 3.5–5)
PRO-BNP: ABNORMAL PG/ML (ref 0–450)
PROTEIN UA: ABNORMAL MG/DL
RBC # BLD: 4.88 E12/L (ref 3.5–5.5)
RBC UA: ABNORMAL /HPF (ref 0–2)
SARS-COV-2, NAAT: NOT DETECTED
SCHISTOCYTES: ABNORMAL
SODIUM BLD-SCNC: 127 MMOL/L (ref 132–146)
SPECIFIC GRAVITY UA: >=1.03 (ref 1–1.03)
TARGET CELLS: ABNORMAL
TOTAL PROTEIN: 7.2 G/DL (ref 6.4–8.3)
TROPONIN, HIGH SENSITIVITY: 35 NG/L (ref 0–9)
TROPONIN, HIGH SENSITIVITY: 36 NG/L (ref 0–9)
TROPONIN, HIGH SENSITIVITY: 36 NG/L (ref 0–9)
UROBILINOGEN, URINE: 0.2 E.U./DL
VACUOLATED NEUTROPHILS: ABNORMAL
WBC # BLD: 13.1 E9/L (ref 4.5–11.5)
WBC UA: ABNORMAL /HPF (ref 0–5)

## 2022-01-26 PROCEDURE — 96374 THER/PROPH/DIAG INJ IV PUSH: CPT

## 2022-01-26 PROCEDURE — 6370000000 HC RX 637 (ALT 250 FOR IP): Performed by: INTERNAL MEDICINE

## 2022-01-26 PROCEDURE — 2500000003 HC RX 250 WO HCPCS

## 2022-01-26 PROCEDURE — 87088 URINE BACTERIA CULTURE: CPT

## 2022-01-26 PROCEDURE — 87040 BLOOD CULTURE FOR BACTERIA: CPT

## 2022-01-26 PROCEDURE — 83880 ASSAY OF NATRIURETIC PEPTIDE: CPT

## 2022-01-26 PROCEDURE — 2580000003 HC RX 258: Performed by: INTERNAL MEDICINE

## 2022-01-26 PROCEDURE — 80053 COMPREHEN METABOLIC PANEL: CPT

## 2022-01-26 PROCEDURE — 93005 ELECTROCARDIOGRAM TRACING: CPT | Performed by: STUDENT IN AN ORGANIZED HEALTH CARE EDUCATION/TRAINING PROGRAM

## 2022-01-26 PROCEDURE — 71045 X-RAY EXAM CHEST 1 VIEW: CPT

## 2022-01-26 PROCEDURE — 87635 SARS-COV-2 COVID-19 AMP PRB: CPT

## 2022-01-26 PROCEDURE — 83605 ASSAY OF LACTIC ACID: CPT

## 2022-01-26 PROCEDURE — 99285 EMERGENCY DEPT VISIT HI MDM: CPT

## 2022-01-26 PROCEDURE — 6360000002 HC RX W HCPCS: Performed by: INTERNAL MEDICINE

## 2022-01-26 PROCEDURE — 84484 ASSAY OF TROPONIN QUANT: CPT

## 2022-01-26 PROCEDURE — APPSS60 APP SPLIT SHARED TIME 46-60 MINUTES: Performed by: PHYSICIAN ASSISTANT

## 2022-01-26 PROCEDURE — 2580000003 HC RX 258: Performed by: STUDENT IN AN ORGANIZED HEALTH CARE EDUCATION/TRAINING PROGRAM

## 2022-01-26 PROCEDURE — 2500000003 HC RX 250 WO HCPCS: Performed by: INTERNAL MEDICINE

## 2022-01-26 PROCEDURE — U0005 INFEC AGEN DETEC AMPLI PROBE: HCPCS

## 2022-01-26 PROCEDURE — 74176 CT ABD & PELVIS W/O CONTRAST: CPT

## 2022-01-26 PROCEDURE — 2500000003 HC RX 250 WO HCPCS: Performed by: STUDENT IN AN ORGANIZED HEALTH CARE EDUCATION/TRAINING PROGRAM

## 2022-01-26 PROCEDURE — 2060000000 HC ICU INTERMEDIATE R&B

## 2022-01-26 PROCEDURE — 85025 COMPLETE CBC W/AUTO DIFF WBC: CPT

## 2022-01-26 PROCEDURE — U0003 INFECTIOUS AGENT DETECTION BY NUCLEIC ACID (DNA OR RNA); SEVERE ACUTE RESPIRATORY SYNDROME CORONAVIRUS 2 (SARS-COV-2) (CORONAVIRUS DISEASE [COVID-19]), AMPLIFIED PROBE TECHNIQUE, MAKING USE OF HIGH THROUGHPUT TECHNOLOGIES AS DESCRIBED BY CMS-2020-01-R: HCPCS

## 2022-01-26 PROCEDURE — 83690 ASSAY OF LIPASE: CPT

## 2022-01-26 PROCEDURE — 99223 1ST HOSP IP/OBS HIGH 75: CPT | Performed by: INTERNAL MEDICINE

## 2022-01-26 PROCEDURE — 36415 COLL VENOUS BLD VENIPUNCTURE: CPT

## 2022-01-26 PROCEDURE — 81001 URINALYSIS AUTO W/SCOPE: CPT

## 2022-01-26 RX ORDER — DILTIAZEM HYDROCHLORIDE 5 MG/ML
10 INJECTION INTRAVENOUS ONCE
Status: COMPLETED | OUTPATIENT
Start: 2022-01-26 | End: 2022-01-26

## 2022-01-26 RX ORDER — 0.9 % SODIUM CHLORIDE 0.9 %
250 INTRAVENOUS SOLUTION INTRAVENOUS ONCE
Status: COMPLETED | OUTPATIENT
Start: 2022-01-26 | End: 2022-01-26

## 2022-01-26 RX ORDER — DIGOXIN 0.25 MG/ML
500 INJECTION INTRAMUSCULAR; INTRAVENOUS ONCE
Status: COMPLETED | OUTPATIENT
Start: 2022-01-26 | End: 2022-01-26

## 2022-01-26 RX ORDER — POTASSIUM CHLORIDE 750 MG/1
10 TABLET, FILM COATED, EXTENDED RELEASE ORAL DAILY
Status: DISCONTINUED | OUTPATIENT
Start: 2022-01-26 | End: 2022-01-26

## 2022-01-26 RX ORDER — M-VIT,TX,IRON,MINS/CALC/FOLIC 27MG-0.4MG
1 TABLET ORAL DAILY
Status: DISCONTINUED | OUTPATIENT
Start: 2022-01-26 | End: 2022-01-31 | Stop reason: HOSPADM

## 2022-01-26 RX ORDER — ONDANSETRON 4 MG/1
4 TABLET, ORALLY DISINTEGRATING ORAL 3 TIMES DAILY PRN
Status: DISCONTINUED | OUTPATIENT
Start: 2022-01-26 | End: 2022-01-31 | Stop reason: HOSPADM

## 2022-01-26 RX ORDER — ATORVASTATIN CALCIUM 20 MG/1
20 TABLET, FILM COATED ORAL EVERY EVENING
Status: DISCONTINUED | OUTPATIENT
Start: 2022-01-26 | End: 2022-01-31 | Stop reason: HOSPADM

## 2022-01-26 RX ORDER — SODIUM CHLORIDE 0.9 % (FLUSH) 0.9 %
5-40 SYRINGE (ML) INJECTION 2 TIMES DAILY
Status: DISCONTINUED | OUTPATIENT
Start: 2022-01-26 | End: 2022-01-31 | Stop reason: HOSPADM

## 2022-01-26 RX ORDER — BUMETANIDE 0.25 MG/ML
1 INJECTION, SOLUTION INTRAMUSCULAR; INTRAVENOUS 2 TIMES DAILY
Status: DISCONTINUED | OUTPATIENT
Start: 2022-01-26 | End: 2022-01-26

## 2022-01-26 RX ORDER — ONDANSETRON 2 MG/ML
4 INJECTION INTRAMUSCULAR; INTRAVENOUS EVERY 6 HOURS PRN
Status: DISCONTINUED | OUTPATIENT
Start: 2022-01-26 | End: 2022-01-31 | Stop reason: HOSPADM

## 2022-01-26 RX ORDER — METOPROLOL SUCCINATE 25 MG/1
100 TABLET, EXTENDED RELEASE ORAL 2 TIMES DAILY
Status: DISCONTINUED | OUTPATIENT
Start: 2022-01-26 | End: 2022-01-31

## 2022-01-26 RX ORDER — ALBUTEROL SULFATE 2.5 MG/3ML
2.5 SOLUTION RESPIRATORY (INHALATION) EVERY 6 HOURS PRN
Status: DISCONTINUED | OUTPATIENT
Start: 2022-01-26 | End: 2022-01-31 | Stop reason: HOSPADM

## 2022-01-26 RX ORDER — DILTIAZEM HYDROCHLORIDE 5 MG/ML
INJECTION INTRAVENOUS
Status: COMPLETED
Start: 2022-01-26 | End: 2022-01-26

## 2022-01-26 RX ORDER — FAMOTIDINE 20 MG/1
40 TABLET, FILM COATED ORAL EVERY EVENING
Status: DISCONTINUED | OUTPATIENT
Start: 2022-01-26 | End: 2022-01-31 | Stop reason: HOSPADM

## 2022-01-26 RX ORDER — LEVOTHYROXINE SODIUM 0.03 MG/1
25 TABLET ORAL DAILY
Status: DISCONTINUED | OUTPATIENT
Start: 2022-01-26 | End: 2022-01-31 | Stop reason: HOSPADM

## 2022-01-26 RX ORDER — SODIUM CHLORIDE 9 MG/ML
INJECTION, SOLUTION INTRAVENOUS
Status: DISPENSED
Start: 2022-01-26 | End: 2022-01-27

## 2022-01-26 RX ORDER — FOLIC ACID 1 MG/1
1 TABLET ORAL DAILY
Status: DISCONTINUED | OUTPATIENT
Start: 2022-01-26 | End: 2022-01-31 | Stop reason: HOSPADM

## 2022-01-26 RX ORDER — DONEPEZIL HYDROCHLORIDE 5 MG/1
5 TABLET, FILM COATED ORAL 2 TIMES DAILY
Status: DISCONTINUED | OUTPATIENT
Start: 2022-01-26 | End: 2022-01-31 | Stop reason: HOSPADM

## 2022-01-26 RX ADMIN — FAMOTIDINE 40 MG: 20 TABLET, FILM COATED ORAL at 17:46

## 2022-01-26 RX ADMIN — FUROSEMIDE 40 MG: 10 INJECTION, SOLUTION INTRAMUSCULAR; INTRAVENOUS at 16:23

## 2022-01-26 RX ADMIN — ONDANSETRON 4 MG: 2 INJECTION INTRAMUSCULAR; INTRAVENOUS at 16:24

## 2022-01-26 RX ADMIN — ATORVASTATIN CALCIUM 20 MG: 20 TABLET, FILM COATED ORAL at 22:26

## 2022-01-26 RX ADMIN — DILTIAZEM HYDROCHLORIDE 5 MG/HR: 5 INJECTION INTRAVENOUS at 14:01

## 2022-01-26 RX ADMIN — DIGOXIN 500 MCG: 250 INJECTION, SOLUTION INTRAMUSCULAR; INTRAVENOUS; PARENTERAL at 17:51

## 2022-01-26 RX ADMIN — METOPROLOL SUCCINATE 100 MG: 25 TABLET, FILM COATED, EXTENDED RELEASE ORAL at 15:54

## 2022-01-26 RX ADMIN — FOLIC ACID 1 MG: 1 TABLET ORAL at 15:54

## 2022-01-26 RX ADMIN — DILTIAZEM HYDROCHLORIDE 10 MG: 5 INJECTION, SOLUTION INTRAVENOUS at 13:38

## 2022-01-26 RX ADMIN — SODIUM CHLORIDE 250 ML: 0.9 INJECTION, SOLUTION INTRAVENOUS at 15:05

## 2022-01-26 RX ADMIN — APIXABAN 2.5 MG: 5 TABLET, FILM COATED ORAL at 17:46

## 2022-01-26 RX ADMIN — Medication 10 ML: at 22:11

## 2022-01-26 RX ADMIN — DONEPEZIL HYDROCHLORIDE 5 MG: 5 TABLET, FILM COATED ORAL at 22:26

## 2022-01-26 RX ADMIN — Medication 10 ML: at 13:44

## 2022-01-26 RX ADMIN — DILTIAZEM HYDROCHLORIDE 10 MG: 5 INJECTION INTRAVENOUS at 13:38

## 2022-01-26 RX ADMIN — MULTIPLE VITAMINS W/ MINERALS TAB 1 TABLET: TAB at 15:56

## 2022-01-26 RX ADMIN — LEVOTHYROXINE SODIUM 25 MCG: 25 TABLET ORAL at 15:54

## 2022-01-26 RX ADMIN — DILTIAZEM HYDROCHLORIDE 5 MG/HR: 5 INJECTION INTRAVENOUS at 22:55

## 2022-01-26 RX ADMIN — SERTRALINE 100 MG: 50 TABLET, FILM COATED ORAL at 15:54

## 2022-01-26 ASSESSMENT — ENCOUNTER SYMPTOMS
EYE PAIN: 0
BACK PAIN: 0
BLOOD IN STOOL: 0
NAUSEA: 1
ABDOMINAL DISTENTION: 0
SHORTNESS OF BREATH: 1
TROUBLE SWALLOWING: 0
VOICE CHANGE: 0
CHEST TIGHTNESS: 0
RHINORRHEA: 0
CONSTIPATION: 0
SHORTNESS OF BREATH: 1
ABDOMINAL PAIN: 1
COLOR CHANGE: 0
BACK PAIN: 0
EYE REDNESS: 0
EYE PAIN: 0
FACIAL SWELLING: 0
RHINORRHEA: 0
DIARRHEA: 1
VOMITING: 0
TROUBLE SWALLOWING: 0
STRIDOR: 0
ANAL BLEEDING: 0
SINUS PRESSURE: 0
SORE THROAT: 0
DIARRHEA: 0
EYE ITCHING: 0
BLOOD IN STOOL: 0
NAUSEA: 1
EYE REDNESS: 0
WHEEZING: 0
PHOTOPHOBIA: 0
CONSTIPATION: 0
APNEA: 0
EYE DISCHARGE: 0
ABDOMINAL PAIN: 0
RECTAL PAIN: 0
EYE ITCHING: 0
CHOKING: 0
COUGH: 0
FACIAL SWELLING: 0
ABDOMINAL DISTENTION: 0
PHOTOPHOBIA: 0
WHEEZING: 0
CHEST TIGHTNESS: 0
VOMITING: 0
COLOR CHANGE: 0

## 2022-01-26 NOTE — PROGRESS NOTES
Vic Baker is a 64 y.o. female  . Subjective:      Patient was just seen by cardiology. They told her to to call if she has palpitations or sob. She is having these so I first recommend that she immediately calls them back up to set up another appointment. She complains of orthopnea, sob, fatigue and leg swelling. Furosemide seemed to help in the beginning but is not helping now. We will change this to bumex. She will continue potassium. She is complaining of poor appetite and nausea. She has been having diarrhea, pepto bismol has helped a little. We will try a few new medications and set her up with gastroenterology. Patient has not lost any weight. However we have to consider water retention. Patient was doing very well prior to hospitalization, so we need to evaluate deeply. Patient to try to increase activity as much as possible. We will consider PT for strengthening . Patient to have a low threshold for going to ER if anything worsens or does not improve. Review of Systems   Constitutional: Positive for fatigue. Negative for activity change, appetite change, chills, diaphoresis, fever and unexpected weight change. HENT: Negative for congestion, dental problem, drooling, ear discharge, ear pain, facial swelling, hearing loss, mouth sores, nosebleeds, postnasal drip, rhinorrhea, sinus pressure, sneezing, sore throat, tinnitus, trouble swallowing and voice change. Eyes: Negative for photophobia, pain, discharge, redness, itching and visual disturbance. Respiratory: Positive for shortness of breath. Negative for apnea, cough, choking, chest tightness, wheezing and stridor. Orthopnea   Cardiovascular: Negative for chest pain, palpitations and leg swelling. Gastrointestinal: Positive for diarrhea and nausea. Negative for abdominal distention, abdominal pain, anal bleeding, blood in stool, constipation, rectal pain and vomiting.    Endocrine: Negative for cold intolerance, heat intolerance, polydipsia, polyphagia and polyuria. Genitourinary: Negative for decreased urine volume, difficulty urinating, dyspareunia, dysuria, enuresis, flank pain, frequency, genital sores, hematuria, menstrual problem, pelvic pain, urgency, vaginal bleeding, vaginal discharge and vaginal pain. Musculoskeletal: Negative for arthralgias, back pain, gait problem, joint swelling, myalgias, neck pain and neck stiffness. Skin: Negative for color change, pallor, rash and wound. Allergic/Immunologic: Negative for environmental allergies, food allergies and immunocompromised state. Neurological: Negative for dizziness, tremors, seizures, syncope, facial asymmetry, speech difficulty, weakness, light-headedness, numbness and headaches. Hematological: Negative for adenopathy. Does not bruise/bleed easily. Psychiatric/Behavioral: Negative for agitation, behavioral problems, confusion, decreased concentration, dysphoric mood, hallucinations, self-injury, sleep disturbance and suicidal ideas. The patient is not nervous/anxious and is not hyperactive. Past Medical History:   Diagnosis Date    Arthritis     Asymptomatic PVCs 2012    pt felt flutter, no other sx, holter + pvc's, few runs of SVT   [facility]    History of Holter monitoring     Hyperlipidemia     Macular degeneration     Nausea & vomiting     Pelvic prolapse     PONV (postoperative nausea and vomiting)     TIA (transient ischemic attack)        Social History     Socioeconomic History    Marital status:       Spouse name: Not on file    Number of children: Not on file    Years of education: Not on file    Highest education level: Not on file   Occupational History    Occupation: retired   Tobacco Use    Smoking status: Former Smoker     Packs/day: 0.50     Quit date: 1994     Years since quittin.6    Smokeless tobacco: Never Used   Vaping Use    Vaping Use: Never used   Substance and Sexual Activity    Alcohol use: No     Comment: Coffee 1 cup a day     Drug use: No    Sexual activity: Not Currently   Other Topics Concern    Not on file   Social History Narrative    Not on file     Social Determinants of Health     Financial Resource Strain: Low Risk     Difficulty of Paying Living Expenses: Not hard at all   Food Insecurity: No Food Insecurity    Worried About Running Out of Food in the Last Year: Never true    [address] in the Last Year: Never true   Transportation Needs:     Lack of Transportation (Medical): Not on file    Lack of Transportation (Non-Medical): Not on file   Physical Activity:     Days of Exercise per Week: Not on file    Minutes of Exercise per Session: Not on file   Stress:     Feeling of Stress : Not on file   Social Connections:     Frequency of Communication with Friends and Family: Not on file    Frequency of Social Gatherings with Friends and Family: Not on file    Attends [employer] Services: Not on file    Active Member of [address] [place] [address] or Organizations: Not on file    Attends Club or Organization Meetings: Not on file    Marital Status: Not on file   Intimate Partner Violence:     Fear of Current or Ex-Partner: Not on file    Emotionally Abused: Not on file    Physically Abused: Not on file    Sexually Abused: Not on file   Housing Stability:     Unable to Pay for Housing in the Last Year: Not on file    Number of [place] in the Last Year: Not on file    Unstable Housing in the Last Year: Not on file       No family history on file.     Current Outpatient Medications on File Prior to Visit   Medication Sig Dispense Refill    Inulin (FIBER CHOICE PO) Take by mouth daily      donepezil (ARICEPT) 5 MG tablet Take 5 mg by mouth 2 times daily      Magnesium 500 MG CAPS Take by mouth daily      metoprolol succinate (TOPROL XL) 100 MG extended release tablet Take 1 tablet by mouth 2 times daily 180 tablet 5    donepezil (ARICEPT) 10 MG tablet Take 1 tablet by mouth daily 90 tablet 1    sertraline (ZOLOFT) 100 MG tablet TAKE 1 TABLET BY MOUTH  DAILY 90 tablet 3    Magnesium 100 MG CAPS Take 200 mg by mouth nightly 30 capsule 5    ammonium lactate (LAC-HYDRIN) 12 % lotion Apply topically daily. 222 mL 5    apixaban (ELIQUIS) 5 MG TABS tablet Take 1 tablet by mouth 2 times daily 018 tablet 5    folic acid (FOLVITE) 1 MG tablet Take 1 tablet by mouth daily 30 tablet 5    levothyroxine (SYNTHROID) 25 MCG tablet Take 1 tablet by mouth Daily 90 tablet 5    atorvastatin (LIPITOR) 20 MG tablet TAKE ONE TABLET BY MOUTH EVERY EVENING 90 tablet 5    diclofenac sodium 1 % GEL Apply 4 g topically 4 times daily 3 Tube 5    therapeutic multivitamin-minerals (THERAGRAN-M) tablet Take 1 tablet by mouth daily. No current facility-administered medications on file prior to visit. No Known Allergies    I have reviewedher allergies, medications, problem list, medical, social and family history and have updated as needed in the electronic medical record. Objective:     Physical Exam  Vitals and nursing note reviewed. Constitutional:       General: She is not in acute distress. Appearance: She is well-developed. She is not diaphoretic. HENT:      Head: Normocephalic and atraumatic. Right Ear: External ear normal.      Left Ear: External ear normal.      Nose: Nose normal.      Mouth/Throat:      Pharynx: No oropharyngeal exudate. Eyes:      General: No scleral icterus. Right eye: No discharge. Left eye: No discharge. Conjunctiva/sclera: Conjunctivae normal.      Pupils: Pupils are equal, round, and reactive to light. Neck:      Thyroid: No thyromegaly. Vascular: No JVD. Trachea: No tracheal deviation. Cardiovascular:      Rate and Rhythm: Normal rate and regular rhythm. Heart sounds: Normal heart sounds. No murmur heard. No friction rub. No gallop.     Pulmonary:      Effort: Pulmonary effort is normal. No respiratory distress. Breath sounds: Normal breath sounds. No stridor. No wheezing or rales. Chest:      Chest wall: No tenderness. Abdominal:      General: Bowel sounds are normal. There is no distension. Palpations: Abdomen is soft. There is no mass. Tenderness: There is no abdominal tenderness. There is no guarding or rebound. Musculoskeletal:         General: No tenderness. Normal range of motion. Cervical back: Normal range of motion and neck supple. Right lower leg: Edema present. Left lower leg: Edema present. Lymphadenopathy:      Cervical: No cervical adenopathy. Skin:     General: Skin is warm and dry. Coloration: Skin is not pale. Findings: No erythema or rash. Neurological:      Mental Status: She is alert and oriented to person, place, and time. Cranial Nerves: No cranial nerve deficit. Motor: No abnormal muscle tone. Coordination: Coordination normal.      Deep Tendon Reflexes: Reflexes are normal and symmetric. Reflexes normal.   Psychiatric:         Behavior: Behavior normal.         Thought Content: Thought content normal.         Judgment: Judgment normal.         Assessment / Plan:   Kendra Barton was seen today for foot swelling, fatigue, insomnia, shortness of breath, diarrhea and abdominal pain. Diagnoses and all orders for this visit:    SOB (shortness of breath)  -     XR CHEST STANDARD (2 VW); Future    Orthopnea  -     potassium chloride (KLOR-CON) 10 MEQ extended release tablet; Take 1 tablet by mouth daily  -     bumetanide (BUMEX) 2 MG tablet; Take 1 tablet by mouth daily  -     XR CHEST STANDARD (2 VW); Future    Nausea  -     ondansetron (ZOFRAN-ODT) 4 MG disintegrating tablet; Take 1 tablet by mouth 3 times daily as needed for Nausea  -     famotidine (PEPCID) 40 MG tablet; Take 1 tablet by mouth every evening    Functional diarrhea  -     CBC Auto Differential; Future  -     Comprehensive Metabolic Panel;  Future  - diphenoxylate-atropine (DIPHENATOL) 2.5-0.025 MG per tablet; Take 1 tablet by mouth 4 times daily as needed for Diarrhea for up to 30 days. -     Ivette Ewing MD, GastroenterologyAnuradha    Loss of appetite  -     Ivette Ewing MD, GastroenterologyAnuradha    Suspected CHF (congestive heart failure)  -     BRAIN NATRIURETIC PEPTIDE; Future    Congestive heart failure, unspecified HF chronicity, unspecified heart failure type ([facility]  -     BRAIN NATRIURETIC PEPTIDE; Future    Chronic cough  -     benzonatate (TESSALON) 200 MG capsule; Take 1 capsule by mouth 3 times daily as needed for Cough    Gastroesophageal reflux disease without esophagitis  -     famotidine (PEPCID) 40 MG tablet; Take 1 tablet by mouth every evening  -     Ivette Ewing MD, Gastroenterology, [facility] with own gynecologist for gynecological and breast care. Reviewed health maintenance report. Patient is aware of deficiencies and suggested preventative tests.

## 2022-01-26 NOTE — ED PROVIDER NOTES
Name: Vallerie Schlatter   MRN: [id number]     --------------------------------------------- History of Present Illness ---------------------------------------------  1/26/22, Time: 1:21 PM EST   Chief Complaint   Patient presents with    Chest Pain      HPI    Vallerie Schlatter is a 64 y.o. female, with hx of a-fib (on eloquis), GERD, PUD, alzheimers, hyperlipidemia, who presents to the ED today for feels like her heart is racing, which began 3 days ago. She states she has been nauseated as well as had some shortness of breath with this. Patient describes this as constant, moderate in severity, nothing makes it better or worse. Patient states she has been hospitalized for A. fib with RVR before. The pt denies any associated fever, cough, chest pain, lightheadedness, HA, abd pain, GI or  complaints. Spoke with daughter, who stated she has been having some abd pain over the past few days. Pt denies nay abd pain at this time. Allg: Patient has no known allergies.  PCP: Jesus Steiner DO.    Meds:   Current Facility-Administered Medications:     sodium chloride flush 0.9 % injection 5-40 mL, 5-40 mL, IntraVENous, BID, Linsey Husbands DO Carrillo, 10 mL at 01/26/22 1344    dilTIAZem 125 mg in dextrose 5 % 125 mL infusion, 5-15 mg/hr, IntraVENous, Continuous, Camilo Daily DO, Last Rate: 15 mL/hr at 01/26/22 1532, 15 mg/hr at 01/26/22 1532    apixaban (ELIQUIS) tablet 5 mg, 5 mg, Oral, BID, Camilo Daily DO    atorvastatin (LIPITOR) tablet 20 mg, 20 mg, Oral, QPM, Camilo Daily DO    donepezil (ARICEPT) tablet 5 mg, 5 mg, Oral, BID, Camilo Daily DO    famotidine (PEPCID) tablet 40 mg, 40 mg, Oral, QPM, Camilo Daily DO    folic acid (FOLVITE) tablet 1 mg, 1 mg, Oral, Daily, Camilo Daily DO, 1 mg at 01/26/22 1554    levothyroxine (SYNTHROID) tablet 25 mcg, 25 mcg, Oral, Daily, Camilo Daily DO, 25 mcg at 01/26/22 1554    metoprolol succinate (TOPROL XL) extended release tablet 100 mg, 100 mg, Oral, BID, Camilo Daily DO, 100 mg at 01/26/22 1554    ondansetron (ZOFRAN-ODT) disintegrating tablet 4 mg, 4 mg, Oral, TID PRN, Kemal Bullocks, DO    sertraline (ZOLOFT) tablet 100 mg, 100 mg, Oral, Daily, Kemal Bullocks, DO, 100 mg at 01/26/22 1554    therapeutic multivitamin-minerals 1 tablet, 1 tablet, Oral, Daily, Kemal Bullocks, DO, 1 tablet at 01/26/22 1556    albuterol (PROVENTIL) nebulizer solution 2.5 mg, 2.5 mg, Nebulization, Q6H PRN, Kemal Bullocks, DO    ondansetron [facility]) injection 4 mg, 4 mg, IntraVENous, Q6H PRN, Kemal Bullocks, DO    sodium chloride 0.9 % infusion, , , ,     furosemide (LASIX) injection 40 mg, 40 mg, IntraVENous, BID, Kemal Bullocks, DO    Current Outpatient Medications:     potassium chloride (KLOR-CON) 10 MEQ extended release tablet, Take 1 tablet by mouth daily, Disp: 30 tablet, Rfl: 5    bumetanide (BUMEX) 2 MG tablet, Take 1 tablet by mouth daily, Disp: 30 tablet, Rfl: 5    diphenoxylate-atropine (DIPHENATOL) 2.5-0.025 MG per tablet, Take 1 tablet by mouth 4 times daily as needed for Diarrhea for up to 30 days. , Disp: 120 tablet, Rfl: 5    ondansetron (ZOFRAN-ODT) 4 MG disintegrating tablet, Take 1 tablet by mouth 3 times daily as needed for Nausea, Disp: 90 tablet, Rfl: 3    famotidine (PEPCID) 40 MG tablet, Take 1 tablet by mouth every evening, Disp: 30 tablet, Rfl: 5    benzonatate (TESSALON) 200 MG capsule, Take 1 capsule by mouth 3 times daily as needed for Cough, Disp: 30 capsule, Rfl: 0    Inulin (FIBER CHOICE PO), Take by mouth daily, Disp: , Rfl:     donepezil (ARICEPT) 5 MG tablet, Take 5 mg by mouth 2 times daily, Disp: , Rfl:     Magnesium 500 MG CAPS, Take by mouth daily, Disp: , Rfl:     metoprolol succinate (TOPROL XL) 100 MG extended release tablet, Take 1 tablet by mouth 2 times daily, Disp: 180 tablet, Rfl: 5    donepezil (ARICEPT) 10 MG tablet, Take 1 tablet by mouth daily, Disp: 90 tablet, Rfl: 1    sertraline (ZOLOFT) 100 MG tablet, TAKE 1 TABLET BY MOUTH  DAILY, Disp: 90 tablet, Rfl: 3    Magnesium 100 MG CAPS, Take 200 mg by mouth nightly, Disp: 30 capsule, Rfl: 5    ammonium lactate (LAC-HYDRIN) 12 % lotion, Apply topically daily. , Disp: 222 mL, Rfl: 5    apixaban (ELIQUIS) 5 MG TABS tablet, Take 1 tablet by mouth 2 times daily, Disp: 180 tablet, Rfl: 5    folic acid (FOLVITE) 1 MG tablet, Take 1 tablet by mouth daily, Disp: 30 tablet, Rfl: 5    levothyroxine (SYNTHROID) 25 MCG tablet, Take 1 tablet by mouth Daily, Disp: 90 tablet, Rfl: 5    atorvastatin (LIPITOR) 20 MG tablet, TAKE ONE TABLET BY MOUTH EVERY EVENING, Disp: 90 tablet, Rfl: 5    diclofenac sodium 1 % GEL, Apply 4 g topically 4 times daily, Disp: 3 Tube, Rfl: 5    therapeutic multivitamin-minerals (THERAGRAN-M) tablet, Take 1 tablet by mouth daily. , Disp: , Rfl:      Review of Systems   Constitutional: Positive for fatigue. Negative for chills and fever. HENT: Negative for congestion, facial swelling, rhinorrhea and trouble swallowing. Eyes: Negative for photophobia, pain, redness and itching. Respiratory: Positive for shortness of breath. Negative for chest tightness and wheezing. Cardiovascular: Negative for chest pain and palpitations. Gastrointestinal: Positive for abdominal pain (non currently) and nausea. Negative for abdominal distention, blood in stool, constipation, diarrhea and vomiting. Genitourinary: Negative for difficulty urinating, dysuria, hematuria, vaginal bleeding and vaginal discharge. Musculoskeletal: Negative for arthralgias, back pain, myalgias and neck pain. Skin: Negative for color change, rash and wound. Neurological: Negative for dizziness, syncope, weakness, light-headedness, numbness and headaches. Psychiatric/Behavioral: Negative for agitation, behavioral problems and confusion. Physical Exam  Constitutional:       General: She is not in acute distress. Appearance: Normal appearance. She is normal weight. She is ill-appearing.  She is not toxic-appearing or diaphoretic. HENT:      Head: Normocephalic and atraumatic. Right Ear: External ear normal.      Left Ear: External ear normal.      Nose: Nose normal.      Mouth/Throat:      Pharynx: Oropharynx is clear. Eyes:      Conjunctiva/sclera: Conjunctivae normal.      Pupils: Pupils are equal, round, and reactive to light. Cardiovascular:      Rate and Rhythm: Regular rhythm. Tachycardia present. Pulses: Normal pulses. Heart sounds: Normal heart sounds. Pulmonary:      Effort: Pulmonary effort is normal. No respiratory distress. Breath sounds: No stridor. No wheezing. Abdominal:      General: Bowel sounds are normal. There is no distension. Palpations: Abdomen is soft. Tenderness: There is no abdominal tenderness. There is no guarding. Musculoskeletal:         General: No swelling or tenderness. Normal range of motion. Cervical back: Normal range of motion. Right lower leg: Edema present. Left lower leg: Edema present. Skin:     General: Skin is warm and dry. Capillary Refill: Capillary refill takes less than 2 seconds. Findings: No erythema or rash. Neurological:      General: No focal deficit present. Mental Status: She is alert and oriented to person, place, and time. Psychiatric:         Mood and Affect: Mood normal.         Behavior: Behavior normal.          Procedures     MDM  Number of Diagnoses or Management Options  Abdominal pain, unspecified abdominal location  Atrial fibrillation with RVR ([address]  Diagnosis management comments: Mrs. Brad Cardozo is a 63 y/o F pt who presents today for tachycardia and palpitations which she states began 3 days ago. She has a history of A. fib and is on Eliquis. She denies any recent illnesses. She states she has been nauseated and had some shortness of breath over the past few days as well. Denies other review of systems. On physical exam, patient is alert, mildly anxious. Heart rate 155, regular. Other vitals within normal limits. Lungs clear and equal bilaterally. Abdomen is soft, nontender. Mild to moderate swelling lower legs. Lab work as well as EKG, chest x-ray are ordered. CT abdomen was ordered as well as her daughter had stated she has been having some abdominal pain recently. Mild DAYNA creatinine 1.7. BNP [id number]. Initial troponin 36, second ordered. EKG showed A. fib at a rate of 177. Diltiazem bolus as well as drip was started. Lactic was 4.6.  1050 mL fluids was given, will recheck lactic. Spoke with Dr. Jose Clark, hospitalist, will admit patient for further care of A. fib with RVR. ED Course as of 01/26/22 1558   Wed Jan 26, 2022   1404 CBC Auto Differential(!):    WBC 13.1(!)   RBC 4.88   Hemoglobin Quant 14.7   Hematocrit 46.6   MCV 95.5   MCH 30.1   MCHC 31.5(!)   RDW 14.6   Platelet Count 742   MPV 11.9  Mild leukocytosis 13.1 [PW]   1404 Dr. Jose Clark saw patient, discussed, will admit for further care. [PW]   1432 -155. Cardizem drip running. Daughter in room now. Spoke patient and daughter. Plan to admit. They are amenable this plan. Patient states her nausea is better. [PW]   8905 Fluids ordered. [PW]   1522 Brain Natriuretic Peptide(!):    Pro-BNP 23,589(!)  BNP 23,589. [PW]   1557 . [PW]   1558 Lactic 4.6. Repeat after fluids 3.4 [PW]      ED Course User Index  [PW] Katerina Hanson, DO      ED Course as of 01/26/22 1558   Wed Jan 26, 2022   1404 CBC Auto Differential(!):    WBC 13.1(!)   RBC 4.88   Hemoglobin Quant 14.7   Hematocrit 46.6   MCV 95.5   MCH 30.1   MCHC 31.5(!)   RDW 14.6   Platelet Count 877   MPV 11.9  Mild leukocytosis 13.1 [PW]   1404 Dr. Jose Clark saw patient, discussed, will admit for further care. [PW]   1432 -155. Cardizem drip running. Daughter in room now. Spoke patient and daughter. Plan to admit. They are amenable this plan. Patient states her nausea is better. [PW]   1566 Fluids ordered.   [PW]   1522 Brain Natriuretic Peptide(!):    Pro-BNP 23,589(!)  BNP [id number]. [PW]   1557 . [PW]   1558 Lactic 4.6. Repeat after fluids 3.4 [PW]      ED Course User Index  [PW] Elda Ahumada DO       --------------------------------------------- PAST HISTORY ---------------------------------------------  Past Medical History:  has a past medical history of Arthritis, Asymptomatic PVCs, History of Holter monitoring, Hyperlipidemia, Macular degeneration, Nausea & vomiting, Pelvic prolapse, PONV (postoperative nausea and vomiting), and TIA (transient ischemic attack). Past Surgical History:  has a past surgical history that includes Colonoscopy (2012); Endoscopy, colon, diagnostic; hernia repair; Hysterectomy; eye surgery; skin biopsy; other surgical history (sept 2013); and transesophageal echocardiogram (N/A, 10/20/2020). Social History:  reports that she quit smoking about 27 years ago. She smoked 0.50 packs per day. She has never used smokeless tobacco. She reports that she does not drink alcohol and does not use drugs. Family History: family history is not on file. The patients home medications have been reviewed. Allergies: Patient has no known allergies.     -------------------------------------------------- RESULTS -------------------------------------------------    LABS:  Results for orders placed or performed during the hospital encounter of 01/26/22   COVID-19, Rapid    Specimen: Nasopharyngeal Swab   Result Value Ref Range    SARS-CoV-2, NAAT Not Detected Not Detected   Troponin   Result Value Ref Range    Troponin, High Sensitivity 36 (H) 0 - 9 ng/L   CBC Auto Differential   Result Value Ref Range    WBC 13.1 (H) 4.5 - 11.5 E9/L    RBC 4.88 3.50 - 5.50 E12/L    Hemoglobin 14.7 11.5 - 15.5 g/dL    Hematocrit 46.6 34.0 - 48.0 %    MCV 95.5 80.0 - 99.9 fL    MCH 30.1 26.0 - 35.0 pg    MCHC 31.5 (L) 32.0 - 34.5 %    RDW 14.6 11.5 - 15.0 fL    Platelets 265 878 - 541 E9/L    MPV 11.9 7.0 - 12.0 fL    Neutrophils % 82.6 (H) 43.0 - 80.0 %    Lymphocytes % 9.6 (L) 20.0 - 42.0 %    Monocytes % 7.8 2.0 - 12.0 %    Eosinophils % 0.2 0.0 - 6.0 %    Basophils % 0.3 0.0 - 2.0 %    Neutrophils Absolute 10.87 (H) 1.80 - 7.30 E9/L    Lymphocytes Absolute 1.31 (L) 1.50 - 4.00 E9/L    Monocytes Absolute 1.05 (H) 0.10 - 0.95 E9/L    Eosinophils Absolute 0.00 (L) 0.05 - 0.50 E9/L    Basophils Absolute 0.00 0.00 - 0.20 E9/L    Vacuolated Neutrophils 1+     Polychromasia 1+     Poikilocytes 2+     Schistocytes 1+     Acanthocytes 1+     Houston Cells 1+     Target Cells 1+    Comprehensive Metabolic Panel w/ Reflex to MG   Result Value Ref Range    Sodium 127 (L) 132 - 146 mmol/L    Potassium reflex Magnesium 5.1 (H) 3.5 - 5.0 mmol/L    Chloride 89 (L) 98 - 107 mmol/L    CO2 17 (L) 22 - 29 mmol/L    Anion Gap 21 (H) 7 - 16 mmol/L    Glucose 108 (H) 74 - 99 mg/dL    BUN 42 (H) 6 - 23 mg/dL    CREATININE 1.7 (H) 0.5 - 1.0 mg/dL    GFR Non-African American 29 >=60 mL/min/1.73    GFR African American 35     Calcium 9.5 8.6 - 10.2 mg/dL    Total Protein 7.2 6.4 - 8.3 g/dL    Albumin 4.1 3.5 - 5.2 g/dL    Total Bilirubin 0.9 0.0 - 1.2 mg/dL    Alkaline Phosphatase 92 35 - 104 U/L    ALT 69 (H) 0 - 32 U/L    AST 71 (H) 0 - 31 U/L   Brain Natriuretic Peptide   Result Value Ref Range    Pro-BNP 23,589 (H) 0 - 450 pg/mL   Lactic Acid, Plasma   Result Value Ref Range    Lactic Acid 4.6 (HH) 0.5 - 2.2 mmol/L   Lipase   Result Value Ref Range    Lipase 67 (H) 13 - 60 U/L   LACTIC ACID, PLASMA   Result Value Ref Range    Lactic Acid 3.4 (H) 0.5 - 2.2 mmol/L   EKG 12 Lead   Result Value Ref Range    Ventricular Rate 177 BPM    Atrial Rate 202 BPM    QRS Duration 98 ms    Q-T Interval 284 ms    QTc Calculation (Bazett) 487 ms    R Axis 18 degrees    T Axis 59 degrees       RADIOLOGY:  CT ABDOMEN PELVIS WO CONTRAST   Preliminary Result   1 bilateral pleural effusions right greater than left moderate right and   small left involvement with adjacent opacifications of atelectasis versus   airspace disease      2 small volume abdominopelvic ascites along with mild to moderate body wall   edema      3 no mechanical obstructive process or loculated fluid collection within the   abdomen or pelvis      RECOMMENDATIONS:   Unavailable         XR CHEST PORTABLE   Final Result   Bilateral pleural effusions with slightly greater adjacent bibasilar   infiltrate and or atelectasis.               ------------------------- NURSING NOTES AND VITALS REVIEWED ---------------------------  Date / Time Roomed:  1/26/2022  1:19 PM  ED Bed Assignment:  12/12    The nursing notes within the ED encounter and vital signs as below have been reviewed.      Patient Vitals for the past 24 hrs:   BP Temp Temp src Pulse Resp SpO2 Weight   01/26/22 1557 121/80 -- -- 152 18 96 % --   01/26/22 1554 124/73 -- -- 136 -- -- --   01/26/22 1543 131/79 -- -- 147 20 -- --   01/26/22 1536 114/85 -- -- 140 18 95 % --   01/26/22 1534 102/83 -- -- 138 20 94 % --   01/26/22 1526 109/67 -- -- 138 22 94 % --   01/26/22 1524 (!) 128/113 -- -- 148 22 92 % --   01/26/22 1506 116/75 -- -- 147 18 95 % --   01/26/22 1445 114/87 -- -- 152 20 96 % --   01/26/22 1432 (!) 127/94 -- -- 166 -- 98 % --   01/26/22 1420 123/82 -- -- -- -- -- --   01/26/22 1410 116/83 -- -- 141 24 -- --   01/26/22 1405 115/86 -- -- 142 20 97 % --   01/26/22 1400 121/78 -- -- 144 18 96 % --   01/26/22 1353 120/84 -- -- 143 20 94 % --   01/26/22 1347 110/75 -- -- 154 22 94 % --   01/26/22 1345 122/63 -- -- 150 23 94 % --   01/26/22 1342 106/66 -- -- 155 18 94 % --   01/26/22 1339 114/83 -- -- 169 18 93 % --   01/26/22 1336 107/79 -- -- 174 20 98 % 130 lb (59 kg)   01/26/22 1332 (!) 114/90 98.2 °F (36.8 °C) Oral 176 18 98 % --       Oxygen Saturation Interpretation: Normal    ------------------------------------------ PROGRESS NOTES ------------------------------------------    Counseling:  I have spoken with the patient and daughter discussed todays results, in addition to providing specific details for the plan of care and counseling regarding the diagnosis and prognosis. Their questions are answered at this time and they are agreeable with the plan of admission.    --------------------------------- ADDITIONAL PROVIDER NOTES ---------------------------------  Consultations:  Time: 1500. Spoke with Dr. Katey Pepe. Discussed case. They will admit the patient. This patient's ED course included: a personal history and physicial examination, re-evaluation prior to disposition, multiple bedside re-evaluations, IV medications, cardiac monitoring, continuous pulse oximetry and complex medical decision making and emergency management    This patient has remained hemodynamically stable during their ED course. Diagnosis:  1. Atrial fibrillation with RVR (HCC)    2. Abdominal pain, unspecified abdominal location        Disposition:  Patient's disposition: Admit to telemetry  Patient's condition is fair.              Reyes Cruz DO  Resident  01/26/22 6120

## 2022-01-26 NOTE — TELEPHONE ENCOUNTER
Writer contacted ED provider Temitope Ramon  to inform of 30 day readmission risk. ED provider informed writer of possible readmission.     Call Back: If you need to call back to inform of disposition you can contact me at [phone]

## 2022-01-26 NOTE — CONSULTS
Inpatient [id number] [facility] Cardiology Consultation      Reason for Consult: Atrial fibrillation with RVR    Consulting Physician: Dr. Isamar Hampton    Requesting Physician: Dr. Lila Celis    Date of Consultation: 1/26/2022    HISTORY OF PRESENT ILLNESS:   Patient is a 64year old WF who is known to Dr. Antione Pete. Patient is being seen in consultation this hospital admission by Dr. Isamar Hampton for evaluation and recommendations regarding atrial fibrillation with RVR. Patient has a known past medical history of paroxysmal atrial fibrillation on chronic Eliquis therapy, former tobacco abuse, chronic heart failure with preserved ejection fraction, valvular heart disease, GERD, hyperlipidemia, hypothyroidism on replacement therapy, history of TIA with no residual deficits, dementia and history of left atrial appendage thrombus on MARYLOU October 2021. Patient presented to [address] [place] [address] on January 26, 2022 with multiple complaints, including dry cough, shortness of breath on exertion, peripheral edema and orthopnea. She states she first noticed these symptoms shortly after she was seen in the office by her primary cardiologist 1/6, with progressive worsening of the symptoms. She additionally admits to generalized fatigue on exertion, as well as nausea and diarrhea. She denies any complaints to me of chest discomfort at rest or on exertion, emesis, diaphoresis, palpitations, dizziness, near-syncope or syncope. She admits to weight gain. She denies any hematemesis, hemoptysis, dark or bloody stools. She admits to medication compliance, with no missed doses of her cardiac medications. She admits to poor urinary response to Lasix as an outpatient --> was seen by her PCP in the office for above-noted complaints yesterday and states she was switched to Bumex therapy at that time. Family and social history as noted below. Labs and diagnostic testing as noted below.       Please note: past medical records were reviewed per ventricular ejection fraction was calculated to be  53%, with normal wall motion. Impression: The myocardial perfusion imaging was normal.  Overall left ventricular systolic function was normal without regional  wall motion abnormalities. Low risk study. PAST SURGICAL HISTORY:    Past Surgical History:   Procedure Laterality Date    COLONOSCOPY  2012    ENDOSCOPY, COLON, DIAGNOSTIC      EYE SURGERY      macular hole    HERNIA REPAIR      inguinal    HYSTERECTOMY      OTHER SURGICAL HISTORY  sept 2013    ant elevatetransobturator sling rectocele and cystocele enterocele    SKIN BIOPSY      arms    TRANSESOPHAGEAL ECHOCARDIOGRAM N/A 10/20/2020    TRANSESOPHAGEAL ECHOCARDIOGRAM WITH BUBBLE STUDY performed by Judah Park MD at [address]:  Prior to Admission medications    Medication Sig Start Date End Date Taking? Authorizing Provider   potassium chloride (KLOR-CON) 10 MEQ extended release tablet Take 1 tablet by mouth daily 1/25/22   Bertrum Dapper, DO   bumetanide (BUMEX) 2 MG tablet Take 1 tablet by mouth daily 1/25/22   Bertrum Dapper, DO   diphenoxylate-atropine (DIPHENATOL) 2.5-0.025 MG per tablet Take 1 tablet by mouth 4 times daily as needed for Diarrhea for up to 30 days.  1/25/22 2/24/22  Bertrum Dapper, DO   ondansetron (ZOFRAN-ODT) 4 MG disintegrating tablet Take 1 tablet by mouth 3 times daily as needed for Nausea 1/25/22 4/25/22  Bertrum Dapper, DO   famotidine (PEPCID) 40 MG tablet Take 1 tablet by mouth every evening 1/25/22   Bertrum Dapper, DO   benzonatate (TESSALON) 200 MG capsule Take 1 capsule by mouth 3 times daily as needed for Cough 1/25/22 2/4/22  Bertrum Dapper, DO   Inulin (FIBER CHOICE PO) Take by mouth daily    Historical Provider, MD   donepezil (ARICEPT) 5 MG tablet Take 5 mg by mouth 2 times daily    Historical Provider, MD   Magnesium 500 MG CAPS Take by mouth daily    Historical Provider, MD   metoprolol succinate (TOPROL XL) 100 MG extended release tablet Take 1 tablet by mouth 2 times daily 1/4/22   Zoracarol ann Rivera, DO   donepezil (ARICEPT) 10 MG tablet Take 1 tablet by mouth daily 12/13/21   Zoracarol ann Rivera, DO   sertraline (ZOLOFT) 100 MG tablet TAKE 1 TABLET BY MOUTH  DAILY 12/9/21   [facility], [facility]   Magnesium 100 MG CAPS Take 200 mg by mouth nightly 11/11/21   Zoracarol ann Rivera, DO   ammonium lactate (LAC-HYDRIN) 12 % lotion Apply topically daily. 9/2/21   Alena Coop., DPM   apixaban (ELIQUIS) 5 MG TABS tablet Take 1 tablet by mouth 2 times daily 2/15/21   [facility]   folic acid (FOLVITE) 1 MG tablet Take 1 tablet by mouth daily 11/18/20   [facility], [facility]   levothyroxine (SYNTHROID) 25 MCG tablet Take 1 tablet by mouth Daily 10/19/20   [facility], [facility]   atorvastatin (LIPITOR) 20 MG tablet TAKE ONE TABLET BY MOUTH EVERY EVENING 10/19/20   Zoracarol ann Rivera, DO   diclofenac sodium 1 % GEL Apply 4 g topically 4 times daily 9/12/19   Zora [facility], DO   therapeutic multivitamin-minerals [facility]) tablet Take 1 tablet by mouth daily.     Historical Provider, MD       CURRENT MEDICATIONS:      Current Facility-Administered Medications:     sodium chloride flush 0.9 % injection 5-40 mL, 5-40 mL, IntraVENous, BID, Kulwantbeverley Wesley Black, DO, 10 mL at 01/26/22 1344    dilTIAZem 125 mg in dextrose 5 % 125 mL infusion, 5-15 mg/hr, IntraVENous, Continuous, Terri Route, DO, Last Rate: 10 mL/hr at 01/26/22 1432, 10 mg/hr at 01/26/22 1432    0.9 % sodium chloride bolus, 250 mL, IntraVENous, Once, Terri Route, DO    apixaban (ELIQUIS) tablet 5 mg, 5 mg, Oral, BID, Terri Route, DO    atorvastatin (LIPITOR) tablet 20 mg, 20 mg, Oral, QPM, Terri Route, DO    donepezil (ARICEPT) tablet 5 mg, 5 mg, Oral, BID, Terri Route, DO    famotidine (PEPCID) tablet 40 mg, 40 mg, Oral, QPM, Terri Route, DO    folic acid (FOLVITE) tablet 1 mg, 1 mg, Oral, Daily, Terri Route, DO    levothyroxine (SYNTHROID) tablet 25 mcg, 25 mcg, Oral, Daily, Orrtanna Amour, DO    metoprolol succinate (TOPROL XL) extended release tablet 100 mg, 100 mg, Oral, BID, Orrtanna Amour, DO    ondansetron (ZOFRAN-ODT) disintegrating tablet 4 mg, 4 mg, Oral, TID PRN, Anita Amour, DO    potassium chloride (KLOR-CON) extended release tablet 10 mEq, 10 mEq, Oral, Daily, Anita Amour, DO    sertraline (ZOLOFT) tablet 100 mg, 100 mg, Oral, Daily, Orrtanna Amour, DO    therapeutic multivitamin-minerals 1 tablet, 1 tablet, Oral, Daily, Orrtanna Amour, DO    bumetanide (BUMEX) injection 1 mg, 1 mg, IntraVENous, BID, Anita Amour, DO    albuterol (PROVENTIL) nebulizer solution 2.5 mg, 2.5 mg, Nebulization, Q6H PRN, Anita Amour, DO    ondansetron [facility]) injection 4 mg, 4 mg, IntraVENous, Q6H PRN, Anita Amour, DO    sodium chloride 0.9 % infusion, , , ,     Current Outpatient Medications:     potassium chloride (KLOR-CON) 10 MEQ extended release tablet, Take 1 tablet by mouth daily, Disp: 30 tablet, Rfl: 5    bumetanide (BUMEX) 2 MG tablet, Take 1 tablet by mouth daily, Disp: 30 tablet, Rfl: 5    diphenoxylate-atropine (DIPHENATOL) 2.5-0.025 MG per tablet, Take 1 tablet by mouth 4 times daily as needed for Diarrhea for up to 30 days. , Disp: 120 tablet, Rfl: 5    ondansetron (ZOFRAN-ODT) 4 MG disintegrating tablet, Take 1 tablet by mouth 3 times daily as needed for Nausea, Disp: 90 tablet, Rfl: 3    famotidine (PEPCID) 40 MG tablet, Take 1 tablet by mouth every evening, Disp: 30 tablet, Rfl: 5    benzonatate (TESSALON) 200 MG capsule, Take 1 capsule by mouth 3 times daily as needed for Cough, Disp: 30 capsule, Rfl: 0    Inulin (FIBER CHOICE PO), Take by mouth daily, Disp: , Rfl:     donepezil (ARICEPT) 5 MG tablet, Take 5 mg by mouth 2 times daily, Disp: , Rfl:     Magnesium 500 MG CAPS, Take by mouth daily, Disp: , Rfl:     metoprolol succinate (TOPROL XL) 100 MG extended release tablet, Take 1 tablet by mouth 2 times daily, Disp: 180 tablet, Rfl: 5    donepezil (ARICEPT) 10 MG tablet, Take 1 tablet by mouth daily, Disp: 90 tablet, Rfl: 1    sertraline (ZOLOFT) 100 MG tablet, TAKE 1 TABLET BY MOUTH  DAILY, Disp: 90 tablet, Rfl: 3    Magnesium 100 MG CAPS, Take 200 mg by mouth nightly, Disp: 30 capsule, Rfl: 5    ammonium lactate (LAC-HYDRIN) 12 % lotion, Apply topically daily. , Disp: 222 mL, Rfl: 5    apixaban (ELIQUIS) 5 MG TABS tablet, Take 1 tablet by mouth 2 times daily, Disp: 180 tablet, Rfl: 5    folic acid (FOLVITE) 1 MG tablet, Take 1 tablet by mouth daily, Disp: 30 tablet, Rfl: 5    levothyroxine (SYNTHROID) 25 MCG tablet, Take 1 tablet by mouth Daily, Disp: 90 tablet, Rfl: 5    atorvastatin (LIPITOR) 20 MG tablet, TAKE ONE TABLET BY MOUTH EVERY EVENING, Disp: 90 tablet, Rfl: 5    diclofenac sodium 1 % GEL, Apply 4 g topically 4 times daily, Disp: 3 Tube, Rfl: 5    therapeutic multivitamin-minerals (THERAGRAN-M) tablet, Take 1 tablet by mouth daily. , Disp: , Rfl:       ALLERGIES:  Patient has no known allergies. SOCIAL HISTORY:    Former tobacco smoker, quit approximately 30 years ago. Smoked half a pack per day for 10+ years. Denies former current alcohol or illicit drug use. FAMILY HISTORY:   Non-contributory at this time due to patient's advanced age. REVIEW OF SYSTEMS:     · Constitutional: +weight gain, +generalized fatigue/weakness. Denies fevers, chills, night sweats. · HEENT: Denies headaches, nose bleeds, rhinorrhea, sore throat. Denies blurred vision. Denies dysphagia, odynophagia. · Musculoskeletal: Denies falls, pain to BLE with ambulation. · Neurological: Denies dizziness or lightheadedness, numbness and tingling. Denies focal neurological deficits. · Cardiovascular: +orthopnea, +orthopnea. Denies chest pain, palpitations, diaphoresis. Denies near syncope, syncope. · Respiratory: +SOB, +cough. Denies hemoptysis. · Gastrointestinal: +abdominal discomfort, +nausea, +diarrhea. Denies vomiting, constipation, black/bloody, and tarry stools. · Genitourinary: Denies dysuria and hematuria. · Hematologic: Denies excessive bruising or bleeding. · Endocrine: Denies excessive thirst. Denies intolerance to hot and cold. PHYSICAL EXAM:   /82   Pulse 133   Temp 98.2 °F (36.8 °C) (Oral)   Resp 18   Wt 130 lb (59 kg)   SpO2 92%   BMI 24.56 kg/m²   CONST:  Well developed, well nourished elderly WF who appears stated age. Awake, alert, cooperative, no apparent distress. HEENT:   Head- Normocephalic, atraumatic. Eyes- Conjunctivae pink, anicteric. Neck-  No stridor, trachea midline. CHEST: Chest symmetrical and non-tender to palpation. No accessory muscle use or intercostal retractions. RESPIRATORY: Lung sounds - diminished at the bases bilaterally with rare rales. CARDIOVASCULAR:     Heart Ausculation- Irregularly irregular rhythm with fast rate, 2/6 systolic murmur LLSB, apex. PV: 1-2+ bilateral lower extremity edema. Pedal pulses palpable, no clubbing or cyanosis. ABDOMEN: Soft, epigastric tenderness. Bowel sounds present. MS: Good muscle strength and tone. No atrophy or abnormal movements. SKIN: Warm and dry. NEURO / PSYCH: Oriented to person, place and time. Speech clear and appropriate. Follows all commands. Pleasant affect. DATA:    Telemetry: Atrial fibrillation with HR in the 130s-140s     Diagnostic:  All diagnostic testing and lab work thus far this admission reviewed in detail. CXR 1/26/2022:  Impression:  Bilateral pleural effusions with slightly greater adjacent bibasilar  infiltrate and or atelectasis. CT Abdomen/Pelvis 1/26/2022:  Impression:  1.  Bilateral pleural effusions, right greater than left, moderate right and  small left involvement, with adjacent opacifications of atelectasis versus  airspace disease. 2.  Small volume abdominopelvic ascites along with mild to moderate body wall  edema. 3.  No mechanical obstructive process or loculated fluid collection within  the abdomen or pelvis.       No intake or output data in the 24 hours ending 01/26/22 1459    Labs:   CBC:   Recent Labs     01/25/22  1423 01/26/22  1345   WBC 12.6* 13.1*   HGB 14.0 14.7   HCT 45.3 46.6    427     BMP:   Recent Labs     01/25/22  1423 01/26/22  1345   * 127*   K 6.0* 5.1*   CO2 11* 17*   BUN 32* 42*   CREATININE 1.5* 1.7*   LABGLOM 34 29   CALCIUM 9.7 9.5     HgA1c:   Lab Results   Component Value Date    LABA1C 5.5 11/03/2021     FASTING LIPID PANEL:  Lab Results   Component Value Date    CHOL 182 11/03/2021    HDL 56 11/03/2021    LDLCALC 106 11/03/2021    TRIG 98 11/03/2021     LIVER PROFILE:  Recent Labs     01/25/22  1423 01/26/22  1345   AST 61* 71*   ALT 53* 69*   LABALBU 3.6 4.1      Ref Range & Units 01/25/22 1423   Pro-BNP 0 - 450 pg/mL 19,528 High       Ref Range & Units 01/26/22 1345    Pro-BNP 0 - 450 pg/mL 23,589 High       Ref Range & Units 01/26/22 1333    Lactic Acid 0.5 - 2.2 mmol/L 4.6 High Panic       Component Ref Range & Units 01/26/22 1456 01/26/22 1345   Troponin, High Sensitivity 0 - 9 ng/L 35 High   36 High  CM         Ref Range & Units 01/26/22 1456   SARS-CoV-2, NAAT Not Detected Not Detected          ASSESSMENT:  · Paroxysmal atrial fibrillation, now with RVR. On chronic Eliquis therapy. · Acute on chronic heart failure preserved ejection fraction. Appears hypervolemic on exam.  proBNP 4900 12/2021 --> now 23,000. Bilateral pleural effusions on abdomen/pelvis CT, small amount of abdominal ascites. · Acute hypoxic respiratory failure, currently on low flow nasal cannula. · Acute renal failure. Hyperkalemia. Hyponatremia. · Leukocytosis. Elevated lactic acid on admission. COVID-19 rapid negative. · Elevated LFTs. · History of left atrial appendage thrombus on MARYLOU October 2021. On Eliquis therapy. · History of TIA with no residual deficits. · Hyperlipidemia, on statin therapy. · Hypothyroidism, on replacement therapy. · Dementia, on Aricept therapy.   · Former tobacco smoker. RECOMMENDATIONS:  · Agree with IV Cardizem drip. Continue with oral beta-blocker therapy. · IV diuresis. Strict intake and output, daily weights. Monitor renal function and electrolytes closely. · Echocardiogram, ideally when heart rate is better controlled. · Continue other cardiac medications the same at this time. · Check TSH and mag level. · Further recommendations to follow as per Dr. Lexii Maya. The above case and recommendations have been discussed and made in collaboration with Dr. Lexii Maya. NOTE: This report was transcribed using voice recognition software. Every effort was made to ensure accuracy; however, inadvertent computerized transcription errors may be present. Refugio Nielsen, [address] [place] [address], [place] [id number] Cardiology    Electronically signed by China Becker PA-C on 1/26/2022 at 2:59 PM   ______________________________________________________________________  Cardiology attending attestation:  I have independently interviewed and examined the patient. I personally reviewed pertinent laboratory values and diagnostic testing (including, if applicable, chest xray, electrocardiogram, telemetry, echocardiogram, stress testing, and coronary angiography). I have reviewed the above documentation completed by Refugio Nielsen PA-C including past medical, social, and family history and agree with the findings, assessment and plan except where noted. Plan formulated under my direct supervision. I participated in all aspects of the medical decision making. Please see my additional contributions to the HPI, physical exam, and assessment / medical decision making:  _______________________________________________________________________    Presents with a multitude of symptoms including orthopnea, dyspnea exertion, lower extremity edema, abdominal pain, nausea and diarrhea. Found to be in A. fib RVR. When last seen by Dr. Omar De La Torre earlier this month she was in sinus.   Had a left

## 2022-01-26 NOTE — ED NOTES
Bed: 12  Expected date:   Expected time:   Means of arrival:   Comments:  Martinez Kimball RN  01/26/22 1331

## 2022-01-26 NOTE — H&P
arms    TRANSESOPHAGEAL ECHOCARDIOGRAM N/A 10/20/2020    TRANSESOPHAGEAL ECHOCARDIOGRAM WITH BUBBLE STUDY performed by Mahsa Ziegler MD at [address] [facility] Hx:  No family history on file. HOME MEDICATIONS:  Prior to Admission medications    Medication Sig Start Date End Date Taking? Authorizing Provider   potassium chloride (KLOR-CON) 10 MEQ extended release tablet Take 1 tablet by mouth daily 1/25/22   Joce Barbosa DO   bumetanide (BUMEX) 2 MG tablet Take 1 tablet by mouth daily 1/25/22   Joce Barbosa DO   diphenoxylate-atropine (DIPHENATOL) 2.5-0.025 MG per tablet Take 1 tablet by mouth 4 times daily as needed for Diarrhea for up to 30 days. 1/25/22 2/24/22  Joce Barbosa DO   ondansetron (ZOFRAN-ODT) 4 MG disintegrating tablet Take 1 tablet by mouth 3 times daily as needed for Nausea 1/25/22 4/25/22  Joce Barbosa DO   famotidine (PEPCID) 40 MG tablet Take 1 tablet by mouth every evening 1/25/22   Joce Barbosa DO   benzonatate (TESSALON) 200 MG capsule Take 1 capsule by mouth 3 times daily as needed for Cough 1/25/22 2/4/22  Joce Barbosa, DO   Inulin (FIBER CHOICE PO) Take by mouth daily    Historical Provider, MD   donepezil (ARICEPT) 5 MG tablet Take 5 mg by mouth 2 times daily    Historical Provider, MD   Magnesium 500 MG CAPS Take by mouth daily    Historical Provider, MD   metoprolol succinate (TOPROL XL) 100 MG extended release tablet Take 1 tablet by mouth 2 times daily 1/4/22   Joce Barbosa DO   donepezil (ARICEPT) 10 MG tablet Take 1 tablet by mouth daily 12/13/21   Joce Barbosa DO   sertraline (ZOLOFT) 100 MG tablet TAKE 1 TABLET BY MOUTH  DAILY 12/9/21   Joce Barbosa,    Magnesium 100 MG CAPS Take 200 mg by mouth nightly 11/11/21   Joce Barbosa DO   ammonium lactate (LAC-HYDRIN) 12 % lotion Apply topically daily.  9/2/21   Warden Luz, VLAD   apixaban (ELIQUIS) 5 MG TABS tablet Take 1 tablet by mouth 2 times daily 2/15/21   Tracey Mcdonough Matilde, DO   folic acid (FOLVITE) 1 MG tablet Take 1 tablet by mouth daily 20   Iza Giuliano, DO   levothyroxine (SYNTHROID) 25 MCG tablet Take 1 tablet by mouth Daily 10/19/20   Iza Giuliano, DO   atorvastatin (LIPITOR) 20 MG tablet TAKE ONE TABLET BY MOUTH EVERY EVENING 10/19/20   Iza Giuliano, DO   diclofenac sodium 1 % GEL Apply 4 g topically 4 times daily 19   Iza Giuliano, DO   therapeutic multivitamin-minerals [facility]) tablet Take 1 tablet by mouth daily. Historical Provider, MD       ALLERGIES:  Patient has no known allergies. SOCIAL Hx:  Social History     Socioeconomic History    Marital status:      Spouse name: Not on file    Number of children: Not on file    Years of education: Not on file    Highest education level: Not on file   Occupational History    Occupation: retired   Tobacco Use    Smoking status: Former Smoker     Packs/day: 0.50     Quit date: 1994     Years since quittin.6    Smokeless tobacco: Never Used   Vaping Use    Vaping Use: Never used   Substance and Sexual Activity    Alcohol use: No     Comment: Coffee 1 cup a day     Drug use: No    Sexual activity: Not Currently   Other Topics Concern    Not on file   Social History Narrative    Not on file     Social Determinants of Health     Financial Resource Strain: Low Risk     Difficulty of Paying Living Expenses: Not hard at all   Food Insecurity: No Food Insecurity    Worried About [address] in the Last Year: Never true    [facility] in the Last Year: Never true   Transportation Needs:     Lack of Transportation (Medical): Not on file    Lack of Transportation (Non-Medical):  Not on file   Physical Activity:     Days of Exercise per Week: Not on file    Minutes of Exercise per Session: Not on file   Stress:     Feeling of Stress : Not on file   Social Connections:     Frequency of Communication with Friends and Family: Not on file    Frequency of Social Gatherings with Friends and Family: Not on file    Attends Yarsani Services: Not on file    Active Member of Clubs or Organizations: Not on file    Attends Club or Organization Meetings: Not on file    Marital Status: Not on file   Intimate Partner Violence:     Fear of Current or Ex-Partner: Not on file    Emotionally Abused: Not on file    Physically Abused: Not on file    Sexually Abused: Not on file   Housing Stability:     Unable to Pay for Housing in the Last Year: Not on file    Number of [place] in the Last Year: Not on file    Unstable Housing in the Last Year: Not on file       ROS:  General:   Admits to generalized malaise and fatigue. Psychological:   Denies anxiety, disorientation or hallucinations    ENT:    Denies epistaxis, headaches, vertigo or visual changes. Admits to mild irritation associated with the nasal cannula oxygen. Cardiovascular:   Denies any chest pain, irregular heartbeats, or palpitations. No paroxysmal nocturnal dyspnea. Respiratory:   Admits to shortness of breath both at rest and with exertion. She describes orthopnea. Gastrointestinal:   Denies nausea, vomiting, diarrhea, or constipation. Denies any abdominal pain. Denies change in bowel habits or stools. Genito-Urinary:    Denies any urgency, frequency, hematuria. Voiding without difficulty. Musculoskeletal:   Denies joint pain, joint stiffness, joint swelling or muscle pain    Neurology:    Denies any headache or focal neurological deficits. She admits to generalized weakness and deconditioning. Derm:    Denies any rashes, ulcers, or excoriations. Denies bruising. Extremities:   She admits to bilateral lower extremity swelling and edema.       PHYSICAL EXAM:  VITALS:  Vitals:    01/26/22 1432   BP: (!) 127/94   Pulse: 166   Resp:    Temp:    SpO2: 98%         CONSTITUTIONAL:    Awake, alert, cooperative, no apparent distress, and appears stated age    EYES:    PERRL, EOMI, sclera clear, conjunctiva normal    ENT:    Normocephalic, atraumatic, sinuses nontender on palpation. External ears without lesions. Oral pharynx with moist mucus membranes. Tonsils without erythema or exudates. Nasal cannula oxygen is in place. NECK:    Supple, symmetrical, trachea midline, no adenopathy, thyroid symmetric, not enlarged and no tenderness, skin normal, no bruits, no JVD    HEMATOLOGIC/LYMPHATICS:    No cervical lymphadenopathy and no supraclavicular lymphadenopathy    LUNGS:    Diminished bilaterally with decreased breath sounds throughout. Rales are noted in the bases posteriorly. CARDIOVASCULAR:    Atrial fibrillation with rapid ventricular response. Mild systolic murmur. ABDOMEN:    No scars, normal bowel sounds, soft, non-distended, non-tender, no masses palpated, no hepatosplenomegaly, no rebound or guarding elicited on palpation     MUSCULOSKELETAL:    There is no redness, warmth, or swelling of the joints. Full range of motion noted. Motor strength is 5 out of 5 all extremities bilaterally. Tone is normal.    NEUROLOGIC:    Awake, alert, oriented to name, place and time. Cranial nerves II-XII are grossly intact. Motor is 5 out of 5 bilaterally. SKIN:    No bruising or bleeding. No redness, warmth, or swelling    EXTREMITIES:    Lower extremity edema is present    OSTEOPATHIC:    Examined in seated and supine positions. Normal thoracic kyphosis and lumbar lordosis. No acute somatic dysfunction.     LABORATORY DATA:  CBC with Differential:    Lab Results   Component Value Date    WBC 13.1 01/26/2022    RBC 4.88 01/26/2022    HGB 14.7 01/26/2022    HCT 46.6 01/26/2022     01/26/2022    MCV 95.5 01/26/2022    MCH 30.1 01/26/2022    MCHC 31.5 01/26/2022    RDW 14.6 01/26/2022    SEGSPCT 81 09/06/2013    LYMPHOPCT 9.6 01/26/2022    MONOPCT 7.8 01/26/2022    BASOPCT 0.3 01/26/2022    MONOSABS 1.05 01/26/2022    LYMPHSABS 1.31 01/26/2022    EOSABS 0.00 01/26/2022    BASOSABS 0.00 01/26/2022     BMP:    Lab Results   Component Value Date     01/25/2022    K 6.0 01/25/2022    K 3.9 12/23/2021    CL 93 01/25/2022    CO2 11 01/25/2022    BUN 32 01/25/2022    LABALBU 3.6 01/25/2022    LABALBU 4.7 04/10/2012    CREATININE 1.5 01/25/2022    CALCIUM 9.7 01/25/2022    GFRAA 41 01/25/2022    LABGLOM 34 01/25/2022    GLUCOSE 105 01/25/2022    GLUCOSE 75 04/10/2012       ASSESSMENT:  1. Atrial fibrillation with rapid ventricular response  2. Decompensated diastolic congestive heart failure with preserved ejection fraction resulting in acute respiratory failure with hypoxia  3. Recent hospitalization status post negative ischemic evaluation  4. Hyperlipidemia on statin agent  5. Macular degeneration  6. Moderate to severe mitral regurgitation  7. History of tobacco abuse  8. History of TIAs  9. Gastroesophageal reflux disease      PLAN:  The patient has been following with her primary cardiologist regularly. She was found to be in atrial fibrillation with rapid ventricular response in the emergency department and Cardizem infusion has been instituted. Oral beta-blocker therapy will be resumed. 2D echocardiogram will be updated and the cardiovascular team will provide consultation. She is maintained on oral anticoagulation therapy. She appears to be volume overloaded with decompensated congestive heart failure. IV diuresis will be undertaken. We will attempt to wean off the nasal cannula oxygen. Complete infectious work-up will be undertaken as well.     Lavinia Tello DO, D.O., FACOI  2:42 PM  1/26/2022    Electronically signed by Lavinia Tello DO on 1/26/22 at 2:42 PM EST

## 2022-01-26 NOTE — ED NOTES
Pt presented tot he ED c/o chest pain. Pt has hx of afib and takes eliquis.       Jenny Frankel, RN  01/26/22 6445

## 2022-01-26 NOTE — PROGRESS NOTES
Christian Deleon,    Your patient is on a medication that requires a renal dose adjustment. Renal Function Assessment:    Date Body Weight IBW Adj. Body Weight SCr CrCl Dialysis status   1/26/2022 59 kg   1.7  No orders       Pharmacy has renally dose-adjusted the following medication(s):    Date Medication Original Dosing Regimen New Dosing Regimen   1/26/2022 apixaban 5 mg twice daily 2.5 mg twice daily           These changes were made per protocol according to the [facility] Renal Function-Based Dose Adjustments Policy    *Please note this dose may need readjusted if your patient's renal function significantly improves. Please contact pharmacy with any questions regarding these changes.

## 2022-01-27 LAB
ANION GAP SERPL CALCULATED.3IONS-SCNC: 15 MMOL/L (ref 7–16)
BASOPHILS ABSOLUTE: 0.03 E9/L (ref 0–0.2)
BASOPHILS RELATIVE PERCENT: 0.2 % (ref 0–2)
BUN BLDV-MCNC: 38 MG/DL (ref 6–23)
CALCIUM SERPL-MCNC: 8.1 MG/DL (ref 8.6–10.2)
CHLORIDE BLD-SCNC: 93 MMOL/L (ref 98–107)
CHOLESTEROL, TOTAL: 133 MG/DL (ref 0–199)
CO2: 20 MMOL/L (ref 22–29)
CREAT SERPL-MCNC: 1.3 MG/DL (ref 0.5–1)
EOSINOPHILS ABSOLUTE: 0.1 E9/L (ref 0.05–0.5)
EOSINOPHILS RELATIVE PERCENT: 0.7 % (ref 0–6)
GFR AFRICAN AMERICAN: 48
GFR NON-AFRICAN AMERICAN: 40 ML/MIN/1.73
GLUCOSE BLD-MCNC: 98 MG/DL (ref 74–99)
HCT VFR BLD CALC: 39.9 % (ref 34–48)
HDLC SERPL-MCNC: 37 MG/DL
HEMOGLOBIN: 12.9 G/DL (ref 11.5–15.5)
IMMATURE GRANULOCYTES #: 0.1 E9/L
IMMATURE GRANULOCYTES %: 0.7 % (ref 0–5)
LDL CHOLESTEROL CALCULATED: 85 MG/DL (ref 0–99)
LYMPHOCYTES ABSOLUTE: 1.13 E9/L (ref 1.5–4)
LYMPHOCYTES RELATIVE PERCENT: 8.1 % (ref 20–42)
MAGNESIUM: 2.1 MG/DL (ref 1.6–2.6)
MCH RBC QN AUTO: 30.6 PG (ref 26–35)
MCHC RBC AUTO-ENTMCNC: 32.3 % (ref 32–34.5)
MCV RBC AUTO: 94.8 FL (ref 80–99.9)
MONOCYTES ABSOLUTE: 1.19 E9/L (ref 0.1–0.95)
MONOCYTES RELATIVE PERCENT: 8.5 % (ref 2–12)
NEUTROPHILS ABSOLUTE: 11.4 E9/L (ref 1.8–7.3)
NEUTROPHILS RELATIVE PERCENT: 81.8 % (ref 43–80)
PDW BLD-RTO: 14.4 FL (ref 11.5–15)
PHOSPHORUS: 5 MG/DL (ref 2.5–4.5)
PLATELET # BLD: 346 E9/L (ref 130–450)
PMV BLD AUTO: 12 FL (ref 7–12)
POTASSIUM SERPL-SCNC: 4.2 MMOL/L (ref 3.5–5)
RBC # BLD: 4.21 E12/L (ref 3.5–5.5)
SODIUM BLD-SCNC: 128 MMOL/L (ref 132–146)
T4 FREE: 1.2 NG/DL (ref 0.93–1.7)
TRIGL SERPL-MCNC: 54 MG/DL (ref 0–149)
TSH SERPL DL<=0.05 MIU/L-ACNC: 1.36 UIU/ML (ref 0.27–4.2)
VLDLC SERPL CALC-MCNC: 11 MG/DL
WBC # BLD: 14 E9/L (ref 4.5–11.5)

## 2022-01-27 PROCEDURE — 80061 LIPID PANEL: CPT

## 2022-01-27 PROCEDURE — 6360000002 HC RX W HCPCS: Performed by: INTERNAL MEDICINE

## 2022-01-27 PROCEDURE — 2580000003 HC RX 258: Performed by: INTERNAL MEDICINE

## 2022-01-27 PROCEDURE — 84443 ASSAY THYROID STIM HORMONE: CPT

## 2022-01-27 PROCEDURE — 6370000000 HC RX 637 (ALT 250 FOR IP): Performed by: INTERNAL MEDICINE

## 2022-01-27 PROCEDURE — 83735 ASSAY OF MAGNESIUM: CPT

## 2022-01-27 PROCEDURE — 84100 ASSAY OF PHOSPHORUS: CPT

## 2022-01-27 PROCEDURE — 80048 BASIC METABOLIC PNL TOTAL CA: CPT

## 2022-01-27 PROCEDURE — 99233 SBSQ HOSP IP/OBS HIGH 50: CPT | Performed by: INTERNAL MEDICINE

## 2022-01-27 PROCEDURE — 36415 COLL VENOUS BLD VENIPUNCTURE: CPT

## 2022-01-27 PROCEDURE — 97530 THERAPEUTIC ACTIVITIES: CPT | Performed by: PHYSICAL THERAPIST

## 2022-01-27 PROCEDURE — 2500000003 HC RX 250 WO HCPCS: Performed by: INTERNAL MEDICINE

## 2022-01-27 PROCEDURE — 97530 THERAPEUTIC ACTIVITIES: CPT | Performed by: OCCUPATIONAL THERAPIST

## 2022-01-27 PROCEDURE — 97161 PT EVAL LOW COMPLEX 20 MIN: CPT | Performed by: PHYSICAL THERAPIST

## 2022-01-27 PROCEDURE — 2060000000 HC ICU INTERMEDIATE R&B

## 2022-01-27 PROCEDURE — 85025 COMPLETE CBC W/AUTO DIFF WBC: CPT

## 2022-01-27 PROCEDURE — 84439 ASSAY OF FREE THYROXINE: CPT

## 2022-01-27 PROCEDURE — 97165 OT EVAL LOW COMPLEX 30 MIN: CPT | Performed by: OCCUPATIONAL THERAPIST

## 2022-01-27 RX ADMIN — MULTIPLE VITAMINS W/ MINERALS TAB 1 TABLET: TAB at 08:28

## 2022-01-27 RX ADMIN — DILTIAZEM HYDROCHLORIDE 5 MG/HR: 5 INJECTION INTRAVENOUS at 23:35

## 2022-01-27 RX ADMIN — FUROSEMIDE 40 MG: 10 INJECTION, SOLUTION INTRAMUSCULAR; INTRAVENOUS at 08:27

## 2022-01-27 RX ADMIN — FOLIC ACID 1 MG: 1 TABLET ORAL at 08:27

## 2022-01-27 RX ADMIN — Medication 10 ML: at 22:40

## 2022-01-27 RX ADMIN — DONEPEZIL HYDROCHLORIDE 5 MG: 5 TABLET, FILM COATED ORAL at 22:25

## 2022-01-27 RX ADMIN — SERTRALINE 100 MG: 50 TABLET, FILM COATED ORAL at 08:27

## 2022-01-27 RX ADMIN — ATORVASTATIN CALCIUM 20 MG: 20 TABLET, FILM COATED ORAL at 22:26

## 2022-01-27 RX ADMIN — DONEPEZIL HYDROCHLORIDE 5 MG: 5 TABLET, FILM COATED ORAL at 08:56

## 2022-01-27 RX ADMIN — METOPROLOL SUCCINATE 100 MG: 25 TABLET, FILM COATED, EXTENDED RELEASE ORAL at 22:25

## 2022-01-27 RX ADMIN — APIXABAN 2.5 MG: 5 TABLET, FILM COATED ORAL at 08:28

## 2022-01-27 RX ADMIN — METOPROLOL SUCCINATE 100 MG: 25 TABLET, FILM COATED, EXTENDED RELEASE ORAL at 08:28

## 2022-01-27 RX ADMIN — APIXABAN 5 MG: 5 TABLET, FILM COATED ORAL at 22:26

## 2022-01-27 RX ADMIN — Medication 10 ML: at 08:29

## 2022-01-27 RX ADMIN — FAMOTIDINE 40 MG: 20 TABLET, FILM COATED ORAL at 18:33

## 2022-01-27 RX ADMIN — LEVOTHYROXINE SODIUM 25 MCG: 25 TABLET ORAL at 08:28

## 2022-01-27 RX ADMIN — FUROSEMIDE 40 MG: 10 INJECTION, SOLUTION INTRAMUSCULAR; INTRAVENOUS at 22:40

## 2022-01-27 ASSESSMENT — PAIN SCALES - GENERAL: PAINLEVEL_OUTOF10: 0

## 2022-01-27 NOTE — PROGRESS NOTES
[address]  [address], [address]         Date:2022                                                   Patient Name: Senait Gates     MRN: [id number]     : 1945     Room:        Evaluating OT: Torey Soto, ABEBER/L; [id number]       Referring Provider and Orders/Date:   OT eval and treat Start: 22 1445, End: 22 1445, ONE TIME, Standing Count: 1 Occurrences, R    Patrick Fry DO       Diagnosis:   1.  Atrial fibrillation with RVR (HCC)    2. Abdominal pain, unspecified abdominal location       Pertinent Medical History: Dementia       Past Medical History:   Diagnosis Date    Arthritis     Asymptomatic PVCs 2012    pt felt flutter, no other sx, holter + pvc's, few runs of SVT   [place]    History of Holter monitoring     Hyperlipidemia     Macular degeneration     Nausea & vomiting     Pelvic prolapse     PONV (postoperative nausea and vomiting)     TIA (transient ischemic attack)           Past Surgical History:   Procedure Laterality Date    COLONOSCOPY      ENDOSCOPY, COLON, DIAGNOSTIC      EYE SURGERY      macular hole    HERNIA REPAIR      inguinal    HYSTERECTOMY      OTHER SURGICAL HISTORY  2013    ant elevatetransobturator sling rectocele and cystocele enterocele    SKIN BIOPSY      arms    TRANSESOPHAGEAL ECHOCARDIOGRAM N/A 10/20/2020    TRANSESOPHAGEAL ECHOCARDIOGRAM WITH BUBBLE STUDY performed by Christina Weems MD at [facility] ENDOSCOPY       Precautions:  Fall Risk    Recommended placement: home with [facility] if needed    Assessment of current deficits     [x] Functional mobility  [x]ADLs  [x] Strength               []Cognition     [x] Functional transfers   [x] IADLs         [x] Safety Awareness   [x]Endurance     [] Fine Coordination              [x] Balance      [] Vision/perception   []Sensation      [x]Gross Motor Coordination  [] ROM  [] Delirium [] Motor Control     OT PLAN OF CARE   OT POC based on physician orders, patient diagnosis and results of clinical assessment    Frequency/Duration 1-3 days/wk for 2 weeks PRN   Specific OT Treatment Interventions to include:   * Instruction/training on adapted ADL techniques and AE recommendations to increase functional independence within precautions       * Training on energy conservation strategies, correct breathing pattern and techniques to improve independence/tolerance for self-care routine  * Functional transfer/mobility training/DME recommendations for increased independence, safety, and fall prevention  * Patient/Family education to increase follow through with safety techniques and functional independence  * Recommendation of environmental modifications for increased safety with functional transfers/mobility and ADLs  * Cognitive retraining/development of therapeutic activities to improve problem solving, judgement, memory, and attention for increased safety/participation in ADL/IADL tasks  * Therapeutic exercise to improve motor endurance, ROM, and functional strength for ADLs/functional transfers  * Therapeutic activities to facilitate/challenge dynamic balance, stand tolerance for increased safety and independence with ADLs  * Therapeutic activities to facilitate gross/fine motor skills for increased independence with ADLs  * Neuro-muscular re-education: facilitation of righting/equilibrium reactions, midline orientation, scapular stability/mobility, normalization of muscle tone, and facilitation of volitional active controled movement  * Positioning to improve skin integrity, interaction with environment and functional independence     Recommended Adaptive Equipment/DME: shower chair; TBD      Home Living: Patient lives alone in a ranch home  with 3 steps  to enter bilateral Rail. Laundry in basement with a standard flight of steps to basement, but will have assistance.  Can have family assist if needed. Bathroom setup: tub shower with grab bars, standard toilet    DME owned: cane and wheeled walker     Prior Level of Function: indep with ADLs , indep with IADLs; ambulated indep   Driving: no   Occupation: retired   Enjoys: ipad, reading, 2 cats    Pain Level: none  Cognition: A&O: 4/4; Follows 3 step directions   Memory:  Intact   Sequencing:  Intact   Problem solving:  Intact   Judgement/safety:  Intact    AM-PAC Daily Activity Inpatient   How much help for putting on and taking off regular lower body clothing?: A Little  How much help for Bathing?: A Little  How much help for Toileting?: A Little  How much help for putting on and taking off regular upper body clothing?: A Lot  How much help for taking care of personal grooming?: A Little  How much help for eating meals?: A Little  AM-PAC Inpatient Daily Activity Raw Score: 17  AM-PAC Inpatient ADL T-Scale Score : 37.26  ADL Inpatient CMS 0-100% Score: 50.11  ADL Inpatient CMS G-Code Modifier : CK     Functional Assessment:     Initial Eval Status  Date: 1/27/2022   Treatment Status  Date: STGs = LTGs  Time frame: 10-14 days   Feeding Supervision sitting EOB  indep   Grooming Stand by Assist standing to use hand   Independent    UB Dressing Moderate Assist with gown management from sitting EOB  Indep   LB Dressing Min A with socks and slippers from sitting EOB. Independent    Bathing Minimal Assist suspected, but unable to complete from ED setting  Modified [place]    Toileting Min A with abraham management. Independent    Bed Mobility  Supine to sit: Moderate Assist   Sit to supine: Minimal Assist     Supine to sit: Independent   Sit to supine: Independent    Functional Transfers Stand by Assist from elevated surface of bed and toilet without AD  Independent    Functional Mobility Stand by Assist without AD for short distance functional mobility throughout the room to simulate house hold distances.   Independent Balance Sitting:     Static:  good    Dynamic:good  Standing: fair+    Standing: good   Activity Tolerance Vitals with activity:104/77 HR 95 sitting EOB; standing 108/75 . Sitting with 5min tolerance and standing 2min tolerance  Increase standing tolerance for >4min with stable vital signs for carry over into toileting, functional tranfers and indep in ADLs   Visual/  Perceptual Glasses: Present; WFL    Reports change in vision since admission: No     NA   Yuriy UE Strengthening  4/5 generally  5/5MMT generally for carry over into self care, functional transfers and functional mobility with AD. Hand Dominance  [x] Right  [] Left    AROM (PROM) Strength Additional Info:    RUE  WFL 4/5 good  and  FMC/dexterity noted during ADL tasks  Opposition [x] Intact [] Impaired  Finger to nose [x] Intact [] Impaired     LUE WFL 4/5 good  and FMC/dexterity noted during ADL tasks  Opposition [x] Intact [] Impaired  Finger to nose [x] Intact [] Impaired     Hearing: WFL   Sensation:  No c/o numbness or tingling   Tone: WFL   Edema: yuriy ankles    Comments: Upon arrival patient supine with daughter present. Pt required mod A for most UB ADLs and min A LB ADLs tasks. Limited with SB A for standing during LB ADLs and functional transfers. The biggest barriers reflect that of functional transfers, functional mobility, UB/LB ADLs, cognition, activity tolerance, balance, safety and strengthening. At end of session, patient supine with call light and phone within reach, all lines and tubes intact. Overall patient demonstrated decreased independence and safety during completion of ADL/functional transfer/mobility tasks compared to PLOF. Nursing updated on pt position and status following OT eval. Pt would benefit from continued skilled OT to increase safety and independence with completion of ADL/IADL tasks for functional independence and quality of life.     Treatment: OT treatment provided this date includes:   Instruction, education and training on safe facilitation and adapted techniques for completion of ADLs. These include neuromuscular reeducation to facilitate balance/righting reactions, safe functional transfer techniques and on energy conservation/work simplification for completion of ADLs. Education provided on hand/feet placement with toilet and body mechanics for fall prevention. Cues for energy conservation and safety for in the home at DC, including modifications and DME. Extended time to complete all tasks, including skilled monitoring of patient's response during treatment session and vital signs. Prior to and at the end of session, environmental modifications / line management completed for patients safety and efficiency of treatment session. See above for further details. Rehab Potential: Good for established goals     Patient / Family Goal: Return home    Patient and/or family were instructed on functional diagnosis, prognosis/goals and OT plan of care. Demonstrated good understanding. Eval Complexity: Low  · History: Brief review of medical records and additional review of physical, cognitive, or psychosocial history related to current functional performance  · Exam: 3+ performance deficits  · Assistance/Modification: Mod assistance or modifications required to perform tasks. May have comorbidities that affect occupational performance.     Time In: 1436  Time Out: 1507  Total Treatment Time: 11    Min Units   OT Eval Low 97165  x  1   OT Eval Medium 12242      OT Eval High 43286      OT Re-Eval [id number]       Therapeutic Ex 33981       Therapeutic Activities 77839  11 1    ADL/Self Care 30066       Orthotic Management 84564       Manual [id number]     Neuro Re-Ed 31443       Non-Billable Time          Evaluation Time additionally includes thorough review of current medical information, gathering information on past medical history/social history and prior level of function, interpretation of standardized testing/informal observation of tasks, assessment of data and development of plan of care and goals.             Zohaib Colindres OTR/L; [id number]

## 2022-01-27 NOTE — ACP (ADVANCE CARE PLANNING)
Advance Care Planning     Advance Care Planning Activator (Inpatient)  Conversation Note      Date of ACP Conversation: 1/27/2022     Conversation Conducted with: Patient with Decision Making Capacity    ACP Activator: Shaun Hazel, [address] Maker:     Current Designated Health Care Decision Maker:     Primary Decision Maker: Zachery Nicolas, [address]    Secondary Decision Maker: Jeffrey Dueñas [address] [facility] - [phone]  Click here to complete Healthcare Decision Makers including section of the Healthcare Decision Maker Relationship (ie \"Primary\")  Today we documented Decision Maker(s) consistent with ACP documents on file. Care Preferences    Ventilation: \"If you were in your present state of health and suddenly became very ill and were unable to breathe on your own, what would your preference be about the use of a ventilator (breathing machine) if it were available to you? \"      Would the patient desire the use of ventilator (breathing machine)?: no    \"If your health worsens and it becomes clear that your chance of recovery is unlikely, what would your preference be about the use of a ventilator (breathing machine) if it were available to you? \"     Would the patient desire the use of ventilator (breathing machine)?: No      Resuscitation  \"CPR works best to restart the heart when there is a sudden event, like a heart attack, in someone who is otherwise healthy. Unfortunately, CPR does not typically restart the heart for people who have serious health conditions or who are very sick. \"    \"In the event your heart stopped as a result of an underlying serious health condition, would you want attempts to be made to restart your heart (answer \"yes\" for attempt to resuscitate) or would you prefer a natural death (answer \"no\" for do not attempt to resuscitate)? \" no       [] Yes   [x] No   Educated Patient / Tasiha Preciado regarding differences between Advance Directives and portable DNR orders. Length of ACP Conversation in minutes: 14     Conversation Outcomes:  [x] ACP discussion completed  [] Existing advance directive reviewed with patient; no changes to patient's previously recorded wishes  [] New Advance Directive completed  [] Portable Do Not Rescitate prepared for Provider review and signature  [] POLST/POST/MOLST/MOST prepared for Provider review and signature      Follow-up plan:    [] Schedule follow-up conversation to continue planning  [x] Referred individual to Provider for additional questions/concerns   [] Advised patient/agent/surrogate to review completed ACP document and update if needed with changes in condition, patient preferences or care setting. [x] This note routed to one or more involved healthcare providers. Pt wants to be DNR/CCA. Please see SW notes from 1/27/22.
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
## 2022-01-27 NOTE — ED NOTES
Pt resting comfortably with family at bedside. Repositioned for comfort - waiting for bed assignment for admission. Pt denies any complaints.      Alicia Estrada RN  01/27/22 6538

## 2022-01-27 NOTE — CARE COORDINATION
SS Note: Covid test negative. Pt boarded in ED, presented for CP, SOB & found to have A-Fib w/RVR- being weaned from cardizem drip. Echo pending. Pt on RA. Pt on chronic Eliquis. PT eval notes: Tentative placement recommendation: Home with HH PT vs Home. SW met w/pt in ED 12 and spoke from door wearing mask/PPE and explained role. Pt's Dtr, Lulú Young @ bedside and pt gives permission to speak openly. Pt is a readmission 12/23 - 12/27 Atrial fibrillation with RVR. SW completed the readmission review. Pt is  who lives alone in ran home has 3 step entry. Pt seems to have some memory issues as her Dtr had to correct her several times when she misreported information she was giving to SW; like she did go to her cardiologist since her last hospitalization but pt reports she did not. Pt has Macular degeneration so she does not drive. Her Dtr or JAYDEN transport her or friends assist. PTA, pt is independent in IADL's/ADL's and has insurance. PCP is Malvin Ly [address] is [facility] or [facility] in [place]. Pt has following DME: none that she uses. She noted OT recommended shower chair. SW noted that is not covered via insurance and gave her list of DME's. Can also purchase at [facility] or even [employer]. Denies hx of [facility] or [facility] & No hx of 02. SW completed ACP w/pt. She wants to be DNR/CCA. SW talked about possible need for [address] and list provided. SW also gave resources for [employer] and [facility]. Pt's Dtr did inform SW outside of the room that pt has been having more memory issues- short term memory loss. She feels pt is not reliable nor accurate in information she reports to physicians, especially her symptoms. Pt informed Ced Nesbitt she had diarrhea and dark stool- opposite of what she told doctor- said it was fine. Pt had CP in ED while Ced Nesbitt there and when doctor asked 1/2 later, she denied; maybe she forgot? Also, big concern is pt will not take her meds when she feels well.  She will not allow Alicia Ernandez to oversee this as she wants to be independent. However, it seems to be affecting her health. Pt takes Aricept 10 mg and Zoloft but may need to increase. ABIGAIL returned to room and reviewed Aleisha's discussion w/her. Pt acknowledges she is having some memory loss and states she will allow Alicia Ernandez to oversee her meds now. Pt aware she is taking Aricept 10 mg and Zoloft but gives no reason why she has missed meds. Alicia Ernandez did give SW updated POA naming her as agent and her  as 1st alt. SW had it scanned to chart. SW updated [id number]-NG of pt's code status. She will alert  to have doctor paged for update and need for code status to change.    Electronically signed by MERCEDES Vallejo on 1/27/2022 at 6:06 PM

## 2022-01-27 NOTE — PROGRESS NOTES
Physical Therapy Initial Evaluation/Plan of Care    Room #:  12/12  Patient Name: Andreea Bacon  YOB: 1945  MRN: [id number]    Date of Service: 1/27/2022     Tentative placement recommendation: Home with [facility] PT vs Home  Equipment recommendation: None      Evaluating Physical Therapist: Funmi Sethi PT, DPT #[id number]      Specific Provider Orders/Date/Referring Provider :     01/26/22 1445   PT eval and treat Start: 01/26/22 1445, End: 01/26/22 1445, ONE TIME, Standing Count: 1 Occurrences, R    Artur Borja, DO Acknowledge New    Admitting Diagnosis:   Atrial fibrillation with rapid ventricular response ([address] [I48.91]      Surgery: none  Visit Diagnoses       Codes    Abdominal pain, unspecified abdominal location     R10.9          Patient Active Problem List   Diagnosis    Pelvic prolapse    Hyperlipidemia    GERD (gastroesophageal reflux disease)    PUD (peptic ulcer disease)    Urinary incontinence    Osteoarthritis    Palpitations    Precordial pain    Thrombophilia ([address]    Alzheimer's disease, unspecified    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 Pt generally steady during ambulation with occasional VC for [employer] approximation throughout session. Pt performed function slowly with no LOB, dizziness, and no reported SOB.         PHYSICAL THERAPY  PLAN OF CARE       Physical therapy plan of care is established based on physician order,  patient diagnosis and clinical assessment    Current Treatment Recommendations:    -Bed Mobility: Lower extremity exercises  and Trunk control activities   -Sitting Balance: Incorporate reaching activities to activate trunk muscles , Hands on support to maintain midline , Facilitate active trunk muscle engagement , Facilitate postural control in all planes  and Engage in core activities to allow for movement within base of support   -Standing Balance: Perform strengthening exercises in standing to promote motor control with or without upper extremity support , Instruct patient on adequate base of support to maintain balance and Challenge balance utilizing reaching  activities beyond center of gravity    -Transfers: Provide instruction on proper hand and foot position for adequate transfer of weight onto lower extremities and use of gait device if needed, Cues for hand placement, technique and safety. Provide stabilization to prevent fall , Facilitate weight shift forward on to lower extremities and provide necessary stabilization of bilateral lower extremities , Support transfer of weight on to lower extremities and Assist with extension of knees trunk and hip to accept weight transfer   -Gait: Gait training, Standing activities to improve: base of support, weight shift, weight bearing , Exercises to improve trunk control, Exercises to improve hip and knee control, Performance of protected weight bearing activities and Activities to increase weight bearing   -Endurance: Utilize Supervised activities to increase level of endurance to allow for safe functional mobility including transfers and gait  and Use graduated activities to promote good breathing techniques and provide support and education to maximize respiratory function    PT long term treatment goals are located in below grid    Patient and or family understand(s) diagnosis, prognosis, and plan of care. Frequency of treatments: Patient will be seen  3-5 times/week.          Prior Level of Function: Patient ambulated independently   Rehab Potential: good  for baseline    Past medical history:   Past Medical History:   Diagnosis Date    Arthritis     Asymptomatic PVCs 4/2012    pt felt flutter, no other sx, holter + pvc's, few runs of SVT   [place]    History of Holter monitoring     Hyperlipidemia     Macular degeneration     Nausea & vomiting     Pelvic prolapse     PONV (postoperative nausea and vomiting)     TIA (transient ischemic attack)      Past Surgical History:   Procedure Laterality Date    COLONOSCOPY  2012    ENDOSCOPY, COLON, DIAGNOSTIC      EYE SURGERY      macular hole    HERNIA REPAIR      inguinal    HYSTERECTOMY      OTHER SURGICAL HISTORY  sept 2013    ant elevatetransobturator sling rectocele and cystocele enterocele    SKIN BIOPSY      arms    TRANSESOPHAGEAL ECHOCARDIOGRAM N/A 10/20/2020    TRANSESOPHAGEAL ECHOCARDIOGRAM WITH BUBBLE STUDY performed by Donna Byers MD at [address] [place] [address] Avenue:    Precautions: Up with assistance, falls and dementia   Social history: Patient lives alone in a ranch home  with 3 steps  to enter bilateral Rail  Tub shower grab bars    Equipment owned: Bella Page and [address] [place] [address]   17/24    AM-[facility] Mobility Inpatient   How much difficulty turning over in bed?: A Little  How much difficulty sitting down on / standing up from a chair with arms?: A Little  How much difficulty moving from lying on back to sitting on side of bed?: A Little  How much help from another person moving to and from a bed to a chair?: A Little  How much help from another person needed to walk in hospital room?: A Little  How much help from another person for climbing 3-5 steps with a railing?: A Lot  AM-PAC Inpatient Mobility Raw Score : 17  AM-PAC Inpatient T-Scale Score : 42.13  Mobility Inpatient CMS 0-100% Score: 50.57  Mobility Inpatient CMS G-Code Modifier : CK    Nursing cleared patient for PT evaluation. The admitting diagnosis and active problem list as listed above have been reviewed prior to the initiation of this evaluation.     OBJECTIVE;   Initial Evaluation  Date: 1/27/2022 Treatment Date:     Short Term/ Long Term   Goals   Was pt agreeable to Eval/treatment? Yes  To be met in 2 days   Pain level   0/10       Bed Mobility    Rolling: Supervision     Supine to sit: Supervision     Sit to supine: Supervision     Scooting: Supervision     Rolling: Independent    Supine to sit: Independent    Sit to supine: Independent    Scooting: Independent     Transfers Sit to stand: Minimal assist of 1   Sit to stand: Independent    Ambulation    2 x 100 feet using  wheeled walker with Supervision    for walker control, walker approximation, balance and safety   > 200 feet using  least restrictive device versus no device with Independent    Stair negotiation: ascended and descended   Not assessed     3 steps with 2 HR with Mod I   ROM Within functional limits    Increase range of motion 10% of affected joints    Strength BUE:  refer to OT eval  RLE:  4/5  LLE:  4/5  Increase strength in affected mm groups by 1/3 grade   Balance Sitting EOB:  fair +  Dynamic Standing:  fair +  Sitting EOB:  good   Dynamic Standing: good      Patient is Alert & Oriented x person, place, time and situation and follows directions    Sensation:  Patient  denies numbness/tingling   Edema:  no   Endurance: fair  +    Vitals: room air   Blood Pressure at rest  Blood Pressure during session    Heart Rate at rest  Heart Rate during session    SPO2 at rest %  SPO2 during session %     Patient education  Patient educated on role of Physical Therapy, risks of immobility, safety and plan of care, energy conservation,  importance of mobility while in hospital , importance and purpose of adaptive device and adjusted to proper height for the patient.  and safety      Patient response to education:   Pt verbalized understanding Pt demonstrated skill Pt requires further education in this area   Yes Partial Yes      Treatment:  Patient practiced and was instructed/facilitated in the following treatment: Patient Sat edge of bed 10 minutes with Supervision  to increase dynamic sitting balance and activity tolerance. Pt performed bed mobility, transfers, ambulation in hallways. Therapeutic Exercises:  not performed      At end of session, patient in bed with   call light and phone within reach,  all lines and tubes intact, nursing notified. Patient would benefit from continued skilled Physical Therapy to improve functional independence and quality of life. Patient's/ family goals   get stronger    Time in  1250  Time out  1320    Total Treatment Time  10 minutes    Evaluation time includes thorough review of current medical information, gathering information on past medical history/social history and prior level of function, completion of standardized testing/informal observation of tasks, assessment of data, and development of Plan of care and goals.      CPT codes:  Low Complexity PT evaluation (49029)  Therapeutic activities (86008)   10 minutes  1 unit(s)    Manfred Marino PT

## 2022-01-27 NOTE — ED NOTES
Pt resting comfortably sitting at side of bed on ipad. Pt denies any complaints. IV to RAC leaking around site. Pt denies pain. IV removed and replaced with 22g PIV to right hand.  socks applied to patient.      Neel Garcia RN  01/27/22 Hope Mom

## 2022-01-27 NOTE — PROGRESS NOTES
Internal Medicine Progress Note    [facility]    Fatuma Menendez. Kayley Nunes., FARAZ.C.OJinnyI. Aubrey Shaffer D.O., SHANTANUOIZAIAH Bernstein D.O. Junior Ortega, MSN, APRN, NP-C  Lilian Bingham. Sheng Hyatt, MSN, APRN-CNP     Primary Care Physician: Linda Damian DO   Admitting Physician:  Ladi Nair DO  Admission date and time: 1/26/2022  1:19 PM    Room:  12/12  Admitting diagnosis: Atrial fibrillation with rapid ventricular response [facility]) [I48.91]    Patient Name: Lacey Calhoun  MRN: [id number]    Date of Service: 1/27/2022     Subjective:  Mainor Ravi is a 64 y.o. female who was seen and examined today,1/27/2022, at the bedside. She is resting comfortably. States that she does feel palpitations when her heart races. Admits to fatigue as well. Patient also has complaint of lower leg edema- pitting which is new. No family present during my examination. Review of System:   Constitutional:   Denies fever or chills, weight loss or gain,+ fatigue. HEENT:   Denies ear pain, sore throat, sinus or eye problems. Cardiovascular:   Denies any chest pain, + irregular heartbeats and palpitations. Respiratory:   Denies shortness of breath, coughing, sputum production, hemoptysis, or wheezing. Gastrointestinal:   Denies nausea, vomiting, diarrhea, or constipation. Denies any abdominal pain. Genitourinary:    Denies any urgency, frequency, hematuria. Voiding  without difficulty. Extremities:   Admits to lower leg edema. Neurology:    Denies any headache or focal neurological deficits, Denies generalized weakness or memory difficulty. Psch:   Denies being anxious or depressed. Musculoskeletal:    Denies  myalgias, joint complaints or back pain. Integumentary:   Denies any rashes, ulcers, or excoriations. Denies bruising. Hematologic/Lymphatic:  Denies bruising or bleeding. Physical Exam:  No intake/output data recorded.   No intake or output data in the 24 hours ending 01/27/22 1312No intake/output data recorded. Patient Vitals for the past 96 hrs (Last 3 readings):   Weight   01/26/22 1336 130 lb (59 kg)     Vital Signs:   Blood pressure 100/74, pulse 102, temperature 97.4 °F (36.3 °C), temperature source Oral, resp. rate 24, weight 130 lb (59 kg), SpO2 97 %, not currently breastfeeding. General appearance:  Alert, responsive, oriented to person, place, and time. Well preserved, alert, no distress. Head:  Normocephalic. No masses, lesions or tenderness. Eyes:  PERRLA. EOMI. Sclera clear. Buccal mucosa moist.  ENT:  Ears normal. Mucosa normal.  Neck:    Supple. Trachea midline. No thyromegaly. No JVD. No bruits. Heart:    Irregular Rhythm and variable rate- tachycardic at times. Murmur present. Lungs:    Symmetrical. Clear to auscultation bilaterally. No wheezes. No rhonchi. No rales. Abdomen:   Soft. Non-tender. Non-distended. Bowel sounds positive. No organomegaly or masses. No pain on palpation. Extremities:    Peripheral pulses present. 2+ pitting lower leg/ankle edema. No ulcers. No cyanosis. No clubbing. Neurologic:    Alert x 3. No focal deficit. Cranial nerves grossly intact. No focal weakness. Psych:   Behavior is normal. Mood appears normal. Speech is not rapid and/or pressured. Musculoskeletal:   Spine ROM normal. Muscular strength intact. Gait not assessed. Integumentary:  No rashes  Skin normal color and texture.   Genitalia/Breast:  Deferred    Medication:  Scheduled Meds:   apixaban  5 mg Oral BID    sodium chloride flush  5-40 mL IntraVENous BID    atorvastatin  20 mg Oral QPM    donepezil  5 mg Oral BID    famotidine  40 mg Oral QPM    folic acid  1 mg Oral Daily    levothyroxine  25 mcg Oral Daily    metoprolol succinate  100 mg Oral BID    sertraline  100 mg Oral Daily    therapeutic multivitamin-minerals  1 tablet Oral Daily    furosemide  40 mg IntraVENous BID     Continuous Infusions:   dilTIAZem (CARDIZEM) 125 mg in dextrose 5% 125 mL infusion 5 mg/hr (01/27/22 1035)       Objective Data:  CBC with Differential:    Lab Results   Component Value Date    WBC 14.0 01/27/2022    RBC 4.21 01/27/2022    HGB 12.9 01/27/2022    HCT 39.9 01/27/2022     01/27/2022    MCV 94.8 01/27/2022    MCH 30.6 01/27/2022    MCHC 32.3 01/27/2022    RDW 14.4 01/27/2022    SEGSPCT 81 09/06/2013    LYMPHOPCT 8.1 01/27/2022    MONOPCT 8.5 01/27/2022    BASOPCT 0.2 01/27/2022    MONOSABS 1.19 01/27/2022    LYMPHSABS 1.13 01/27/2022    EOSABS 0.10 01/27/2022    BASOSABS 0.03 01/27/2022     CMP:    Lab Results   Component Value Date     01/27/2022    K 4.2 01/27/2022    K 5.1 01/26/2022    CL 93 01/27/2022    CO2 20 01/27/2022    BUN 38 01/27/2022    CREATININE 1.3 01/27/2022    GFRAA 48 01/27/2022    LABGLOM 40 01/27/2022    GLUCOSE 98 01/27/2022    GLUCOSE 75 04/10/2012    PROT 7.2 01/26/2022    LABALBU 4.1 01/26/2022    LABALBU 4.7 04/10/2012    CALCIUM 8.1 01/27/2022    BILITOT 0.9 01/26/2022    ALKPHOS 92 01/26/2022    AST 71 01/26/2022    ALT 69 01/26/2022       Wound Documentation:   Incision 09/04/13 Perineum (Active)   Number of days: 3066       Assessment:    · Paroxysmal atrial fibrillation with rapid ventricular response  · Acute on chronic diastolic heart failure with preserved ejection fraction resulting in acute respiratory failure with hypoxia  · Recent hospitalization status post negative ischemic evaluation  · Hyperlipidemia on statin agent  · Macular degeneration  · Moderate to severe mitral regurgitation  · History of tobacco abuse  · History of TIAs  · Gastroesophageal reflux disease  · Hypothyroidism  · Hx of LA appendage thrombus      Plan:   Cardiology is following. Recommendations reviewed. Patient to be weaned from cardizem drip as able and continue on beta blocker therapy. Continue diuretic therapy- strict I/O. Echocardiogram is pending. Continue current therapy. See orders for further plan of care.     Due to extremely high hospital inpatient census and bed limitations, along with staff shortages, we have had a kathryn discussion with the patient/family regarding the likelihood of prolonged ER boarding status and potential delays/disruptions in care related to this. They understand and agree to maintain hospitalization at this facility while accepting these risks. We have made every attempt to coordinate care with the ER nursing staff as well to avoid any disruptions in care. More than 50% of my  time was spent at the bedside counseling/coordinating care with the patient and/or family with face to face contact. This time was spent reviewing notes and laboratory data as well as instructing and counseling the patient. Time I spent with the family or surrogate(s) is included only if the patient was incapable of providing the necessary information or participating in medical decisions. I also discussed the differential diagnosis and all of the proposed management plans with the patient and individuals accompanying the patient. Tammy Timmons requires this high level of physician care and nursing on the IMC/Telemetry unit due the complexity of decision management and chance of rapid decline or death. Continued cardiac monitoring and higher level of nursing are required. I am readily available for any further decision-making and intervention. The patient was seen, examined and then discussed with Dr. Marcell Pepper. Narcisa Fajardo, ELIO - CNP,  1/27/2022  1:12 PM      I agree with the findings and the plan of care as documented in Narcisa Fajardo NP-C's  note.     Magui Lees DO, D.O., FACOI  1:46 PM  1/27/2022

## 2022-01-27 NOTE — ED NOTES
Nevaeh Evans,  spoke with patient and reports that patient requesting DNR-CCA code status. Message given to Dr. Octavio Joy and telephone order received to change order.       Sienna Rahman RN  01/27/22 8606

## 2022-01-28 ENCOUNTER — TELEPHONE (OUTPATIENT)
Dept: FAMILY MEDICINE CLINIC | Age: 77
End: 2022-01-28

## 2022-01-28 ENCOUNTER — APPOINTMENT (OUTPATIENT)
Dept: ULTRASOUND IMAGING | Age: 77
DRG: 308 | End: 2022-01-28
Payer: MEDICARE

## 2022-01-28 ENCOUNTER — APPOINTMENT (OUTPATIENT)
Dept: CT IMAGING | Age: 77
DRG: 308 | End: 2022-01-28
Payer: MEDICARE

## 2022-01-28 LAB
ANION GAP SERPL CALCULATED.3IONS-SCNC: 13 MMOL/L (ref 7–16)
BASOPHILS ABSOLUTE: 0.02 E9/L (ref 0–0.2)
BASOPHILS RELATIVE PERCENT: 0.1 % (ref 0–2)
BUN BLDV-MCNC: 27 MG/DL (ref 6–23)
CALCIUM SERPL-MCNC: 8.6 MG/DL (ref 8.6–10.2)
CHLORIDE BLD-SCNC: 87 MMOL/L (ref 98–107)
CO2: 28 MMOL/L (ref 22–29)
CREAT SERPL-MCNC: 1.1 MG/DL (ref 0.5–1)
EOSINOPHILS ABSOLUTE: 0.05 E9/L (ref 0.05–0.5)
EOSINOPHILS RELATIVE PERCENT: 0.3 % (ref 0–6)
GFR AFRICAN AMERICAN: 58
GFR NON-AFRICAN AMERICAN: 48 ML/MIN/1.73
GLUCOSE BLD-MCNC: 123 MG/DL (ref 74–99)
HCT VFR BLD CALC: 43.2 % (ref 34–48)
HEMOGLOBIN: 14.3 G/DL (ref 11.5–15.5)
IMMATURE GRANULOCYTES #: 0.1 E9/L
IMMATURE GRANULOCYTES %: 0.7 % (ref 0–5)
LYMPHOCYTES ABSOLUTE: 0.84 E9/L (ref 1.5–4)
LYMPHOCYTES RELATIVE PERCENT: 5.5 % (ref 20–42)
MCH RBC QN AUTO: 30.3 PG (ref 26–35)
MCHC RBC AUTO-ENTMCNC: 33.1 % (ref 32–34.5)
MCV RBC AUTO: 91.5 FL (ref 80–99.9)
METER GLUCOSE: 113 MG/DL (ref 74–99)
MONOCYTES ABSOLUTE: 1.03 E9/L (ref 0.1–0.95)
MONOCYTES RELATIVE PERCENT: 6.7 % (ref 2–12)
NEUTROPHILS ABSOLUTE: 13.34 E9/L (ref 1.8–7.3)
NEUTROPHILS RELATIVE PERCENT: 86.7 % (ref 43–80)
PDW BLD-RTO: 14.5 FL (ref 11.5–15)
PLATELET # BLD: 428 E9/L (ref 130–450)
PMV BLD AUTO: 11.2 FL (ref 7–12)
POTASSIUM SERPL-SCNC: 3.3 MMOL/L (ref 3.5–5)
RBC # BLD: 4.72 E12/L (ref 3.5–5.5)
SARS-COV-2, PCR: NOT DETECTED
SODIUM BLD-SCNC: 128 MMOL/L (ref 132–146)
WBC # BLD: 15.4 E9/L (ref 4.5–11.5)

## 2022-01-28 PROCEDURE — 99233 SBSQ HOSP IP/OBS HIGH 50: CPT | Performed by: INTERNAL MEDICINE

## 2022-01-28 PROCEDURE — 80048 BASIC METABOLIC PNL TOTAL CA: CPT

## 2022-01-28 PROCEDURE — 6370000000 HC RX 637 (ALT 250 FOR IP): Performed by: INTERNAL MEDICINE

## 2022-01-28 PROCEDURE — 6360000002 HC RX W HCPCS: Performed by: INTERNAL MEDICINE

## 2022-01-28 PROCEDURE — 70450 CT HEAD/BRAIN W/O DYE: CPT

## 2022-01-28 PROCEDURE — 2580000003 HC RX 258: Performed by: INTERNAL MEDICINE

## 2022-01-28 PROCEDURE — 36415 COLL VENOUS BLD VENIPUNCTURE: CPT

## 2022-01-28 PROCEDURE — 82962 GLUCOSE BLOOD TEST: CPT

## 2022-01-28 PROCEDURE — 93306 TTE W/DOPPLER COMPLETE: CPT

## 2022-01-28 PROCEDURE — 93880 EXTRACRANIAL BILAT STUDY: CPT

## 2022-01-28 PROCEDURE — 85025 COMPLETE CBC W/AUTO DIFF WBC: CPT

## 2022-01-28 PROCEDURE — 97116 GAIT TRAINING THERAPY: CPT

## 2022-01-28 PROCEDURE — 2060000000 HC ICU INTERMEDIATE R&B

## 2022-01-28 RX ORDER — POTASSIUM BICARBONATE 25 MEQ/1
25 TABLET, EFFERVESCENT ORAL ONCE
Status: DISCONTINUED | OUTPATIENT
Start: 2022-01-28 | End: 2022-01-28 | Stop reason: CLARIF

## 2022-01-28 RX ADMIN — MULTIPLE VITAMINS W/ MINERALS TAB 1 TABLET: TAB at 09:22

## 2022-01-28 RX ADMIN — Medication 10 ML: at 20:41

## 2022-01-28 RX ADMIN — FOLIC ACID 1 MG: 1 TABLET ORAL at 09:22

## 2022-01-28 RX ADMIN — SERTRALINE 100 MG: 50 TABLET, FILM COATED ORAL at 09:22

## 2022-01-28 RX ADMIN — LEVOTHYROXINE SODIUM 25 MCG: 25 TABLET ORAL at 06:37

## 2022-01-28 RX ADMIN — APIXABAN 5 MG: 5 TABLET, FILM COATED ORAL at 09:22

## 2022-01-28 RX ADMIN — WATER 1000 MG: 1 INJECTION INTRAMUSCULAR; INTRAVENOUS; SUBCUTANEOUS at 20:43

## 2022-01-28 RX ADMIN — FUROSEMIDE 40 MG: 10 INJECTION, SOLUTION INTRAMUSCULAR; INTRAVENOUS at 09:22

## 2022-01-28 RX ADMIN — POTASSIUM BICARBONATE 20 MEQ: 782 TABLET, EFFERVESCENT ORAL at 13:53

## 2022-01-28 RX ADMIN — DONEPEZIL HYDROCHLORIDE 5 MG: 5 TABLET, FILM COATED ORAL at 09:22

## 2022-01-28 RX ADMIN — DONEPEZIL HYDROCHLORIDE 5 MG: 5 TABLET, FILM COATED ORAL at 20:33

## 2022-01-28 RX ADMIN — METOPROLOL SUCCINATE 100 MG: 25 TABLET, FILM COATED, EXTENDED RELEASE ORAL at 20:33

## 2022-01-28 RX ADMIN — METOPROLOL SUCCINATE 100 MG: 25 TABLET, FILM COATED, EXTENDED RELEASE ORAL at 09:22

## 2022-01-28 RX ADMIN — ATORVASTATIN CALCIUM 20 MG: 20 TABLET, FILM COATED ORAL at 20:33

## 2022-01-28 RX ADMIN — Medication 10 ML: at 09:23

## 2022-01-28 RX ADMIN — FUROSEMIDE 40 MG: 10 INJECTION, SOLUTION INTRAMUSCULAR; INTRAVENOUS at 20:34

## 2022-01-28 RX ADMIN — FAMOTIDINE 40 MG: 20 TABLET, FILM COATED ORAL at 18:44

## 2022-01-28 RX ADMIN — APIXABAN 5 MG: 5 TABLET, FILM COATED ORAL at 20:33

## 2022-01-28 ASSESSMENT — PAIN SCALES - GENERAL
PAINLEVEL_OUTOF10: 0

## 2022-01-28 NOTE — FLOWSHEET NOTE
01/28/22 1604   Provider Notification   Reason for Communication Evaluate  (BP)   Provider Name Dr. Bibi Foy   Provider Notification Physician   Method of Communication Secure Message   Response See orders   Notification Time [id number]

## 2022-01-28 NOTE — PROGRESS NOTES
Internal Medicine Progress Note    [facility]    Aylin Soni, F.A.CJinnyOJinnyIJinny Drummond D.O., SOPHIAAFABIOKIRILL Keene Cap, MSN, APRN, NP-C  Michelle Sullivan. Tc Abdalla, MSN, APRN-CNP     Primary Care Physician: Ramon Garcia DO   Admitting Physician:  Tyrel Salmon DO  Admission date and time: 1/26/2022  1:19 PM    Room:  [address] [place] [address]  Admitting diagnosis: Atrial fibrillation with rapid ventricular response ([address] [I48.91]  Atrial fibrillation with RVR ([address] [I48.91]  Abdominal pain, unspecified abdominal location [R10.9]    Patient Name: Kathe Oneil  MRN: [id number]    Date of Service: 1/28/2022     Subjective:  Jessica Blanco is a 64 y.o. female who was seen and examined today,1/28/2022, at the bedside. Patient seems slightly confused today. She reoriented easily not quite sure how she got in the hospital.  She does seem somewhat more forgetful. Her heart rhythm does appear to be irregular and is currently on a Cardizem drip. She also being seen by cardiology. The patient is agreeable for CAT scan. She does seem to be breathing easier      No family present during my examination. Review of System:   Constitutional:   Denies fever or chills, weight loss or gain,+ fatigue. HEENT:   Denies ear pain, sore throat, sinus or eye problems. Cardiovascular:   Denies any chest pain, + irregular heartbeats and palpitations. Respiratory:   Denies shortness of breath, coughing, sputum production, hemoptysis, or wheezing. Gastrointestinal:   Denies nausea, vomiting, diarrhea, or constipation. Denies any abdominal pain. Genitourinary:    Denies any urgency, frequency, hematuria. Voiding  without difficulty. Extremities:   Admits to lower leg edema. Neurology:    Denies any headache or focal neurological deficits, patient relate to having memory difficulty   Psch:   Denies being anxious or depressed.   Musculoskeletal:    Denies  myalgias, joint complaints or back pain. Integumentary:   Denies any rashes, ulcers, or excoriations. Denies bruising. Hematologic/Lymphatic:  Denies bruising or bleeding. Physical Exam:  I/O this shift:  In: 600 [P.O.:600]  Out: 1025 [Urine:1025]    Intake/Output Summary (Last 24 hours) at 1/28/2022 1521  Last data filed at 1/28/2022 1443  Gross per 24 hour   Intake 800 ml   Output 3175 ml   Net -2375 ml   I/O last 3 completed shifts: In: 680 [P.O.:680]  Out: 3650 [Urine:3650]  Patient Vitals for the past 96 hrs (Last 3 readings):   Weight   01/28/22 0423 129 lb 1 oz (58.5 kg)   01/26/22 1336 130 lb (59 kg)     Vital Signs:   Blood pressure 95/60, pulse 99, temperature 97.7 °F (36.5 °C), temperature source Oral, resp. rate 18, weight 129 lb 1 oz (58.5 kg), SpO2 94 %, not currently breastfeeding. General appearance:  Alert, responsive, oriented to person, place, and time. Well preserved, alert, no distress. Head:  Normocephalic. No masses, lesions or tenderness. Eyes:  PERRLA. EOMI. Sclera clear. Buccal mucosa moist.  ENT:  Ears normal. Mucosa normal.  Neck:    Supple. Trachea midline. No thyromegaly. No JVD. No bruits. Heart:    Irregular Rhythm and variable rate- tachycardic at times. Murmur present. Lungs:    Symmetrical. Clear to auscultation bilaterally. No wheezes. No rhonchi. No rales. Abdomen:   Soft. Non-tender. Non-distended. Bowel sounds positive. No organomegaly or masses. No pain on palpation. Extremities:    Peripheral pulses present. 2+ pitting lower leg/ankle edema. No ulcers. No cyanosis. No clubbing. Neurologic:    Alert x 3. No focal deficit. Cranial nerves grossly intact. No focal weakness. Psych:   Behavior is normal. Mood appears normal. Speech is not rapid and/or pressured. Musculoskeletal:   Spine ROM normal. Muscular strength intact. Gait not assessed. Integumentary:  No rashes  Skin normal color and texture.   Genitalia/Breast:  Deferred    Medication:  Scheduled Meds:   apixaban  5 mg Oral BID    sodium chloride flush  5-40 mL IntraVENous BID    atorvastatin  20 mg Oral QPM    donepezil  5 mg Oral BID    famotidine  40 mg Oral QPM    folic acid  1 mg Oral Daily    levothyroxine  25 mcg Oral Daily    metoprolol succinate  100 mg Oral BID    sertraline  100 mg Oral Daily    therapeutic multivitamin-minerals  1 tablet Oral Daily    furosemide  40 mg IntraVENous BID     Continuous Infusions:   dilTIAZem (CARDIZEM) 125 mg in dextrose 5% 125 mL infusion 5 mg/hr (01/27/22 3392)       Objective Data:  CBC with Differential:    Lab Results   Component Value Date    WBC 15.4 01/28/2022    RBC 4.72 01/28/2022    HGB 14.3 01/28/2022    HCT 43.2 01/28/2022     01/28/2022    MCV 91.5 01/28/2022    MCH 30.3 01/28/2022    MCHC 33.1 01/28/2022    RDW 14.5 01/28/2022    SEGSPCT 81 09/06/2013    LYMPHOPCT 5.5 01/28/2022    MONOPCT 6.7 01/28/2022    BASOPCT 0.1 01/28/2022    MONOSABS 1.03 01/28/2022    LYMPHSABS 0.84 01/28/2022    EOSABS 0.05 01/28/2022    BASOSABS 0.02 01/28/2022     CMP:    Lab Results   Component Value Date     01/28/2022    K 3.3 01/28/2022    K 5.1 01/26/2022    CL 87 01/28/2022    CO2 28 01/28/2022    BUN 27 01/28/2022    CREATININE 1.1 01/28/2022    GFRAA 58 01/28/2022    LABGLOM 48 01/28/2022    GLUCOSE 123 01/28/2022    GLUCOSE 75 04/10/2012    PROT 7.2 01/26/2022    LABALBU 4.1 01/26/2022    LABALBU 4.7 04/10/2012    CALCIUM 8.6 01/28/2022    BILITOT 0.9 01/26/2022    ALKPHOS 92 01/26/2022    AST 71 01/26/2022    ALT 69 01/26/2022       Wound Documentation:   Incision 09/04/13 Perineum (Active)   Number of days: 3066       Assessment:    · Paroxysmal atrial fibrillation with rapid ventricular response  · Acute on chronic diastolic heart failure with preserved ejection fraction resulting in acute respiratory failure with hypoxia  · Recent hospitalization status post negative ischemic evaluation  · Hyperlipidemia on statin agent  · Macular degeneration  · Moderate to severe mitral regurgitation  · History of tobacco abuse  · History of TIAs  · Gastroesophageal reflux disease  · Hypothyroidism  · Hx of LA appendage thrombus      Plan:     · Heart rhythm appear to be irregular and sporadic at time. Adjustment has been made been made by cardiology. Currently on Cardizem drip  · Due to memory problem will obtain CT to exclude embolic phenomenon  · Continue with blood pressure medication  · Monitor electrolytes closely  · Will obtain ultrasound of the carotids        More than 50% of my  time was spent at the bedside counseling/coordinating care with the patient and/or family with face to face contact. This time was spent reviewing notes and laboratory data as well as instructing and counseling the patient. Time I spent with the family or surrogate(s) is included only if the patient was incapable of providing the necessary information or participating in medical decisions. I also discussed the differential diagnosis and all of the proposed management plans with the patient and individuals accompanying the patient. Karime Duarte requires this high level of physician care and nursing on the IMC/Telemetry unit due the complexity of decision management and chance of rapid decline or death. Continued cardiac monitoring and higher level of nursing are required. I am readily available for any further decision-making and intervention.          Hamida Lopes DO, D.O., FACOI  3:21 PM  1/28/2022

## 2022-01-28 NOTE — CARE COORDINATION
1/28/22 1424 CM note: COVID (-) X 2 on 1/23/22 & 1/26/22. Cardizem gtt. Met with patient and her daughter, Kieran Rock at the bedside to discuss discharge planning. Discharge plan at this time is home. Pts daughter is concerned about pts recent forgetfulness and would like to talk with the doctor to see if this is d/t infection like UTI vs something more long term. Kieran Rock states she spoke with pts RN Kelvin Alva and she is going to check on getting a hold of the doctor to give her an update. Kieran Rock is agreeable to [facility] and requested [facility]. Referral given to Bella Casarez and he is reviewing. WILL NEED [facility] ORDERS. Information on medical alert systems and A.L facilities given to pt/ family as requested. Patricia Sanders already provided family with [employer] packet. Pts family will provide transportation home. CM/SW will continue to follow for discharge planning.  Electronically signed by Jonah Bradford RN on 1/28/2022 at 2:29 PM

## 2022-01-28 NOTE — SIGNIFICANT EVENT
Extensive discussion with her daughter Boom Knight by phone. Updated on mom condition. All questions were answered    Albaro Goel.  Tarik ROY, FBOBBI.C.O.I.  1/28/2022  5:41 PM

## 2022-01-28 NOTE — PROGRESS NOTES
Physical Therapy Treatment Note/Plan of Care    Room #:  5519/4418-78  Patient Name: Alexis Nix  YOB: 1945  MRN: [id number]    Date of Service: 1/28/2022     Tentative placement recommendation: Home with [facility] PT vs Home  Equipment recommendation: None      Evaluating Physical Therapist: Justyna Kennedy, PT, DPT #[id number]      Specific Provider Orders/Date/Referring Provider :     01/26/22 1445   PT eval and treat Start: 01/26/22 1445, End: 01/26/22 1445, ONE TIME, Standing Count: 1 Occurrences, R    Jossy Miguel, DO Acknowledge New    Admitting Diagnosis:   Atrial fibrillation with rapid ventricular response ([address] [I48.91]  Atrial fibrillation with RVR ([address] [I48.91]  Abdominal pain, unspecified abdominal location [R10.9]      Surgery: none  Visit Diagnoses       Codes    Abdominal pain, unspecified abdominal location     R10.9          Patient Active Problem List   Diagnosis    Pelvic prolapse    Hyperlipidemia    GERD (gastroesophageal reflux disease)    PUD (peptic ulcer disease)    Urinary incontinence    Osteoarthritis    Palpitations    Precordial pain    Thrombophilia ([address]    Alzheimer's disease, unspecified    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 Pt generally steady during ambulation with occasional VC for safety throughout session. Pt performed function slowly with no LOB, dizziness, and no reported SOB.         PHYSICAL THERAPY  PLAN OF CARE       Physical therapy plan of care is established based on physician order,  patient diagnosis and clinical assessment    Current Treatment Recommendations:    -Bed Mobility: Lower extremity exercises  and Trunk control activities   -Sitting Balance: Incorporate reaching activities to activate trunk muscles , Hands on support to maintain midline , Facilitate active trunk muscle engagement , Facilitate postural control in all planes  and Engage in core activities to allow for movement within base of support   -Standing Balance: Perform strengthening exercises in standing to promote motor control with or without upper extremity support , Instruct patient on adequate base of support to maintain balance and Challenge balance utilizing reaching  activities beyond center of gravity    -Transfers: Provide instruction on proper hand and foot position for adequate transfer of weight onto lower extremities and use of gait device if needed, Cues for hand placement, technique and safety. Provide stabilization to prevent fall , Facilitate weight shift forward on to lower extremities and provide necessary stabilization of bilateral lower extremities , Support transfer of weight on to lower extremities and Assist with extension of knees trunk and hip to accept weight transfer   -Gait: Gait training, Standing activities to improve: base of support, weight shift, weight bearing , Exercises to improve trunk control, Exercises to improve hip and knee control, Performance of protected weight bearing activities and Activities to increase weight bearing   -Endurance: Utilize Supervised activities to increase level of endurance to allow for safe functional mobility including transfers and gait  and Use graduated activities to promote good breathing techniques and provide support and education to maximize respiratory function    PT long term treatment goals are located in below grid    Patient and or family understand(s) diagnosis, prognosis, and plan of care. Frequency of treatments: Patient will be seen  3-5 times/week.          Prior Level of Function: Patient ambulated independently   Rehab Potential: good  for baseline    Past medical history:   Past Medical History:   Diagnosis Date    Arthritis     Asymptomatic PVCs 4/2012    pt felt flutter, no other sx, holter + pvc's, few runs of SVT   [place]    History of Holter monitoring     Hyperlipidemia     Macular degeneration     Nausea & vomiting     Pelvic prolapse     PONV (postoperative nausea and vomiting)     TIA (transient ischemic attack)      Past Surgical History:   Procedure Laterality Date    COLONOSCOPY  2012    ENDOSCOPY, COLON, DIAGNOSTIC      EYE SURGERY      macular hole    HERNIA REPAIR      inguinal    HYSTERECTOMY      OTHER SURGICAL HISTORY  sept 2013    ant elevatetransobturator sling rectocele and cystocele enterocele    SKIN BIOPSY      arms    TRANSESOPHAGEAL ECHOCARDIOGRAM N/A 10/20/2020    TRANSESOPHAGEAL ECHOCARDIOGRAM WITH BUBBLE STUDY performed by El Sotomayor MD at [address] [facility] [address]:    Precautions: Up with assistance, falls and dementia   Social history: Patient lives alone in a ranch home  with 3 steps  to enter bilateral Rail  Tub shower grab bars    Equipment owned: [employer] and [address]   How much difficulty turning over in bed?: None  How much difficulty sitting down on / standing up from a chair with arms?: A Little  How much difficulty moving from lying on back to sitting on side of bed?: A Little  How much help from another person moving to and from a bed to a chair?: A Little  How much help from another person needed to walk in hospital room?: A Little  How much help from another person for climbing 3-5 steps with a railing?: A Lot  AM-PAC Inpatient Mobility Raw Score : 18  AM-PAC Inpatient T-Scale Score : 43.63  Mobility Inpatient CMS 0-100% Score: 46.58  Mobility Inpatient CMS G-Code Modifier : CK    Nursing cleared patient for PT evaluation.  The admitting diagnosis and active problem list as listed above have been reviewed prior to the initiation of this evaluation. OBJECTIVE;   Initial Evaluation  Date: 1/27/2022 Treatment Date:,1/28/2022   Short Term/ Long Term   Goals   Was pt agreeable to Eval/treatment? Yes Yes To be met in 2 days   Pain level   0/10   0/10    Bed Mobility    Rolling: Supervision     Supine to sit: Supervision     Sit to supine: Supervision     Scooting: Supervision    Rolling: Not assessed    Supine to sit: Supervision    Sit to supine: Supervision    Scooting: Supervision   Rolling: Independent    Supine to sit: Independent    Sit to supine: Independent    Scooting: Independent     Transfers Sit to stand: Minimal assist of 1  Sit to stand: Minimal assist of 1;    Sit to stand: Independent    Ambulation    2 x 100 feet using  wheeled walker with Supervision    for walker control, walker approximation, balance and safety Patient ambulated 100 feet x 2 using IV pole with Minimal assist of 1. Verbal cues were given for safety, upright posture, increased base of support, and obstacle negotiation.     > 200 feet using  least restrictive device versus no device with Independent    Stair negotiation: ascended and descended   Not assessed  Not assessed   3 steps with 2 HR with Mod I   ROM Within functional limits    Increase range of motion 10% of affected joints    Strength BUE:  refer to OT eval  RLE:  4/5  LLE:  4/5  Increase strength in affected mm groups by 1/3 grade   Balance Sitting EOB:  fair +  Dynamic Standing:  fair + Sitting EOB: good   Dynamic Standing: fair+ using IV pole Sitting EOB:  good   Dynamic Standing: good      Patient is Alert & Oriented x person, place, time and situation and follows directions    Sensation:  Patient denies numbness/tingling   Edema:  no   Endurance: fair  +    Vitals: room air   Blood Pressure at rest  Blood Pressure during session    Heart Rate at rest  Heart Rate during session    SPO2 at rest %  SPO2 during session %     Patient education  Patient educated on role of Physical Therapy, risks of immobility, safety and plan of care, energy conservation,  importance of mobility while in hospital , importance and purpose of adaptive device and adjusted to proper height for the patient. and safety      Patient response to education:   Pt verbalized understanding Pt demonstrated skill Pt requires further education in this area   Yes Partial Yes      Treatment: Patient practiced and was instructed/facilitated in the following treatment: Patient Sat edge of bed 3 minutes with Supervision  to increase dynamic sitting balance and activity tolerance. Pt performed bed mobility, sit to stand transfers, and ambulation in hallway. Therapeutic Exercises: not performed      At end of session, patient in bed with  call light and phone within reach, all lines and tubes intact, nursing notified. Patient would benefit from continued skilled Physical Therapy to improve functional independence and quality of life.          Patient's/ family goals   get stronger    Time in 0900  Time out 0908    Total Treatment Time 8 minutes    CPT codes:  Gait Training (82033) 8 minutes 1 unit(s)    David Gallegos PTA   LIC# [id number]

## 2022-01-28 NOTE — PROGRESS NOTES
INPATIENT CARDIOLOGY FOLLOW-UP    Name: Guido Beltrán    Age: 64 y.o. Date of Admission: 1/26/2022  1:19 PM    Date of Service: 1/28/2022    Primary Cardiologist: Dr Rosie España    Chief Complaint: Follow-up for CHF/AF RVR    Interim History:  Feels better. Breathing is improved. Remains in atrial fibrillation with improved heart rates on 5 mg/h diltiazem infusion. Net -3 L    Echo done this morning. Reviewed, shows moderate LV dysfunction with EF 30 to 35%.     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    Pain of right foot    Atrial fibrillation with RVR ([address]    Aneurysm ([address]    Atrial fibrillation with rapid ventricular response ([address]       Current Medications:    Current Facility-Administered Medications:     apixaban (ELIQUIS) tablet 5 mg, 5 mg, Oral, BID, Conni Bernheim, MD, 5 mg at 01/28/22 7814    sodium chloride flush 0.9 % injection 5-40 mL, 5-40 mL, IntraVENous, BID, Burton Murphy DO, 10 mL at 01/28/22 6209    dilTIAZem 125 mg in dextrose 5 % 125 mL infusion, 5-15 mg/hr, IntraVENous, Continuous, Burton Murphy DO, Last Rate: 5 mL/hr at 01/27/22 2335, 5 mg/hr at 01/27/22 2335    atorvastatin (LIPITOR) tablet 20 mg, 20 mg, Oral, QPM, Burton Murphy DO, 20 mg at 01/27/22 2226    donepezil (ARICEPT) tablet 5 mg, 5 mg, Oral, BID, Volmandeep Murphy DO, 5 mg at 01/28/22 2393    famotidine (PEPCID) tablet 40 mg, 40 mg, Oral, QPM, Volmandeep Murphy, DO, 40 mg at 14/15/34 0775    folic acid (FOLVITE) tablet 1 mg, 1 mg, Oral, Daily, Burton Murphy DO, 1 mg at 01/28/22 5085    levothyroxine (SYNTHROID) tablet 25 mcg, 25 mcg, Oral, Daily, Camilo Abimbola, DO, 25 mcg at 01/28/22 7318    metoprolol succinate (TOPROL XL) extended release tablet 100 mg, 100 mg, Oral, BID, Camilo Abimbola, DO, 100 mg at 01/28/22 6634    ondansetron (ZOFRAN-ODT) disintegrating tablet 4 mg, 4 mg, Oral, TID PRN, Camilo Abimbola, DO    sertraline (ZOLOFT) tablet 100 mg, 100 mg, Oral, Daily, Camilo Abimbola, DO, 100 mg at 01/28/22 0140    therapeutic multivitamin-minerals 1 tablet, 1 tablet, Oral, Daily, Camilo Abimbola, DO, 1 tablet at 01/28/22 8116    albuterol (PROVENTIL) nebulizer solution 2.5 mg, 2.5 mg, Nebulization, Q6H PRN, Camilo Abimbola, DO    ondansetron [facility]) injection 4 mg, 4 mg, IntraVENous, Q6H PRN, Camilo Abimbola, DO, 4 mg at 01/26/22 1624    furosemide (LASIX) injection 40 mg, 40 mg, IntraVENous, BID, Camilo Abimbola, DO, 40 mg at 01/28/22 5776    Physical Exam:  /64   Pulse 110   Temp 98.5 °F (36.9 °C) (Oral)   Resp 20   Wt 129 lb 1 oz (58.5 kg)   SpO2 91%   BMI 24.39 kg/m²   Wt Readings from Last 3 Encounters:   01/28/22 129 lb 1 oz (58.5 kg)   01/06/22 124 lb (56.2 kg)   01/04/22 123 lb (55.8 kg)     Appearance: Elderly female, awake, alert, on nasal cannula  Skin: Intact, no rash  Head: Normocephalic, atraumatic  Eyes: EOMI, no conjunctival erythema  ENMT: No pharyngeal erythema, MMM, no rhinorrhea  Neck: Supple, mildly elevated JVP, no carotid bruits  Lungs: Diminished at the bases with bibasilar rales  Cardiac: PMI nondisplaced, irregular rhythm with a normal rate, S1 & S2 normal, no murmurs  Abdomen: Soft, nontender, +bowel sounds  Extremities: Moves all extremities x 4, trace lower extremity edema  Neurologic: No focal motor deficits apparent, normal mood and affect  Peripheral Pulses: Intact posterior tibial pulses bilaterally    Intake/Output:    Intake/Output Summary (Last 24 hours) at 1/28/2022 1018  Last data filed at 1/28/2022 0644  Gross per 24 hour   Intake 680 ml   Output 3650 ml   Net -2970 ml     No intake/output data recorded.     Laboratory Tests:  Recent Labs     22  1423 22  1345 22  0530   * 127* 128*   K 6.0* 5.1* 4.2   CL 93* 89* 93*   CO2 11* 17* 20*   BUN 32* 42* 38*   CREATININE 1.5* 1.7* 1.3*   GLUCOSE 105* 108* 98   CALCIUM 9.7 9.5 8.1*     Lab Results   Component Value Date    MG 2.1 2022     Recent Labs     22  1423 22  1345   ALKPHOS 90 92   ALT 53* 69*   AST 61* 71*   PROT 7.2 7.2   BILITOT 0.6 0.9   LABALBU 3.6 4.1     Recent Labs     22  1423 22  1345 22  0530   WBC 12.6* 13.1* 14.0*   RBC 4.67 4.88 4.21   HGB 14.0 14.7 12.9   HCT 45.3 46.6 39.9   MCV 97.0 95.5 94.8   MCH 30.0 30.1 30.6   MCHC 30.9* 31.5* 32.3   RDW 14.9 14.6 14.4    427 346   MPV 12.6* 11.9 12.0     Lab Results   Component Value Date    CKTOTAL 107 2013    CKMB 1.4 2013    TROPONINI 0.02 2013     Lab Results   Component Value Date    INR 1.5 2021    PROTIME 17.3 (H) 2021     Lab Results   Component Value Date    TSH 1.360 2022     Lab Results   Component Value Date    LABA1C 5.5 2021     No results found for: EAG  Lab Results   Component Value Date    CHOL 133 2022    CHOL 182 2021    CHOL 235 (H) 2021     Lab Results   Component Value Date    TRIG 54 2022    TRIG 98 2021    TRIG 104 2021     Lab Results   Component Value Date    HDL 37 2022    HDL 56 2021    HDL 54 2021     Lab Results   Component Value Date    LDLCALC 85 2022    LDLCALC 106 (H) 2021    LDLCALC 160 (H) 2021     Lab Results   Component Value Date    LABVLDL 11 2022    LABVLDL 20 2021    LABVLDL 21 2021     No results found for: CHOLHDLRATIO  Recent Labs     22  1423 22  1345   PROBNP 19,528* 23,589*       Cardiac Tests:    EK2022: Atrial fibrillation  beats minute.   Normal axis and intervals PICC nonspecific T wave changes    Telemetry: Atrial fibrillation 100s    Chest X-ray: 1/26/2022    Impression   Bilateral pleural effusions with slightly greater adjacent bibasilar   infiltrate and or atelectasis. Echocardiogram:   Transthoracic echo 1/28/2022  EF 30 to 35%  Moderate MR  Mild to moderate AR    MARYLOU (Dr. Chantale Merino, 10/2020)  Summary   Large irregular mobile echogenic mass noted in the left atrial appendage   and left atrium near mouth of MARTA suggestive of thrombus.   Moderate to severe central jet of mitral regurgitation - ERO 0.3cm2, PISA   0.6cm, RV 76cc.   Normal left ventricular chamber size.   Normal left ventricular systolic function.   Left atrium is enlarged.   Interatrial septum intact.   Agitated saline injection showed no right to left shunt.   Normal right ventricle size and function.   No mitral valve prolapse.   There is trace aortic regurgitation.   There is mild tricuspid regurgitation.   Normal aortic root size.   No evidence of pericardial effusion.   Pericardium appears normal.   No comparison study available in the [employer]. Stress test:    Pharmacologic Stress Test (12/2021)  FINDINGS: The overall quality of the study was excellent. Left ventricular cavity size was noted to be normal.  Rotational analog analysis demonstrated minimal attenuation. The gated SPECT stress imaging in the short, vertical long, and  horizontal long axis demonstrated normal homogeneous tracer  distribution throughout the myocardium. The resting images normal.   Gated SPECT left ventricular ejection fraction was calculated to be  53%, with normal wall motion. Impression: The myocardial perfusion imaging was normal.  Overall left ventricular systolic function was normal without regional  wall motion abnormalities. Low risk study. Cardiac catheterization:     ----------------------------------------------------------------------------------------------------------------------------------------------------------------  IMPRESSION:  1.  Paroxysmal atrial fibrillation RVR.  On IV diltiazem  2. Acute on chronic HFrEF.  proBNP 24,000  3. Cardiomyopathy, EF 30-35% new finding. Suspect tachycardia mediated. Recent stress 12/2021 negative for ischemia  4. History of left atrial appendage thrombus by MARYLOU 10/2020  5. Minimally elevated troponin not consistent with ACS  6. Hyperkalemia improved, now hypokalemia  7. AKICr 1.7 -> 1.1 recent baseline 0.7  8. Hyponatremia  9. Bilateral pleural effusions  10. Lactic acidosis/abdominal pain/leukocytosis  11. Abnormal LFTs  12. History of TIA  13. Hyperlipidemia  14. Hypothyroidism    RECOMMENDATIONS:     Wean and discontinue IV diltiazem   Continue oral metoprolol for rate control   Continue IV diuresis with strict I's and O's   Electrolyte replacement   Continue apixaban for stroke risk reduction   Given significant LV dysfunction, plan for MARYLOU cardioversion once adequately diuresed, possibly Monday, if does not convert to sinus on her own   Risk factor modification    Micky Jones MD, [facility]    NOTE: This report was transcribed using voice recognition software. Every effort was made to ensure accuracy; however, inadvertent computerized transcription errors may be present.

## 2022-01-28 NOTE — TELEPHONE ENCOUNTER
Sammie Coronado from [facility] called ask if Dr. Coty Dickson will follow pt for home care. Pt to be D/C from [facility] [address] [place] [address] on 1.31.22. A-Fib,  PT,OT Skilled nursing.   Please advise     Sammie Coronado [phone]

## 2022-01-29 LAB
ANION GAP SERPL CALCULATED.3IONS-SCNC: 11 MMOL/L (ref 7–16)
BASOPHILS ABSOLUTE: 0.05 E9/L (ref 0–0.2)
BASOPHILS RELATIVE PERCENT: 0.3 % (ref 0–2)
BUN BLDV-MCNC: 22 MG/DL (ref 6–23)
CALCIUM SERPL-MCNC: 8.9 MG/DL (ref 8.6–10.2)
CHLORIDE BLD-SCNC: 82 MMOL/L (ref 98–107)
CO2: 34 MMOL/L (ref 22–29)
CREAT SERPL-MCNC: 1 MG/DL (ref 0.5–1)
EOSINOPHILS ABSOLUTE: 0.2 E9/L (ref 0.05–0.5)
EOSINOPHILS RELATIVE PERCENT: 1.2 % (ref 0–6)
FOLATE: >20 NG/ML (ref 4.8–24.2)
GFR AFRICAN AMERICAN: >60
GFR NON-AFRICAN AMERICAN: 54 ML/MIN/1.73
GLUCOSE BLD-MCNC: 111 MG/DL (ref 74–99)
HCT VFR BLD CALC: 49.1 % (ref 34–48)
HEMOGLOBIN: 16.2 G/DL (ref 11.5–15.5)
IMMATURE GRANULOCYTES #: 0.11 E9/L
IMMATURE GRANULOCYTES %: 0.7 % (ref 0–5)
LYMPHOCYTES ABSOLUTE: 1.11 E9/L (ref 1.5–4)
LYMPHOCYTES RELATIVE PERCENT: 6.9 % (ref 20–42)
MCH RBC QN AUTO: 30.4 PG (ref 26–35)
MCHC RBC AUTO-ENTMCNC: 33 % (ref 32–34.5)
MCV RBC AUTO: 92.1 FL (ref 80–99.9)
MONOCYTES ABSOLUTE: 1.19 E9/L (ref 0.1–0.95)
MONOCYTES RELATIVE PERCENT: 7.4 % (ref 2–12)
NEUTROPHILS ABSOLUTE: 13.5 E9/L (ref 1.8–7.3)
NEUTROPHILS RELATIVE PERCENT: 83.5 % (ref 43–80)
PDW BLD-RTO: 14.5 FL (ref 11.5–15)
PLATELET # BLD: 416 E9/L (ref 130–450)
PMV BLD AUTO: 10.9 FL (ref 7–12)
POTASSIUM SERPL-SCNC: 2.9 MMOL/L (ref 3.5–5)
RBC # BLD: 5.33 E12/L (ref 3.5–5.5)
SODIUM BLD-SCNC: 127 MMOL/L (ref 132–146)
URINE CULTURE, ROUTINE: NORMAL
VITAMIN B-12: >2000 PG/ML (ref 211–946)
WBC # BLD: 16.2 E9/L (ref 4.5–11.5)

## 2022-01-29 PROCEDURE — 2060000000 HC ICU INTERMEDIATE R&B

## 2022-01-29 PROCEDURE — 82607 VITAMIN B-12: CPT

## 2022-01-29 PROCEDURE — 2700000000 HC OXYGEN THERAPY PER DAY

## 2022-01-29 PROCEDURE — 36415 COLL VENOUS BLD VENIPUNCTURE: CPT

## 2022-01-29 PROCEDURE — 85025 COMPLETE CBC W/AUTO DIFF WBC: CPT

## 2022-01-29 PROCEDURE — 82746 ASSAY OF FOLIC ACID SERUM: CPT

## 2022-01-29 PROCEDURE — 6370000000 HC RX 637 (ALT 250 FOR IP): Performed by: NURSE PRACTITIONER

## 2022-01-29 PROCEDURE — 2580000003 HC RX 258: Performed by: INTERNAL MEDICINE

## 2022-01-29 PROCEDURE — 6360000002 HC RX W HCPCS: Performed by: INTERNAL MEDICINE

## 2022-01-29 PROCEDURE — 80048 BASIC METABOLIC PNL TOTAL CA: CPT

## 2022-01-29 PROCEDURE — 6370000000 HC RX 637 (ALT 250 FOR IP): Performed by: INTERNAL MEDICINE

## 2022-01-29 PROCEDURE — 99233 SBSQ HOSP IP/OBS HIGH 50: CPT | Performed by: INTERNAL MEDICINE

## 2022-01-29 RX ORDER — FUROSEMIDE 40 MG/1
40 TABLET ORAL 2 TIMES DAILY
Status: DISCONTINUED | OUTPATIENT
Start: 2022-01-29 | End: 2022-01-31 | Stop reason: HOSPADM

## 2022-01-29 RX ORDER — POTASSIUM CHLORIDE 20 MEQ/1
40 TABLET, EXTENDED RELEASE ORAL ONCE
Status: COMPLETED | OUTPATIENT
Start: 2022-01-29 | End: 2022-01-29

## 2022-01-29 RX ORDER — POTASSIUM CHLORIDE 7.45 MG/ML
10 INJECTION INTRAVENOUS PRN
Status: DISCONTINUED | OUTPATIENT
Start: 2022-01-29 | End: 2022-01-31 | Stop reason: HOSPADM

## 2022-01-29 RX ORDER — POTASSIUM CHLORIDE 20 MEQ/1
40 TABLET, EXTENDED RELEASE ORAL PRN
Status: DISCONTINUED | OUTPATIENT
Start: 2022-01-29 | End: 2022-01-31 | Stop reason: HOSPADM

## 2022-01-29 RX ADMIN — APIXABAN 5 MG: 5 TABLET, FILM COATED ORAL at 20:07

## 2022-01-29 RX ADMIN — FUROSEMIDE 40 MG: 10 INJECTION, SOLUTION INTRAMUSCULAR; INTRAVENOUS at 08:29

## 2022-01-29 RX ADMIN — FAMOTIDINE 40 MG: 20 TABLET, FILM COATED ORAL at 17:13

## 2022-01-29 RX ADMIN — POTASSIUM CHLORIDE 40 MEQ: 1500 TABLET, EXTENDED RELEASE ORAL at 14:29

## 2022-01-29 RX ADMIN — DONEPEZIL HYDROCHLORIDE 5 MG: 5 TABLET, FILM COATED ORAL at 08:29

## 2022-01-29 RX ADMIN — Medication 10 ML: at 20:07

## 2022-01-29 RX ADMIN — FOLIC ACID 1 MG: 1 TABLET ORAL at 08:29

## 2022-01-29 RX ADMIN — ATORVASTATIN CALCIUM 20 MG: 20 TABLET, FILM COATED ORAL at 20:07

## 2022-01-29 RX ADMIN — LEVOTHYROXINE SODIUM 25 MCG: 25 TABLET ORAL at 06:20

## 2022-01-29 RX ADMIN — MULTIPLE VITAMINS W/ MINERALS TAB 1 TABLET: TAB at 08:29

## 2022-01-29 RX ADMIN — APIXABAN 5 MG: 5 TABLET, FILM COATED ORAL at 08:29

## 2022-01-29 RX ADMIN — METOPROLOL SUCCINATE 100 MG: 25 TABLET, FILM COATED, EXTENDED RELEASE ORAL at 08:29

## 2022-01-29 RX ADMIN — SERTRALINE 100 MG: 50 TABLET, FILM COATED ORAL at 08:30

## 2022-01-29 RX ADMIN — METOPROLOL SUCCINATE 100 MG: 25 TABLET, FILM COATED, EXTENDED RELEASE ORAL at 20:06

## 2022-01-29 RX ADMIN — DONEPEZIL HYDROCHLORIDE 5 MG: 5 TABLET, FILM COATED ORAL at 20:06

## 2022-01-29 RX ADMIN — Medication 10 ML: at 08:31

## 2022-01-29 RX ADMIN — FUROSEMIDE 40 MG: 40 TABLET ORAL at 17:13

## 2022-01-29 ASSESSMENT — PAIN SCALES - GENERAL
PAINLEVEL_OUTOF10: 0

## 2022-01-29 NOTE — PROGRESS NOTES
[facility]        CARDIOLOGY                 INPATIENT PROGRESS NOTE          PATIENT SEEN IN FOLLOW UP FOR: A fib, CHF, CMP    Hospital Day: [address] is a 64year old patient known to me. SUBJECTIVE: Denies any Cp, breathing better. No PND or orthopnea, No palpitations    ROS: Review of rest of 10 systems negative except as mentioned above    OBJECTIVE: No acute distress. See Assessment     Diagnostics:       Telemetry:  Reviewed       Echocardiogram:   Transthoracic echo 1/28/2022  EF 30 to 35%  Moderate MR  Mild to moderate AR     MARYLOU (Dr. Julianne Dee, 10/2020)  Summary   Large irregular mobile echogenic mass noted in the left atrial appendage   and left atrium near mouth of MARTA suggestive of thrombus.   Moderate to severe central jet of mitral regurgitation - ERO 0.3cm2, PISA 0.6cm, RV 76cc.   Normal left ventricular chamber size.   Normal left ventricular systolic function.   Left atrium is enlarged.   Interatrial septum intact.   Agitated saline injection showed no right to left shunt.   Normal right ventricle size and function.   No mitral valve prolapse.   There is trace aortic regurgitation.   There is mild tricuspid regurgitation.   Normal aortic root size.   No evidence of pericardial effusion.   Pericardium appears normal.   No comparison study available in the local [facility].     Stress test:    Pharmacologic Stress Test (12/2021)  FINDINGS: The overall quality of the study was excellent. Left ventricular cavity size was noted to be normal.  Rotational analog analysis demonstrated minimal attenuation. The gated SPECT stress imaging in the short, vertical long, and  horizontal long axis demonstrated normal homogeneous tracer  distribution throughout the myocardium. The resting images normal.   Gated SPECT left ventricular ejection fraction was calculated to be 53%, with normal wall motion. Impression:   The myocardial perfusion imaging was oz (54.1 kg)   SpO2 95%   BMI 22.52 kg/m²   Pulse  Av  Min: 99  Max: 805  Systolic (02WWB), NBB:263 , Min:95 , IUE:167    Diastolic (06OWT), JIV:17, Min:55, Max:79    In general, this is a well developed, well nourished who appears stated age. awake, alert, no apparent distress  HEENT: eyes -conjunctivae pink,  Throat - Oral mucosa pink . Neck-  no stridor, no noted enlargement of the thyroid, no carotid bruit. no jugular venous distention   RESPIRATORY: Chest symmetrical and non-tender to palpation. No accessory muscle use. Lung auscultation - diminished at bases, few rhonchi  CARDIOVASCULAR:     Heart Palpation - no palpable thrills   Heart Ausculation - Irregularly irregular rate and rhythm, 2/6 systolic murmur, No s3 or rub. No lower extremity edema, no varicosities. Distal pulses palpable, no clubbing or cyanosis   ABDOMEN: Soft, nontender, nondistended. Bowel sounds present. No Palpable masses; no abdominal bruit / pulsation  MS: good muscle strength and tone. : Deferred  Rectal Exam: Deferred  SKIN: warm and dry no statis dermatitis or ulcers   NEURO / PSYCH: oriented to person, place      IMPRESSION/PLAN:    Paroxysmal atrial fibrillation RVR. Continue Metoprolol for rate control, Continue Apixaban for [address]. If rates controlled, and she is stable, home tomorrow or MARYLOU/DCCV Monday (If no thrombus). Acute on chronic HFrEF. -ve balance; Change to tomorrow PO diuretics, monitor daily Wts, I/Os, BP, renal Fn    Cardiomyopathy - EF 30-35% new finding. Normal EF in 10/ 2021, Likely tachycardia mediated. Recent stress test in 2021 negative for ischemia, EF 53%. Continue Metoprolol, If BP tolerate add Entresto. Add Jardiance tomorrow.   She is DNR CCA,      History of left atrial appendage thrombus - by MARYLOU 10/2020 - On Eliquis    Borderline elevated troponin - Does not consistent with ACS, likely demand ischemia from CHF, tachycardia, No CP    Hyperkalemia improved, now hypokalemia -

## 2022-01-29 NOTE — PLAN OF CARE
Problem: Falls - Risk of:  Goal: Absence of physical injury  Description: Absence of physical injury  1/28/2022 1926 by Pierre Patel RN  Outcome: Met This Shift     Problem: Falls - Risk of:  Goal: Will remain free from falls  Description: Will remain free from falls  1/28/2022 1926 by Pierre Patel RN  Outcome: Not Met This Shift

## 2022-01-29 NOTE — PROGRESS NOTES
Pulse ox was__94__% on room air at rest.  Ambulated patient on room air. Oxygen saturation was __94___% on room air while ambulating.

## 2022-01-29 NOTE — PROGRESS NOTES
Internal Medicine Progress Note    [facility]    Poplarysabel Paulsonl Citlaly., F.A.CJinnyOJinnyI. Kady Gu D.O., SHANTANUOIZAIAH Nuñez D.O. Ibrahima Larsen, MSN, APRN, NP-C  Elizabeth Hearn. Reymundo Low, MSN, APRN-CNP     Primary Care Physician: Nupur Dean DO   Admitting Physician:  Anita Asher DO  Admission date and time: 1/26/2022  1:19 PM    Room:  [address] [place] [address]  Admitting diagnosis: Atrial fibrillation with rapid ventricular response ([facility] [address] [I48.91]  Atrial fibrillation with RVR ([address] [I48.91]  Abdominal pain, unspecified abdominal location [R10.9]    Patient Name: Jo Dimas  MRN: [id number]    Date of Service: 1/29/2022     Subjective:  Nii Nunez is a 64 y.o. female who was seen and examined today,1/29/2022, at the bedside. She has just used a bedside commode. She remains very mildly confused. Had an unwitnessed fall yesterday, refer to documentation regarding that event. No traumatic injury was suffered. Discussed maintain hospitalization until planned cardioversion on Monday. Cardiovascular team is also been updated. No family present during my examination. Review of System:   Constitutional:   Denies fever or chills, weight loss or gain,+ fatigue. HEENT:   Denies ear pain, sore throat, sinus or eye problems. Cardiovascular:   Denies any chest pain, + irregular heartbeats but denies palpitations. Respiratory:   Denies shortness of breath, coughing, sputum production, hemoptysis, or wheezing. Gastrointestinal:   Denies nausea, vomiting, diarrhea, or constipation. Denies any abdominal pain. Genitourinary:    Denies any urgency, frequency, hematuria. Voiding  without difficulty. Extremities:   Admits to lower leg edema. Neurology:    Denies any headache or focal neurological deficits, patient relate to having memory difficulty   Psch:   Denies being anxious or depressed. Musculoskeletal:    Denies  myalgias, joint complaints or back pain. Integumentary:   Denies any rashes, ulcers, or excoriations. Denies bruising. Hematologic/Lymphatic:  Denies bruising or bleeding. Physical Exam:  I/O this shift:  In: 240 [P.O.:240]  Out: 450 [Urine:450]    Intake/Output Summary (Last 24 hours) at 1/29/2022 1047  Last data filed at 1/29/2022 1021  Gross per 24 hour   Intake 600 ml   Output 4175 ml   Net -3575 ml   I/O last 3 completed shifts: In: 800 [P.O.:800]  Out: 8570 [Urine:5875]  Patient Vitals for the past 96 hrs (Last 3 readings):   Weight   01/29/22 0535 119 lb 3.2 oz (54.1 kg)   01/28/22 0423 129 lb 1 oz (58.5 kg)   01/26/22 1336 130 lb (59 kg)     Vital Signs:   Blood pressure 114/79, pulse 105, temperature 98.1 °F (36.7 °C), temperature source Oral, resp. rate 16, weight 119 lb 3.2 oz (54.1 kg), SpO2 95 %, not currently breastfeeding. General appearance:  Alert, responsive, oriented to person, place, and time. Acute on chronic ill-appearing, no distress. Head:  Normocephalic. No masses, lesions or tenderness. Eyes:  PERRLA. EOMI. Sclera clear. Buccal mucosa moist.  ENT:  Ears normal. Mucosa normal.  Neck:    Supple. Trachea midline. No thyromegaly. No JVD. No bruits. Heart:    Irregular rate and rhythm, S1 and S2. Murmur present. Atrial fibrillation with variable ventricular sponsor bedside telemetry  Lungs:    Symmetrical. Clear to auscultation bilaterally. No wheezes. No rhonchi. No rales. Abdomen:   Soft. Non-tender. Non-distended. Bowel sounds positive. No organomegaly or masses. No pain on palpation. Extremities:    Peripheral pulses present. Trace to 1+ pitting lower leg/ankle edema. No ulcers. No cyanosis. No clubbing. Neurologic:    Alert x 3. No focal deficit. Cranial nerves grossly intact. No focal weakness. Psych:   Behavior is normal. Mood appears normal. Speech is not rapid and/or pressured. Musculoskeletal:   Spine ROM normal. Muscular strength intact. Gait not assessed.   Integumentary:  No rashes  Skin normal color and texture.   Genitalia/Breast:  Deferred    Medication:  Scheduled Meds:   furosemide  40 mg Oral BID    apixaban  5 mg Oral BID    sodium chloride flush  5-40 mL IntraVENous BID    atorvastatin  20 mg Oral QPM    donepezil  5 mg Oral BID    famotidine  40 mg Oral QPM    folic acid  1 mg Oral Daily    levothyroxine  25 mcg Oral Daily    metoprolol succinate  100 mg Oral BID    sertraline  100 mg Oral Daily    therapeutic multivitamin-minerals  1 tablet Oral Daily     Continuous Infusions:      Objective Data:  CBC with Differential:    Lab Results   Component Value Date    WBC 15.4 01/28/2022    RBC 4.72 01/28/2022    HGB 14.3 01/28/2022    HCT 43.2 01/28/2022     01/28/2022    MCV 91.5 01/28/2022    MCH 30.3 01/28/2022    MCHC 33.1 01/28/2022    RDW 14.5 01/28/2022    SEGSPCT 81 09/06/2013    LYMPHOPCT 5.5 01/28/2022    MONOPCT 6.7 01/28/2022    BASOPCT 0.1 01/28/2022    MONOSABS 1.03 01/28/2022    LYMPHSABS 0.84 01/28/2022    EOSABS 0.05 01/28/2022    BASOSABS 0.02 01/28/2022     CMP:    Lab Results   Component Value Date     01/28/2022    K 3.3 01/28/2022    K 5.1 01/26/2022    CL 87 01/28/2022    CO2 28 01/28/2022    BUN 27 01/28/2022    CREATININE 1.1 01/28/2022    GFRAA 58 01/28/2022    LABGLOM 48 01/28/2022    GLUCOSE 123 01/28/2022    GLUCOSE 75 04/10/2012    PROT 7.2 01/26/2022    LABALBU 4.1 01/26/2022    LABALBU 4.7 04/10/2012    CALCIUM 8.6 01/28/2022    BILITOT 0.9 01/26/2022    ALKPHOS 92 01/26/2022    AST 71 01/26/2022    ALT 69 01/26/2022       Wound Documentation:   Incision 09/04/13 Perineum (Active)   Number of days: 3066       Assessment:    · Paroxysmal atrial fibrillation with rapid ventricular response  · Acute on chronic diastolic heart failure with preserved ejection fraction resulting in acute respiratory failure with hypoxia  · Recent hospitalization status post negative ischemic evaluation  · Hyperlipidemia on statin agent  · Macular degeneration  · Moderate to severe mitral regurgitation  · History of tobacco abuse  · History of TIAs  · Gastroesophageal reflux disease  · Hypothyroidism  · Hx of LA appendage thrombus      Plan:   Federico Smith is doing better  overall. From a heart failure standpoint, she is now compensated we will transition to oral diuretic therapy. Renal function has trended downward despite diuresis and we are awaiting today's labs to assess for continued trend. From a A. fib standpoint, the patient will be for cardioversion on Monday and is on therapeutic anticoagulation and rate/rhythm controlling medications off of Cardizem drip. Due to fall yesterday and ongoing memory issues, CT was undertaken and returned negative. Carotid ultrasound negative. He has no focal neurological symptoms or deficits. Chronic comorbidities laboratory values and vital signs being monitored and addressed accordingly as well. Plan remains maintaining hospitalization and continuation of care through Monday for MARYLOU guided cardioversion and then consideration for discharge thereafter, refer to SW/CM notes for details. More than 50% of my  time was spent at the bedside counseling/coordinating care with the patient and/or family with face to face contact. This time was spent reviewing notes and laboratory data as well as instructing and counseling the patient. Time I spent with the family or surrogate(s) is included only if the patient was incapable of providing the necessary information or participating in medical decisions. I also discussed the differential diagnosis and all of the proposed management plans with the patient and individuals accompanying the patient. Federico Smith requires this high level of physician care and nursing on the C/Telemetry unit due the complexity of decision management and chance of rapid decline or death. Continued cardiac monitoring and higher level of nursing are required.  I am readily available for any further decision-making and intervention.          ELIO Aranda - CNP  10:47 AM  1/29/2022

## 2022-01-30 LAB
ANION GAP SERPL CALCULATED.3IONS-SCNC: 10 MMOL/L (ref 7–16)
ANISOCYTOSIS: ABNORMAL
BASOPHILS ABSOLUTE: 0 E9/L (ref 0–0.2)
BASOPHILS RELATIVE PERCENT: 0.5 % (ref 0–2)
BUN BLDV-MCNC: 22 MG/DL (ref 6–23)
BURR CELLS: ABNORMAL
CALCIUM SERPL-MCNC: 8.6 MG/DL (ref 8.6–10.2)
CHLORIDE BLD-SCNC: 86 MMOL/L (ref 98–107)
CO2: 30 MMOL/L (ref 22–29)
CREAT SERPL-MCNC: 0.9 MG/DL (ref 0.5–1)
EOSINOPHILS ABSOLUTE: 0.25 E9/L (ref 0.05–0.5)
EOSINOPHILS RELATIVE PERCENT: 1.7 % (ref 0–6)
GFR AFRICAN AMERICAN: >60
GFR NON-AFRICAN AMERICAN: >60 ML/MIN/1.73
GLUCOSE BLD-MCNC: 149 MG/DL (ref 74–99)
HCT VFR BLD CALC: 43.2 % (ref 34–48)
HEMOGLOBIN: 14.5 G/DL (ref 11.5–15.5)
HYPOCHROMIA: ABNORMAL
LYMPHOCYTES ABSOLUTE: 1.48 E9/L (ref 1.5–4)
LYMPHOCYTES RELATIVE PERCENT: 10.4 % (ref 20–42)
MCH RBC QN AUTO: 30.2 PG (ref 26–35)
MCHC RBC AUTO-ENTMCNC: 33.6 % (ref 32–34.5)
MCV RBC AUTO: 90 FL (ref 80–99.9)
MONOCYTES ABSOLUTE: 0.89 E9/L (ref 0.1–0.95)
MONOCYTES RELATIVE PERCENT: 6.1 % (ref 2–12)
NEUTROPHILS ABSOLUTE: 12.14 E9/L (ref 1.8–7.3)
NEUTROPHILS RELATIVE PERCENT: 81.7 % (ref 43–80)
OVALOCYTES: ABNORMAL
PDW BLD-RTO: 14.5 FL (ref 11.5–15)
PLATELET # BLD: 425 E9/L (ref 130–450)
PMV BLD AUTO: 10.6 FL (ref 7–12)
POIKILOCYTES: ABNORMAL
POLYCHROMASIA: ABNORMAL
POTASSIUM SERPL-SCNC: 3.4 MMOL/L (ref 3.5–5)
RBC # BLD: 4.8 E12/L (ref 3.5–5.5)
SODIUM BLD-SCNC: 126 MMOL/L (ref 132–146)
WBC # BLD: 14.8 E9/L (ref 4.5–11.5)

## 2022-01-30 PROCEDURE — 6370000000 HC RX 637 (ALT 250 FOR IP): Performed by: NURSE PRACTITIONER

## 2022-01-30 PROCEDURE — 80048 BASIC METABOLIC PNL TOTAL CA: CPT

## 2022-01-30 PROCEDURE — 2700000000 HC OXYGEN THERAPY PER DAY

## 2022-01-30 PROCEDURE — 2060000000 HC ICU INTERMEDIATE R&B

## 2022-01-30 PROCEDURE — 6360000002 HC RX W HCPCS: Performed by: INTERNAL MEDICINE

## 2022-01-30 PROCEDURE — 99233 SBSQ HOSP IP/OBS HIGH 50: CPT | Performed by: INTERNAL MEDICINE

## 2022-01-30 PROCEDURE — 2580000003 HC RX 258: Performed by: INTERNAL MEDICINE

## 2022-01-30 PROCEDURE — 36415 COLL VENOUS BLD VENIPUNCTURE: CPT

## 2022-01-30 PROCEDURE — 85025 COMPLETE CBC W/AUTO DIFF WBC: CPT

## 2022-01-30 PROCEDURE — 6370000000 HC RX 637 (ALT 250 FOR IP): Performed by: INTERNAL MEDICINE

## 2022-01-30 RX ORDER — DIGOXIN 0.25 MG/ML
250 INJECTION INTRAMUSCULAR; INTRAVENOUS ONCE
Status: COMPLETED | OUTPATIENT
Start: 2022-01-30 | End: 2022-01-30

## 2022-01-30 RX ADMIN — Medication 8 ML: at 07:53

## 2022-01-30 RX ADMIN — METOPROLOL SUCCINATE 100 MG: 25 TABLET, FILM COATED, EXTENDED RELEASE ORAL at 07:45

## 2022-01-30 RX ADMIN — Medication 10 ML: at 20:14

## 2022-01-30 RX ADMIN — DONEPEZIL HYDROCHLORIDE 5 MG: 5 TABLET, FILM COATED ORAL at 07:44

## 2022-01-30 RX ADMIN — MULTIPLE VITAMINS W/ MINERALS TAB 1 TABLET: TAB at 07:45

## 2022-01-30 RX ADMIN — LEVOTHYROXINE SODIUM 25 MCG: 25 TABLET ORAL at 06:26

## 2022-01-30 RX ADMIN — FUROSEMIDE 40 MG: 40 TABLET ORAL at 07:44

## 2022-01-30 RX ADMIN — FOLIC ACID 1 MG: 1 TABLET ORAL at 07:44

## 2022-01-30 RX ADMIN — APIXABAN 5 MG: 5 TABLET, FILM COATED ORAL at 07:44

## 2022-01-30 RX ADMIN — SERTRALINE 100 MG: 50 TABLET, FILM COATED ORAL at 07:44

## 2022-01-30 RX ADMIN — FUROSEMIDE 40 MG: 40 TABLET ORAL at 17:03

## 2022-01-30 RX ADMIN — DONEPEZIL HYDROCHLORIDE 5 MG: 5 TABLET, FILM COATED ORAL at 20:14

## 2022-01-30 RX ADMIN — APIXABAN 5 MG: 5 TABLET, FILM COATED ORAL at 20:14

## 2022-01-30 RX ADMIN — FAMOTIDINE 40 MG: 20 TABLET, FILM COATED ORAL at 17:03

## 2022-01-30 RX ADMIN — ATORVASTATIN CALCIUM 20 MG: 20 TABLET, FILM COATED ORAL at 20:14

## 2022-01-30 RX ADMIN — DIGOXIN 250 MCG: 250 INJECTION, SOLUTION INTRAMUSCULAR; INTRAVENOUS; PARENTERAL at 10:09

## 2022-01-30 RX ADMIN — METOPROLOL SUCCINATE 100 MG: 25 TABLET, FILM COATED, EXTENDED RELEASE ORAL at 20:14

## 2022-01-30 RX ADMIN — POTASSIUM CHLORIDE 40 MEQ: 20 TABLET, EXTENDED RELEASE ORAL at 10:39

## 2022-01-30 ASSESSMENT — PAIN SCALES - GENERAL
PAINLEVEL_OUTOF10: 0

## 2022-01-30 NOTE — CARE COORDINATION
1/30/22 Weekend SS consult : Owen Salgado orders obtained-accepted per Jose Miguel Davis with [facility]. CM/SS assigned to follow. No discharge yet.  Electronically signed by REILLY Ann  on 1/30/2022 at 11:16 AM

## 2022-01-30 NOTE — PROGRESS NOTES
Internal Medicine Progress Note    [facility]    Albaro Goel. Cary Birmingham., F.A.CJinnyOJinnyI. Irma Rosa D.O., PATY Whalen D.O. Halie Guzman, MSN, APRN, NP-C  Roly Oreilly. Corinne Anon, MSN, APRN-CNP     Primary Care Physician: David Coronado DO   Admitting Physician:  Zelalem Macias DO  Admission date and time: 1/26/2022  1:19 PM    Room:  [address] [place] [address]  Admitting diagnosis: Atrial fibrillation with rapid ventricular response ([address] [I48.91]  Atrial fibrillation with RVR ([address] [I48.91]  Abdominal pain, unspecified abdominal location [R10.9]    Patient Name: Raeann Kerns  MRN: [id number]    Date of Service: 1/30/2022     Subjective:  Jamie Singer is a 64 y.o. female who was seen and examined today,1/30/2022, at the bedside. From a mental status standpoint she is doing better and her family is present. Multiple questions have been answered at the bedside and the cardiologist presented as well and participated in the question and answer conversation. He was discussed plan for cardioversion Monday as long as you cardiac thrombus is no longer present. We also discussed medical optimization in the setting of significant cardiomyopathy. She has no acute complaints at this time and is eager for discharge once this can be arranged. Review of System:   Constitutional:   Denies fever or chills, weight loss or gain,+ fatigue. HEENT:   Denies ear pain, sore throat, sinus or eye problems. Cardiovascular:   Denies any chest pain, + irregular heartbeats but denies palpitations. Respiratory:   Denies shortness of breath, coughing, sputum production, hemoptysis, or wheezing. Gastrointestinal:   Denies nausea, vomiting, diarrhea, or constipation. Denies any abdominal pain. Genitourinary:    Denies any urgency, frequency, hematuria. Voiding  without difficulty. Extremities:   Admits to lower leg edema.     Neurology:    Denies any headache or focal neurological deficits, patient relate to having memory difficulty   Psch:   Denies being anxious or depressed. Musculoskeletal:    Denies  myalgias, joint complaints or back pain. Integumentary:   Denies any rashes, ulcers, or excoriations. Denies bruising. Hematologic/Lymphatic:  Denies bruising or bleeding. Physical Exam:  No intake/output data recorded. Intake/Output Summary (Last 24 hours) at 1/30/2022 0929  Last data filed at 1/30/2022 0545  Gross per 24 hour   Intake 480 ml   Output 2050 ml   Net -1570 ml   I/O last 3 completed shifts: In: 480 [P.O.:480]  Out: 4850 [KTOWL:3906]  Patient Vitals for the past 96 hrs (Last 3 readings):   Weight   01/30/22 0553 117 lb (53.1 kg)   01/30/22 0024 117 lb (53.1 kg)   01/29/22 0535 119 lb 3.2 oz (54.1 kg)     Vital Signs:   Blood pressure 121/76, pulse 110, temperature 98.5 °F (36.9 °C), temperature source Oral, resp. rate 16, weight 117 lb (53.1 kg), SpO2 94 %, not currently breastfeeding. General appearance:  Alert, responsive, oriented to person, place, and time. Acute on chronic ill-appearing, no distress. Head:  Normocephalic. No masses, lesions or tenderness. Eyes:  PERRLA. EOMI. Sclera clear. Buccal mucosa moist.  ENT:  Ears normal. Mucosa normal.  Neck:    Supple. Trachea midline. No thyromegaly. No JVD. No bruits. Heart:    Irregular rate and rhythm, S1 and S2. Murmur present. Atrial fibrillation with variable ventricular sponsor bedside telemetry  Lungs:    Symmetrical. Clear to auscultation bilaterally. No wheezes. No rhonchi. No rales. Abdomen:   Soft. Non-tender. Non-distended. Bowel sounds positive. No organomegaly or masses. No pain on palpation. Extremities:    Peripheral pulses present. No further pitting lower leg/ankle edema. No ulcers. No cyanosis. No clubbing. Neurologic:    Alert x 3. No focal deficit. Cranial nerves grossly intact. No focal weakness.   Psych:   Behavior is normal. Mood appears normal. Speech is not rapid and/or pressured. Musculoskeletal:   Spine ROM normal. Muscular strength intact. Gait not assessed. Integumentary:  No rashes  Skin normal color and texture.   Genitalia/Breast:  Deferred    Medication:  Scheduled Meds:   furosemide  40 mg Oral BID    apixaban  5 mg Oral BID    sodium chloride flush  5-40 mL IntraVENous BID    atorvastatin  20 mg Oral QPM    donepezil  5 mg Oral BID    famotidine  40 mg Oral QPM    folic acid  1 mg Oral Daily    levothyroxine  25 mcg Oral Daily    metoprolol succinate  100 mg Oral BID    sertraline  100 mg Oral Daily    therapeutic multivitamin-minerals  1 tablet Oral Daily     Continuous Infusions:      Objective Data:  CBC with Differential:    Lab Results   Component Value Date    WBC 16.2 01/29/2022    RBC 5.33 01/29/2022    HGB 16.2 01/29/2022    HCT 49.1 01/29/2022     01/29/2022    MCV 92.1 01/29/2022    MCH 30.4 01/29/2022    MCHC 33.0 01/29/2022    RDW 14.5 01/29/2022    SEGSPCT 81 09/06/2013    LYMPHOPCT 6.9 01/29/2022    MONOPCT 7.4 01/29/2022    BASOPCT 0.3 01/29/2022    MONOSABS 1.19 01/29/2022    LYMPHSABS 1.11 01/29/2022    EOSABS 0.20 01/29/2022    BASOSABS 0.05 01/29/2022     CMP:    Lab Results   Component Value Date     01/29/2022    K 2.9 01/29/2022    K 5.1 01/26/2022    CL 82 01/29/2022    CO2 34 01/29/2022    BUN 22 01/29/2022    CREATININE 1.0 01/29/2022    GFRAA >60 01/29/2022    LABGLOM 54 01/29/2022    GLUCOSE 111 01/29/2022    GLUCOSE 75 04/10/2012    PROT 7.2 01/26/2022    LABALBU 4.1 01/26/2022    LABALBU 4.7 04/10/2012    CALCIUM 8.9 01/29/2022    BILITOT 0.9 01/26/2022    ALKPHOS 92 01/26/2022    AST 71 01/26/2022    ALT 69 01/26/2022       Wound Documentation:   Incision 09/04/13 Perineum (Active)   Number of days: 3066       Assessment:    · Paroxysmal atrial fibrillation with rapid ventricular response  · Acute on chronic diastolic heart failure with now severely reduced LVEF resulting in acute respiratory failure with hypoxia with plans for medical optimization  · Recent hospitalization status post negative ischemic evaluation  · Hyperlipidemia on statin agent  · Macular degeneration  · Moderate to severe mitral regurgitation  · History of tobacco abuse  · History of TIAs  · Gastroesophageal reflux disease  · Hypothyroidism  · Hx of LA appendage thrombus      Plan:   Jeremías Moore continues to improve. From a heart failure standpoint, she remains compensated on oral diuretic therapy. Renal function has trended downward despite diuresis and we are awaiting today's labs to assess for continued trend. From a A. fib standpoint, the patient will be for cardioversion on Monday and is on therapeutic anticoagulation and rate/rhythm controlling medications off of Cardizem drip. We have discussed that previously she had left atrial appendage thrombus and this would prohibit cardioversion at present and they are aware of this. Medical optimization in setting of advanced cardiomyopathy is also been discussed. We will defer the institution of these medications to the cardiovascular team.  Chronic comorbidities laboratory values and vital signs being monitored and addressed accordingly as well. Plan remains maintaining hospitalization and continuation of care through Monday for MARYLOU guided cardioversion and then consideration for discharge thereafter, refer to SW/CM notes for details. Family members updated extensively and plan of care will be to return home with family for at least the first 50 hours with home health care. More than 50% of my  time was spent at the bedside counseling/coordinating care with the patient and/or family with face to face contact. This time was spent reviewing notes and laboratory data as well as instructing and counseling the patient. Time I spent with the family or surrogate(s) is included only if the patient was incapable of providing the necessary information or participating in medical decisions.  I also discussed the differential diagnosis and all of the proposed management plans with the patient and individuals accompanying the patient. Amanda Faust requires this high level of physician care and nursing on the IMC/Telemetry unit due the complexity of decision management and chance of rapid decline or death. Continued cardiac monitoring and higher level of nursing are required. I am readily available for any further decision-making and intervention.          Yaya Colindres, ELIO - CNP  9:29 AM  1/30/2022

## 2022-01-30 NOTE — PROGRESS NOTES
[facility]        CARDIOLOGY                 INPATIENT PROGRESS NOTE          PATIENT SEEN IN FOLLOW UP FOR: A fib, CHF, CMP    Hospital Day: Solomon Low is a 64year old patient known to me. SUBJECTIVE: Denies any chest pain; breathing better. No PND or orthopnea, No palpitations    ROS: Review of rest of 10 systems negative except as mentioned above    OBJECTIVE: No acute distress. See Assessment     Diagnostics:       Telemetry:  Reviewed       Echocardiogram:   Transthoracic echo 1/28/2022  EF 30 to 35%  Moderate MR  Mild to moderate AR     MARYLOU (Dr. Severo Patricia, 10/2020)  Summary   Large irregular mobile echogenic mass noted in the left atrial appendage   and left atrium near mouth of MARTA suggestive of thrombus.   Moderate to severe central jet of mitral regurgitation - ERO 0.3cm2, PISA 0.6cm, RV 76cc.   Normal left ventricular chamber size.   Normal left ventricular systolic function.   Left atrium is enlarged.   Interatrial septum intact.   Agitated saline injection showed no right to left shunt.   Normal right ventricle size and function.   No mitral valve prolapse.   There is trace aortic regurgitation.   There is mild tricuspid regurgitation.   Normal aortic root size.   No evidence of pericardial effusion.   Pericardium appears normal.   No comparison study available in the local [facility].     Stress test:    Pharmacologic Stress Test (12/2021)  FINDINGS: The overall quality of the study was excellent. Left ventricular cavity size was noted to be normal.  Rotational analog analysis demonstrated minimal attenuation. The gated SPECT stress imaging in the short, vertical long, and  horizontal long axis demonstrated normal homogeneous tracer  distribution throughout the myocardium. The resting images normal.   Gated SPECT left ventricular ejection fraction was calculated to be 53%, with normal wall motion. Impression:   The myocardial perfusion imaging was normal.  Overall left ventricular systolic function was normal without regional  wall motion abnormalities. Low risk study.         Intake/Output Summary (Last 24 hours) at 1/30/2022 0842  Last data filed at 1/30/2022 0545  Gross per 24 hour   Intake 480 ml   Output 2500 ml   Net -2020 ml       Labs:   CBC:   Recent Labs     01/28/22  1016 01/29/22  1302   WBC 15.4* 16.2*   HGB 14.3 16.2*   HCT 43.2 49.1*    416     BMP:   Recent Labs     01/28/22  1016 01/29/22  1302   * 127*   K 3.3* 2.9*   CO2 28 34*   BUN 27* 22   CREATININE 1.1* 1.0   LABGLOM 48 54   CALCIUM 8.6 8.9     Mag:   No results for input(s): MG in the last 72 hours. Phos:   No results for input(s): PHOS in the last 72 hours. TSH:   No results for input(s): TSH in the last 72 hours. HgA1c:     BNP: No results for input(s): BNP in the last 72 hours. PT/INR: No results for input(s): PROTIME, INR in the last 72 hours. APTT:No results for input(s): APTT in the last 72 hours. CARDIAC ENZYMES:No results for input(s): CKTOTAL, CKMB, CKMBINDEX, TROPONINI in the last 72 hours. FASTING LIPID PANEL:  Lab Results   Component Value Date    CHOL 133 01/27/2022    HDL 37 01/27/2022    LDLCALC 85 01/27/2022    TRIG 54 01/27/2022     LIVER PROFILE:  No results for input(s): AST, ALT, LABALBU in the last 72 hours.     Current Inpatient Medications:   furosemide  40 mg Oral BID    apixaban  5 mg Oral BID    sodium chloride flush  5-40 mL IntraVENous BID    atorvastatin  20 mg Oral QPM    donepezil  5 mg Oral BID    famotidine  40 mg Oral QPM    folic acid  1 mg Oral Daily    levothyroxine  25 mcg Oral Daily    metoprolol succinate  100 mg Oral BID    sertraline  100 mg Oral Daily    therapeutic multivitamin-minerals  1 tablet Oral Daily       IV Infusions (if any):        PHYSICAL EXAM:     CONSTITUTIONAL:   /76   Pulse 110   Temp 98.5 °F (36.9 °C) (Oral)   Resp 16   Wt 117 lb (53.1 kg)   SpO2 94%   BMI 22.11 kg/m²   Pulse  Avg: 105.2  Min: 80  Max: 889  Systolic (23NZR), BVL:703 , Min:96 , YCL:470    Diastolic (83NSC), RQS:79, Min:63, Max:84    In general, this is a well developed, well nourished who appears stated age. awake, alert, no apparent distress  HEENT: eyes -conjunctivae pink,  Throat - Oral mucosa pink . Neck-  no stridor, no noted enlargement of the thyroid, no carotid bruit. no jugular venous distention   RESPIRATORY: Chest symmetrical and non-tender to palpation. No accessory muscle use. Lung auscultation - diminished at bases, few rhonchi  CARDIOVASCULAR:     Heart Palpation - no palpable thrills   Heart Ausculation - Irregularly irregular rate and rhythm, 2/6 systolic murmur, No s3 or rub. No lower extremity edema, no varicosities. Distal pulses palpable, no clubbing or cyanosis   ABDOMEN: Soft, nontender, nondistended. Bowel sounds present. No Palpable masses; no abdominal bruit / pulsation  MS: good muscle strength and tone. : Deferred  Rectal Exam: Deferred  SKIN: warm and dry no statis dermatitis or ulcers   NEURO / PSYCH: oriented to person, place      IMPRESSION/PLAN:    Paroxysmal atrial fibrillation RVR. Continue Metoprolol for rate control, Continue Apixaban for [address]. Give one dose of Digoxin for rate control; MARYLOU/DCCV Monday (If no thrombus). Acute on chronic HFrEF. -ve 2002cc, Change to tomorrow PO diuretics, monitor daily Wts, I/Os, BP, renal Fn    Cardiomyopathy - EF 30-35% new finding. Normal EF in 10/ 2021, Likely tachycardia mediated. Recent stress test in 12/2021 negative for ischemia, EF 53%. Continue Metoprolol, If BP tolerate add Entresto. Later add Jardiance. She is DNR CCA.     History of left atrial appendage thrombus - by MARYLOU 10/2020 - On Eliquis    Borderline elevated troponin - Does not consistent with ACS, likely demand ischemia from CHF, tachycardia, No CP    Hyperkalemia improved, now hypokalemia - Supplement Kcl    DAYNA - Better    VHD - MR, AR    Bilateral pleural effusions - better    Abnormal LFTs    History of TIA    Hyperlipidemia - On Statin    Hypothyroidism - On HRT               Above recommendations discussed with her and her family    D/w Dr Cody Schmitt      Electronically signed by Deysi De Leon MD on 1/30/2022 at 8:42 AM  [facility]) Cardiology

## 2022-01-31 ENCOUNTER — ANESTHESIA (OUTPATIENT)
Dept: POSTOP/PACU | Age: 77
DRG: 308 | End: 2022-01-31
Payer: MEDICARE

## 2022-01-31 ENCOUNTER — ANESTHESIA EVENT (OUTPATIENT)
Dept: POSTOP/PACU | Age: 77
DRG: 308 | End: 2022-01-31
Payer: MEDICARE

## 2022-01-31 VITALS
DIASTOLIC BLOOD PRESSURE: 58 MMHG | SYSTOLIC BLOOD PRESSURE: 106 MMHG | OXYGEN SATURATION: 97 % | RESPIRATION RATE: 24 BRPM

## 2022-01-31 VITALS
DIASTOLIC BLOOD PRESSURE: 58 MMHG | HEART RATE: 57 BPM | RESPIRATION RATE: 18 BRPM | WEIGHT: 117 LBS | BODY MASS INDEX: 22.11 KG/M2 | SYSTOLIC BLOOD PRESSURE: 112 MMHG | OXYGEN SATURATION: 96 % | TEMPERATURE: 98.1 F

## 2022-01-31 LAB
ANION GAP SERPL CALCULATED.3IONS-SCNC: 10 MMOL/L (ref 7–16)
BASOPHILS ABSOLUTE: 0.08 E9/L (ref 0–0.2)
BASOPHILS RELATIVE PERCENT: 0.6 % (ref 0–2)
BLOOD CULTURE, ROUTINE: NORMAL
BUN BLDV-MCNC: 21 MG/DL (ref 6–23)
CALCIUM SERPL-MCNC: 8.7 MG/DL (ref 8.6–10.2)
CHLORIDE BLD-SCNC: 89 MMOL/L (ref 98–107)
CO2: 30 MMOL/L (ref 22–29)
CREAT SERPL-MCNC: 0.9 MG/DL (ref 0.5–1)
CULTURE, BLOOD 2: NORMAL
EOSINOPHILS ABSOLUTE: 0.35 E9/L (ref 0.05–0.5)
EOSINOPHILS RELATIVE PERCENT: 2.6 % (ref 0–6)
GFR AFRICAN AMERICAN: >60
GFR NON-AFRICAN AMERICAN: >60 ML/MIN/1.73
GLUCOSE BLD-MCNC: 98 MG/DL (ref 74–99)
HCT VFR BLD CALC: 44.1 % (ref 34–48)
HEMOGLOBIN: 14.6 G/DL (ref 11.5–15.5)
IMMATURE GRANULOCYTES #: 0.08 E9/L
IMMATURE GRANULOCYTES %: 0.6 % (ref 0–5)
LV EF: 35 %
LVEF MODALITY: NORMAL
LYMPHOCYTES ABSOLUTE: 1.33 E9/L (ref 1.5–4)
LYMPHOCYTES RELATIVE PERCENT: 9.9 % (ref 20–42)
MAGNESIUM: 1.9 MG/DL (ref 1.6–2.6)
MCH RBC QN AUTO: 30.5 PG (ref 26–35)
MCHC RBC AUTO-ENTMCNC: 33.1 % (ref 32–34.5)
MCV RBC AUTO: 92.1 FL (ref 80–99.9)
MONOCYTES ABSOLUTE: 1.45 E9/L (ref 0.1–0.95)
MONOCYTES RELATIVE PERCENT: 10.8 % (ref 2–12)
NEUTROPHILS ABSOLUTE: 10.09 E9/L (ref 1.8–7.3)
NEUTROPHILS RELATIVE PERCENT: 75.5 % (ref 43–80)
PDW BLD-RTO: 14.5 FL (ref 11.5–15)
PHOSPHORUS: 3.4 MG/DL (ref 2.5–4.5)
PLATELET # BLD: 397 E9/L (ref 130–450)
PMV BLD AUTO: 10.7 FL (ref 7–12)
POTASSIUM SERPL-SCNC: 3.6 MMOL/L (ref 3.5–5)
RBC # BLD: 4.79 E12/L (ref 3.5–5.5)
SODIUM BLD-SCNC: 129 MMOL/L (ref 132–146)
WBC # BLD: 13.4 E9/L (ref 4.5–11.5)

## 2022-01-31 PROCEDURE — 2580000003 HC RX 258: Performed by: NURSE ANESTHETIST, CERTIFIED REGISTERED

## 2022-01-31 PROCEDURE — 94761 N-INVAS EAR/PLS OXIMETRY MLT: CPT

## 2022-01-31 PROCEDURE — 93312 ECHO TRANSESOPHAGEAL: CPT | Performed by: INTERNAL MEDICINE

## 2022-01-31 PROCEDURE — 6370000000 HC RX 637 (ALT 250 FOR IP): Performed by: INTERNAL MEDICINE

## 2022-01-31 PROCEDURE — B24BZZ4 ULTRASONOGRAPHY OF HEART WITH AORTA, TRANSESOPHAGEAL: ICD-10-PCS | Performed by: INTERNAL MEDICINE

## 2022-01-31 PROCEDURE — 85025 COMPLETE CBC W/AUTO DIFF WBC: CPT

## 2022-01-31 PROCEDURE — 83735 ASSAY OF MAGNESIUM: CPT

## 2022-01-31 PROCEDURE — 36415 COLL VENOUS BLD VENIPUNCTURE: CPT

## 2022-01-31 PROCEDURE — 99232 SBSQ HOSP IP/OBS MODERATE 35: CPT | Performed by: INTERNAL MEDICINE

## 2022-01-31 PROCEDURE — 84100 ASSAY OF PHOSPHORUS: CPT

## 2022-01-31 PROCEDURE — 2580000003 HC RX 258: Performed by: INTERNAL MEDICINE

## 2022-01-31 PROCEDURE — 6360000002 HC RX W HCPCS: Performed by: NURSE ANESTHETIST, CERTIFIED REGISTERED

## 2022-01-31 PROCEDURE — 80048 BASIC METABOLIC PNL TOTAL CA: CPT

## 2022-01-31 PROCEDURE — 93325 DOPPLER ECHO COLOR FLOW MAPG: CPT

## 2022-01-31 PROCEDURE — 93312 ECHO TRANSESOPHAGEAL: CPT

## 2022-01-31 PROCEDURE — 5A2204Z RESTORATION OF CARDIAC RHYTHM, SINGLE: ICD-10-PCS | Performed by: INTERNAL MEDICINE

## 2022-01-31 PROCEDURE — 6370000000 HC RX 637 (ALT 250 FOR IP): Performed by: NURSE PRACTITIONER

## 2022-01-31 PROCEDURE — 92960 CARDIOVERSION ELECTRIC EXT: CPT | Performed by: INTERNAL MEDICINE

## 2022-01-31 RX ORDER — PROPOFOL 10 MG/ML
INJECTION, EMULSION INTRAVENOUS PRN
Status: DISCONTINUED | OUTPATIENT
Start: 2022-01-31 | End: 2022-01-31 | Stop reason: SDUPTHER

## 2022-01-31 RX ORDER — ATORVASTATIN CALCIUM 20 MG/1
TABLET, FILM COATED ORAL
Qty: 90 TABLET | Refills: 0 | Status: SHIPPED | OUTPATIENT
Start: 2022-01-31 | End: 2022-02-02 | Stop reason: SDUPTHER

## 2022-01-31 RX ORDER — FUROSEMIDE 40 MG/1
40 TABLET ORAL 2 TIMES DAILY
Qty: 60 TABLET | Refills: 0 | Status: SHIPPED | OUTPATIENT
Start: 2022-01-31 | End: 2022-02-02 | Stop reason: SDUPTHER

## 2022-01-31 RX ORDER — SODIUM CHLORIDE 9 MG/ML
INJECTION, SOLUTION INTRAVENOUS CONTINUOUS PRN
Status: DISCONTINUED | OUTPATIENT
Start: 2022-01-31 | End: 2022-01-31 | Stop reason: SDUPTHER

## 2022-01-31 RX ORDER — METOPROLOL SUCCINATE 50 MG/1
50 TABLET, EXTENDED RELEASE ORAL 2 TIMES DAILY
Qty: 60 TABLET | Refills: 0 | Status: ON HOLD | OUTPATIENT
Start: 2022-01-31 | End: 2022-02-23 | Stop reason: HOSPADM

## 2022-01-31 RX ORDER — METOPROLOL SUCCINATE 50 MG/1
50 TABLET, EXTENDED RELEASE ORAL 2 TIMES DAILY
Status: DISCONTINUED | OUTPATIENT
Start: 2022-01-31 | End: 2022-01-31 | Stop reason: HOSPADM

## 2022-01-31 RX ORDER — POTASSIUM CHLORIDE 750 MG/1
20 TABLET, FILM COATED, EXTENDED RELEASE ORAL 2 TIMES DAILY
Qty: 60 TABLET | Refills: 0 | Status: SHIPPED | OUTPATIENT
Start: 2022-01-31 | End: 2022-02-02 | Stop reason: SDUPTHER

## 2022-01-31 RX ADMIN — PROPOFOL 25 MG: 10 INJECTION, EMULSION INTRAVENOUS at 13:22

## 2022-01-31 RX ADMIN — DONEPEZIL HYDROCHLORIDE 5 MG: 5 TABLET, FILM COATED ORAL at 07:57

## 2022-01-31 RX ADMIN — APIXABAN 5 MG: 5 TABLET, FILM COATED ORAL at 07:57

## 2022-01-31 RX ADMIN — FUROSEMIDE 40 MG: 40 TABLET ORAL at 07:57

## 2022-01-31 RX ADMIN — PROPOFOL 25 MG: 10 INJECTION, EMULSION INTRAVENOUS at 13:24

## 2022-01-31 RX ADMIN — LEVOTHYROXINE SODIUM 25 MCG: 25 TABLET ORAL at 06:43

## 2022-01-31 RX ADMIN — MULTIPLE VITAMINS W/ MINERALS TAB 1 TABLET: TAB at 07:57

## 2022-01-31 RX ADMIN — FUROSEMIDE 40 MG: 40 TABLET ORAL at 16:27

## 2022-01-31 RX ADMIN — Medication 10 ML: at 08:02

## 2022-01-31 RX ADMIN — FAMOTIDINE 40 MG: 20 TABLET, FILM COATED ORAL at 16:27

## 2022-01-31 RX ADMIN — PROPOFOL 25 MG: 10 INJECTION, EMULSION INTRAVENOUS at 13:27

## 2022-01-31 RX ADMIN — SODIUM CHLORIDE: 9 INJECTION, SOLUTION INTRAVENOUS at 13:04

## 2022-01-31 RX ADMIN — SERTRALINE 100 MG: 50 TABLET, FILM COATED ORAL at 07:57

## 2022-01-31 RX ADMIN — METOPROLOL SUCCINATE 100 MG: 25 TABLET, FILM COATED, EXTENDED RELEASE ORAL at 07:56

## 2022-01-31 RX ADMIN — FOLIC ACID 1 MG: 1 TABLET ORAL at 07:57

## 2022-01-31 RX ADMIN — PROPOFOL 25 MG: 10 INJECTION, EMULSION INTRAVENOUS at 13:25

## 2022-01-31 ASSESSMENT — LIFESTYLE VARIABLES: SMOKING_STATUS: 0

## 2022-01-31 ASSESSMENT — PAIN SCALES - GENERAL
PAINLEVEL_OUTOF10: 0
PAINLEVEL_OUTOF10: 0

## 2022-01-31 NOTE — ANESTHESIA PRE PROCEDURE
Department of Anesthesiology  Preprocedure Note       Name:  Vasu Luis   Age:  64 y.o.  :  1945                                          MRN:  [id number]         Date:  2022      Surgeon: Ct Vasquez    Procedure: CARDIOVERSION. Medications prior to admission:   Prior to Admission medications    Medication Sig Start Date End Date Taking? Authorizing Provider   Calcium Polycarbophil (FIBER-CAPS PO) Take 1 capsule by mouth at bedtime    Historical Provider, MD   FOLIC ACID PO Take 1 tablet by mouth daily    Historical Provider, MD   potassium chloride (KLOR-CON) 10 MEQ extended release tablet Take 1 tablet by mouth daily 22   Rylee Butler, DO   bumetanide (BUMEX) 2 MG tablet Take 1 tablet by mouth daily 22   Rylee Butler, DO   diphenoxylate-atropine (DIPHENATOL) 2.5-0.025 MG per tablet Take 1 tablet by mouth 4 times daily as needed for Diarrhea for up to 30 days.  22  Rylee Butler, DO   ondansetron (ZOFRAN-ODT) 4 MG disintegrating tablet Take 1 tablet by mouth 3 times daily as needed for Nausea 22  Rylee Butler, DO   famotidine (PEPCID) 40 MG tablet Take 1 tablet by mouth every evening 22   Rylee Butler, DO   benzonatate (TESSALON) 200 MG capsule Take 1 capsule by mouth 3 times daily as needed for Cough 22  Rylee Butler, DO   metoprolol succinate (TOPROL XL) 100 MG extended release tablet Take 1 tablet by mouth 2 times daily 22   Rylee Butler, DO   donepezil (ARICEPT) 10 MG tablet Take 1 tablet by mouth daily  Patient taking differently: Take 5 mg by mouth 2 times daily  21   Rylee Butler, DO   sertraline (ZOLOFT) 100 MG tablet TAKE 1 TABLET BY MOUTH  DAILY 21   Rylee Butler, DO   Magnesium 100 MG CAPS Take 200 mg by mouth nightly 21   Rylee Butler, DO   apixaban (ELIQUIS) 5 MG TABS tablet Take 1 tablet by mouth 2 times daily 2/15/21   Rylee Butler, DO   levothyroxine (SYNTHROID) 25 MCG tablet Take 1 tablet by mouth Daily 10/19/20   Portia Tyler, DO   atorvastatin (LIPITOR) 20 MG tablet TAKE ONE TABLET BY MOUTH EVERY EVENING 10/19/20   Portia Tyler, DO   therapeutic multivitamin-minerals [facility]) tablet Take 1 tablet by mouth daily. Historical Provider, MD       Current medications:    No current facility-administered medications for this visit. No current outpatient medications on file.      Facility-Administered Medications Ordered in Other Visits   Medication Dose Route Frequency Provider Last Rate Last Admin    0.9 % sodium chloride infusion   IntraVENous Continuous PRN ELIO Diaz - CRNA 100 mL/hr at 01/31/22 1304 New Bag at 01/31/22 1304    furosemide (LASIX) tablet 40 mg  40 mg Oral BID ELIO Castro CNP   40 mg at 01/31/22 0757    potassium chloride (KLOR-CON M) extended release tablet 40 mEq  40 mEq Oral PRN Kemal Bullocks, DO   40 mEq at 01/30/22 1039    Or    potassium bicarb-citric acid (EFFER-K) effervescent tablet 40 mEq  40 mEq Oral PRN Kemal Bullocks, DO        Or    potassium chloride 10 mEq/100 mL IVPB (Peripheral Line)  10 mEq IntraVENous PRN Kemal Bullocks, DO        apixaban (ELIQUIS) tablet 5 mg  5 mg Oral BID Pradip León MD   5 mg at 01/31/22 0757    sodium chloride flush 0.9 % injection 5-40 mL  5-40 mL IntraVENous BID Kemal Bullocks, DO   10 mL at 01/31/22 0802    atorvastatin (LIPITOR) tablet 20 mg  20 mg Oral QPM Kemal Bullocks, DO   20 mg at 01/30/22 2014    donepezil (ARICEPT) tablet 5 mg  5 mg Oral BID Kemal Bullocks, DO   5 mg at 01/31/22 0757    famotidine (PEPCID) tablet 40 mg  40 mg Oral QPM Kemal Bullocks, DO   40 mg at 70/37/75 1854    folic acid (FOLVITE) tablet 1 mg  1 mg Oral Daily Kemal Bullocks, DO   1 mg at 01/31/22 0757    levothyroxine (SYNTHROID) tablet 25 mcg  25 mcg Oral Daily Kemal Bullocks, DO   25 mcg at 01/31/22 0466    metoprolol succinate (TOPROL XL) extended release tablet 100 mg  100 mg Oral BID Kemal Bullocks, DO   100 mg at 01/31/22 0756    ondansetron (ZOFRAN-ODT) disintegrating tablet 4 mg  4 mg Oral TID PRN Lavinia Ser, DO        sertraline (ZOLOFT) tablet 100 mg  100 mg Oral Daily Lavinia Ser, DO   100 mg at 01/31/22 1219    therapeutic multivitamin-minerals 1 tablet  1 tablet Oral Daily Lavinia Ser, DO   1 tablet at 01/31/22 0757    albuterol (PROVENTIL) nebulizer solution 2.5 mg  2.5 mg Nebulization Q6H PRN Lavinia Ser, DO        [facility]ansetron [facility]) injection 4 mg  4 mg IntraVENous Q6H PRN Lavinia Ser, DO   4 mg at 01/26/22 1624       Allergies:  No Known Allergies    Problem List:    Patient Active Problem List   Diagnosis Code    Pelvic prolapse N81.9    Hyperlipidemia E78.5    GERD (gastroesophageal reflux disease) K21.9    PUD (peptic ulcer disease) K27.9    Urinary incontinence R32    Osteoarthritis M19.90    Palpitations R00.2    Precordial pain R07.2    Thrombophilia ([facility] [address] D68.59    Alzheimer's disease, unspecified G30.9    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 D23.71    Pain of right foot M79.671    Atrial fibrillation with RVR (HCC) I48.91    Aneurysm (HCC) I72.9    Atrial fibrillation with rapid ventricular response (HCC) I48.91       Past Medical History:        Diagnosis Date    Arthritis     Asymptomatic PVCs 4/2012    pt felt flutter, no other sx, holter + pvc's, few runs of SVT   [place]    History of Holter monitoring     Hyperlipidemia     Macular degeneration     Nausea & vomiting     Pelvic prolapse     PONV (postoperative nausea and vomiting)     TIA (transient ischemic attack)        Past Surgical History:        Procedure Laterality Date    COLONOSCOPY  2012    ENDOSCOPY, COLON, DIAGNOSTIC      EYE SURGERY      macular hole    HERNIA REPAIR      inguinal    HYSTERECTOMY      OTHER SURGICAL HISTORY  sept 2013    ant elevatetransobturator sling rectocele and cystocele enterocele    SKIN BIOPSY      arms    TRANSESOPHAGEAL ECHOCARDIOGRAM N/A 10/20/2020 TRANSESOPHAGEAL ECHOCARDIOGRAM WITH BUBBLE STUDY performed by Angelito Bell MD at [address] [place] [address] Crossing History:    Social History     Tobacco Use    Smoking status: Former Smoker     Packs/day: 0.50     Quit date: 1994     Years since quittin.6    Smokeless tobacco: Never Used   Substance Use Topics    Alcohol use: No     Comment: Coffee 1 cup a day                                 Counseling given: Not Answered      Vital Signs (Current): There were no vitals filed for this visit. BP Readings from Last 3 Encounters:   22 110/61   22 101/68   22 124/80       NPO Status:                                                                                 BMI:   Wt Readings from Last 3 Encounters:   22 117 lb (53.1 kg)   22 124 lb (56.2 kg)   22 123 lb (55.8 kg)     There is no height or weight on file to calculate BMI.    CBC:   Lab Results   Component Value Date    WBC 13.4 2022    RBC 4.79 2022    HGB 14.6 2022    HCT 44.1 2022    MCV 92.1 2022    RDW 14.5 2022     2022       CMP:   Lab Results   Component Value Date     2022    K 3.6 2022    K 5.1 2022    CL 89 2022    CO2 30 2022    BUN 21 2022    CREATININE 0.9 2022    GFRAA >60 2022    LABGLOM >60 2022    GLUCOSE 98 2022    GLUCOSE 75 04/10/2012    PROT 7.2 2022    CALCIUM 8.7 2022    BILITOT 0.9 2022    ALKPHOS 92 2022    AST 71 2022    ALT 69 2022       POC Tests: No results for input(s): POCGLU, POCNA, POCK, POCCL, POCBUN, POCHEMO, POCHCT in the last 72 hours.     Coags:   Lab Results   Component Value Date    PROTIME 17.3 2021    INR 1.5 2021       HCG (If Applicable): No results found for: PREGTESTUR, PREGSERUM, HCG, HCGQUANT     ABGs: No results found for: PHART, PO2ART, GYE3HGB, QUR4XDT, BEART, O7BWQSIA     Type & Screen (If Applicable):  No results found for: LABABO, LABRH    Drug/Infectious Status (If Applicable):  No results found for: HIV, HEPCAB    COVID-19 Screening (If Applicable):   Lab Results   Component Value Date    COVID19 Not Detected 01/26/2022    COVID19 Not Detected 01/26/2022         Anesthesia Evaluation  Patient summary reviewed   history of anesthetic complications: PONV. Airway: Mallampati: II  TM distance: >3 FB   Neck ROM: full  Mouth opening: > = 3 FB Dental:    (+) upper dentures and partials      Pulmonary: breath sounds clear to auscultation      (-) not a current smoker                           Cardiovascular:    (+) valvular problems/murmurs (mild AS, mild MR on TTE 9/20): AS and MR, dysrhythmias: SVT, hyperlipidemia      ECG reviewed  Rhythm: regular  Rate: normal  Echocardiogram reviewed  Stress test reviewed             ROS comment: EKG: Atrial Fibrillation 177 with rapid ventricular response, low voltage QRS, NS St & T changes. STRESS TEST:  Exercise EKG was normal.  The patient experienced no chest pain with exercise. Beckman treadmill score was 7 implying low risk of acute ischemic events. Exercise capacity was average. Echo:   Large irregular mobile echogenic mass noted in the left atrial appendage and left atrium near mouth of MARTA suggestive of thrombus. Moderate to severe central jet of mitral regurgitation - ERO 0.3cm2, PISA 0.6cm, RV 76cc. Normal left ventricular chamber size. Normal left ventricular systolic function. Left atrium is enlarged. Interatrial septum intact. Agitated saline injection showed no right to left shunt. Normal right ventricle size and function. No mitral valve prolapse. There is trace aortic regurgitation. There is mild tricuspid regurgitation. Normal aortic root size. No evidence of pericardial effusion. Pericardium appears normal.   No comparison study available in the local [facility].      Neuro/Psych: (+) TIA,             GI/Hepatic/Renal:   (+) GERD:, PUD,           Endo/Other:    (+) hypothyroidism: arthritis:., .                 Abdominal:         (-) obese       Vascular: negative vascular ROS. Other Findings:               Anesthesia Plan      MAC     ASA 3       Induction: intravenous. Anesthetic plan and risks discussed with patient. Plan discussed with CRNA. Attending anesthesiologist reviewed and agrees with Preprocedure content      DOS STAFF ADDENDUM:    Pt seen and examined, chart reviewed (including anesthesia, drug and allergy history). Anesthetic plan, risks, benefits, alternatives, and personnel involved discussed with patient. Patient verbalized an understanding and agrees to proceed. Plan discussed with care team members and agreed upon.     Noni Harrison MD  Staff Anesthesiologist  1:31 PM        Noni Harrison MD   1/31/2022

## 2022-01-31 NOTE — PLAN OF CARE
Problem: Falls - Risk of:  Goal: Will remain free from falls  Description: Will remain free from falls  1/31/2022 1440 by Sarah Villar RN  Outcome: Completed  1/31/2022 0808 by Sarah Villar RN  Outcome: Ongoing  Goal: Absence of physical injury  Description: Absence of physical injury  1/31/2022 1440 by Sarah Villar RN  Outcome: Completed  1/31/2022 0808 by Sarah Villar RN  Outcome: Ongoing

## 2022-01-31 NOTE — CARE COORDINATION
1/31/22 1416 CM note: COVID (-) 1/26/22. Discharge order noted. Pt had MARYLOU with cardioversion today. Discharge plan remains home with [facility]. [facility] order noted. Notified Loreto Clark, of pts discharge today. Info on alert systems, A.L facilities, and [employer] packet provided to pts daughter Joseph Diaz last week. Pts family will provide transportation home.  Electronically signed by Burton Carrillo RN on 1/31/2022 at 2:20 PM

## 2022-01-31 NOTE — PROGRESS NOTES
[facility]        CARDIOLOGY                 INPATIENT PROGRESS NOTE          PATIENT SEEN IN FOLLOW UP FOR: A fib, CHF, CMP    Hospital Day: [address] [facility] is a 64year old patient known to me. SUBJECTIVE: Denies any chest pain; breathing better. No PND or orthopnea, No palpitations - Seen in PACU area prior to MARYLOU    ROS: Review of rest of 10 systems negative except as mentioned above    OBJECTIVE: No acute distress. See Assessment     Diagnostics:       Telemetry:  Reviewed       Echocardiogram:   Transthoracic echo 1/28/2022  EF 30 to 35%  Moderate MR  Mild to moderate AR     MARYLOU (Dr. Bess Monreal, 10/2020)  Summary   Large irregular mobile echogenic mass noted in the left atrial appendage   and left atrium near mouth of MARTA suggestive of thrombus.   Moderate to severe central jet of mitral regurgitation - ERO 0.3cm2, PISA 0.6cm, RV 76cc.   Normal left ventricular chamber size.   Normal left ventricular systolic function.   Left atrium is enlarged.   Interatrial septum intact.   Agitated saline injection showed no right to left shunt.   Normal right ventricle size and function.   No mitral valve prolapse.   There is trace aortic regurgitation.   There is mild tricuspid regurgitation.   Normal aortic root size.   No evidence of pericardial effusion.   Pericardium appears normal.   No comparison study available in the local [facility].     Stress test:    Pharmacologic Stress Test (12/2021)  FINDINGS: The overall quality of the study was excellent. Left ventricular cavity size was noted to be normal.  Rotational analog analysis demonstrated minimal attenuation. The gated SPECT stress imaging in the short, vertical long, and  horizontal long axis demonstrated normal homogeneous tracer  distribution throughout the myocardium. The resting images normal.   Gated SPECT left ventricular ejection fraction was calculated to be 53%, with normal wall motion. Impression:   The myocardial perfusion imaging was normal.  Overall left ventricular systolic function was normal without regional  wall motion abnormalities. Low risk study.         Intake/Output Summary (Last 24 hours) at 1/31/2022 1313  Last data filed at 1/31/2022 1258  Gross per 24 hour   Intake 800 ml   Output 2075 ml   Net -1275 ml       Labs:   CBC:   Recent Labs     01/30/22  0940 01/31/22  0549   WBC 14.8* 13.4*   HGB 14.5 14.6   HCT 43.2 44.1    397     BMP:   Recent Labs     01/30/22  0929 01/31/22  0549   * 129*   K 3.4* 3.6   CO2 30* 30*   BUN 22 21   CREATININE 0.9 0.9   LABGLOM >60 >60   CALCIUM 8.6 8.7     Mag:   Recent Labs     01/31/22  0549   MG 1.9     Phos:   Recent Labs     01/31/22  0549   PHOS 3.4     TSH:   No results for input(s): TSH in the last 72 hours. HgA1c:     BNP: No results for input(s): BNP in the last 72 hours. PT/INR: No results for input(s): PROTIME, INR in the last 72 hours. APTT:No results for input(s): APTT in the last 72 hours. CARDIAC ENZYMES:No results for input(s): CKTOTAL, CKMB, CKMBINDEX, TROPONINI in the last 72 hours. FASTING LIPID PANEL:  Lab Results   Component Value Date    CHOL 133 01/27/2022    HDL 37 01/27/2022    LDLCALC 85 01/27/2022    TRIG 54 01/27/2022     LIVER PROFILE:  No results for input(s): AST, ALT, LABALBU in the last 72 hours.     Current Inpatient Medications:   furosemide  40 mg Oral BID    apixaban  5 mg Oral BID    sodium chloride flush  5-40 mL IntraVENous BID    atorvastatin  20 mg Oral QPM    donepezil  5 mg Oral BID    famotidine  40 mg Oral QPM    folic acid  1 mg Oral Daily    levothyroxine  25 mcg Oral Daily    metoprolol succinate  100 mg Oral BID    sertraline  100 mg Oral Daily    therapeutic multivitamin-minerals  1 tablet Oral Daily       IV Infusions (if any):        PHYSICAL EXAM:     CONSTITUTIONAL:   /61   Pulse 108   Temp 98.4 °F (36.9 °C) (Oral)   Resp 18   Wt 117 lb (53.1 kg)   SpO2 93%   BMI 22.11 kg/m²   Pulse  Av  Min: 107  Max: 610  Systolic (10YSN), DIV:168 , Min:109 , ZRW:576    Diastolic (40WUP), THOMAS:51, Min:59, Max:90    In general, this is a well developed, well nourished who appears stated age. awake, alert, no apparent distress  HEENT: eyes -conjunctivae pink,  Throat - Oral mucosa pink . Neck-  no stridor, no noted enlargement of the thyroid, no carotid bruit. no jugular venous distention   RESPIRATORY: Chest symmetrical and non-tender to palpation. No accessory muscle use. Lung auscultation - diminished at bases, few rhonchi  CARDIOVASCULAR:     Heart Palpation - no palpable thrills   Heart Ausculation - Irregularly irregular rate and rhythm, 2/6 systolic murmur, No s3 or rub. No lower extremity edema, no varicosities. Distal pulses palpable, no clubbing or cyanosis   ABDOMEN: Soft, nontender, nondistended. Bowel sounds present. No Palpable masses; no abdominal bruit / pulsation  MS: good muscle strength and tone. : Deferred  Rectal Exam: Deferred  SKIN: warm and dry no statis dermatitis or ulcers   NEURO / PSYCH: oriented to person, place      IMPRESSION/PLAN:    Paroxysmal atrial fibrillation RVR. Continue and adjust Metoprolol for rate control; Continue Apixaban for [facility]. MARYLOU/DCCV today, If no LA thrombus. Risk beneftis, alternatives discussed. Agreeable. Importance of uninterrupted [facility] next 4 weeks discussed. Acute on chronic HFrEF -  -ve 1275cc, Change to PO diuretics, monitor daily Wts, I/Os, BP, renal Fn    Cardiomyopathy - EF 30-35%. Normal EF in 10/ 2021, Likely tachycardia mediated. Recent stress test in 2021- negative for ischemia, EF 53%. Continue Metoprolol, If BP tolerate add Entresto. Later add Jardiance. She is DNR CCA.     History of Left atrial appendage thrombus - by MARYLOU 10/2020 - On Eliquis    Borderline elevated troponin - Does not consistent with ACS, likely demand ischemia from CHF, tachycardia, No CP    Hypokalemia - Potassium Supplemented, Resolved    DAYNA - Better    VHD - MR, AR    Bilateral pleural effusions - better    History of TIA    Hyperlipidemia - On Statin    Hypothyroidism - On HRT               Above recommendations discussed with her   D/w Dr Ricco Garcia      Electronically signed by Justina Menon MD on 1/31/2022 at 1:13 PM  [facility]) Cardiology

## 2022-01-31 NOTE — PROGRESS NOTES
Sinus henrry after cardioversion, decrease Metoprolol dose and Monitor Heart rates and BP    OK to discharge home later today        Angelito Bell MD  1/31/2022  1:49 PM

## 2022-01-31 NOTE — OP NOTE
Operative Note      Patient: Vasu Luis  YOB: 1945  MRN: [id number]    Date of Procedure:  1/32/2022    Pre-operative Diagnosis: A Fib, history of LA thrombus    Post-operative Diagnosis: No intracardiac thrombus, Mr, TR, LV dysfunction    Procedure: MARYLOU and DC cardioversion x1 100J    Anesthesia: [facility]    Surgeons/Assistants: Dr Jessi Oro    Complications: None    MARYLOU report will be done in [facility] and Operative report will be dictated.     Electronically signed by Hugo Rebolledo MD, [facility]             Electronically signed by Hugo Rebolledo MD on 1/31/2022 at 1:35 PM

## 2022-01-31 NOTE — PROGRESS NOTES
CLINICAL PHARMACY NOTE: MEDS TO BEDS    Total # of Prescriptions Filled: 3   The following medications were delivered to the patient:  · Atorvastatin 20mg  · Furosemide 40mg  · Metoprolol Succ ER 50mg    Additional Documentation:

## 2022-01-31 NOTE — PLAN OF CARE
Problem: Falls - Risk of:  Goal: Will remain free from falls  Description: Will remain free from falls  1/31/2022 1440 by Francois Starr RN  Outcome: Completed  1/31/2022 0808 by Francois Starr RN  Outcome: Ongoing  Goal: Absence of physical injury  Description: Absence of physical injury  1/31/2022 1440 by Francois Starr RN  Outcome: Completed  1/31/2022 0808 by Francois Starr RN  Outcome: Ongoing

## 2022-01-31 NOTE — DISCHARGE SUMMARY
Internal Medicine Progress Note     [facility]     Zora Capellan, PATY Adkins D.O., KIRILL Alvarado, MSN, APRN, NP-C  Eulogio Riley. Candace Sands, MSN, APRN-CNP       Internal Medicine  Discharge Summary    NAME: Vic Baker  :  1945  MRN:  [id number]  PCP:James Bartlett DO  ADMITTED: 2022      DISCHARGED: 22    ADMITTING PHYSICIAN: Elba Torres DO    CONSULTANT(S):   IP CONSULT TO CARDIOLOGY  IP CONSULT TO SOCIAL WORK     ADMITTING DIAGNOSIS:   Atrial fibrillation with rapid ventricular response (HCC) [I48.91]  Atrial fibrillation with RVR (HCC) [I48.91]  Abdominal pain, unspecified abdominal location [R10.9]     DISCHARGE DIAGNOSES:   · Paroxysmal atrial fibrillation with rapid ventricular response status post MARYLOU guided cardioversion 2022 by Dr. Omar De La Torre  · Acute on chronic diastolic heart failure with now severely reduced LVEF resulting in acute respiratory failure with hypoxia with plans for medical optimization  · Recent hospitalization status post negative ischemic evaluation  · Hyperlipidemia on statin agent  · Macular degeneration  · Moderate to severe mitral regurgitation  · History of tobacco abuse  · History of TIAs  · Gastroesophageal reflux disease  · Hypothyroidism  · Hx of LA appendage thrombus      BRIEF HISTORY OF PRESENT ILLNESS:   Cathy Sood is a polite and pleasant 17-year-old female who presented to [address] [place] [address] emergency department for progressive shortness of breath with intermittent palpitations. She has a known history of chronic atrial fibrillation as well as diastolic congestive heart failure with preserved ejection fraction. She is followed with her primary care physician recently as well as the cardiologist.  She was admitted to the hospital in late December with atrial fibrillation and rapid ventricular response.   She underwent ischemic evaluation at that point which was found to be normal.  Unfortunately, she describes increasing shortness of breath over the past several days. This is accompanied by lower extremity edema and palpitations. She was found to be in atrial fibrillation with rapid ventricular response once again. Complete work-up is being undertaken in the emergency department. She has been placed on nasal cannula oxygen in the setting of hypoxia. She denies any recent sick contacts. LABS[de-identified]  Lab Results   Component Value Date    WBC 13.4 (H) 01/31/2022    HGB 14.6 01/31/2022    HCT 44.1 01/31/2022     01/31/2022     (L) 01/31/2022    K 3.6 01/31/2022    CL 89 (L) 01/31/2022    CREATININE 0.9 01/31/2022    BUN 21 01/31/2022    CO2 30 (H) 01/31/2022    GLUCOSE 98 01/31/2022    ALT 69 (H) 01/26/2022    AST 71 (H) 01/26/2022    INR 1.5 12/24/2021     Lab Results   Component Value Date    INR 1.5 12/24/2021    PROTIME 17.3 (H) 12/24/2021      Lab Results   Component Value Date    TSH 1.360 01/27/2022     Lab Results   Component Value Date    TRIG 54 01/27/2022    TRIG 98 11/03/2021    TRIG 104 08/04/2021     Lab Results   Component Value Date    HDL 37 01/27/2022    HDL 56 11/03/2021    HDL 54 08/04/2021     Lab Results   Component Value Date    LDLCALC 85 01/27/2022    LDLCALC 106 (H) 11/03/2021    LDLCALC 160 (H) 08/04/2021     Lab Results   Component Value Date    LABA1C 5.5 11/03/2021       IMAGING:  CT ABDOMEN PELVIS WO CONTRAST    Result Date: 1/26/2022  EXAMINATION: CT OF THE ABDOMEN AND PELVIS WITHOUT CONTRAST 1/26/2022 3:27 pm TECHNIQUE: CT of the abdomen and pelvis was performed without the administration of intravenous contrast. Multiplanar reformatted images are provided for review. Dose modulation, iterative reconstruction, and/or weight based adjustment of the mA/kV was utilized to reduce the radiation dose to as low as reasonably achievable.  COMPARISON: None HISTORY: ORDERING SYSTEM PROVIDED HISTORY: abd pain, diarrhea, tender RUQ TECHNOLOGIST PROVIDED HISTORY: Reason for exam:->abd pain, diarrhea, tender RUQ FINDINGS: Lower Chest: Lung bases demonstrate partially visualized bilateral pleural effusions moderate right and small left with adjacent opacifications, atelectasis versus airspace disease, along with interstitial edema pattern. Organs: Liver without focal lesion, limited due to lack of intravenous contrast.  Gallbladder unremarkable. No biliary dilatation. Pancreas and spleen unremarkable. Adrenals without distinct nodule. Kidneys without hydronephrosis or hydroureter. GI/Bowel: No mechanical obstructive process evident as there is no disproportionate dilatation of bowel. Pelvis: No bulky pelvic adenopathy or free fluid. Peritoneum/Retroperitoneum: Mild mesenteric edema with small volume ascites, greatest in the right perihepatic region and within the pelvic cul-de-sac. Bones/Soft Tissues: No acute osseous findings. Mild-to-moderate diffuse body wall edema. 1.  Bilateral pleural effusions, right greater than left, moderate right and small left involvement, with adjacent opacifications of atelectasis versus airspace disease. 2.  Small volume abdominopelvic ascites along with mild to moderate body wall edema. 3.  No mechanical obstructive process or loculated fluid collection within the abdomen or pelvis. RECOMMENDATIONS: Unavailable     XR CHEST STANDARD (2 VW)    Result Date: 1/25/2022  EXAMINATION: TWO XRAY VIEWS OF THE CHEST 1/25/2022 2:35 pm COMPARISON: 01/04/2022 HISTORY: ORDERING SYSTEM PROVIDED HISTORY: SOB (shortness of breath) TECHNOLOGIST PROVIDED HISTORY: Reason for exam:->as above FINDINGS: The cardiac silhouette is within normals. There are patchy bibasilar infiltrates. There is minimal blunting of the right and left costophrenic angles unchanged compared to prior study. The upper lobes are clear.      Patchy bibasilar infiltrates Blunting of the right and left costophrenic angles favored to represent trace bilateral pleural effusions. XR CHEST STANDARD (2 VW)    Result Date: 1/4/2022  EXAMINATION: TWO XRAY VIEWS OF THE CHEST 1/4/2022 10:56 am COMPARISON: 23 December 2021 HISTORY: ORDERING SYSTEM PROVIDED HISTORY: SOB (shortness of breath) TECHNOLOGIST PROVIDED HISTORY: Reason for exam:->sob FINDINGS: Both lateral costophrenic angles are mildly blunted suggesting small pleural effusions and there is adjacent mild atelectasis and or infiltrate. Stable cardiomediastinal silhouette. Pulmonary vascularity is borderline congested. Small pleural effusions with adjacent mild atelectasis and or infiltrate. Borderline pulmonary vascular congestion. CT HEAD WO CONTRAST    Result Date: 1/28/2022  EXAMINATION: CT OF THE HEAD WITHOUT CONTRAST  1/28/2022 1:03 pm TECHNIQUE: CT of the head was performed without the administration of intravenous contrast. Dose modulation, iterative reconstruction, and/or weight based adjustment of the mA/kV was utilized to reduce the radiation dose to as low as reasonably achievable. COMPARISON: CT head without contrast, 02/01/2021. HISTORY: ORDERING SYSTEM PROVIDED HISTORY: Rule out bleed/ confusion TECHNOLOGIST PROVIDED HISTORY: Has a \"code stroke\" or \"stroke alert\" been called? ->No Reason for exam:->Rule out bleed/ confusion FINDINGS: BRAIN/VENTRICLES: No mass effect, edema or hemorrhage is seen. Chronic area of infarction is seen in the right frontal lobe. No significant volume loss is seen in the brain. Moderate chronic microvascular ischemic changes are noted. No hydrocephalus or extra-axial fluid is seen. ORBITS: Prosthetic lenses are seen in the globes bilaterally. The orbits are otherwise grossly unremarkable. SINUSES: The visualized paranasal sinuses and mastoid air cells demonstrate no acute abnormality. SOFT TISSUES/SKULL:  No acute abnormality of the visualized skull or soft tissues. 1.  No acute intracranial abnormality.  2. Small chronic right frontal lobe infarction. 3. Moderate chronic microvascular ischemic changes. RECOMMENDATIONS: Unavailable     XR CHEST PORTABLE    Result Date: 1/26/2022  EXAMINATION: ONE XRAY VIEW OF THE CHEST 1/26/2022 1:44 pm COMPARISON: 25 January 2022 HISTORY: ORDERING SYSTEM PROVIDED HISTORY: chest pain TECHNOLOGIST PROVIDED HISTORY: Reason for exam:->chest pain FINDINGS: There are redemonstrated bilateral pleural effusions. Adjacent atelectasis and or infiltrate at the bases appears slightly greater. Stable cardiomediastinal silhouette. Bilateral pleural effusions with slightly greater adjacent bibasilar infiltrate and or atelectasis. US CAROTID ARTERY BILATERAL    Result Date: 1/28/2022  EXAMINATION: ULTRASOUND EVALUATION OF THE CAROTID ARTERIES 1/28/2022 TECHNIQUE: Duplex ultrasound using B-mode/gray scaled imaging, Doppler spectral analysis and color flow Doppler was obtained of the carotid arteries. COMPARISON: None. HISTORY: ORDERING SYSTEM PROVIDED HISTORY: ams TECHNOLOGIST PROVIDED HISTORY: Reason for exam:->ams What reading provider will be dictating this exam?->CRC FINDINGS: RIGHT: The right common carotid artery demonstrates peak systolic velocities of 11.1 cm/sec in the proximal and distal segments respectively. The right internal carotid artery demonstrates the systolic velocities of 91.8 cm/sec in the proximal, mid and distal segments respectively. The external carotid artery is patent. The vertebral artery demonstrates normal antegrade flow. No evidence of focal atherosclerotic plaque. There is minimal intimal thickening within the distal common carotid artery extending into the bulb. ICA/CCA ratio of 2.1 LEFT: The left common carotid artery demonstrates peak systolic velocities of 75.6 cm/sec in the proximal and distal segments respectively. The left internal carotid artery demonstrates the systolic velocities of 69.0 cm/sec in the proximal, mid and distal segments respectively.  The external carotid artery is patent. The vertebral artery demonstrates normal antegrade flow. No evidence of focal atherosclerotic plaque. ICA/CCA ratio of 2.0     The right internal carotid artery demonstrates 0-50% stenosis. The left internal carotid artery demonstrates 0-50% stenosis. Bilateral vertebral arteries are patent with flow in the normal direction. RECOMMENDATIONS: Unavailable         HOSPITAL COURSE:   Hari Downey was admitted January 26 due to atrial fibrillation with RVR further complicated by CHF decompensation. Medication adjustments have been made as an outpatient to control volume status and heart rate without success. She was maintained longitudinally on anticoagulation due to known left atrial appendage thrombus. Cardiovascular team provided consultation and the patient was diuresed accordingly and became euvolemic and was transitioned to oral medications for home. Lasix was increased to 40 mg twice daily. Scheduled potassium was also added. MARYLOU guided cardioversion was undertaken today after left atrial appendage thrombus was evaluated for and was well-tolerated. Beta-blocker therapy was scaled back following MARYLOU guided cardioversion as per the cardiovascular consultant. Renal status electrolyte status remained stable despite extensive diuresis. Her chronic comorbidities were well managed. Her family was updated extensively. She is acceptable for discharge home with close outpatient cardiology and primary care follow-up. BRIEF PHYSICAL EXAMINATION AND LABORATORIES ON DAY OF DISCHARGE:  VITALS:  /61   Pulse 108   Temp 98.4 °F (36.9 °C) (Oral)   Resp 18   Wt 117 lb (53.1 kg)   SpO2 93%   BMI 22.11 kg/m²     HEENT:  PERRLA. EOMI. Sclera clear. Buccal mucosa moist.    Neck:  Supple. Trachea midline. No thyromegaly. No JVD. No bruits. Heart:  Rhythm regular, rate controlled. S1 and S2.   Normal sinus rhythm, borderline sinus tachycardia    Lungs:  Symmetrical.  Improved air exchange throughout without any rales. Clear to auscultation bilaterally. No wheezes. No rhonchi. Abdomen: Soft. Non-tender. Non-distended. Bowel sounds positive. No organomegaly or masses. No pain on palpation    Extremities:  Peripheral pulses present. No peripheral edema. No ulcers. Neurologic:  Alert x 3. No focal deficit. Cranial nerves grossly intact. Skin:  No petechia. No hemorrhage. No wounds. DISPOSITION:  The patient's condition is good. At this time the patient is without objective evidence of an acute process requiring continuing hospitalization or inpatient management. They are stable for discharge with outpatient follow-up. I have spoken with the patient and discussed the results of the current hospitalization, in addition to providing specific details for the plan of care and counseling regarding the diagnosis and prognosis. The plan has been discussed in detail and they are aware of the specific conditions for emergent return, as well as the importance of follow-up. Their questions are answered at this time and they are agreeable with the plan for discharge to home    DISCHARGE MEDICATIONS:   Current Discharge Medication List           Details   furosemide (LASIX) 40 MG tablet Take 1 tablet by mouth 2 times daily  Qty: 60 tablet, Refills: 0              Details   atorvastatin (LIPITOR) 20 MG tablet TAKE ONE TABLET BY MOUTH EVERY EVENING  Qty: 90 tablet, Refills: 0    Associated Diagnoses: TIA (transient ischemic attack);  Mixed hyperlipidemia      potassium chloride (KLOR-CON) 10 MEQ extended release tablet Take 2 tablets by mouth 2 times daily  Qty: 60 tablet, Refills: 0    Associated Diagnoses: Orthopnea      metoprolol succinate (TOPROL XL) 50 MG extended release tablet Take 1 tablet by mouth 2 times daily  Qty: 60 tablet, Refills: 0              Details   Calcium Polycarbophil (FIBER-CAPS PO) Take 1 capsule by mouth at bedtime      FOLIC ACID PO Take 1 tablet by mouth daily      famotidine (PEPCID) 40 MG tablet Take 1 tablet by mouth every evening  Qty: 30 tablet, Refills: 5    Associated Diagnoses: Nausea; Gastroesophageal reflux disease without esophagitis      donepezil (ARICEPT) 10 MG tablet Take 1 tablet by mouth daily  Qty: 90 tablet, Refills: 1    Associated Diagnoses: Memory loss; Vascular dementia without behavioral disturbance (HCC)      sertraline (ZOLOFT) 100 MG tablet TAKE 1 TABLET BY MOUTH  DAILY  Qty: 90 tablet, Refills: 3    Comments: Requesting 1 year supply  Associated Diagnoses: Dysthymia      Magnesium 100 MG CAPS Take 200 mg by mouth nightly  Qty: 30 capsule, Refills: 5    Associated Diagnoses: Nocturnal foot cramps      apixaban (ELIQUIS) 5 MG TABS tablet Take 1 tablet by mouth 2 times daily  Qty: 180 tablet, Refills: 5    Associated Diagnoses: Thrombophilia ([facility])      levothyroxine (SYNTHROID) 25 MCG tablet Take 1 tablet by mouth Daily  Qty: 90 tablet, Refills: 5    Associated Diagnoses: Acquired hypothyroidism; Annual physical exam      therapeutic multivitamin-minerals (THERAGRAN-M) tablet Take 1 tablet by mouth daily. diphenoxylate-atropine (DIPHENATOL) 2.5-0.025 MG per tablet Take 1 tablet by mouth 4 times daily as needed for Diarrhea for up to 30 days. Qty: 120 tablet, Refills: 5    Associated Diagnoses: Functional diarrhea      ondansetron (ZOFRAN-ODT) 4 MG disintegrating tablet Take 1 tablet by mouth 3 times daily as needed for Nausea  Qty: 90 tablet, Refills: 3    Associated Diagnoses: Nausea      benzonatate (TESSALON) 200 MG capsule Take 1 capsule by mouth 3 times daily as needed for Cough  Qty: 30 capsule, Refills: 0    Associated Diagnoses: Chronic cough             FOLLOW UP/INSTRUCTIONS:  · This patient is instructed to follow-up with her primary care physician. · Patient is instructed to follow-up with the consults listed above as directed by them.   · she is instructed to resume home medications and take new medications as indicated in the list above. · If the patient has a recurrence of symptoms, she is instructed to go to the ED. Preparing for this patient's discharge, including paperwork, orders, instructions, and meeting with patient did require > 40 minutes.     ELIO Pereira CNP     1/31/2022  1:51 PM

## 2022-01-31 NOTE — PROGRESS NOTES
Internal Medicine Progress Note    [facility]    Camille Power. Sofie Millan., CHRISTIANO.DANIELE.PATTIOJinnyI. Og Cortes D.O., SHANTANUOJinnyIJinny Shafer D.O. Lio Farmer, MSN, APRN, NP-C  Cole Luna. Karlos Thao, MSN, APRN-CNP     Primary Care Physician: Portia Scott DO   Admitting Physician:  Kemal Dinero DO  Admission date and time: 1/26/2022  1:19 PM    Room:  [address] [place] [address]  Admitting diagnosis: Atrial fibrillation with rapid ventricular response ([address] [I48.91]  Atrial fibrillation with RVR ([address] [I48.91]  Abdominal pain, unspecified abdominal location [R10.9]    Patient Name: Dara Vivas  MRN: [id number]    Date of Service: 1/31/2022     Subjective:  Federico Smith is a 64 y.o. female who was seen and examined today,1/31/2022, at the bedside. She is doing rather well overall. We have discussed the plan of care for today with eventual discharge home following the procedure. Family was updated extensively yesterday. She voices no other acute complaints at this time. No family present at bedside today  Review of System:   Constitutional:   Denies fever or chills, weight loss or gain,+ fatigue. HEENT:   Denies ear pain, sore throat, sinus or eye problems. Cardiovascular:   Denies any chest pain, + irregular heartbeats but denies palpitations. Respiratory:   Denies shortness of breath, coughing, sputum production, hemoptysis, or wheezing. Gastrointestinal:   Denies nausea, vomiting, diarrhea, or constipation. Denies any abdominal pain. Genitourinary:    Denies any urgency, frequency, hematuria. Voiding  without difficulty. Extremities:   Admits to lower leg edema. Neurology:    Denies any headache or focal neurological deficits, patient relate to having memory difficulty   Psch:   Denies being anxious or depressed. Musculoskeletal:    Denies  myalgias, joint complaints or back pain. Integumentary:   Denies any rashes, ulcers, or excoriations.   Denies bruising. Hematologic/Lymphatic:  Denies bruising or bleeding. Physical Exam:  I/O this shift: In: 80 [P.O.:80]  Out: -     Intake/Output Summary (Last 24 hours) at 1/31/2022 0944  Last data filed at 1/31/2022 0756  Gross per 24 hour   Intake 1040 ml   Output 2175 ml   Net -1135 ml   I/O last 3 completed shifts: In: 960 [P.O.:960]  Out: 3325 [[id number]]  Patient Vitals for the past 96 hrs (Last 3 readings):   Weight   01/31/22 0320 117 lb (53.1 kg)   01/31/22 0046 117 lb (53.1 kg)   01/30/22 0553 117 lb (53.1 kg)     Vital Signs:   Blood pressure 110/61, pulse 108, temperature 98.4 °F (36.9 °C), temperature source Oral, resp. rate 18, weight 117 lb (53.1 kg), SpO2 93 %, not currently breastfeeding. General appearance:  Alert, responsive, oriented to person, place, and time. Acute on chronic ill-appearing, no distress. Head:  Normocephalic. No masses, lesions or tenderness. Eyes:  PERRLA. EOMI. Sclera clear. Buccal mucosa moist.  ENT:  Ears normal. Mucosa normal.  Neck:    Supple. Trachea midline. No thyromegaly. No JVD. No bruits. Heart:    Irregular rate and rhythm, S1 and S2. Murmur present. Atrial fibrillation with variable ventricular response on bedside telemetry  Lungs:    Symmetrical. Clear to auscultation bilaterally. No wheezes. No rhonchi. No rales. Abdomen:   Soft. Non-tender. Non-distended. Bowel sounds positive. No organomegaly or masses. No pain on palpation. Extremities:    Peripheral pulses present. No further pitting lower leg/ankle edema. No ulcers. No cyanosis. No clubbing. Neurologic:    Alert x 3. No focal deficit. Cranial nerves grossly intact. No focal weakness. Psych:   Behavior is normal. Mood appears normal. Speech is not rapid and/or pressured. Musculoskeletal:   Spine ROM normal. Muscular strength intact. Gait not assessed. Integumentary:  No rashes  Skin normal color and texture.   Genitalia/Breast:  Deferred    Medication:  Scheduled Meds:   furosemide  40 mg Oral BID    apixaban  5 mg Oral BID    sodium chloride flush  5-40 mL IntraVENous BID    atorvastatin  20 mg Oral QPM    donepezil  5 mg Oral BID    famotidine  40 mg Oral QPM    folic acid  1 mg Oral Daily    levothyroxine  25 mcg Oral Daily    metoprolol succinate  100 mg Oral BID    sertraline  100 mg Oral Daily    therapeutic multivitamin-minerals  1 tablet Oral Daily     Continuous Infusions:      Objective Data:  CBC with Differential:    Lab Results   Component Value Date    WBC 13.4 01/31/2022    RBC 4.79 01/31/2022    HGB 14.6 01/31/2022    HCT 44.1 01/31/2022     01/31/2022    MCV 92.1 01/31/2022    MCH 30.5 01/31/2022    MCHC 33.1 01/31/2022    RDW 14.5 01/31/2022    SEGSPCT 81 09/06/2013    LYMPHOPCT 9.9 01/31/2022    MONOPCT 10.8 01/31/2022    BASOPCT 0.6 01/31/2022    MONOSABS 1.45 01/31/2022    LYMPHSABS 1.33 01/31/2022    EOSABS 0.35 01/31/2022    BASOSABS 0.08 01/31/2022     CMP:    Lab Results   Component Value Date     01/31/2022    K 3.6 01/31/2022    K 5.1 01/26/2022    CL 89 01/31/2022    CO2 30 01/31/2022    BUN 21 01/31/2022    CREATININE 0.9 01/31/2022    GFRAA >60 01/31/2022    LABGLOM >60 01/31/2022    GLUCOSE 98 01/31/2022    GLUCOSE 75 04/10/2012    PROT 7.2 01/26/2022    LABALBU 4.1 01/26/2022    LABALBU 4.7 04/10/2012    CALCIUM 8.7 01/31/2022    BILITOT 0.9 01/26/2022    ALKPHOS 92 01/26/2022    AST 71 01/26/2022    ALT 69 01/26/2022       Wound Documentation:   Incision 09/04/13 Perineum (Active)   Number of days: 3066       Assessment:    · Paroxysmal atrial fibrillation with rapid ventricular response  · Acute on chronic diastolic heart failure with now severely reduced LVEF resulting in acute respiratory failure with hypoxia with plans for medical optimization  · Recent hospitalization status post negative ischemic evaluation  · Hyperlipidemia on statin agent  · Macular degeneration  · Moderate to severe mitral regurgitation  · History of tobacco abuse  · History of TIAs  · Gastroesophageal reflux disease  · Hypothyroidism  · Hx of LA appendage thrombus      Plan:   Jessica Blanco continues to improve. From a heart failure standpoint, she remains compensated on oral diuretic therapy. Renal function has remained stable and appropriate despite diuresis. From a A. fib standpoint, the patient will be for cardioversion today and is on therapeutic anticoagulation and rate/rhythm controlling medications off of Cardizem drip. We have discussed that previously she had left atrial appendage thrombus and this would prohibit cardioversion at present and they are aware of this. Medical optimization in setting of advanced cardiomyopathy is also been discussed. We will defer the institution of these medications to the cardiovascular team.  Chronic comorbidities laboratory values and vital signs being monitored and addressed accordingly as well. Plan remains maintaining hospitalization and continuation of care through Monday for MARYLOU guided cardioversion and then consideration for discharge thereafter, refer to SW/CM notes for details. Family members updated extensively and plan of care will be to return home with family for at least the first 50 hours with home health care. More than 50% of my  time was spent at the bedside counseling/coordinating care with the patient and/or family with face to face contact. This time was spent reviewing notes and laboratory data as well as instructing and counseling the patient. Time I spent with the family or surrogate(s) is included only if the patient was incapable of providing the necessary information or participating in medical decisions. I also discussed the differential diagnosis and all of the proposed management plans with the patient and individuals accompanying the patient.     Jessica Blanco requires this high level of physician care and nursing on the IMC/Telemetry unit due the complexity of decision management and chance of rapid decline or death. Continued cardiac monitoring and higher level of nursing are required. I am readily available for any further decision-making and intervention.          Gonzalo Stauffer APRN - CNP  9:44 AM  1/31/2022

## 2022-01-31 NOTE — ANESTHESIA POSTPROCEDURE EVALUATION
Department of Anesthesiology  Postprocedure Note    Patient: Hannah Centeno  MRN: [id number]  YOB: 1945  Date of evaluation: 1/31/2022  Time:  1:49 PM     Procedure Summary     Date: 01/31/22 Room / Location: [address] [place] [address] PACU; [address] [place] [address] ECHO    Anesthesia Start: 1311 Anesthesia Stop: 2346    Procedure: SJWZ MARYLOU CARDIOVERSION Diagnosis: Afib ([address]    Scheduled Providers:  Responsible Provider: Chaz Cardenas MD    Anesthesia Type: MAC ASA Status: 3          Anesthesia Type: No value filed. Jaxon Phase I:      Jaxon Phase II:      Last vitals: Reviewed and per EMR flowsheets.        Anesthesia Post Evaluation    Patient location during evaluation: PACU  Patient participation: complete - patient participated  Level of consciousness: awake  Pain score: 0  Airway patency: patent  Nausea & Vomiting: no nausea  Complications: no  Cardiovascular status: blood pressure returned to baseline  Respiratory status: acceptable  Hydration status: euvolemic

## 2022-02-01 ENCOUNTER — CARE COORDINATION (OUTPATIENT)
Dept: CASE MANAGEMENT | Age: 77
End: 2022-02-01

## 2022-02-01 DIAGNOSIS — I48.91 ATRIAL FIBRILLATION WITH RAPID VENTRICULAR RESPONSE (HCC): Primary | ICD-10-CM

## 2022-02-01 PROCEDURE — 1111F DSCHRG MED/CURRENT MED MERGE: CPT | Performed by: FAMILY MEDICINE

## 2022-02-01 NOTE — CARE COORDINATION
[facility] Transitions Initial Follow Up Call    Call within 2 business days of discharge: Yes    Patient: Alexis Nix Patient : 1945   MRN: [id number]  Reason for Admission: AFib with RVR  Discharge Date: 22 RARS: Readmission Risk Score: 16.6 ( )      Last Discharge [facility]       Complaint Diagnosis Description Type Department Provider    22 Chest Pain Atrial fibrillation with RVR ([facility] [address] . .. ED to Hosp-Admission (Discharged) (ADMITTED) [id number] Robbie Rosen DO; Mark Jerome. .. Attempted to contact patient DTR Jenaro De Paz) today 22 for TCM/hospital discharge follow up. Spoke briefly with Aleisha's  Doris Miranda) who answers phone advising CTN to call [phone]. Unable to reach Kehinde Krause at number as CTN received message saying \"the wireless customer is not available, please try your call again later\".       Livia Madera, APRN

## 2022-02-01 NOTE — CARE COORDINATION
R[facility] Transitions Initial Follow Up Call    Call within 2 business days of discharge: Yes    Patient: Fanta Mims Patient : 1945   MRN: [id number]  Reason for Admission: AFib with RVR  Discharge Date: 22 RARS: Readmission Risk Score: 16.6 ( )      Last Discharge [facility]       Complaint Diagnosis Description Type Department Provider    22 Chest Pain Atrial fibrillation with RVR ([facility] [address] . .. ED to Hosp-Admission (Discharged) (ADMITTED) [id number] Robbie Rosen DO; Augusto Palumbo. .. Transitions of Care Initial Call    Was this an external facility discharge? No Discharge Facility: N/A    Challenges to be reviewed by the provider   Additional needs identified to be addressed with provider: No  none             Method of communication with provider : none      Advance Care Planning:   Does patient have an Advance Directive: reviewed and current. Was this a readmission? No  Patient stated reason for admission: shortness of breath  Patients top risk factors for readmission: functional cognitive ability  medical condition-Alzheimers and AFIb on Eliquis therapy  polypharmacy    Care Transition Nurse (CTN) contacted the family by telephone to perform post hospital discharge assessment. Verified name and  with family as identifiers. Provided introduction to self, and explanation of the CTN role. CTN reviewed discharge instructions, medical action plan and red flags with family who verbalized understanding. Family given an opportunity to ask questions and does not have any further questions or concerns at this time. Were discharge instructions available to patient? Yes. Reviewed appropriate site of care based on symptoms and resources available to patient including: PCP, Urgent care clinics, Home health and When to call 911. The family agrees to contact the PCP office for questions related to their healthcare.      Medication reconciliation was performed with family, who verbalizes understanding of administration of home medications. Advised obtaining a 90-day supply of all daily and as-needed medications. Covid Risk Education     Educated patient about risk for severe COVID-19 due to risk factors according to CDC guidelines. CTN reviewed discharge instructions, medical action plan and red flag symptoms with the family who verbalized understanding. Discussed COVID vaccination status: Yes. Education provided on COVID-19 vaccination as appropriate. Discussed exposure protocols and quarantine with CDC Guidelines. Family was given an opportunity to verbalize any questions and concerns and agrees to contact CTN or health care provider for questions related to their healthcare. Reviewed and educated family on any new and changed medications related to discharge diagnosis. Was patient discharged with a pulse oximeter? No Discussed and confirmed pulse oximeter discharge instructions and when to notify provider or seek emergency care. CTN provided contact information. Plan for follow-up call in 5-7 days based on severity of symptoms and risk factors. Plan for next call: symptom management-Has shortness of breath improved?  follow up appointment-Did patient get nex PCP ov moved up to be seen sooner from 2/16/22? Non-face-to-face services provided:  Scheduled appointment with PCP-CTN confirmed with patient DTR Marlyn Lian) that patient is scheudled to f/u with Dr. Julian Snyder (PCP) on 2/16/22 but will call office to see if appt can be moved up sooner  Obtained and reviewed discharge summary and/or continuity of care documents  Assessment and support for treatment adherence and medication management-Discussed bleeding preccautions associated with Eliquis therapy and knowing when to seek medical attention. Establishment or re-establishment of referrals-CTN confirmed with Lesa Childress that [facility] is scheduled for first visit today 2/1/22 at 10:30 am for [facility].     Care Transitions 24 Hour Call    Do you have any ongoing symptoms?: Yes  Patient-reported symptoms: Shortness of Breath  Do you have a copy of your discharge instructions?: Yes  Do you have all of your prescriptions and are they filled?: Yes  Have you been contacted by a [facility] Pharmacist?: No  Have you scheduled your follow up appointment?: Yes  How are you going to get to your appointment?: Car - family or friend to transport  Were you discharged with any Home Care or Post Acute Services: Yes  Post Acute Services: Home Health (Comment: [facility] 2/1/22)  Do you feel like you have everything you need to keep you well at home?: Yes  Care Transitions Interventions  No Identified Needs       Spoke with patient DTR (Aleisha)on communication release form who called CTN back from earlier regarding TCM/hospital discharge follow up for AFib with RVR. Nadya Davis states patient has ongoing shortness of breath when lying down and has \" a small area\" of chest discomfort from cardioversion which is improving. States patient Hardik Wallace is off\" but has a walker and ambulating with assistance from DTR. States patient had double vision yesterday which has improved. Noted Sodium was initially low at 25 and Potassium high at 6.0 which improved to 129 and 3.6 on discharge. Denies patient having any other complaints at this time. Provided a complete review of home meds with Nadya Davis who confirms patient obtained Lasix newly ordered on discharge. Reviewed dose adjustment on Potassium and importance to take Potassium while on Lasix. Discussed bleeding precautions associated with Eliquis therapy and knowing when to seek medical attention. 1111F code entered. Confirmed with Nadya Davis that [facility] nurse is scheduled for first visit today for [facility]. Nadya Davis states patient is scheduled to f/u with Dr. Ana Biggs (PCP) on 2/16/22 but will call office to see if appt can be moved up to be seen sooner.  Denies needing any CTN assistance at this time. Ced Nesbitt is receptive to subsequent calls. CTN will continue to follow.      Follow Up  Future Appointments   Date Time Provider Camron Godinez   2/2/2022  9:00 AM DO Samy Sparks [facility]   2/16/2022  2:30 PM DO Samy Sparks [facility]       ELIO Shaw

## 2022-02-01 NOTE — OP NOTE
[address]                                OPERATIVE REPORT    PATIENT NAME: George Calero                  :        1945  MED REC NO:   [id number]                            ROOM:       [id number]  ACCOUNT NO:   [de-identified]                           ADMIT DATE: 2022  PROVIDER:     Deysi De Leon MD    DATE OF PROCEDURE:  2022    PREOPERATIVE DIAGNOSIS:  Atrial fibrillation. POSTOPERATIVE DIAGNOSIS:  Atrial fibrillation. OPERATION PERFORMED:  DC cardioversion x1. SURGEON:  Deysi De Leon MD    IMMEDIATE COMPLICATIONS:  None. SEDATION:  LMAC sedation. CLINICAL HISTORY:  The patient was scheduled for DC cardioversion due to  her symptomatic atrial fibrillation. She had a history of left atrial  thrombus and for that a MARYLOU was done prior to her DC cardioversion. Risks, benefits, and alternatives were discussed and informed consent  was obtained. The patient was on oral anticoagulation. OPERATIVE PROCEDURE:  The patient was brought to the PACU area. She was  connected to pulse oximetry, blood pressure, and heart rhythm monitor. After sedation, the patient's MARYLOU showed no intracardiac thrombus. At  that time, the patient was given 100 joules of synchronized biphasic  direct current via anterior and posterior cardioversion patches and was  converted to sinus rhythm. The patient was slightly bradycardic  postprocedure. POSTPROCEDURE COMPLICATIONS:  None and no focal neurologic deficit. CONCLUSION:  Successful conversion of atrial fibrillation into sinus  rhythm with single attempt of 100 joules of synchronized biphasic direct  current.         Andrez Mckenna MD    D: 2022 9:44:30       T: 2022 11:57:17     [id number]  Job#: [id number]     Doc#: [id number]    CC:

## 2022-02-02 ENCOUNTER — OFFICE VISIT (OUTPATIENT)
Dept: FAMILY MEDICINE CLINIC | Age: 77
End: 2022-02-02
Payer: MEDICARE

## 2022-02-02 VITALS
WEIGHT: 116 LBS | SYSTOLIC BLOOD PRESSURE: 118 MMHG | DIASTOLIC BLOOD PRESSURE: 74 MMHG | HEIGHT: 61 IN | HEART RATE: 49 BPM | RESPIRATION RATE: 18 BRPM | BODY MASS INDEX: 21.9 KG/M2

## 2022-02-02 DIAGNOSIS — D68.59 THROMBOPHILIA (HCC): ICD-10-CM

## 2022-02-02 DIAGNOSIS — R79.89 ELEVATED BRAIN NATRIURETIC PEPTIDE (BNP) LEVEL: ICD-10-CM

## 2022-02-02 DIAGNOSIS — I50.813 ACUTE ON CHRONIC RIGHT-SIDED CONGESTIVE HEART FAILURE (HCC): Primary | ICD-10-CM

## 2022-02-02 DIAGNOSIS — R06.01 ORTHOPNEA: ICD-10-CM

## 2022-02-02 DIAGNOSIS — F01.50 VASCULAR DEMENTIA WITHOUT BEHAVIORAL DISTURBANCE (HCC): ICD-10-CM

## 2022-02-02 DIAGNOSIS — G45.9 TIA (TRANSIENT ISCHEMIC ATTACK): ICD-10-CM

## 2022-02-02 DIAGNOSIS — E78.2 MIXED HYPERLIPIDEMIA: ICD-10-CM

## 2022-02-02 DIAGNOSIS — E03.9 ACQUIRED HYPOTHYROIDISM: ICD-10-CM

## 2022-02-02 DIAGNOSIS — R05.3 CHRONIC COUGH: ICD-10-CM

## 2022-02-02 DIAGNOSIS — R41.3 MEMORY LOSS: ICD-10-CM

## 2022-02-02 DIAGNOSIS — I48.91 ATRIAL FIBRILLATION WITH RVR (HCC): ICD-10-CM

## 2022-02-02 PROCEDURE — 99496 TRANSJ CARE MGMT HIGH F2F 7D: CPT | Performed by: FAMILY MEDICINE

## 2022-02-02 PROCEDURE — 1111F DSCHRG MED/CURRENT MED MERGE: CPT | Performed by: FAMILY MEDICINE

## 2022-02-02 RX ORDER — ATORVASTATIN CALCIUM 20 MG/1
TABLET, FILM COATED ORAL
Qty: 90 TABLET | Refills: 5 | Status: SHIPPED
Start: 2022-02-02 | End: 2022-03-10 | Stop reason: SDUPTHER

## 2022-02-02 RX ORDER — BENZONATATE 200 MG/1
200 CAPSULE ORAL 3 TIMES DAILY PRN
Qty: 90 CAPSULE | Refills: 3 | Status: ON HOLD
Start: 2022-02-02 | End: 2022-02-23 | Stop reason: HOSPADM

## 2022-02-02 RX ORDER — FUROSEMIDE 40 MG/1
40 TABLET ORAL 2 TIMES DAILY
Qty: 120 TABLET | Refills: 5 | Status: ON HOLD
Start: 2022-02-02 | End: 2022-02-23 | Stop reason: HOSPADM

## 2022-02-02 RX ORDER — POTASSIUM CHLORIDE 750 MG/1
20 TABLET, FILM COATED, EXTENDED RELEASE ORAL 2 TIMES DAILY
Qty: 180 TABLET | Refills: 5 | Status: SHIPPED
Start: 2022-02-02 | End: 2022-03-10

## 2022-02-02 RX ORDER — DONEPEZIL HYDROCHLORIDE 5 MG/1
5 TABLET, FILM COATED ORAL 2 TIMES DAILY
Qty: 180 TABLET | Refills: 5 | Status: SHIPPED
Start: 2022-02-02 | End: 2022-02-07

## 2022-02-02 RX ORDER — LEVOTHYROXINE SODIUM 0.03 MG/1
25 TABLET ORAL DAILY
Qty: 90 TABLET | Refills: 5 | Status: SHIPPED
Start: 2022-02-02 | End: 2022-10-08 | Stop reason: SDUPTHER

## 2022-02-06 NOTE — PROGRESS NOTES
Post-Discharge Transitional Care Management Services or Hospital Follow Up      Cindy Valle   YOB: 1945    Date of Office Visit:  2/2/2022  Date of Hospital Admission: 1/26/22  Date of Hospital Discharge: 1/31/22  Risk of hospital readmission (high >=14%. Medium >=10%) :Readmission Risk Score: 16.6 ( )      Care management risk score Rising risk (score 2-5) and Complex Care (Scores >=6): 5     Non face to face  following discharge, date last encounter closed (first attempt may have been earlier): 2/1/2022 11:06 AM    Call initiated 2 business days of discharge: Yes    Patient Active Problem List   Diagnosis    Pelvic prolapse    Hyperlipidemia    GERD (gastroesophageal reflux disease)    PUD (peptic ulcer disease)    Urinary incontinence    Osteoarthritis    Palpitations    Precordial pain    Thrombophilia ([address]    Alzheimer's disease, unspecified    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 If you have any questions, ask your nurse or doctor.             CHANGE how you take these medications    donepezil 5 MG tablet  Commonly known as: ARICEPT  Take 1 tablet by mouth 2 times daily  What changed:   · medication strength  · how much to take  · when to take this  Changed by: Myriam Dickey, DO        CONTINUE taking these medications    apixaban 5 MG Tabs tablet  Commonly known as: Eliquis  Take 1 tablet by mouth 2 times daily     atorvastatin 20 MG tablet  Commonly known as: LIPITOR  TAKE ONE TABLET BY MOUTH EVERY EVENING     benzonatate 200 MG capsule  Commonly known as: TESSALON  Take 1 capsule by mouth 3 times daily as needed for Cough     diphenoxylate-atropine 2.5-0.025 MG per tablet  Commonly known as: Diphenatol  Take 1 tablet by mouth 4 times daily as needed for Diarrhea for up to 30 days.      famotidine 40 MG tablet  Commonly known as: PEPCID  Take 1 tablet by mouth every evening     FIBER-CAPS PO     FOLIC ACID PO     furosemide 40 MG tablet  Commonly known as: LASIX  Take 1 tablet by mouth 2 times daily     levothyroxine 25 MCG tablet  Commonly known as: SYNTHROID  Take 1 tablet by mouth Daily     Magnesium 100 MG Caps  Take 200 mg by mouth nightly     metoprolol succinate 50 MG extended release tablet  Commonly known as: TOPROL XL  Take 1 tablet by mouth 2 times daily     ondansetron 4 MG disintegrating tablet  Commonly known as: ZOFRAN-ODT  Take 1 tablet by mouth 3 times daily as needed for Nausea     potassium chloride 10 MEQ extended release tablet  Commonly known as: KLOR-CON  Take 2 tablets by mouth 2 times daily     sertraline 100 MG tablet  Commonly known as: ZOLOFT  TAKE 1 TABLET BY MOUTH  DAILY     therapeutic multivitamin-minerals tablet           Where to Get Your Medications      These medications were sent to [address] [place] [address] 18, [address] [place] [address]    Phone: [phone]   · benzonatate 200 MG capsule  · furosemide 40 MG tablet  · potassium chloride 10 MEQ extended release tablet     [facility] [address] [facility] [address] [place] [id number]    Phone: [phone]   · atorvastatin 20 MG tablet  · donepezil 5 MG tablet  · levothyroxine 25 MCG tablet           Medications marked \"taking\" at this time  Outpatient Medications Marked as Taking for the 2/2/22 encounter (Office Visit) with Myriam Dickey, DO   Medication Sig Dispense Refill    benzonatate (TESSALON) 200 MG capsule Take 1 capsule by mouth 3 times daily as needed for Cough 90 capsule 3  potassium chloride (KLOR-CON) 10 MEQ extended release tablet Take 2 tablets by mouth 2 times daily 180 tablet 5    donepezil (ARICEPT) 5 MG tablet Take 1 tablet by mouth 2 times daily 180 tablet 5    levothyroxine (SYNTHROID) 25 MCG tablet Take 1 tablet by mouth Daily 90 tablet 5    atorvastatin (LIPITOR) 20 MG tablet TAKE ONE TABLET BY MOUTH EVERY EVENING 90 tablet 5    furosemide (LASIX) 40 MG tablet Take 1 tablet by mouth 2 times daily 120 tablet 5    metoprolol succinate (TOPROL XL) 50 MG extended release tablet Take 1 tablet by mouth 2 times daily 60 tablet 0    Calcium Polycarbophil (FIBER-CAPS PO) Take 1 capsule by mouth at bedtime      FOLIC ACID PO Take 1 tablet by mouth daily      diphenoxylate-atropine (DIPHENATOL) 2.5-0.025 MG per tablet Take 1 tablet by mouth 4 times daily as needed for Diarrhea for up to 30 days. 120 tablet 5    ondansetron (ZOFRAN-ODT) 4 MG disintegrating tablet Take 1 tablet by mouth 3 times daily as needed for Nausea 90 tablet 3    famotidine (PEPCID) 40 MG tablet Take 1 tablet by mouth every evening 30 tablet 5    sertraline (ZOLOFT) 100 MG tablet TAKE 1 TABLET BY MOUTH  DAILY 90 tablet 3    Magnesium 100 MG CAPS Take 200 mg by mouth nightly 30 capsule 5    apixaban (ELIQUIS) 5 MG TABS tablet Take 1 tablet by mouth 2 times daily 180 tablet 5    therapeutic multivitamin-minerals (THERAGRAN-M) tablet Take 1 tablet by mouth daily. Medications patient taking as of now reconciled against medications ordered at time of hospital discharge: Yes    Chief Complaint   Patient presents with    Care Management     [address]       History of Present illness - Follow up of Hospital diagnosis(es): Afib with RVR, heart failure     Inpatient course: Discharge summary reviewed- see chart. Interval history/Current status: improved      A comprehensive review of systems was negative except for what was noted in the HPI.     Vitals:    02/02/22 0921   BP: 118/74   Site: Right Upper Arm   Position: Sitting   Pulse: (!) 49   Resp: 18   Weight: 116 lb (52.6 kg)   Height: 5' 1\" (1.549 m)     Body mass index is 21.92 kg/m². Wt Readings from Last 3 Encounters:   02/02/22 116 lb (52.6 kg)   01/31/22 117 lb (53.1 kg)   01/06/22 124 lb (56.2 kg)     BP Readings from Last 3 Encounters:   02/02/22 118/74   01/31/22 (!) 100/52   01/31/22 (!) 112/58        Physical Exam:  General Appearance: alert and oriented to person, place and time, well developed and well- nourished, in no acute distress  Skin: warm and dry, no rash or erythema  Head: normocephalic and atraumatic  Eyes: pupils equal, round, and reactive to light, extraocular eye movements intact, conjunctivae normal  ENT: tympanic membrane, external ear and ear canal normal bilaterally, nose without deformity, nasal mucosa and turbinates normal without polyps  Neck: supple and non-tender without mass, no thyromegaly or thyroid nodules, no cervical lymphadenopathy  Pulmonary/Chest: some crackles to basesCardiovascular: normal rate, regular rhythm, normal S1 and S2, no murmurs, rubs, clicks, or gallops, distal pulses intact, no carotid bruits  Abdomen: soft, non-tender, non-distended, normal bowel sounds, no masses or organomegaly  Extremities: no cyanosis, clubbing or edema  Musculoskeletal: normal range of motion, no joint swelling, deformity or tenderness  Neurologic: reflexes normal and symmetric, no cranial nerve deficit, gait, coordination and speech normal    Assessment/Plan:  1. Chronic cough    - benzonatate (TESSALON) 200 MG capsule; Take 1 capsule by mouth 3 times daily as needed for Cough  Dispense: 90 capsule; Refill: 3  - CBC; Future    2. Acute on chronic right-sided congestive heart failure (HCC)    - XR CHEST STANDARD (2 VW); Future  - Comprehensive Metabolic Panel; Future  - SD DISCHARGE MEDS RECONCILED W/ CURRENT OUTPATIENT MED LIST    3. Thrombophilia ([address]      4.  Orthopnea    - potassium chloride (KLOR-CON) 10 MEQ extended release tablet; Take 2 tablets by mouth 2 times daily  Dispense: 180 tablet; Refill: 5  - FL DISCHARGE MEDS RECONCILED W/ CURRENT OUTPATIENT MED LIST    5. Memory loss    - donepezil (ARICEPT) 5 MG tablet; Take 1 tablet by mouth 2 times daily  Dispense: 180 tablet; Refill: 5    6. Vascular dementia without behavioral disturbance (HCC)    - donepezil (ARICEPT) 5 MG tablet; Take 1 tablet by mouth 2 times daily  Dispense: 180 tablet; Refill: 5    7. Acquired hypothyroidism    - levothyroxine (SYNTHROID) 25 MCG tablet; Take 1 tablet by mouth Daily  Dispense: 90 tablet; Refill: 5        - levothyroxine (SYNTHROID) 25 MCG tablet; Take 1 tablet by mouth Daily  Dispense: 90 tablet; Refill: 5    9. TIA (transient ischemic attack)    - atorvastatin (LIPITOR) 20 MG tablet; TAKE ONE TABLET BY MOUTH EVERY EVENING  Dispense: 90 tablet; Refill: 5    10. Mixed hyperlipidemia    - atorvastatin (LIPITOR) 20 MG tablet; TAKE ONE TABLET BY MOUTH EVERY EVENING  Dispense: 90 tablet; Refill: 5    11. Elevated brain natriuretic peptide (BNP) level      12.  Atrial fibrillation with RVR (HCC)    - FL DISCHARGE MEDS RECONCILED W/ CURRENT OUTPATIENT MED LIST        Medical Decision Making: high complexity

## 2022-02-07 ENCOUNTER — TELEPHONE (OUTPATIENT)
Dept: FAMILY MEDICINE CLINIC | Age: 77
End: 2022-02-07

## 2022-02-07 DIAGNOSIS — R05.3 CHRONIC COUGH: ICD-10-CM

## 2022-02-07 DIAGNOSIS — I50.813 ACUTE ON CHRONIC RIGHT-SIDED CONGESTIVE HEART FAILURE (HCC): ICD-10-CM

## 2022-02-07 LAB
ALBUMIN SERPL-MCNC: 3.6 G/DL (ref 3.5–5.2)
ALP BLD-CCNC: 79 U/L (ref 35–104)
ALT SERPL-CCNC: 24 U/L (ref 0–32)
ANION GAP SERPL CALCULATED.3IONS-SCNC: 8 MMOL/L (ref 7–16)
AST SERPL-CCNC: 25 U/L (ref 0–31)
BILIRUB SERPL-MCNC: 0.5 MG/DL (ref 0–1.2)
BUN BLDV-MCNC: 18 MG/DL (ref 6–23)
CALCIUM SERPL-MCNC: 8.9 MG/DL (ref 8.6–10.2)
CHLORIDE BLD-SCNC: 96 MMOL/L (ref 98–107)
CO2: 28 MMOL/L (ref 22–29)
CREAT SERPL-MCNC: 1 MG/DL (ref 0.5–1)
GFR AFRICAN AMERICAN: >60
GFR NON-AFRICAN AMERICAN: 54 ML/MIN/1.73
GLUCOSE BLD-MCNC: 89 MG/DL (ref 74–99)
HCT VFR BLD CALC: 44.9 % (ref 34–48)
HEMOGLOBIN: 13.8 G/DL (ref 11.5–15.5)
MCH RBC QN AUTO: 29.7 PG (ref 26–35)
MCHC RBC AUTO-ENTMCNC: 30.7 % (ref 32–34.5)
MCV RBC AUTO: 96.6 FL (ref 80–99.9)
PDW BLD-RTO: 14.9 FL (ref 11.5–15)
PLATELET # BLD: 390 E9/L (ref 130–450)
PMV BLD AUTO: 10.5 FL (ref 7–12)
POTASSIUM SERPL-SCNC: 4.7 MMOL/L (ref 3.5–5)
RBC # BLD: 4.65 E12/L (ref 3.5–5.5)
SODIUM BLD-SCNC: 132 MMOL/L (ref 132–146)
TOTAL PROTEIN: 7 G/DL (ref 6.4–8.3)
WBC # BLD: 13 E9/L (ref 4.5–11.5)

## 2022-02-07 RX ORDER — DONEPEZIL HYDROCHLORIDE 10 MG/1
10 TABLET, FILM COATED ORAL NIGHTLY
Qty: 30 TABLET | Refills: 5 | Status: SHIPPED
Start: 2022-02-07 | End: 2022-03-10

## 2022-02-08 ENCOUNTER — TELEPHONE (OUTPATIENT)
Dept: FAMILY MEDICINE CLINIC | Age: 77
End: 2022-02-08

## 2022-02-08 NOTE — TELEPHONE ENCOUNTER
Joelle Uribe RN called to report pt is taking her lasix 40mg twice daily, and pt c/o not sleeping well due to getting up in the middle of the night to use the bathroom. Her weight is consistent 109-111lbs for 8 days. She is asking for a reduced dose/skip the second dose.  Please advise  Last seen 2/2/2022  Next appt 2/16/2022    [facility]

## 2022-02-09 ENCOUNTER — CARE COORDINATION (OUTPATIENT)
Dept: CASE MANAGEMENT | Age: 77
End: 2022-02-09

## 2022-02-09 NOTE — CARE COORDINATION
[facility] Transitions Follow Up Call    2022    Patient: Alexis Nix  Patient : 1945   MRN: [id number]  Reason for Admission: AFib with RVR  Discharge Date: 22 RARS: Readmission Risk Score: 16.6 ( )    CTN spoke with patient son-in-law who answers phone saying DTR/POA Jenaro Oar) is sleeping and will give message to return CTN call. COnfirmed with Tuan Stern that CTN contact number showed up on caller ID. CTN will await a return call. Noted patient completed HFu appt with Dr. Mela Barriga (PCP) on 22.        ELIO Eastman

## 2022-02-10 ENCOUNTER — TELEPHONE (OUTPATIENT)
Dept: CARDIOLOGY CLINIC | Age: 77
End: 2022-02-10

## 2022-02-10 NOTE — TELEPHONE ENCOUNTER
MONIKA CALLED AND STATED THAT LORNA HAD BEEN HAVING SOME AFIB. HER HR WAS IN 'S. SHE IS CURRENTLY TAKING 50 MG. METOPROLOL BID. I CALLED DR Kavya Francois AND HE SAID THAT LORNA CAN TAKE AN EXTRA 50 MG. METOPROLOL WITH ELEVATED HR ONCE DAILY AS NEEDED. MONITOR HER BP WITH THE ADDITIONAL DOSE TO MAKE SURE IT DOES NOT GO TOO LOW. IF SHE CONTINUES WITH THE AFIB, AND DOES NOT FEEL WELL SHE NEEDS TO GO TO THE ER.     I GAVE THE ABOVE INFORMATION TO MONIKA.  ANTONIO

## 2022-02-16 ENCOUNTER — OFFICE VISIT (OUTPATIENT)
Dept: FAMILY MEDICINE CLINIC | Age: 77
End: 2022-02-16
Payer: MEDICARE

## 2022-02-16 ENCOUNTER — CARE COORDINATION (OUTPATIENT)
Dept: CASE MANAGEMENT | Age: 77
End: 2022-02-16

## 2022-02-16 VITALS
HEART RATE: 71 BPM | BODY MASS INDEX: 22.84 KG/M2 | DIASTOLIC BLOOD PRESSURE: 80 MMHG | SYSTOLIC BLOOD PRESSURE: 122 MMHG | TEMPERATURE: 97.3 F | WEIGHT: 121 LBS | HEIGHT: 61 IN | OXYGEN SATURATION: 96 %

## 2022-02-16 DIAGNOSIS — R10.84 GENERALIZED ABDOMINAL PAIN: Primary | ICD-10-CM

## 2022-02-16 DIAGNOSIS — K92.1 BLOOD IN STOOL: ICD-10-CM

## 2022-02-16 DIAGNOSIS — Z00.00 MEDICARE ANNUAL WELLNESS VISIT, SUBSEQUENT: ICD-10-CM

## 2022-02-16 DIAGNOSIS — A09 DIARRHEA OF INFECTIOUS ORIGIN: ICD-10-CM

## 2022-02-16 PROCEDURE — 4040F PNEUMOC VAC/ADMIN/RCVD: CPT | Performed by: FAMILY MEDICINE

## 2022-02-16 PROCEDURE — G8484 FLU IMMUNIZE NO ADMIN: HCPCS | Performed by: FAMILY MEDICINE

## 2022-02-16 PROCEDURE — 1123F ACP DISCUSS/DSCN MKR DOCD: CPT | Performed by: FAMILY MEDICINE

## 2022-02-16 PROCEDURE — G0439 PPPS, SUBSEQ VISIT: HCPCS | Performed by: FAMILY MEDICINE

## 2022-02-16 SDOH — HEALTH STABILITY: PHYSICAL HEALTH
ON AVERAGE, HOW MANY DAYS PER WEEK DO YOU ENGAGE IN MODERATE TO STRENUOUS EXERCISE (LIKE A BRISK WALK)?: PATIENT DECLINED

## 2022-02-16 SDOH — HEALTH STABILITY: PHYSICAL HEALTH: ON AVERAGE, HOW MANY MINUTES DO YOU ENGAGE IN EXERCISE AT THIS LEVEL?: PATIENT DECLINED

## 2022-02-16 ASSESSMENT — PATIENT HEALTH QUESTIONNAIRE - PHQ9
SUM OF ALL RESPONSES TO PHQ9 QUESTIONS 1 & 2: 0
SUM OF ALL RESPONSES TO PHQ QUESTIONS 1-9: 0
2. FEELING DOWN, DEPRESSED OR HOPELESS: 0
SUM OF ALL RESPONSES TO PHQ QUESTIONS 1-9: 0
1. LITTLE INTEREST OR PLEASURE IN DOING THINGS: 0
SUM OF ALL RESPONSES TO PHQ QUESTIONS 1-9: 0
SUM OF ALL RESPONSES TO PHQ QUESTIONS 1-9: 0

## 2022-02-16 ASSESSMENT — LIFESTYLE VARIABLES
HOW OFTEN DO YOU HAVE A DRINK CONTAINING ALCOHOL: NEVER
HOW MANY STANDARD DRINKS CONTAINING ALCOHOL DO YOU HAVE ON A TYPICAL DAY: PATIENT DECLINED
HOW OFTEN DO YOU HAVE A DRINK CONTAINING ALCOHOL: 1
HOW MANY STANDARD DRINKS CONTAINING ALCOHOL DO YOU HAVE ON A TYPICAL DAY: 98
HOW OFTEN DO YOU HAVE SIX OR MORE DRINKS ON ONE OCCASION: 1

## 2022-02-16 NOTE — CARE COORDINATION
[facility] [address] Transitions Follow Up Call    2022    Patient: Vasu Luis  Patient : 1945   MRN: [id number]  Reason for Admission: AFib with RVR  Discharge Date: 22 RARS: Readmission Risk Score: 16.6 ( )    Care Transitions Follow Up Call    Needs to be reviewed by the provider   Additional needs identified to be addressed with provider: No  none             Method of communication with provider : none      Care Transition Nurse (CTN) contacted the family by telephone to follow up after admission on 22. Verified name and  with family as identifiers. Addressed changes since last contact: none  Discussed follow-up appointments. If no appointment was previously scheduled, appointment scheduling offered: Yes. Is follow up appointment scheduled within 7 days of discharge? Yes. Advance Care Planning:   Does patient have an Advance Directive: reviewed and current. CTN reviewed discharge instructions, medical action plan and red flags with family and discussed any barriers to care and/or understanding of plan of care after discharge. Discussed appropriate site of care based on symptoms and resources available to patient including: PCP, Urgent care clinics, Home health and When to call 911. The family agrees to contact the PCP office for questions related to their healthcare. Patients top risk factors for readmission: functional physical ability  polypharmacy    . Plan for follow-up call in 7-10 days based on severity of symptoms and risk factors.     Care Transitions Subsequent and Final Call    Subsequent and Final Calls  Do you have any ongoing symptoms?: No  Have your medications changed?: Yes  Patient Reports: Patient DTR/POA Gloria Roger) states PCP decreased Lasix dos to 20 mg daily from twice daily and Potassium to 20 MeQ daily from twice diaily  Do you have any questions related to your medications?: No  Do you currently have any active services?: Yes  Are you currently active with any services?: Home Health  Do you have any needs or concerns that I can assist you with?: No  Identified Barriers: None  Care Transitions Interventions  No Identified Needs  Other Interventions:         Spoke with patient DTR/CHARLES Ward) today 2/16/22 for TCM/hospital discharge follow up sub call for AfIb with RVR and s/p cardioversion. Charlotte Loja states patient is doing better since last CTN call and offers no complaints at this time. Denies patient having any shortness of breath, chest pain, chest discomfort or palpitations. States patient confusion has improved and is walking much better. [facility] patient continues working with [facility] which is helping. [facility] PCP decreased dose on Lasix to 40 mg daily from twice daily and Potassium to 20 MeQ daily from twice daily. Noted lab work on 2/7/22 improved showing Sodium=132 and Potassium=4.7. [facility] patent is scheduled to follow up with Dr. Jose Guadalupe Eid (PCP) today in office. Denies any complaints, concerns or needs at this time. CTN will continue to follow for Care Transition.      Follow Up  Future Appointments   Date Time Provider Camron Godinez   2/16/2022  2:30 PM DO Anuradha Sutton [facility]     CTN provided contact information for future needs    ELIO Avalos

## 2022-02-18 ENCOUNTER — HOSPITAL ENCOUNTER (INPATIENT)
Age: 77
LOS: 5 days | Discharge: NURSING, REHAB OR LONG-TERM CARE | DRG: 391 | End: 2022-02-23
Attending: EMERGENCY MEDICINE | Admitting: INTERNAL MEDICINE
Payer: MEDICARE

## 2022-02-18 ENCOUNTER — APPOINTMENT (OUTPATIENT)
Dept: GENERAL RADIOLOGY | Age: 77
DRG: 391 | End: 2022-02-18
Payer: MEDICARE

## 2022-02-18 ENCOUNTER — APPOINTMENT (OUTPATIENT)
Dept: CT IMAGING | Age: 77
DRG: 391 | End: 2022-02-18
Payer: MEDICARE

## 2022-02-18 DIAGNOSIS — R94.31 PROLONGED Q-T INTERVAL ON ECG: ICD-10-CM

## 2022-02-18 DIAGNOSIS — E87.1 HYPONATREMIA: ICD-10-CM

## 2022-02-18 DIAGNOSIS — E87.5 HYPERKALEMIA: ICD-10-CM

## 2022-02-18 DIAGNOSIS — N17.9 AKI (ACUTE KIDNEY INJURY) (HCC): Primary | ICD-10-CM

## 2022-02-18 LAB
ACANTHOCYTES: ABNORMAL
ALBUMIN SERPL-MCNC: 3.7 G/DL (ref 3.5–5.2)
ALP BLD-CCNC: 99 U/L (ref 35–104)
ALT SERPL-CCNC: 68 U/L (ref 0–32)
ANION GAP SERPL CALCULATED.3IONS-SCNC: 17 MMOL/L (ref 7–16)
ANION GAP SERPL CALCULATED.3IONS-SCNC: 21 MMOL/L (ref 7–16)
ANISOCYTOSIS: ABNORMAL
AST SERPL-CCNC: 122 U/L (ref 0–31)
BACTERIA: ABNORMAL /HPF
BASOPHILS ABSOLUTE: 0 E9/L (ref 0–0.2)
BASOPHILS RELATIVE PERCENT: 0.4 % (ref 0–2)
BILIRUB SERPL-MCNC: 1.3 MG/DL (ref 0–1.2)
BILIRUBIN URINE: ABNORMAL
BLOOD, URINE: NEGATIVE
BUN BLDV-MCNC: 67 MG/DL (ref 6–23)
BUN BLDV-MCNC: 70 MG/DL (ref 6–23)
BURR CELLS: ABNORMAL
CALCIUM SERPL-MCNC: 8.5 MG/DL (ref 8.6–10.2)
CALCIUM SERPL-MCNC: 9.5 MG/DL (ref 8.6–10.2)
CHLORIDE BLD-SCNC: 85 MMOL/L (ref 98–107)
CHLORIDE BLD-SCNC: 87 MMOL/L (ref 98–107)
CLARITY: CLEAR
CO2: 16 MMOL/L (ref 22–29)
CO2: 21 MMOL/L (ref 22–29)
COLOR: YELLOW
CREAT SERPL-MCNC: 2.8 MG/DL (ref 0.5–1)
CREAT SERPL-MCNC: 2.8 MG/DL (ref 0.5–1)
EKG ATRIAL RATE: 48 BPM
EKG P AXIS: 56 DEGREES
EKG P-R INTERVAL: 160 MS
EKG Q-T INTERVAL: 572 MS
EKG QRS DURATION: 106 MS
EKG QTC CALCULATION (BAZETT): 510 MS
EKG R AXIS: 68 DEGREES
EKG T AXIS: 15 DEGREES
EKG VENTRICULAR RATE: 48 BPM
EOSINOPHILS ABSOLUTE: 0 E9/L (ref 0.05–0.5)
EOSINOPHILS RELATIVE PERCENT: 0 % (ref 0–6)
EPITHELIAL CELLS, UA: ABNORMAL /HPF
GFR AFRICAN AMERICAN: 20
GFR AFRICAN AMERICAN: 20
GFR NON-AFRICAN AMERICAN: 16 ML/MIN/1.73
GFR NON-AFRICAN AMERICAN: 16 ML/MIN/1.73
GLUCOSE BLD-MCNC: 109 MG/DL (ref 74–99)
GLUCOSE BLD-MCNC: 110 MG/DL (ref 74–99)
GLUCOSE URINE: NEGATIVE MG/DL
HCT VFR BLD CALC: 44.8 % (ref 34–48)
HEMOGLOBIN: 14.2 G/DL (ref 11.5–15.5)
KETONES, URINE: NEGATIVE MG/DL
LEUKOCYTE ESTERASE, URINE: NEGATIVE
LIPASE: 107 U/L (ref 13–60)
LYMPHOCYTES ABSOLUTE: 1.55 E9/L (ref 1.5–4)
LYMPHOCYTES RELATIVE PERCENT: 6.9 % (ref 20–42)
MAGNESIUM: 2.7 MG/DL (ref 1.6–2.6)
MCH RBC QN AUTO: 29 PG (ref 26–35)
MCHC RBC AUTO-ENTMCNC: 31.7 % (ref 32–34.5)
MCV RBC AUTO: 91.4 FL (ref 80–99.9)
METER GLUCOSE: 111 MG/DL (ref 74–99)
METER GLUCOSE: 112 MG/DL (ref 74–99)
METER GLUCOSE: 350 MG/DL (ref 74–99)
MONOCYTES ABSOLUTE: 0.67 E9/L (ref 0.1–0.95)
MONOCYTES RELATIVE PERCENT: 3.4 % (ref 2–12)
NEUTROPHILS ABSOLUTE: 19.98 E9/L (ref 1.8–7.3)
NEUTROPHILS RELATIVE PERCENT: 89.7 % (ref 43–80)
NITRITE, URINE: NEGATIVE
OVALOCYTES: ABNORMAL
PDW BLD-RTO: 14.2 FL (ref 11.5–15)
PH UA: 5 (ref 5–9)
PLATELET # BLD: 606 E9/L (ref 130–450)
PMV BLD AUTO: 11.2 FL (ref 7–12)
POIKILOCYTES: ABNORMAL
POLYCHROMASIA: ABNORMAL
POTASSIUM SERPL-SCNC: 5.4 MMOL/L (ref 3.5–5)
POTASSIUM SERPL-SCNC: 5.7 MMOL/L (ref 3.5–5)
POTASSIUM SERPL-SCNC: 6.7 MMOL/L (ref 3.5–5)
PROTEIN UA: NEGATIVE MG/DL
RBC # BLD: 4.9 E12/L (ref 3.5–5.5)
RBC UA: ABNORMAL /HPF (ref 0–2)
SODIUM BLD-SCNC: 122 MMOL/L (ref 132–146)
SODIUM BLD-SCNC: 125 MMOL/L (ref 132–146)
SPECIFIC GRAVITY UA: 1.02 (ref 1–1.03)
TARGET CELLS: ABNORMAL
TOTAL PROTEIN: 7.2 G/DL (ref 6.4–8.3)
TROPONIN, HIGH SENSITIVITY: 31 NG/L (ref 0–9)
UROBILINOGEN, URINE: 0.2 E.U./DL
WBC # BLD: 22.2 E9/L (ref 4.5–11.5)
WBC UA: ABNORMAL /HPF (ref 0–5)

## 2022-02-18 PROCEDURE — 93005 ELECTROCARDIOGRAM TRACING: CPT | Performed by: EMERGENCY MEDICINE

## 2022-02-18 PROCEDURE — 6360000002 HC RX W HCPCS: Performed by: INTERNAL MEDICINE

## 2022-02-18 PROCEDURE — 2500000003 HC RX 250 WO HCPCS: Performed by: EMERGENCY MEDICINE

## 2022-02-18 PROCEDURE — 87040 BLOOD CULTURE FOR BACTERIA: CPT

## 2022-02-18 PROCEDURE — 71045 X-RAY EXAM CHEST 1 VIEW: CPT

## 2022-02-18 PROCEDURE — 85025 COMPLETE CBC W/AUTO DIFF WBC: CPT

## 2022-02-18 PROCEDURE — 2580000003 HC RX 258: Performed by: INTERNAL MEDICINE

## 2022-02-18 PROCEDURE — 2580000003 HC RX 258

## 2022-02-18 PROCEDURE — 82962 GLUCOSE BLOOD TEST: CPT

## 2022-02-18 PROCEDURE — 80048 BASIC METABOLIC PNL TOTAL CA: CPT

## 2022-02-18 PROCEDURE — 87088 URINE BACTERIA CULTURE: CPT

## 2022-02-18 PROCEDURE — 96365 THER/PROPH/DIAG IV INF INIT: CPT

## 2022-02-18 PROCEDURE — 99285 EMERGENCY DEPT VISIT HI MDM: CPT

## 2022-02-18 PROCEDURE — 6370000000 HC RX 637 (ALT 250 FOR IP): Performed by: INTERNAL MEDICINE

## 2022-02-18 PROCEDURE — 2580000003 HC RX 258: Performed by: EMERGENCY MEDICINE

## 2022-02-18 PROCEDURE — 70450 CT HEAD/BRAIN W/O DYE: CPT

## 2022-02-18 PROCEDURE — 74176 CT ABD & PELVIS W/O CONTRAST: CPT

## 2022-02-18 PROCEDURE — 96366 THER/PROPH/DIAG IV INF ADDON: CPT

## 2022-02-18 PROCEDURE — 83690 ASSAY OF LIPASE: CPT

## 2022-02-18 PROCEDURE — 96375 TX/PRO/DX INJ NEW DRUG ADDON: CPT

## 2022-02-18 PROCEDURE — 6360000002 HC RX W HCPCS: Performed by: EMERGENCY MEDICINE

## 2022-02-18 PROCEDURE — 6370000000 HC RX 637 (ALT 250 FOR IP): Performed by: EMERGENCY MEDICINE

## 2022-02-18 PROCEDURE — 1200000000 HC SEMI PRIVATE

## 2022-02-18 PROCEDURE — C9113 INJ PANTOPRAZOLE SODIUM, VIA: HCPCS | Performed by: INTERNAL MEDICINE

## 2022-02-18 PROCEDURE — 36415 COLL VENOUS BLD VENIPUNCTURE: CPT

## 2022-02-18 PROCEDURE — 80053 COMPREHEN METABOLIC PANEL: CPT

## 2022-02-18 PROCEDURE — 81001 URINALYSIS AUTO W/SCOPE: CPT

## 2022-02-18 PROCEDURE — 84132 ASSAY OF SERUM POTASSIUM: CPT

## 2022-02-18 PROCEDURE — 96361 HYDRATE IV INFUSION ADD-ON: CPT

## 2022-02-18 PROCEDURE — 83735 ASSAY OF MAGNESIUM: CPT

## 2022-02-18 PROCEDURE — 96376 TX/PRO/DX INJ SAME DRUG ADON: CPT

## 2022-02-18 PROCEDURE — 99222 1ST HOSP IP/OBS MODERATE 55: CPT | Performed by: SURGERY

## 2022-02-18 PROCEDURE — 96367 TX/PROPH/DG ADDL SEQ IV INF: CPT

## 2022-02-18 PROCEDURE — 84484 ASSAY OF TROPONIN QUANT: CPT

## 2022-02-18 RX ORDER — DONEPEZIL HYDROCHLORIDE 5 MG/1
10 TABLET, FILM COATED ORAL NIGHTLY
Status: DISCONTINUED | OUTPATIENT
Start: 2022-02-18 | End: 2022-02-23 | Stop reason: HOSPADM

## 2022-02-18 RX ORDER — PROCHLORPERAZINE EDISYLATE 5 MG/ML
5 INJECTION INTRAMUSCULAR; INTRAVENOUS EVERY 6 HOURS PRN
Status: DISCONTINUED | OUTPATIENT
Start: 2022-02-18 | End: 2022-02-23 | Stop reason: HOSPADM

## 2022-02-18 RX ORDER — LEVOTHYROXINE SODIUM 0.03 MG/1
25 TABLET ORAL DAILY
Status: DISCONTINUED | OUTPATIENT
Start: 2022-02-18 | End: 2022-02-23 | Stop reason: HOSPADM

## 2022-02-18 RX ORDER — PANTOPRAZOLE SODIUM 40 MG/10ML
40 INJECTION, POWDER, LYOPHILIZED, FOR SOLUTION INTRAVENOUS DAILY
Status: DISCONTINUED | OUTPATIENT
Start: 2022-02-18 | End: 2022-02-21

## 2022-02-18 RX ORDER — DEXTROSE MONOHYDRATE 50 MG/ML
100 INJECTION, SOLUTION INTRAVENOUS PRN
Status: DISCONTINUED | OUTPATIENT
Start: 2022-02-18 | End: 2022-02-23 | Stop reason: HOSPADM

## 2022-02-18 RX ORDER — CALCIUM GLUCONATE 20 MG/ML
1000 INJECTION, SOLUTION INTRAVENOUS ONCE
Status: COMPLETED | OUTPATIENT
Start: 2022-02-18 | End: 2022-02-18

## 2022-02-18 RX ORDER — SODIUM CHLORIDE 9 MG/ML
INJECTION, SOLUTION INTRAVENOUS
Status: COMPLETED
Start: 2022-02-18 | End: 2022-02-18

## 2022-02-18 RX ORDER — DEXTROSE MONOHYDRATE 25 G/50ML
12.5 INJECTION, SOLUTION INTRAVENOUS PRN
Status: DISCONTINUED | OUTPATIENT
Start: 2022-02-18 | End: 2022-02-18

## 2022-02-18 RX ORDER — NICOTINE POLACRILEX 4 MG
15 LOZENGE BUCCAL PRN
Status: DISCONTINUED | OUTPATIENT
Start: 2022-02-18 | End: 2022-02-23 | Stop reason: HOSPADM

## 2022-02-18 RX ORDER — 0.9 % SODIUM CHLORIDE 0.9 %
500 INTRAVENOUS SOLUTION INTRAVENOUS ONCE
Status: COMPLETED | OUTPATIENT
Start: 2022-02-18 | End: 2022-02-18

## 2022-02-18 RX ORDER — DEXTROSE MONOHYDRATE 25 G/50ML
50 INJECTION, SOLUTION INTRAVENOUS ONCE
Status: DISCONTINUED | OUTPATIENT
Start: 2022-02-18 | End: 2022-02-18 | Stop reason: CLARIF

## 2022-02-18 RX ADMIN — SODIUM BICARBONATE: 84 INJECTION, SOLUTION INTRAVENOUS at 08:01

## 2022-02-18 RX ADMIN — INSULIN HUMAN 5 UNITS: 100 INJECTION, SOLUTION PARENTERAL at 07:31

## 2022-02-18 RX ADMIN — PROCHLORPERAZINE EDISYLATE 5 MG: 5 INJECTION INTRAMUSCULAR; INTRAVENOUS at 21:08

## 2022-02-18 RX ADMIN — CALCIUM GLUCONATE 1000 MG: 20 INJECTION, SOLUTION INTRAVENOUS at 06:28

## 2022-02-18 RX ADMIN — PANTOPRAZOLE SODIUM 40 MG: 40 INJECTION, POWDER, FOR SOLUTION INTRAVENOUS at 17:37

## 2022-02-18 RX ADMIN — DEXTROSE MONOHYDRATE 250 ML: 100 INJECTION, SOLUTION INTRAVENOUS at 07:34

## 2022-02-18 RX ADMIN — SODIUM CHLORIDE 500 ML: 9 INJECTION, SOLUTION INTRAVENOUS at 06:22

## 2022-02-18 RX ADMIN — DONEPEZIL HYDROCHLORIDE 10 MG: 5 TABLET, FILM COATED ORAL at 21:07

## 2022-02-18 RX ADMIN — SODIUM ZIRCONIUM CYCLOSILICATE 10 G: 5 POWDER, FOR SUSPENSION ORAL at 21:07

## 2022-02-18 RX ADMIN — SODIUM BICARBONATE 50 MEQ: 84 INJECTION, SOLUTION INTRAVENOUS at 07:27

## 2022-02-18 RX ADMIN — LEVOTHYROXINE SODIUM 25 MCG: 25 TABLET ORAL at 17:38

## 2022-02-18 RX ADMIN — WATER 1000 MG: 1 INJECTION INTRAMUSCULAR; INTRAVENOUS; SUBCUTANEOUS at 17:37

## 2022-02-18 RX ADMIN — PIPERACILLIN AND TAZOBACTAM 3375 MG: 3; .375 INJECTION, POWDER, FOR SOLUTION INTRAVENOUS at 08:46

## 2022-02-18 RX ADMIN — SERTRALINE 50 MG: 50 TABLET, FILM COATED ORAL at 17:38

## 2022-02-18 RX ADMIN — Medication 500 ML: at 06:22

## 2022-02-18 ASSESSMENT — PAIN SCALES - GENERAL: PAINLEVEL_OUTOF10: 0

## 2022-02-18 ASSESSMENT — ENCOUNTER SYMPTOMS
CHEST TIGHTNESS: 0
ABDOMINAL PAIN: 1
VOMITING: 1
DIARRHEA: 1
SHORTNESS OF BREATH: 0
NAUSEA: 1

## 2022-02-18 NOTE — ED NOTES
Dr. Kamron Gill notified of critical potassium.      Melissa White, [facility]  02/18/22 0312

## 2022-02-18 NOTE — CONSULTS
GENERAL SURGERY  CONSULT NOTE  2/18/2022    Physician Consulted: Dr. Jody Duran  Reason for Consult: Diverticulitis   Referring Physician: Dr. Jorge A LEDESMA  Ana Pereira is a 64 y.o. female with PMH as below presented for evaluation of nausea and lightheadedness. According to patient and her daughter she has been having nausea, lack of appetite and diarrhea for the last few days. She then woke up in the middle of the night with some lightheadedness so she decided to come to the ED. She denies any significant abdominal pain. Did admit to slight amount of blood in her last episode of diarrhea. She had emesis x1 in the ED this morning. On arrival she is afebrile and BP is as low as 100/54. Labs are significant for WBC count 22.2, elevated LFTs, hyperglycemia, hyponatremia (even with correction), hyperkalemia to 6.7 and DAYNA with cr of 2.8. CT scan revealed sigmoid diverticulitis with possible microperforation with moderate amount of ascites. Past Medical History:   Diagnosis Date    Arthritis     Asymptomatic PVCs 4/2012    pt felt flutter, no other sx, holter + pvc's, few runs of SVT   [place]    History of Holter monitoring     Hyperlipidemia     Macular degeneration     Nausea & vomiting     Pelvic prolapse     PONV (postoperative nausea and vomiting)     TIA (transient ischemic attack)        Past Surgical History:   Procedure Laterality Date    COLONOSCOPY  2012    ENDOSCOPY, COLON, DIAGNOSTIC      EYE SURGERY      macular hole    HERNIA REPAIR      inguinal    HYSTERECTOMY      OTHER SURGICAL HISTORY  sept 2013    ant elevatetransobturator sling rectocele and cystocele enterocele    SKIN BIOPSY      arms    TRANSESOPHAGEAL ECHOCARDIOGRAM N/A 10/20/2020    TRANSESOPHAGEAL ECHOCARDIOGRAM WITH BUBBLE STUDY performed by Shira Damian MD at [place] [id number]       Medications Prior to Admission:    Prior to Admission medications    Medication Sig Start Date End Date Taking?  Authorizing Provider   donepezil (ARICEPT) 10 MG tablet Take 1 tablet by mouth nightly 2/7/22   Bertrum Dapper, DO   benzonatate (TESSALON) 200 MG capsule Take 1 capsule by mouth 3 times daily as needed for Cough 2/2/22 3/4/22  Bertrum Dapper, DO   potassium chloride (KLOR-CON) 10 MEQ extended release tablet Take 2 tablets by mouth 2 times daily 2/2/22   Bertrum Dapper, DO   levothyroxine (SYNTHROID) 25 MCG tablet Take 1 tablet by mouth Daily 2/2/22   Bertrum Dapper, DO   atorvastatin (LIPITOR) 20 MG tablet TAKE ONE TABLET BY MOUTH EVERY EVENING 2/2/22   Bertrum Dapper, DO   furosemide (LASIX) 40 MG tablet Take 1 tablet by mouth 2 times daily 2/2/22   Bertrum Dapper, DO   metoprolol succinate (TOPROL XL) 50 MG extended release tablet Take 1 tablet by mouth 2 times daily 1/31/22   Caprice Lunch, APRN - CNP   Calcium Polycarbophil (FIBER-CAPS PO) Take 1 capsule by mouth at bedtime    Historical Provider, MD   FOLIC ACID PO Take 1 tablet by mouth daily    Historical Provider, MD   diphenoxylate-atropine (DIPHENATOL) 2.5-0.025 MG per tablet Take 1 tablet by mouth 4 times daily as needed for Diarrhea for up to 30 days. 1/25/22 2/24/22  Bertrum Dapper, DO   ondansetron (ZOFRAN-ODT) 4 MG disintegrating tablet Take 1 tablet by mouth 3 times daily as needed for Nausea 1/25/22 4/25/22  Bertrum Dapper, DO   famotidine (PEPCID) 40 MG tablet Take 1 tablet by mouth every evening 1/25/22   Bertrum Dapper, DO   sertraline (ZOLOFT) 100 MG tablet TAKE 1 TABLET BY MOUTH  DAILY 12/9/21   Bertrum Dapper, DO   Magnesium 100 MG CAPS Take 200 mg by mouth nightly 11/11/21   Bertrum Dapper, DO   apixaban (ELIQUIS) 5 MG TABS tablet Take 1 tablet by mouth 2 times daily 2/15/21   Bertrum Dapper, DO   therapeutic multivitamin-minerals [facility]) tablet Take 1 tablet by mouth daily. Historical Provider, MD       No Known Allergies    History reviewed. No pertinent family history.     Social History     Tobacco Use    Smoking status: Former Smoker     Packs/day: 0.50     Quit date: 1994     Years since quittin.6    Smokeless tobacco: Never Used   Vaping Use    Vaping Use: Never used   Substance Use Topics    Alcohol use: No     Comment: Coffee 1 cup a day     Drug use: No         Review of Systems   General ROS: positive for  - fatigue  Hematological and Lymphatic ROS: negative  Respiratory ROS: no cough, shortness of breath, or wheezing  Cardiovascular ROS: no chest pain or dyspnea on exertion  Gastrointestinal ROS: positive for - appetite loss, diarrhea and nausea/vomiting  Genito-Urinary ROS: no dysuria, trouble voiding, or hematuria  Musculoskeletal ROS: negative      PHYSICAL EXAM:    Vitals:    22 0848   BP: 106/60   Pulse: 55   Resp: 20   Temp:    SpO2:        General Appearance:  awake, alert, oriented, in no acute distress  Skin:  Skin color, texture, turgor normal. No rashes or lesions. Head/face:  NCAT  Eyes:  No gross abnormalities. Lungs:  Normal expansion. Clear to auscultation. Heart:  Heart regular rate   Abdomen:  Soft, non-tender, non-distended      LABS:    CBC  Recent Labs     22   WBC 22.2*   HGB 14.2   HCT 44.8   *     BMP  Recent Labs     22  0915   *  --   --    K 6.7*   < > 5.4*   CL 85*  --   --    CO2 16*  --   --    BUN 67*  --   --    CREATININE 2.8*  --   --    CALCIUM 9.5  --   --     < > = values in this interval not displayed. Liver Function  Recent Labs     22   LIPASE 107*   BILITOT 1.3*   *   ALT 68*   ALKPHOS 99   PROT 7.2   LABALBU 3.7     No results for input(s): LACTATE in the last 72 hours. No results for input(s): INR, PTT in the last 72 hours.     Invalid input(s): PT    RADIOLOGY    CT ABDOMEN PELVIS WO CONTRAST Additional Contrast? None    Result Date: 2022  EXAMINATION: CT OF THE ABDOMEN AND PELVIS WITHOUT CONTRAST 2022 6:32 am TECHNIQUE: CT of the abdomen and pelvis was performed without the administration of intravenous contrast. Multiplanar reformatted images are provided for review. Dose modulation, iterative reconstruction, and/or weight based adjustment of the mA/kV was utilized to reduce the radiation dose to as low as reasonably achievable. COMPARISON: 01/26/2022 HISTORY: ORDERING SYSTEM PROVIDED HISTORY: n/v diarrhea, abd pain, lower TECHNOLOGIST PROVIDED HISTORY: Reason for exam:->n/v diarrhea, abd pain, lower Additional Contrast?->None Decision Support Exception - unselect if not a suspected or confirmed emergency medical condition->Emergency Medical Condition (MA) FINDINGS: Lower Chest: There are right greater than left pleural effusions with associated atelectasis. There is cardiomegaly. No pericardial effusion. Organs: The liver, gallbladder, spleen, pancreas, adrenals, and right kidney are within normal limits. There is a left inferior renal pole cyst.  No hydronephrosis. GI/Bowel: There is diverticulosis. There is wall thickening with associated inflammatory stranding involving the proximal sigmoid colon. There are foci of gas which are possibly extraluminal.  The remainder of the GI tract is unremarkable. Pelvis: The urinary bladder is partially filled. There is small volume intravesicular gas. The uterus is absent. Peritoneum/Retroperitoneum: There is moderate free fluid throughout the abdomen and pelvis. There is no pathologic lymphadenopathy. Aorta is normal in caliber without acute abnormality. Bones/Soft Tissues:  No acute abnormality of the visualized osseous structures. Sigmoid diverticulosis with wall thickening and associated inflammatory stranding, compatible with diverticulitis. Adjacent foci of gas which are possibly extraluminal, raising concern for underlying microperforation. No extraluminal fluid collection. Bilateral pleural effusions with moderate ascites, possibly reflecting patient's volume status.      CT HEAD WO CONTRAST    Result Date: 2/18/2022  EXAMINATION: CT OF THE HEAD WITHOUT CONTRAST  2/18/2022 6:32 am TECHNIQUE: CT of the head was performed without the administration of intravenous contrast. Dose modulation, iterative reconstruction, and/or weight based adjustment of the mA/kV was utilized to reduce the radiation dose to as low as reasonably achievable. COMPARISON: 01/28/2022 HISTORY: ORDERING SYSTEM PROVIDED HISTORY: lightheaded TECHNOLOGIST PROVIDED HISTORY: Has a \"code stroke\" or \"stroke alert\" been called? ->No Reason for exam:->lightheaded Decision Support Exception - unselect if not a suspected or confirmed emergency medical condition->Emergency Medical Condition (MA) FINDINGS: BRAIN/VENTRICLES: There is no acute intracranial hemorrhage, mass effect or midline shift. Asymmetric calcification in the right basal ganglia. No abnormal extra-axial fluid collection. The gray-white differentiation is maintained without evidence of an acute infarct. 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 There is no evidence of hydrocephalus. ORBITS: The visualized portion of the orbits demonstrate no acute abnormality. SINUSES: The visualized paranasal sinuses and mastoid air cells demonstrate no acute abnormality. SOFT TISSUES/SKULL:  No acute abnormality of the visualized skull or soft tissues. No acute intracranial abnormality. XR CHEST PORTABLE    Result Date: 2/18/2022  EXAMINATION: ONE XRAY VIEW OF THE CHEST 2/18/2022 5:48 am COMPARISON: 02/07/2022 HISTORY: ORDERING SYSTEM PROVIDED HISTORY: ro chf TECHNOLOGIST PROVIDED HISTORY: Reason for exam:->ro chf FINDINGS: There is borderline cardiomegaly. The lungs are clear except for bibasilar atelectasis. No focal consolidation or kathryn edema. Bibasilar atelectasis.          ASSESSMENT:  64 y.o. female with acute diverticulitis     PLAN:  - admit to medicine team  - continue IV antibiotics  - ok for sips of liquids and ice chips today  - monitor abdominal exam  - IV hydration    Discussed with Dr. Ann Madrid     Electronically signed by Estrella Russell MD on 2/18/22 at 10:44 AM EST    General Surgery Progress Note  [facility]    Patient's Name/Date of Birth: Shree May / 1945    Date: February 19, 2022     Surgeon: Ann Madrid MD    Chief Complaint: diverticulitis    Patient Active Problem List   Diagnosis    Pelvic prolapse    Hyperlipidemia    GERD (gastroesophageal reflux disease)    PUD (peptic ulcer disease)    Urinary incontinence    Osteoarthritis    Palpitations    Precordial pain    Thrombophilia ([address]    Alzheimer's disease, unspecified    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 Denies abdominal pain. Feels hungry and would like to eat.     Objective:  BP (!) 126/56   Pulse 62   Temp 97.9 °F (36.6 °C) (Oral)   Resp 16   Ht 5' 1\" (1.549 m)   Wt 120 lb (54.4 kg)   SpO2 96%   BMI 22.67 kg/m²   Labs:  Recent Labs     02/18/22  0524 02/19/22  0508   WBC 22.2* 19.3*   HGB 14.2 12.1   HCT 44.8 35.7     Lab Results   Component Value Date    CREATININE 2.5 (H) 02/19/2022    BUN 72 (H) 02/19/2022     (L) 02/19/2022    K 4.0 02/19/2022    CL 87 (L) 02/19/2022    CO2 25 02/19/2022     Recent Labs     02/18/22  0524   LIPASE 107*     CBC with Differential:    Lab Results   Component Value Date    WBC 19.3 02/19/2022    RBC 4.11 02/19/2022    HGB 12.1 02/19/2022    HCT 35.7 02/19/2022     02/19/2022    MCV 86.9 02/19/2022    MCH 29.4 02/19/2022    MCHC 33.9 02/19/2022    RDW 14.3 02/19/2022    NRBC 1.0 02/19/2022    SEGSPCT 81 09/06/2013    METASPCT 2.0 02/19/2022    LYMPHOPCT 8.0 02/19/2022    MONOPCT 4.0 02/19/2022    BASOPCT 0.0 02/19/2022    MONOSABS 0.77 02/19/2022    LYMPHSABS 1.54 02/19/2022    EOSABS 0.00 02/19/2022    BASOSABS 0.00 02/19/2022     CMP:    Lab Results   Component Value Date     02/19/2022    K 4.0 02/19/2022    K 5.1 01/26/2022    CL 87 02/19/2022    CO2 25 02/19/2022    BUN 72 02/19/2022    CREATININE 2.5 02/19/2022    GFRAA 23 02/19/2022    LABGLOM 19 02/19/2022    GLUCOSE 95 02/19/2022    GLUCOSE 75 04/10/2012    PROT 5.4 02/19/2022    LABALBU 2.6 02/19/2022    LABALBU 4.7 04/10/2012    CALCIUM 8.2 02/19/2022    BILITOT 0.7 02/19/2022    ALKPHOS 80 02/19/2022     02/19/2022    ALT 91 02/19/2022       General appearance:  NAD  Head: NCAT, PERRLA, EOMI, red conjunctiva  Neck: supple, no masses  Lungs: CTAB, equal chest rise bilateral  Heart: Reg rate  Abdomen: soft, nondistended, nontender, no guarding or rebound. Skin; no lesions  Gu: no cva tenderness  Extremities: extremities normal, atraumatic, no cyanosis or edema      Assessment/Plan:  Familia Garces is a 64 y.o. female with acute diverticulitis of the sigmoid colon with microperforation, leukocytosis, DAYNA    I reviewed the CT scan, though patient has significant diverticular disease, her abdominal exam is markedly unimpressive  She has persistent leukocytosis of 19 however she remains afebrile. Her creatinine is slightly improved today.   Advance to full liquid diet  Continue IV antibiotics  Continue to monitor abdominal exam for signs of peritonitis  May need repeat CT scan in 2 to 3 days to evaluate for development of pericolonic abscess if leukocytosis does not improve much    Sukhwinder Alejandro MD  2/19/2022  2:02 PM

## 2022-02-18 NOTE — H&P
Department of Internal Medicine  History and Physical    Primary Care Physician: Rylee Butler DO   Admitting Physician:  Dylon Easton DO  Admission date and time: 2/18/2022  4:46 AM    Room:  [address] [place] [address]  Admitting diagnosis: Hyperkalemia [E87.5]  Hyponatremia [E87.1]  Prolonged Q-T interval on ECG [R94.31]  DAYNA (acute kidney injury) [facility]) [N17.9]    Patient Name: Vasu Luis  MRN: [id number]    Date of Service: 2/18/2022       CHIEF COMPLAINT: Nausea, vomiting, lower abdominal pain    HISTORY OF PRESENT ILLNESS:    The patient is a 58-year-old female who presented to the emergency department with a complaint of nausea, vomiting, and lower abdominal pain. Patient states in general she has not felt well for a week or 2. States that yesterday she was having lightheadedness with position changes. Felt unsteady. States she lives with her daughter. Patient states her appetite has been very poor but that she does drink fluids. She states that she drinks approximately 2-16 ounce bottles of water daily and 1 to 2 cups of coffee but is not really eating solids at this time. In the emergency department imaging studies were performed. CT of the abdomen pelvis revealed sigmoid diverticulosis with wall thickening and associated inflammatory stranding, compatible with diverticulitis. Adjacent foci of gas which are possibly extraluminal, raising concern for underlying microperforation. CT of the head was also performed which revealed no acute intracranial abnormalities. Chest x-ray revealed bibasilar atelectasis. Labs reveal hyponatremia with a sodium level of 122. Potassium was 6.7. BUN/creatinine were 67 and 2.8. After evaluation it felt that the patient needed further evaluation and was admitted to the hospital for further evaluation.        PAST MEDICAL Hx:  Past Medical History:   Diagnosis Date    Arthritis     Asymptomatic PVCs 4/2012    pt felt flutter, no other sx, holter + pvc's, few runs of SVT    [place]    History of Holter monitoring     Hyperlipidemia     Macular degeneration     Nausea & vomiting     Pelvic prolapse     PONV (postoperative nausea and vomiting)     TIA (transient ischemic attack)        PAST SURGICAL Hx:   Past Surgical History:   Procedure Laterality Date    COLONOSCOPY  2012    ENDOSCOPY, COLON, DIAGNOSTIC      EYE SURGERY      macular hole    HERNIA REPAIR      inguinal    HYSTERECTOMY      OTHER SURGICAL HISTORY  sept 2013    ant elevatetransobturator sling rectocele and cystocele enterocele    SKIN BIOPSY      arms    TRANSESOPHAGEAL ECHOCARDIOGRAM N/A 10/20/2020    TRANSESOPHAGEAL ECHOCARDIOGRAM WITH BUBBLE STUDY performed by Mauro Mckinney MD at [address] Hx:  History reviewed. No pertinent family history. HOME MEDICATIONS:  Prior to Admission medications    Medication Sig Start Date End Date Taking?  Authorizing Provider   donepezil (ARICEPT) 10 MG tablet Take 1 tablet by mouth nightly 2/7/22   Goessel Balsam, DO   benzonatate (TESSALON) 200 MG capsule Take 1 capsule by mouth 3 times daily as needed for Cough 2/2/22 3/4/22  Goessel Balsam, DO   potassium chloride (KLOR-CON) 10 MEQ extended release tablet Take 2 tablets by mouth 2 times daily 2/2/22   Goessel Balsam, DO   levothyroxine (SYNTHROID) 25 MCG tablet Take 1 tablet by mouth Daily 2/2/22   Goessel Balsam, DO   atorvastatin (LIPITOR) 20 MG tablet TAKE ONE TABLET BY MOUTH EVERY EVENING 2/2/22   Goessel Balsam, DO   furosemide (LASIX) 40 MG tablet Take 1 tablet by mouth 2 times daily 2/2/22   Goessel Balsam, DO   metoprolol succinate (TOPROL XL) 50 MG extended release tablet Take 1 tablet by mouth 2 times daily 1/31/22   Claudell Cha, APRN - CNP   Calcium Polycarbophil (FIBER-CAPS PO) Take 1 capsule by mouth at bedtime    Historical Provider, MD   FOLIC ACID PO Take 1 tablet by mouth daily    Historical Provider, MD   diphenoxylate-atropine (DIPHENATOL) 2.5-0.025 MG per tablet Take 1 tablet by mouth 4 times daily as needed for Diarrhea for up to 30 days. 22  [facility], DO   ondansetron (ZOFRAN-ODT) 4 MG disintegrating tablet Take 1 tablet by mouth 3 times daily as needed for Nausea 22  [facility], DO   famotidine (PEPCID) 40 MG tablet Take 1 tablet by mouth every evening 22   [facility], DO   sertraline (ZOLOFT) 100 MG tablet TAKE 1 TABLET BY MOUTH  DAILY 21   [facility], DO   Magnesium 100 MG CAPS Take 200 mg by mouth nightly 21   [facility], DO   apixaban (ELIQUIS) 5 MG TABS tablet Take 1 tablet by mouth 2 times daily 2/15/21   [facility], DO   therapeutic multivitamin-minerals [facility]) tablet Take 1 tablet by mouth daily. Historical Provider, MD       ALLERGIES:  Patient has no known allergies. SOCIAL Hx:  Social History     Socioeconomic History    Marital status:      Spouse name: Not on file    Number of children: 3    Years of education: Not on file    Highest education level: Not on file   Occupational History    Occupation: retired   Tobacco Use    Smoking status: Former Smoker     Packs/day: 0.50     Quit date: 1994     Years since quittin.6    Smokeless tobacco: Never Used   Vaping Use    Vaping Use: Never used   Substance and Sexual Activity    Alcohol use: No     Comment: Coffee 1 cup a day     Drug use: No    Sexual activity: Not Currently   Other Topics Concern    Not on file   Social History Narrative    Not on file     Social Determinants of Health     Financial Resource Strain: Low Risk     Difficulty of Paying Living Expenses: Not hard at all   Food Insecurity: No Food Insecurity    Worried About [address] in the Last Year: Never true    [address] in the Last Year: Never true   Transportation Needs:     Lack of Transportation (Medical): Not on file    Lack of Transportation (Non-Medical):  Not on file   Physical Activity: Unknown  Days of Exercise per Week: Patient refused    Minutes of Exercise per Session: Patient refused   Stress:     Feeling of Stress : Not on file   Social Connections:     Frequency of Communication with Friends and Family: Not on file    Frequency of Social Gatherings with Friends and Family: Not on file    Attends L[employer] day Services: Not on file    Active Member of [address] [place] [address] or Organizations: Not on file    Attends Club or Organization Meetings: Not on file    Marital Status: Not on file   Intimate Partner Violence:     Fear of Current or Ex-Partner: Not on file    Emotionally Abused: Not on file    Physically Abused: Not on file    Sexually Abused: Not on file   Housing Stability:     Unable to Pay for Housing in the Last Year: Not on file    Number of [place] in the Last Year: Not on file    Unstable Housing in the Last Year: Not on file       ROS:  General:   Denies chills, positive for fatigue, denies fever, malaise, night sweats or weight loss    Psychological:   Denies anxiety, disorientation or hallucinations    ENT:    Denies epistaxis, headaches, vertigo or visual changes    Cardiovascular:   Denies any chest pain, irregular heartbeats, or palpitations. No paroxysmal nocturnal dyspnea. Respiratory:   Denies shortness of breath, coughing, sputum production, hemoptysis, or wheezing. No orthopnea. Gastrointestinal:   Positive for nausea, vomiting, diarrhea. Admits to mid to lower abdominal pain. Genito-Urinary:    Denies any urgency, frequency, hematuria. Voiding without difficulty. Musculoskeletal:   Denies joint pain, joint stiffness, joint swelling or muscle pain    Neurology:    Denies any headache or focal neurological deficits. Admits to weakness lightheadedness. Derm:    Denies any rashes, ulcers, or excoriations. Denies bruising. Extremities:   Denies any lower extremity swelling or edema.       PHYSICAL EXAM:  VITALS:  Blood pressure (!) 108/52, pulse 56, temperature 97.9 °F (36.6 °C), temperature source Oral, resp. rate 18, height 5' 1\" (1.549 m), weight 120 lb (54.4 kg), SpO2 95 %, not currently breastfeeding. CONSTITUTIONAL:    Awake, alert, cooperative, no apparent distress, and appears stated age. The patient does answer all orientation questions correctly but is somewhat slow to respond to questions. Patient states she does have history of CVA. EYES:    PERRL, EOMI, sclera clear, conjunctiva normal    ENT:    Normocephalic, atraumatic, sinuses nontender on palpation. External ears without lesions. Oral pharynx with moist mucus membranes. NECK:    Supple, symmetrical, trachea midline, no adenopathy, thyroid symmetric, not enlarged and no tenderness, skin normal, no bruits, no JVD    HEMATOLOGIC/LYMPHATICS:    No cervical lymphadenopathy and no supraclavicular lymphadenopathy    LUNGS:    Symmetric. No increased work of breathing, good air exchange, clear to auscultation bilaterally, no wheezes, rhonchi, or rales,     CARDIOVASCULAR:    Normal apical impulse, regular rate and rhythm, normal S1 and S2, no S3 or S4, and grade 1/6 murmur noted. Currently sinus rhythm. ABDOMEN:    Normal bowel sounds, soft, non-distended, tender, no masses palpated, no hepatosplenomegaly, no rebound or guarding elicited on palpation     MUSCULOSKELETAL:    There is no redness, warmth, or swelling of the joints. Full range of motion noted. Patient observed moving all extremities. NEUROLOGIC:    Awake, alert, oriented to name, place and time. Cranial nerves II-XII are grossly intact. M    SKIN:    No bruising or bleeding. No redness, warmth, or swelling    EXTREMITIES:    Peripheral pulses present. No edema, cyanosis, or swelling. OSTEOPATHIC:    Examined in seated and supine positions. Normal thoracic kyphosis and lumbar lordosis. No acute somatic dysfunction.     LABORATORY DATA:  CBC with Differential:    Lab Results   Component Value Date    WBC 22.2 02/18/2022    RBC 4.90 02/18/2022    HGB 14.2 02/18/2022    HCT 44.8 02/18/2022     02/18/2022    MCV 91.4 02/18/2022    MCH 29.0 02/18/2022    MCHC 31.7 02/18/2022    RDW 14.2 02/18/2022    SEGSPCT 81 09/06/2013    LYMPHOPCT 6.9 02/18/2022    MONOPCT 3.4 02/18/2022    BASOPCT 0.4 02/18/2022    MONOSABS 0.67 02/18/2022    LYMPHSABS 1.55 02/18/2022    EOSABS 0.00 02/18/2022    BASOSABS 0.00 02/18/2022     CMP:    Lab Results   Component Value Date     02/18/2022    K 5.4 02/18/2022    K 5.1 01/26/2022    CL 85 02/18/2022    CO2 16 02/18/2022    BUN 67 02/18/2022    CREATININE 2.8 02/18/2022    GFRAA 20 02/18/2022    LABGLOM 16 02/18/2022    GLUCOSE 110 02/18/2022    GLUCOSE 75 04/10/2012    PROT 7.2 02/18/2022    LABALBU 3.7 02/18/2022    LABALBU 4.7 04/10/2012    CALCIUM 9.5 02/18/2022    BILITOT 1.3 02/18/2022    ALKPHOS 99 02/18/2022     02/18/2022    ALT 68 02/18/2022       ASSESSMENT:  · Acute diverticulitis with imaging results concerning for microperforation  · Leukocytosis secondary to #1  · Hyperkalemia  · Hyponatremia  · Acute kidney injury on  · Paroxysmal atrial fibrillation  · Acute on chronic diastolic heart failure  · Hyperlipidemia  · Moderate to severe mitral regurgitation  · Macular degeneration  · History of tobacco abuse  · History of TIAs  · Gastroesophageal reflux disease  · Hx of LA appendage thrombus      PLAN:  Patient was admitted to the hospital for further evaluation and treatment. Surgery team was consulted. Patient is permitted ice chips and sips of liquids today. Diet to be advanced as per general surgery team.  Continue IV hydration. Patient's home medications were reviewed/adjusted/continued. Monitor blood work closely. Patient was found to be hyponatremic upon admission. Continue to monitor electrolytes and renal function. Continue sodium bicarb drip as ordered. PT/OT to evaluate and treat. Full services for discharge planning.   Monitor blood glucose levels and treat accordingly. Continue antibiotic therapy. The patient was seen, examined and then discussed with Dr. Nico Retana. ELIO Navarro - CNP, NP-C  2:35 PM  2/18/2022    Electronically signed by ELIO Navarro CNP on 2/18/22 at 2:35 PM EST    I have personally participated in the history and medical decision making with the nurse practitioner on the date of service and I agree with all the pertinent clinical information unless otherwise noted. I have also reviewed and agree with the past medical, family, and social history unless otherwise noted.       Hamida Lopes DO, D.O., FACOI  4:13 PM  2/18/2022

## 2022-02-18 NOTE — ED PROVIDER NOTES
69-year-old female presenting from home with nausea, emesis, lower abdominal pain. She is also had some diarrhea. She came in tonight because of starting to feel lightheaded and unsteady. Daughter is with her. She went to bed around 6 PM last night and woke up at 2 AM.  She has had the nausea and vomiting and unsteady since then. She is awake and alert and oriented during my examination. She does not fully report a history, concern for underlying neurologic disorder. Gradual onset, persistent, moderate severity, 2 to 3 days duration, now associate with some lightheadedness. History reviewed. No pertinent family history. Past Surgical History:   Procedure Laterality Date    COLONOSCOPY  2012    ENDOSCOPY, COLON, DIAGNOSTIC      EYE SURGERY      macular hole    HERNIA REPAIR      inguinal    HYSTERECTOMY      OTHER SURGICAL HISTORY  sept 2013    ant elevatetransobturator sling rectocele and cystocele enterocele    SKIN BIOPSY      arms    TRANSESOPHAGEAL ECHOCARDIOGRAM N/A 10/20/2020    TRANSESOPHAGEAL ECHOCARDIOGRAM WITH BUBBLE STUDY performed by Katt Rome MD at [address] [facility] ENDOSCOPY       Review of Systems   Constitutional: Negative for chills and fever. Respiratory: Negative for chest tightness and shortness of breath. Gastrointestinal: Positive for abdominal pain, diarrhea, nausea and vomiting. Neurological: Positive for light-headedness. All other systems reviewed and are negative. Physical Exam  Constitutional:       General: She is not in acute distress. Appearance: She is well-developed. HENT:      Head: Normocephalic and atraumatic. Eyes:      Conjunctiva/sclera: Conjunctivae normal.      Pupils: Pupils are equal, round, and reactive to light. Neck:      Thyroid: No thyromegaly. Cardiovascular:      Rate and Rhythm: Normal rate and regular rhythm. Pulmonary:      Effort: Pulmonary effort is normal. No respiratory distress.       Breath sounds: Normal breath sounds. Abdominal:      General: There is no distension. Palpations: Abdomen is soft. Tenderness: There is no abdominal tenderness. There is no guarding or rebound. Musculoskeletal:         General: No tenderness. Normal range of motion. Cervical back: Normal range of motion. Skin:     General: Skin is warm and dry. Findings: No erythema. Neurological:      Mental Status: She is alert. Mental status is at baseline. Cranial Nerves: No cranial nerve deficit. Coordination: Coordination normal.   Psychiatric:         Mood and Affect: Mood normal.          Procedures     MDM     ED Course as of 02/19/22 0434 Fri Feb 18, 2022 0548 EKG: Interpreted by meSinus bradycardia, P AC present, prolonged QT at 510, no ST elevations or depressions, T wave versions in the precordial anteroseptal leads. [SO]      ED Course User Index  [SO] Mary Topete, DO      ED Course as of 02/19/22 0434 Fri Feb 18, 2022 0548 EKG: Interpreted by meSinus bradycardia, P AC present, prolonged QT at 510, no ST elevations or depressions, T wave versions in the precordial anteroseptal leads. [SO]      ED Course User Index  [SO] Mary Topete, DO       --------------------------------------------- PAST HISTORY ---------------------------------------------  Past Medical History:  has a past medical history of Arthritis, Asymptomatic PVCs, History of Holter monitoring, Hyperlipidemia, Macular degeneration, Nausea & vomiting, Pelvic prolapse, PONV (postoperative nausea and vomiting), and TIA (transient ischemic attack). Past Surgical History:  has a past surgical history that includes Colonoscopy (2012); Endoscopy, colon, diagnostic; hernia repair; Hysterectomy; eye surgery; skin biopsy; other surgical history (sept 2013); and transesophageal echocardiogram (N/A, 10/20/2020). Social History:  reports that she quit smoking about 27 years ago. She smoked 0.50 packs per day.  She has never used smokeless tobacco. She reports that she does not drink alcohol and does not use drugs. Family History: family history is not on file. The patients home medications have been reviewed. Allergies: Patient has no known allergies.     -------------------------------------------------- RESULTS -------------------------------------------------    Lab  Results for orders placed or performed during the hospital encounter of 02/18/22   CBC with Auto Differential   Result Value Ref Range    WBC 22.2 (H) 4.5 - 11.5 E9/L    RBC 4.90 3.50 - 5.50 E12/L    Hemoglobin 14.2 11.5 - 15.5 g/dL    Hematocrit 44.8 34.0 - 48.0 %    MCV 91.4 80.0 - 99.9 fL    MCH 29.0 26.0 - 35.0 pg    MCHC 31.7 (L) 32.0 - 34.5 %    RDW 14.2 11.5 - 15.0 fL    Platelets 813 (H) 402 - 450 E9/L    MPV 11.2 7.0 - 12.0 fL    Neutrophils % 89.7 (H) 43.0 - 80.0 %    Lymphocytes % 6.9 (L) 20.0 - 42.0 %    Monocytes % 3.4 2.0 - 12.0 %    Eosinophils % 0.0 0.0 - 6.0 %    Basophils % 0.4 0.0 - 2.0 %    Neutrophils Absolute 19.98 (H) 1.80 - 7.30 E9/L    Lymphocytes Absolute 1.55 1.50 - 4.00 E9/L    Monocytes Absolute 0.67 0.10 - 0.95 E9/L    Eosinophils Absolute 0.00 (L) 0.05 - 0.50 E9/L    Basophils Absolute 0.00 0.00 - 0.20 E9/L    Anisocytosis 1+     Polychromasia 3+     Poikilocytes 3+     Acanthocytes 3+     Fisk Cells 2+     Ovalocytes 1+     Target Cells 1+    Comprehensive Metabolic Panel   Result Value Ref Range    Sodium 122 (L) 132 - 146 mmol/L    Potassium 6.7 (HH) 3.5 - 5.0 mmol/L    Chloride 85 (L) 98 - 107 mmol/L    CO2 16 (L) 22 - 29 mmol/L    Anion Gap 21 (H) 7 - 16 mmol/L    Glucose 110 (H) 74 - 99 mg/dL    BUN 67 (H) 6 - 23 mg/dL    CREATININE 2.8 (H) 0.5 - 1.0 mg/dL    GFR Non-African American 16 >=60 mL/min/1.73    GFR African American 20     Calcium 9.5 8.6 - 10.2 mg/dL    Total Protein 7.2 6.4 - 8.3 g/dL    Albumin 3.7 3.5 - 5.2 g/dL    Total Bilirubin 1.3 (H) 0.0 - 1.2 mg/dL    Alkaline Phosphatase 99 35 - 104 U/L    ALT 68 (H) 0 - 32 U/L  (H) 0 - 31 U/L   Troponin   Result Value Ref Range    Troponin, High Sensitivity 31 (H) 0 - 9 ng/L   Lipase   Result Value Ref Range    Lipase 107 (H) 13 - 60 U/L   Urinalysis with Microscopic   Result Value Ref Range    Color, UA Yellow Straw/Yellow    Clarity, UA Clear Clear    Glucose, Ur Negative Negative mg/dL    Bilirubin Urine SMALL (A) Negative    Ketones, Urine Negative Negative mg/dL    Specific Gravity, UA 1.025 1.005 - 1.030    Blood, Urine Negative Negative    pH, UA 5.0 5.0 - 9.0    Protein, UA Negative Negative mg/dL    Urobilinogen, Urine 0.2 <2.0 E.U./dL    Nitrite, Urine Negative Negative    Leukocyte Esterase, Urine Negative Negative    WBC, UA 1-3 0 - 5 /HPF    RBC, UA 1-3 0 - 2 /HPF    Epithelial Cells, UA RARE /HPF    Bacteria, UA RARE (A) None Seen /HPF   Magnesium   Result Value Ref Range    Magnesium 2.7 (H) 1.6 - 2.6 mg/dL   Potassium   Result Value Ref Range    Potassium 5.4 (H) 3.5 - 5.0 mmol/L   Basic Metabolic Panel   Result Value Ref Range    Sodium 125 (L) 132 - 146 mmol/L    Potassium 5.7 (H) 3.5 - 5.0 mmol/L    Chloride 87 (L) 98 - 107 mmol/L    CO2 21 (L) 22 - 29 mmol/L    Anion Gap 17 (H) 7 - 16 mmol/L    Glucose 109 (H) 74 - 99 mg/dL    BUN 70 (H) 6 - 23 mg/dL    CREATININE 2.8 (H) 0.5 - 1.0 mg/dL    GFR Non-African American 16 >=60 mL/min/1.73    GFR African American 20     Calcium 8.5 (L) 8.6 - 10.2 mg/dL   POCT Glucose   Result Value Ref Range    Meter Glucose 350 (H) 74 - 99 mg/dL   POCT Glucose   Result Value Ref Range    Meter Glucose 112 (H) 74 - 99 mg/dL   POCT Glucose   Result Value Ref Range    Meter Glucose 111 (H) 74 - 99 mg/dL   EKG 12 Lead   Result Value Ref Range    Ventricular Rate 48 BPM    Atrial Rate 48 BPM    P-R Interval 160 ms    QRS Duration 106 ms    Q-T Interval 572 ms    QTc Calculation (Bazett) 510 ms    P Axis 56 degrees    R Axis 68 degrees    T Axis 15 degrees       Radiology  CT HEAD WO CONTRAST   Final Result   No acute intracranial abnormality. CT ABDOMEN PELVIS WO CONTRAST Additional Contrast? None   Final Result   Sigmoid diverticulosis with wall thickening and associated inflammatory   stranding, compatible with diverticulitis. Adjacent foci of gas which are   possibly extraluminal, raising concern for underlying microperforation. No   extraluminal fluid collection. Bilateral pleural effusions with moderate ascites, possibly reflecting   patient's volume status. XR CHEST PORTABLE   Final Result   Bibasilar atelectasis.               ------------------------- NURSING NOTES AND VITALS REVIEWED ---------------------------  Date / Time Roomed:  2/18/2022  4:46 AM  ED Bed Assignment:  [id number]    The nursing notes within the ED encounter and vital signs as below have been reviewed. Patient Vitals for the past 24 hrs:   BP Temp Temp src Pulse Resp SpO2 Height Weight   02/18/22 2045 (!) 112/56 97.5 °F (36.4 °C) Oral 56 18 95 %     02/18/22 1700 111/61 98.1 °F (36.7 °C) Oral 57 18 96 %     02/18/22 1345 (!) 108/52 97.9 °F (36.6 °C) Oral 56 18 95 %     02/18/22 1050 (!) 97/51 97.5 °F (36.4 °C)  54 18 95 %     02/18/22 0848 106/60   55 20      02/18/22 0738 (!) 105/40 97.6 °F (36.4 °C)  50 21 95 %     02/18/22 0648 (!) 103/44   (!) 47 18 91 %     02/18/22 0551 (!) 100/54   (!) 49       02/18/22 0532      92 %     02/18/22 0457 (!) 100/57 97.1 °F (36.2 °C) Temporal 50 18  5' 1\" (1.549 m) 120 lb (54.4 kg)       Oxygen Saturation Interpretation: Normal      ------------------------------------------ PROGRESS NOTES ------------------------------------------  Re-evaluation(s):   .  Patients symptoms show no change  Repeat physical examination is not changed      Patients symptoms show no change  Repeat physical examination is not changed    I have spoken with the patient and discussed todays results, in addition to providing specific details for the plan of care and counseling regarding the diagnosis and prognosis. Their questions are answered at this time and they are agreeable with the plan.      --------------------------------- ADDITIONAL PROVIDER NOTES ---------------------------------  Consultations:  Spoke with Dr. Curtis Webster,  They will admit this patient. This patient's ED course included: a personal history and physicial examination, re-evaluation prior to disposition, multiple bedside re-evaluations, IV medications, cardiac monitoring, continuous pulse oximetry and complex medical decision making and emergency management    Patient with hyperkalemia and acute kidney injury as well. Given calcium gluconate, sodium bicarb with infusion, insulin and dextrose. Sinus bradycardia in the 40s, will correct the hyperkalemia and determine hospital for placement based on the improvement    This patient has remained hemodynamically stable, improved and been closely monitored during their ED course. Please note that the withdrawal or failure to initiate urgent interventions for this patient would likely result in a life threatening deterioration or permanent disability. Accordingly this patient received 32 minutes of critical care time, excluding separately billable procedures. Systems at risk for deterioration include: neurologic, cardiovascular. Clinical Impression  1. DAYNA (acute kidney injury) ([address]    2. Hyperkalemia    3. Hyponatremia    4. Prolonged Q-T interval on ECG          Disposition  Patient's disposition: Admit to monitored floor  Patient's condition is stable.           Jasmyn Maddox DO  02/19/22 5548

## 2022-02-19 LAB
ALBUMIN SERPL-MCNC: 2.6 G/DL (ref 3.5–5.2)
ALP BLD-CCNC: 80 U/L (ref 35–104)
ALT SERPL-CCNC: 91 U/L (ref 0–32)
AMMONIA: 37 UMOL/L (ref 11–51)
ANION GAP SERPL CALCULATED.3IONS-SCNC: 14 MMOL/L (ref 7–16)
ANION GAP SERPL CALCULATED.3IONS-SCNC: 16 MMOL/L (ref 7–16)
AST SERPL-CCNC: 154 U/L (ref 0–31)
BASOPHILS ABSOLUTE: 0 E9/L (ref 0–0.2)
BASOPHILS RELATIVE PERCENT: 0 % (ref 0–2)
BILIRUB SERPL-MCNC: 0.7 MG/DL (ref 0–1.2)
BUN BLDV-MCNC: 54 MG/DL (ref 6–23)
BUN BLDV-MCNC: 72 MG/DL (ref 6–23)
BURR CELLS: ABNORMAL
CALCIUM SERPL-MCNC: 8.2 MG/DL (ref 8.6–10.2)
CALCIUM SERPL-MCNC: 8.3 MG/DL (ref 8.6–10.2)
CHLORIDE BLD-SCNC: 87 MMOL/L (ref 98–107)
CHLORIDE BLD-SCNC: 88 MMOL/L (ref 98–107)
CHLORIDE URINE RANDOM: 21 MMOL/L
CO2: 25 MMOL/L (ref 22–29)
CO2: 28 MMOL/L (ref 22–29)
CREAT SERPL-MCNC: 2 MG/DL (ref 0.5–1)
CREAT SERPL-MCNC: 2.5 MG/DL (ref 0.5–1)
CREATININE URINE: 33 MG/DL (ref 29–226)
EOSINOPHILS ABSOLUTE: 0 E9/L (ref 0.05–0.5)
EOSINOPHILS RELATIVE PERCENT: 0 % (ref 0–6)
GFR AFRICAN AMERICAN: 23
GFR AFRICAN AMERICAN: 29
GFR NON-AFRICAN AMERICAN: 19 ML/MIN/1.73
GFR NON-AFRICAN AMERICAN: 24 ML/MIN/1.73
GLUCOSE BLD-MCNC: 95 MG/DL (ref 74–99)
GLUCOSE BLD-MCNC: 98 MG/DL (ref 74–99)
HCT VFR BLD CALC: 35.7 % (ref 34–48)
HEMOGLOBIN: 12.1 G/DL (ref 11.5–15.5)
HYPOCHROMIA: ABNORMAL
LYMPHOCYTES ABSOLUTE: 1.54 E9/L (ref 1.5–4)
LYMPHOCYTES RELATIVE PERCENT: 8 % (ref 20–42)
MCH RBC QN AUTO: 29.4 PG (ref 26–35)
MCHC RBC AUTO-ENTMCNC: 33.9 % (ref 32–34.5)
MCV RBC AUTO: 86.9 FL (ref 80–99.9)
METAMYELOCYTES RELATIVE PERCENT: 2 % (ref 0–1)
METER GLUCOSE: 103 MG/DL (ref 74–99)
METER GLUCOSE: 111 MG/DL (ref 74–99)
METER GLUCOSE: 123 MG/DL (ref 74–99)
METER GLUCOSE: 90 MG/DL (ref 74–99)
METER GLUCOSE: 97 MG/DL (ref 74–99)
METER GLUCOSE: 98 MG/DL (ref 74–99)
MONOCYTES ABSOLUTE: 0.77 E9/L (ref 0.1–0.95)
MONOCYTES RELATIVE PERCENT: 4 % (ref 2–12)
NEUTROPHILS ABSOLUTE: 16.98 E9/L (ref 1.8–7.3)
NEUTROPHILS RELATIVE PERCENT: 86 % (ref 43–80)
NUCLEATED RED BLOOD CELLS: 1 /100 WBC
OSMOLALITY URINE: 348 MOSM/KG (ref 300–900)
PDW BLD-RTO: 14.3 FL (ref 11.5–15)
PLATELET # BLD: 517 E9/L (ref 130–450)
PMV BLD AUTO: 11 FL (ref 7–12)
POIKILOCYTES: ABNORMAL
POLYCHROMASIA: ABNORMAL
POTASSIUM SERPL-SCNC: 3.6 MMOL/L (ref 3.5–5)
POTASSIUM SERPL-SCNC: 4 MMOL/L (ref 3.5–5)
RBC # BLD: 4.11 E12/L (ref 3.5–5.5)
SODIUM BLD-SCNC: 128 MMOL/L (ref 132–146)
SODIUM BLD-SCNC: 130 MMOL/L (ref 132–146)
SODIUM URINE: 55 MMOL/L
TOTAL PROTEIN: 5.4 G/DL (ref 6.4–8.3)
WBC # BLD: 19.3 E9/L (ref 4.5–11.5)

## 2022-02-19 PROCEDURE — 6360000002 HC RX W HCPCS: Performed by: INTERNAL MEDICINE

## 2022-02-19 PROCEDURE — 84300 ASSAY OF URINE SODIUM: CPT

## 2022-02-19 PROCEDURE — 82962 GLUCOSE BLOOD TEST: CPT

## 2022-02-19 PROCEDURE — 2500000003 HC RX 250 WO HCPCS: Performed by: INTERNAL MEDICINE

## 2022-02-19 PROCEDURE — 2580000003 HC RX 258: Performed by: INTERNAL MEDICINE

## 2022-02-19 PROCEDURE — 1200000000 HC SEMI PRIVATE

## 2022-02-19 PROCEDURE — 6370000000 HC RX 637 (ALT 250 FOR IP): Performed by: INTERNAL MEDICINE

## 2022-02-19 PROCEDURE — 85025 COMPLETE CBC W/AUTO DIFF WBC: CPT

## 2022-02-19 PROCEDURE — 80053 COMPREHEN METABOLIC PANEL: CPT

## 2022-02-19 PROCEDURE — 83935 ASSAY OF URINE OSMOLALITY: CPT

## 2022-02-19 PROCEDURE — 82570 ASSAY OF URINE CREATININE: CPT

## 2022-02-19 PROCEDURE — C9113 INJ PANTOPRAZOLE SODIUM, VIA: HCPCS | Performed by: INTERNAL MEDICINE

## 2022-02-19 PROCEDURE — 80048 BASIC METABOLIC PNL TOTAL CA: CPT

## 2022-02-19 PROCEDURE — 82436 ASSAY OF URINE CHLORIDE: CPT

## 2022-02-19 PROCEDURE — 82140 ASSAY OF AMMONIA: CPT

## 2022-02-19 PROCEDURE — 36415 COLL VENOUS BLD VENIPUNCTURE: CPT

## 2022-02-19 RX ORDER — METOPROLOL SUCCINATE 50 MG/1
50 TABLET, EXTENDED RELEASE ORAL 2 TIMES DAILY
Status: DISCONTINUED | OUTPATIENT
Start: 2022-02-19 | End: 2022-02-23 | Stop reason: HOSPADM

## 2022-02-19 RX ORDER — SODIUM CHLORIDE 9 MG/ML
INJECTION, SOLUTION INTRAVENOUS CONTINUOUS
Status: DISCONTINUED | OUTPATIENT
Start: 2022-02-19 | End: 2022-02-21

## 2022-02-19 RX ORDER — METOPROLOL TARTRATE 5 MG/5ML
5 INJECTION INTRAVENOUS ONCE
Status: COMPLETED | OUTPATIENT
Start: 2022-02-19 | End: 2022-02-19

## 2022-02-19 RX ORDER — DILTIAZEM HYDROCHLORIDE 5 MG/ML
20 INJECTION INTRAVENOUS ONCE
Status: COMPLETED | OUTPATIENT
Start: 2022-02-19 | End: 2022-02-19

## 2022-02-19 RX ORDER — METOPROLOL SUCCINATE 50 MG/1
50 TABLET, EXTENDED RELEASE ORAL 2 TIMES DAILY
Status: DISCONTINUED | OUTPATIENT
Start: 2022-02-19 | End: 2022-02-19

## 2022-02-19 RX ADMIN — APIXABAN 2.5 MG: 2.5 TABLET, FILM COATED ORAL at 20:41

## 2022-02-19 RX ADMIN — METOPROLOL SUCCINATE 50 MG: 50 TABLET, EXTENDED RELEASE ORAL at 17:43

## 2022-02-19 RX ADMIN — DONEPEZIL HYDROCHLORIDE 10 MG: 5 TABLET, FILM COATED ORAL at 20:41

## 2022-02-19 RX ADMIN — LEVOTHYROXINE SODIUM 25 MCG: 25 TABLET ORAL at 05:34

## 2022-02-19 RX ADMIN — PANTOPRAZOLE SODIUM 40 MG: 40 INJECTION, POWDER, FOR SOLUTION INTRAVENOUS at 09:42

## 2022-02-19 RX ADMIN — WATER 1000 MG: 1 INJECTION INTRAMUSCULAR; INTRAVENOUS; SUBCUTANEOUS at 17:30

## 2022-02-19 RX ADMIN — DILTIAZEM HYDROCHLORIDE 20 MG: 5 INJECTION, SOLUTION INTRAVENOUS at 19:01

## 2022-02-19 RX ADMIN — SERTRALINE 50 MG: 50 TABLET, FILM COATED ORAL at 09:42

## 2022-02-19 RX ADMIN — METOPROLOL TARTRATE 5 MG: 5 INJECTION INTRAVENOUS at 16:24

## 2022-02-19 RX ADMIN — DILTIAZEM HYDROCHLORIDE 10 MG/HR: 5 INJECTION, SOLUTION INTRAVENOUS at 22:09

## 2022-02-19 RX ADMIN — SODIUM CHLORIDE: 9 INJECTION, SOLUTION INTRAVENOUS at 09:42

## 2022-02-19 ASSESSMENT — PAIN SCALES - GENERAL
PAINLEVEL_OUTOF10: 0

## 2022-02-19 NOTE — PROGRESS NOTES
Medicare Annual Wellness Visit    Ana Pereira is here for Medicare [address] [place] [address] was seen today for medicare awv. Diagnoses and all orders for this visit:    Generalized abdominal pain  -     XR ABDOMEN (KUB) (SINGLE AP VIEW); Future    Diarrhea of infectious origin  -     Amb External Referral To Gastroenterology    Blood in stool  -     Amb External Referral To Gastroenterology    Medicare annual wellness visit, subsequent         Recommendations for Preventive Services Due: see orders and patient instructions/AVS.  Recommended screening schedule for the next 5-10 years is provided to the patient in written form: see Patient Instructions/AVS.     Return for Medicare Annual Wellness Visit in 1 year. Reviewed and updated this visit by clinical staff:  Tobacco  Allergies  Meds  Problems  Med Hx  Surg Hx  Soc Hx  Fam Hx        Subjective       Patient's complete Health Risk Assessment and screening values have been reviewed and are found in Flowsheets. The following problems were reviewed today and where indicated follow up appointments were made and/or referrals ordered. Positive Risk Factor Screenings with Interventions:    Fall Risk:  Do you feel unsteady or are you worried about falling? : (!) yes  2 or more falls in past year?: no  Fall with injury in past year?: no     Fall Risk Interventions:    · Home safety tips provided       Alcohol Screening:       A score of 8 or more is associated with harmful or hazardous drinking. A score of 13 or more in women, and 15 or more in men, is likely to indicate alcohol dependence.     Substance Abuse - Alcohol Interventions:  educational materials provided        General Health and ACP:  General  In general, how would you say your health is?: Fair  In the past 7 days, have you experienced any of the following: New or Increased Pain, New or Increased Fatigue, Loneliness, Social Isolation, Stress or Anger?: (!) Yes  Select all that apply: (!) New or Increased Pain,New or Increased Fatigue  Do you get the social and emotional support that you need?: Yes  Do you have a Living Will?: Yes    Advance Directives     Power of  Living Will ACP-Advance Directive ACP-Power of Joby Swartz on 01/27/22 Filed on 09/07/13 Filed [address] [facility] Kayla Risk Interventions:  · Fatigue: regular exercise recommended- 3-5 times per week, 30-45 minutes per session    Health Habits/Nutrition:     Physical Activity: Unknown    Days of Exercise per Week: Patient refused    Minutes of Exercise per Session: Patient refused     Have you lost any weight without trying in the past 3 months?: No    Body mass index: 22.86    Have you seen the dentist within the past year?: (!) No      Health Habits/Nutrition Interventions:  · Nutritional issues:  educational materials for healthy, well-balanced diet provided      ADLs:  In the past 7 days, did you need help from others to perform any of the following everyday activities: Eating, dressing, grooming, bathing, toileting, or walking/balance?: No  In the past 7 days, did you need help from others to take care of any of the following: Laundry, housekeeping, banking/finances, shopping, telephone use, food preparation, transportation, or taking medications?: (!) Yes  Select all that apply: (!) Laundry,Housekeeping,Banking/Finances,Shopping,Transportation,Taking Medications    ADL Interventions:  · Patient declines any further evaluation/treatment for this issue       Objective               No Known Allergies  Prior to Visit Medications    Medication Sig Taking?  Authorizing Provider   donepezil (ARICEPT) 10 MG tablet Take 1 tablet by mouth nightly Yes Kristy Mason DO   benzonatate (TESSALON) 200 MG capsule Take 1 capsule by mouth 3 times daily as needed for Cough Yes Kristy Mason DO   potassium chloride (KLOR-CON) 10 MEQ extended release tablet Take 2 tablets by mouth 2 times daily Yes Kristy Mason DO levothyroxine (SYNTHROID) 25 MCG tablet Take 1 tablet by mouth Daily Yes Feliberto Ellison DO   atorvastatin (LIPITOR) 20 MG tablet TAKE ONE TABLET BY MOUTH EVERY EVENING Yes Feliberto Ellison DO   furosemide (LASIX) 40 MG tablet Take 1 tablet by mouth 2 times daily Yes Feliberto Ellison DO   metoprolol succinate (TOPROL XL) 50 MG extended release tablet Take 1 tablet by mouth 2 times daily Yes ELIO Michelle - CNP   Calcium Polycarbophil (FIBER-CAPS PO) Take 1 capsule by mouth at bedtime Yes Historical Provider, MD   FOLIC ACID PO Take 1 tablet by mouth daily Yes Historical Provider, MD   diphenoxylate-atropine (DIPHENATOL) 2.5-0.025 MG per tablet Take 1 tablet by mouth 4 times daily as needed for Diarrhea for up to 30 days. Yes Feliberto Ellison DO   ondansetron (ZOFRAN-ODT) 4 MG disintegrating tablet Take 1 tablet by mouth 3 times daily as needed for Nausea Yes Feliberto Ellison DO   famotidine (PEPCID) 40 MG tablet Take 1 tablet by mouth every evening Yes Feliberto Ellison DO   sertraline (ZOLOFT) 100 MG tablet TAKE 1 TABLET BY MOUTH  DAILY Yes Feliberto Ellison DO   Magnesium 100 MG CAPS Take 200 mg by mouth nightly Yes Feliberto Ellison DO   apixaban (ELIQUIS) 5 MG TABS tablet Take 1 tablet by mouth 2 times daily Yes Feliberto Ellison DO   therapeutic multivitamin-minerals (THERAGRAN-M) tablet Take 1 tablet by mouth daily.  Yes Historical Provider, MD De Leon (Including outside providers/suppliers regularly involved in providing care):   Patient Care Team:  Feliberto Ellison DO as PCP - [facility] Empaneled Provider  Ayanna Lewis MD as Consulting Physician (Cardiology)  Rosie España MD as Consulting Physician (Cardiology)  ELIO Ferraro as Care Transitions Nurse

## 2022-02-19 NOTE — CONSULTS
[facility] MD Ad Carmona MD ALI PATIENT’S [facility] MD Tiffanie Chavez MD   Consultation  Patient's Name: Rao Ramos  11:08 AM  2/19/2022    Nephrologist: Mag Arora MD    Reason for Consult:  Acute kidney injury     Requesting Physician:  Garett Carrillo DO    Chief Complaint:  Abdominal discomfort     History Obtained From:  Patient , records ,staff     History of Present Ilness:      63 y/o F presenting to hospital with cc of abdominal discomfort , feeling tired and poor po intake that has been going on for 2 weeks . Pt had ct abdomen in ED showing her to have acute diverticulitis . Nephrology asked to see for DAYNA   Pt baseline cr around 0.8 -1 . She presented with cr of 2.8   She was also having metabolic acidosis along with hyperkalemia   Pt was started on bicarboane drip with subsequent improvement in her acidosis   She was also started on lokelema with imrpvement in her K down to normal .   Pt seen earlier  Today she is on RA she is alert oriented x3 she appear euvolemic   She is telling me since being hospitalized she is starting to feel better .          Past Medical History:   Diagnosis Date    Arthritis     Asymptomatic PVCs 4/2012    pt felt flutter, no other sx, holter + pvc's, few runs of SVT   [place]    History of Holter monitoring     Hyperlipidemia     Macular degeneration     Nausea & vomiting     Pelvic prolapse     PONV (postoperative nausea and vomiting)     TIA (transient ischemic attack)        Past Surgical History:   Procedure Laterality Date    COLONOSCOPY  2012    ENDOSCOPY, COLON, DIAGNOSTIC      EYE SURGERY      macular hole    HERNIA REPAIR      inguinal    HYSTERECTOMY      OTHER SURGICAL HISTORY  sept 2013    ant elevatetransobturator sling rectocele and cystocele enterocele    SKIN BIOPSY      arms    TRANSESOPHAGEAL ECHOCARDIOGRAM N/A 10/20/2020    TRANSESOPHAGEAL ECHOCARDIOGRAM WITH BUBBLE STUDY performed by Alexandru Rayo MD at [address] [place] [address] reviewed. No pertinent family history. reports that she quit smoking about 27 years ago. She smoked 0.50 packs per day. She has never used smokeless tobacco. She reports that she does not drink alcohol and does not use drugs. Allergies:  Patient has no known allergies. Current Medications:    0.9 % sodium chloride infusion, Continuous  [START ON 2/20/2022] sodium zirconium cyclosilicate (LOKELMA) oral suspension 10 g, Daily  cefTRIAXone (ROCEPHIN) 1,000 mg in sterile water 10 mL IV syringe, Q24H  pantoprazole (PROTONIX) injection 40 mg, Daily  levothyroxine (SYNTHROID) tablet 25 mcg, Daily  donepezil (ARICEPT) tablet 10 mg, Nightly  sertraline (ZOLOFT) tablet 50 mg, Daily  glucose (GLUTOSE) 40 % oral gel 15 g, PRN  glucagon (rDNA) injection 1 mg, PRN  dextrose 5 % solution, PRN  insulin lispro (HUMALOG) injection vial 0-6 Units, Q4H  dextrose bolus (hypoglycemia) 10% 125 mL, PRN   Or  dextrose bolus (hypoglycemia) 10% 250 mL, PRN  prochlorperazine (COMPAZINE) injection 5 mg, Q6H PRN        Review of Systems:   Constitutional: no fevers , no chills , feels ok   Eyes: no eye pain , no itching , no drainage  Ears, nose, mouth, throat, and face: no ear ,nose pain , hearing is ok ,no nasal drainage   Respiratory: no sob ,no cough ,no wheezing . Cardiovascular: no chest pain , no palpitation ,no sob . Gastrointestinal: no nausea, vomiting , constipation , no abdominal pain . Genitourinary:no urinary retention , no burning , dysuria . No polyuria   Hematologic/lymphatic: no bleeding , no cougulation issues . Musculoskeletal:no joint pain , no swelling . Neurological: no headaches ,no weakness , no numbness . Endocrine: no thirst , no weight issues .      Physical exam:   Vital signs BP (!) 126/56   Pulse 62   Temp 97.9 °F (36.6 °C) (Oral)   Resp 16   Ht 5' 1\" (1.549 m)   Wt 120 lb (54.4 kg)   SpO2 96%   BMI 22.67 kg/m²   Gen : NAD , appropriate for stated age . Head : at , nc   Neck : supple , no thyromegaly noted . Eyes : EOMI , PERRLA   CV : RRR , No M/R/G . Lungs: CTAB , no wheezing , good flow heard b/l   Abd : soft , NT , BS + , No Organomegaly appreciated . Skin : soft, dry . Neuro : CN  II-XII grossly intact , no focal neurologic deficit . Psych : cooperative .      Data:   Labs:  CBC with Differential:    Lab Results   Component Value Date    WBC 19.3 02/19/2022    RBC 4.11 02/19/2022    HGB 12.1 02/19/2022    HCT 35.7 02/19/2022     02/19/2022    MCV 86.9 02/19/2022    MCH 29.4 02/19/2022    MCHC 33.9 02/19/2022    RDW 14.3 02/19/2022    NRBC 1.0 02/19/2022    SEGSPCT 81 09/06/2013    METASPCT 2.0 02/19/2022    LYMPHOPCT 8.0 02/19/2022    MONOPCT 4.0 02/19/2022    BASOPCT 0.0 02/19/2022    MONOSABS 0.77 02/19/2022    LYMPHSABS 1.54 02/19/2022    EOSABS 0.00 02/19/2022    BASOSABS 0.00 02/19/2022     CMP:    Lab Results   Component Value Date     02/19/2022    K 4.0 02/19/2022    K 5.1 01/26/2022    CL 87 02/19/2022    CO2 25 02/19/2022    BUN 72 02/19/2022    CREATININE 2.5 02/19/2022    GFRAA 23 02/19/2022    LABGLOM 19 02/19/2022    GLUCOSE 95 02/19/2022    GLUCOSE 75 04/10/2012    PROT 5.4 02/19/2022    LABALBU 2.6 02/19/2022    LABALBU 4.7 04/10/2012    CALCIUM 8.2 02/19/2022    BILITOT 0.7 02/19/2022    ALKPHOS 80 02/19/2022     02/19/2022    ALT 91 02/19/2022     Ionized Calcium:  No results found for: IONCA  Magnesium:    Lab Results   Component Value Date    MG 2.7 02/18/2022     Phosphorus:    Lab Results   Component Value Date    PHOS 3.4 01/31/2022     U/A:    Lab Results   Component Value Date    COLORU Yellow 02/18/2022    PHUR 5.0 02/18/2022    WBCUA 1-3 02/18/2022    RBCUA 1-3 02/18/2022    BACTERIA RARE 02/18/2022    CLARITYU Clear 02/18/2022    SPECGRAV 1.025 02/18/2022    LEUKOCYTESUR Negative 02/18/2022    UROBILINOGEN 0.2 02/18/2022    BILIRUBINUR SMALL 02/18/2022    BLOODU Negative 02/18/2022    GLUCOSEU Negative 02/18/2022     Microalbumen/Creatinine ratio:  No components found for: RUCREAT  Iron Saturation:  No components found for: PERCENTFE  TIBC:    Lab Results   Component Value Date    TIBC 191 08/04/2021     FERRITIN:  No results found for: FERRITIN     Imaging:  CT HEAD WO CONTRAST   Final Result   No acute intracranial abnormality. CT ABDOMEN PELVIS WO CONTRAST Additional Contrast? None   Final Result   Sigmoid diverticulosis with wall thickening and associated inflammatory   stranding, compatible with diverticulitis. Adjacent foci of gas which are   possibly extraluminal, raising concern for underlying microperforation. No   extraluminal fluid collection. Bilateral pleural effusions with moderate ascites, possibly reflecting   patient's volume status. XR CHEST PORTABLE   Final Result   Bibasilar atelectasis. Assessment    -Acute kidney injury related to decrease effective volume due to volume depletion     -Hyponateremia felt to be related to volume depletion and complicated by her drinking good amount of water .    -Metabolic acidosis due to bicarbonate loss with diarrhea along with DAYNA     -Hyperkalemia due to decrease effective volume downgrading renin angiotension system , DAYNA and metabolic acidosis     -Acute diverticulits     -hx of Moderate to severe mitral regurgitation    Plan :     -continue isotonic iv fluids . With resolving acidosis will discontinue bicarbonate based iv fluids and switch to NS at 100 cc/hr     -with resolving Hyperkalemia and in light of diverticultis will hold lokelma     -avoid nephrotoxins    -f/u serial BMPs, UO       Thank you Dr. Ivy Rios for allowing us to participate in taking care of this pleasant patient .          Electronically signed by Eusebio Garg MD on 2/19/2022 at 11:08 AM

## 2022-02-19 NOTE — CONSULTS
[address]                                  CONSULTATION    PATIENT NAME: Corona Lima                  :        1945  MED REC NO:   [id number]                            ROOM:       0421  ACCOUNT NO:   [de-identified]                           ADMIT DATE: 2022  PROVIDER:     Regla Patiño    CONSULT DATE:  2022    PMD:  Dr. Cynthia Quintana, house doctor    REASON FOR CONSULTATION:  Acute diverticulitis. HISTORY OF PRESENT ILLNESS:  The patient is a 68-year-old female, known  to have past medical history of TIA; AFib, on Eliquis; peptic ulcer  disease; hyperlipidemia; arthritis. Admitted to the hospital,  complaining of abdominal pain, nausea and vomiting and diarrhea, found  to have acute diverticulitis with microperforation. GI consult  requested. The patient is seen in her room with her daughter with her. Daughter reports that her mother has short-term memory loss. She has  been having symptoms for a while now that get worsen, requiring her to  come to the hospital.  She was started on IV antibiotics after the CAT  scan revealed sigmoid diverticulosis, wall thickening and associated  inflammatory stranding compatible with diverticulitis. A foci of gas  which are possibly extraluminal raising concern of underlying  microperforation. The patient today denies any abdominal pain. Her  nausea and vomiting subsided. She is tolerating her diet, ate mashed  potatoes. She developed rapid heart rate when she went to the bathroom  with heart rate went up to 150. Her cardiologist, Dr. Severo Patricia, was  consulted. The patient also reported had a streak of blood in her stool  when she had the diarrhea that also has subsided. Her last colonoscopy  by me as an outpatient in 2016 revealed that she has colonic  diverticulosis and hemorrhoids. PAST MEDICAL HISTORY:  As above.   Also she has mild short-term memory  loss. PAST SURGICAL HISTORY:  Hernia repair, hysterectomy, MARYLOU, EGD, and  colonoscopy. FAMILY HISTORY:  Noncontributory. MEDICATIONS:  See list in the chart. ALLERGIES:  No known medication allergies. SOCIAL HISTORY:  She is ex-smoker. Denies any alcohol or drug abuse. She is . REVIEW OF SYSTEMS:  All systems reviewed unrevealing except per history. LABORATORY DATA:  Sodium 128, chloride 87, BUN 72, creatinine 2.5,  lactic acid 3.4. WBC 19.3, H and H 12.5/35.7, and platelet count 999. Potassium was 4. ALT 91, alk phos 80, albumin 2.6, ammonia 37, ,  bilirubin 0.7. CT of the head on 02/18 revealed no acute intracranial abnormalities. PHYSICAL EXAMINATION:  VITAL SIGNS:  Blood pressure 106/63, pulse was 154 today, respirations  24, temperature 97.9. GENERAL:  Awake, oriented x3, in no acute distress. HEENT:  Normocephalic, atraumatic. Pupils equal, reactive to light. Extraocular muscles are intact. Conjunctivae injected. Sclerae  anicteric. NECK:  Supple. No palpable cervical lymphadenopathy. No palpable  thyromegaly. HEART:  S1, S2. Tachycardic and irregular. LUNGS:  Clear to air bilaterally. No wheezes. No crackles. ABDOMEN:  Soft. Positive bowel sounds. No rebound. No tenderness. EXTREMITIES:  No edema. Positive pulses. SKIN:  No ecchymosis, no cyanosis, and no clubbing. ASSESSMENT AND PLAN:  A 28-year-old female admitted to the hospital with  abdominal pain, diarrhea, nausea, vomiting, minor rectal bleeding, found  to have acute diverticulitis on CAT scan with possible microperforation. Placed on IV antibiotics. Also was found to have renal failure and she  developed rapid AFib while in the hospital.  The patient's abdominal  pain has subsided and diet has been started and tolerated soft diet  today. She will benefit from colonoscopic evaluation in 4-6 weeks as an  outpatient due to her acute diverticulitis.   Her last colonoscopy in  2016 revealed that she has diverticulosis and hemorrhoids. The  patient's renal failure has been managed by renal and Dr. Severo Patricia was  consulted for her rapid AFib. She is on Eliquis due to her history of  stroke and AFib that was diagnosed in 12/2021 per her daughter. Eliquis  to be continued at this time as her bleeding has subsided as per the  patient. No need for acute GI intervention at this time. Protonix 40  mg p.o. daily to be continued due to her history of peptic ulcer disease  in the past.    Thank you for consultation in taking care of your patient. Please feel  free to call me if you have any questions.         Kelsi Mcguire    D: 02/19/2022 16:35:45       T: 02/19/2022 16:39:12     [email or web address]  Job#: [id number]     Doc#: [id number]    CC:

## 2022-02-19 NOTE — PROGRESS NOTES
Internal Medicine Progress Note    [facility]    Jovonia Clarke. Neil Calhoun., F.A.PATTIOJinnyI. Santino Chávez D.O., PATY Hooker D.O. Helga Blas, MSN, APRN, NP-C  Sanaz Bernal. Lissett Kumar, MSN, APRN-CNP     Primary Care Physician: Sobia Franco DO   Admitting Physician:  Priyanka Zimmerman DO  Admission date and time: 2/18/2022  4:46 AM    Room:  [address] [place] [address]  Admitting diagnosis: Hyperkalemia [E87.5]  Hyponatremia [E87.1]  Prolonged Q-T interval on ECG [R94.31]  DAYNA (acute kidney injury) [facility]) [N17.9]    Patient Name: Anthony Vega  MRN: [id number]    Date of Service: 2/19/2022     Subjective:  Abdon Laguna is a 64 y.o. female who was seen and examined today,2/19/2022, at the bedside. She was sitting up in the chair. Cold today. Denies any diarrhea today. No abdominal pain. Appetite fair today. No family present during my examination. Review of System:   Constitutional:   Denies fever or chills, weight loss or gain, +fatigue. HEENT:   Denies ear pain, sore throat, sinus or eye problems. Cardiovascular:   Denies any chest pain, irregular heartbeats, or palpitations. Respiratory:   Denies shortness of breath, coughing, sputum production, hemoptysis, or wheezing. Gastrointestinal:   Denies nausea, vomiting, diarrhea, or constipation. Denies any abdominal pain. Genitourinary:    Denies any urgency, frequency, hematuria. Voiding  without difficulty. Extremities:   Denies lower extremity swelling, edema or cyanosis. Neurology:    Denies any headache or focal neurological deficits, Generalized weakness and instability. Psch:   Denies being anxious or depressed. Musculoskeletal:    Denies  myalgias, joint complaints or back pain. Integumentary:   Denies any rashes, ulcers, or excoriations. Denies bruising. Hematologic/Lymphatic:  Denies bruising or bleeding. Physical Exam:  No intake/output data recorded.     Intake/Output Summary (Last 24 hours) at 2/19/2022 1412  Last data filed at 2/19/2022 0417  Gross per 24 hour   Intake    Output 650 ml   Net -650 ml   I/O last 3 completed shifts:  In: -   Out: 650 [Urine:650]  Patient Vitals for the past 96 hrs (Last 3 readings):   Weight   02/18/22 0457 120 lb (54.4 kg)     Vital Signs:   Blood pressure (!) 126/56, pulse 62, temperature 97.9 °F (36.6 °C), temperature source Oral, resp. rate 16, height 5' 1\" (1.549 m), weight 120 lb (54.4 kg), SpO2 96 %, not currently breastfeeding. General appearance:  Alert, responsive, oriented to person, place, and time. Well preserved, alert, no distress. Head:  Normocephalic. No masses, lesions or tenderness. Eyes:  PERRLA. EOMI. Sclera clear. Buccal mucosa moist.  ENT:  Ears normal. Mucosa normal.  Neck:    Supple. Trachea midline. No thyromegaly. No JVD. No bruits. Heart:    Rhythm regular. Rate controlled. Grade 1/6 murmur. Lungs:    Symmetrical. Clear to auscultation bilaterally. No wheezes. No rhonchi. No rales. Abdomen:   Soft. Non-tender. Non-distended. Bowel sounds positive. No organomegaly or masses. No pain on palpation. Extremities:    Peripheral pulses present. No peripheral edema. No ulcers. No cyanosis. No clubbing. Neurologic:    Alert x 3. No focal deficit. Cranial nerves grossly intact. No focal weakness. Psych:   Behavior is normal. Mood appears normal. Speech is not rapid and/or pressured. Musculoskeletal:   Spine ROM normal. Moves all extremities. Gait not assessed. Integumentary:  No rashes  Skin normal color and texture.   Genitalia/Breast:  Deferred    Medication:  Scheduled Meds:   [Held by provider] sodium zirconium cyclosilicate  10 g Oral Daily    cefTRIAXone (ROCEPHIN) IV  1,000 mg IntraVENous Q24H    pantoprazole  40 mg IntraVENous Daily    levothyroxine  25 mcg Oral Daily    donepezil  10 mg Oral Nightly    sertraline  50 mg Oral Daily    insulin lispro  0-6 Units SubCUTAneous Q4H     Continuous Infusions:   sodium chloride 75 mL/hr at 02/19/22 7558    dextrose         Objective Data:  CBC with Differential:    Lab Results   Component Value Date    WBC 19.3 02/19/2022    RBC 4.11 02/19/2022    HGB 12.1 02/19/2022    HCT 35.7 02/19/2022     02/19/2022    MCV 86.9 02/19/2022    MCH 29.4 02/19/2022    MCHC 33.9 02/19/2022    RDW 14.3 02/19/2022    NRBC 1.0 02/19/2022    SEGSPCT 81 09/06/2013    METASPCT 2.0 02/19/2022    LYMPHOPCT 8.0 02/19/2022    MONOPCT 4.0 02/19/2022    BASOPCT 0.0 02/19/2022    MONOSABS 0.77 02/19/2022    LYMPHSABS 1.54 02/19/2022    EOSABS 0.00 02/19/2022    BASOSABS 0.00 02/19/2022     CMP:    Lab Results   Component Value Date     02/19/2022    K 4.0 02/19/2022    K 5.1 01/26/2022    CL 87 02/19/2022    CO2 25 02/19/2022    BUN 72 02/19/2022    CREATININE 2.5 02/19/2022    GFRAA 23 02/19/2022    LABGLOM 19 02/19/2022    GLUCOSE 95 02/19/2022    GLUCOSE 75 04/10/2012    PROT 5.4 02/19/2022    LABALBU 2.6 02/19/2022    LABALBU 4.7 04/10/2012    CALCIUM 8.2 02/19/2022    BILITOT 0.7 02/19/2022    ALKPHOS 80 02/19/2022     02/19/2022    ALT 91 02/19/2022       Wound Documentation:   Incision 09/04/13 Perineum (Active)   Number of days: 3090       Assessment:  · Acute diverticulitis with imaging results concerning for microperforation  · Leukocytosis secondary to #1  · Hyperkalemia  · Hyponatremia  · Acute kidney injury   · Metabolic acidosis  · Paroxysmal atrial fibrillation  · Acute on chronic diastolic heart failure  · Hyperlipidemia  · Moderate to severe mitral regurgitation  · Macular degeneration  · History of tobacco abuse  · History of TIAs  · Gastroesophageal reflux disease  · Hx of LA appendage thrombus    Plan:   Feels better today. Will have patient work with PT/OT. Nephrology and general surgery are following. Diet has been advanced. Tolerating thus far. Monitor labs. Orthostatic blood pressures.  for discharge planning. Sodium slowly improving.  Creatinine has trended downward. Continue to monitor and maintain on IV hydration. Leukocytosis improved. Continue antibiotic therapy. Blood cultures to date show no growth. Continue current medications as prescribed. Continue current therapy. See orders for further plan of care. More than 50% of my  time was spent at the bedside counseling/coordinating care with the patient and/or family with face to face contact. This time was spent reviewing notes and laboratory data as well as instructing and counseling the patient. Time I spent with the family or surrogate(s) is included only if the patient was incapable of providing the necessary information or participating in medical decisions. I also discussed the differential diagnosis and all of the proposed management plans with the patient and individuals accompanying the patient. Amanda Faust requires this high level of physician care and nursing on the IMC/Telemetry unit due the complexity of decision management and chance of rapid decline or death. Continued cardiac monitoring and higher level of nursing are required. I am readily available for any further decision-making and intervention. The patient was seen, examined and then discussed with Dr. Dae Rangel. ELIO Smith - CNP  2/19/2022  2:12 PM       I saw and evaluated the patient. I agree with the findings and the plan of care as documented in [facility]'s  note.     Gab Corea DO, D.O., [facility]  2:32 PM  2/19/2022

## 2022-02-19 NOTE — PATIENT INSTRUCTIONS
Personalized Preventive Plan for Jennifer Rodrigez - 2/16/2022  Medicare offers a range of preventive health benefits. Some of the tests and screenings are paid in full while other may be subject to a deductible, co-insurance, and/or copay. Some of these benefits include a comprehensive review of your medical history including lifestyle, illnesses that may run in your family, and various assessments and screenings as appropriate. After reviewing your medical record and screening and assessments performed today your provider may have ordered immunizations, labs, imaging, and/or referrals for you. A list of these orders (if applicable) as well as your Preventive Care list are included within your After Visit Summary for your review. Other Preventive Recommendations:    · A preventive eye exam performed by an eye specialist is recommended every 1-2 years to screen for glaucoma; cataracts, macular degeneration, and other eye disorders. · A preventive dental visit is recommended every 6 months. · Try to get at least 150 minutes of exercise per week or 10,000 steps per day on a pedometer . · Order or download the FREE \"Exercise & Physical Activity: Your Everyday Guide\" from [employer]. Call 1-544.388.5119 or search [employer] online. · You need 7247-0196 mg of calcium and 3302-1781 IU of vitamin D per day. It is possible to meet your calcium requirement with diet alone, but a vitamin D supplement is usually necessary to meet this goal.  · When exposed to the sun, use a sunscreen that protects against both UVA and UVB radiation with an SPF of 30 or greater. Reapply every 2 to 3 hours or after sweating, drying off with a towel, or swimming. · Always wear a seat belt when traveling in a car. Always wear a helmet when riding a bicycle or motorcycle.

## 2022-02-19 NOTE — PROGRESS NOTES
Dr. John Malcolm, DO,    Your patient is on a medication that requires a renal and/or weight dose adjustment. Renal/Body Weight Function Assessment:    Date Body Weight IBW  Adjusted BW SCr  CrCl Dialysis status   2/19/2022 54.4 kg Ideal body weight: 47.8 kg (105 lb 6.1 oz)  Adjusted ideal body weight: 50.5 kg (111 lb 3.7 oz) Serum creatinine: 2.5 mg/dL (H) 02/19/22 0508  Estimated creatinine clearance: 14 mL/min (A) N/a       Pharmacy has dose-adjusted the following medication(s):    Date Previous Order Adjusted Order   2/19/2022 Apixaban 5 mg BID Apixaban 2.5 mg BID       These changes were made per protocol according to the [facility] Renal Dosing Policy/ [facility] Pharmacist Anticoagulant Review. *Please note this dose may need readjusted if your patient's condition changes. Please contact pharmacy with any questions regarding these changes.     [id number] Hany Gallegos, [facility]  2/19/2022  5:23 PM

## 2022-02-20 PROBLEM — K57.20 DIVERTICULITIS OF COLON WITH PERFORATION: Status: ACTIVE | Noted: 2022-02-20

## 2022-02-20 LAB
ALBUMIN SERPL-MCNC: 2.7 G/DL (ref 3.5–5.2)
ALP BLD-CCNC: 123 U/L (ref 35–104)
ALT SERPL-CCNC: 93 U/L (ref 0–32)
ANION GAP SERPL CALCULATED.3IONS-SCNC: 10 MMOL/L (ref 7–16)
ANION GAP SERPL CALCULATED.3IONS-SCNC: 11 MMOL/L (ref 7–16)
AST SERPL-CCNC: 134 U/L (ref 0–31)
BASOPHILS ABSOLUTE: 0 E9/L (ref 0–0.2)
BASOPHILS RELATIVE PERCENT: 0 % (ref 0–2)
BILIRUB SERPL-MCNC: 0.6 MG/DL (ref 0–1.2)
BUN BLDV-MCNC: 30 MG/DL (ref 6–23)
BUN BLDV-MCNC: 40 MG/DL (ref 6–23)
CALCIUM SERPL-MCNC: 8 MG/DL (ref 8.6–10.2)
CALCIUM SERPL-MCNC: 8.1 MG/DL (ref 8.6–10.2)
CHLORIDE BLD-SCNC: 90 MMOL/L (ref 98–107)
CHLORIDE BLD-SCNC: 95 MMOL/L (ref 98–107)
CO2: 25 MMOL/L (ref 22–29)
CO2: 27 MMOL/L (ref 22–29)
CREAT SERPL-MCNC: 1 MG/DL (ref 0.5–1)
CREAT SERPL-MCNC: 1.4 MG/DL (ref 0.5–1)
EOSINOPHILS ABSOLUTE: 0.33 E9/L (ref 0.05–0.5)
EOSINOPHILS RELATIVE PERCENT: 2 % (ref 0–6)
GFR AFRICAN AMERICAN: 44
GFR AFRICAN AMERICAN: >60
GFR NON-AFRICAN AMERICAN: 36 ML/MIN/1.73
GFR NON-AFRICAN AMERICAN: 54 ML/MIN/1.73
GLUCOSE BLD-MCNC: 151 MG/DL (ref 74–99)
GLUCOSE BLD-MCNC: 88 MG/DL (ref 74–99)
HCT VFR BLD CALC: 39.5 % (ref 34–48)
HEMOGLOBIN: 12.7 G/DL (ref 11.5–15.5)
HYPOCHROMIA: ABNORMAL
LYMPHOCYTES ABSOLUTE: 1.34 E9/L (ref 1.5–4)
LYMPHOCYTES RELATIVE PERCENT: 8 % (ref 20–42)
MCH RBC QN AUTO: 29.3 PG (ref 26–35)
MCHC RBC AUTO-ENTMCNC: 32.2 % (ref 32–34.5)
MCV RBC AUTO: 91 FL (ref 80–99.9)
METER GLUCOSE: 103 MG/DL (ref 74–99)
METER GLUCOSE: 116 MG/DL (ref 74–99)
METER GLUCOSE: 121 MG/DL (ref 74–99)
METER GLUCOSE: 129 MG/DL (ref 74–99)
MONOCYTES ABSOLUTE: 1.5 E9/L (ref 0.1–0.95)
MONOCYTES RELATIVE PERCENT: 9 % (ref 2–12)
NEUTROPHILS ABSOLUTE: 13.53 E9/L (ref 1.8–7.3)
NEUTROPHILS RELATIVE PERCENT: 81 % (ref 43–80)
PDW BLD-RTO: 14.6 FL (ref 11.5–15)
PLATELET # BLD: 562 E9/L (ref 130–450)
PMV BLD AUTO: 10.7 FL (ref 7–12)
POLYCHROMASIA: ABNORMAL
POTASSIUM SERPL-SCNC: 3.3 MMOL/L (ref 3.5–5)
POTASSIUM SERPL-SCNC: 3.8 MMOL/L (ref 3.5–5)
RBC # BLD: 4.34 E12/L (ref 3.5–5.5)
SODIUM BLD-SCNC: 128 MMOL/L (ref 132–146)
SODIUM BLD-SCNC: 130 MMOL/L (ref 132–146)
TOTAL PROTEIN: 5.4 G/DL (ref 6.4–8.3)
URINE CULTURE, ROUTINE: NORMAL
WBC # BLD: 16.7 E9/L (ref 4.5–11.5)

## 2022-02-20 PROCEDURE — 6370000000 HC RX 637 (ALT 250 FOR IP): Performed by: INTERNAL MEDICINE

## 2022-02-20 PROCEDURE — 2500000003 HC RX 250 WO HCPCS: Performed by: INTERNAL MEDICINE

## 2022-02-20 PROCEDURE — 1200000000 HC SEMI PRIVATE

## 2022-02-20 PROCEDURE — 6360000002 HC RX W HCPCS: Performed by: INTERNAL MEDICINE

## 2022-02-20 PROCEDURE — 2580000003 HC RX 258: Performed by: INTERNAL MEDICINE

## 2022-02-20 PROCEDURE — 36415 COLL VENOUS BLD VENIPUNCTURE: CPT

## 2022-02-20 PROCEDURE — 82962 GLUCOSE BLOOD TEST: CPT

## 2022-02-20 PROCEDURE — 99232 SBSQ HOSP IP/OBS MODERATE 35: CPT | Performed by: SURGERY

## 2022-02-20 PROCEDURE — 80053 COMPREHEN METABOLIC PANEL: CPT

## 2022-02-20 PROCEDURE — 80048 BASIC METABOLIC PNL TOTAL CA: CPT

## 2022-02-20 PROCEDURE — 99222 1ST HOSP IP/OBS MODERATE 55: CPT | Performed by: INTERNAL MEDICINE

## 2022-02-20 PROCEDURE — C9113 INJ PANTOPRAZOLE SODIUM, VIA: HCPCS | Performed by: INTERNAL MEDICINE

## 2022-02-20 PROCEDURE — 85025 COMPLETE CBC W/AUTO DIFF WBC: CPT

## 2022-02-20 RX ORDER — DIGOXIN 0.25 MG/ML
250 INJECTION INTRAMUSCULAR; INTRAVENOUS ONCE
Status: DISCONTINUED | OUTPATIENT
Start: 2022-02-20 | End: 2022-02-20

## 2022-02-20 RX ORDER — POTASSIUM CHLORIDE 20 MEQ/1
40 TABLET, EXTENDED RELEASE ORAL ONCE
Status: COMPLETED | OUTPATIENT
Start: 2022-02-20 | End: 2022-02-20

## 2022-02-20 RX ORDER — DIGOXIN 0.25 MG/ML
250 INJECTION INTRAMUSCULAR; INTRAVENOUS ONCE
Status: COMPLETED | OUTPATIENT
Start: 2022-02-20 | End: 2022-02-20

## 2022-02-20 RX ORDER — ACETAMINOPHEN 325 MG/1
650 TABLET ORAL ONCE
Status: COMPLETED | OUTPATIENT
Start: 2022-02-20 | End: 2022-02-20

## 2022-02-20 RX ORDER — DIGOXIN 0.25 MG/ML
125 INJECTION INTRAMUSCULAR; INTRAVENOUS ONCE
Status: COMPLETED | OUTPATIENT
Start: 2022-02-20 | End: 2022-02-20

## 2022-02-20 RX ADMIN — DILTIAZEM HYDROCHLORIDE 12.5 MG/HR: 5 INJECTION, SOLUTION INTRAVENOUS at 09:32

## 2022-02-20 RX ADMIN — POTASSIUM CHLORIDE 40 MEQ: 1500 TABLET, EXTENDED RELEASE ORAL at 17:49

## 2022-02-20 RX ADMIN — DONEPEZIL HYDROCHLORIDE 10 MG: 5 TABLET, FILM COATED ORAL at 20:12

## 2022-02-20 RX ADMIN — METOPROLOL SUCCINATE 50 MG: 50 TABLET, EXTENDED RELEASE ORAL at 09:31

## 2022-02-20 RX ADMIN — DIGOXIN 125 MCG: 250 INJECTION, SOLUTION INTRAMUSCULAR; INTRAVENOUS; PARENTERAL at 15:19

## 2022-02-20 RX ADMIN — APIXABAN 5 MG: 5 TABLET, FILM COATED ORAL at 20:12

## 2022-02-20 RX ADMIN — DIGOXIN 250 MCG: 250 INJECTION, SOLUTION INTRAMUSCULAR; INTRAVENOUS; PARENTERAL at 09:31

## 2022-02-20 RX ADMIN — METOPROLOL SUCCINATE 50 MG: 50 TABLET, EXTENDED RELEASE ORAL at 20:12

## 2022-02-20 RX ADMIN — BISACODYL 10 MG: 5 TABLET, COATED ORAL at 09:31

## 2022-02-20 RX ADMIN — SERTRALINE 50 MG: 50 TABLET, FILM COATED ORAL at 09:31

## 2022-02-20 RX ADMIN — SODIUM CHLORIDE: 9 INJECTION, SOLUTION INTRAVENOUS at 02:21

## 2022-02-20 RX ADMIN — WATER 1000 MG: 1 INJECTION INTRAMUSCULAR; INTRAVENOUS; SUBCUTANEOUS at 17:48

## 2022-02-20 RX ADMIN — LEVOTHYROXINE SODIUM 25 MCG: 25 TABLET ORAL at 06:23

## 2022-02-20 RX ADMIN — PANTOPRAZOLE SODIUM 40 MG: 40 INJECTION, POWDER, FOR SOLUTION INTRAVENOUS at 09:31

## 2022-02-20 RX ADMIN — ACETAMINOPHEN 650 MG: 325 TABLET ORAL at 05:55

## 2022-02-20 RX ADMIN — APIXABAN 5 MG: 5 TABLET, FILM COATED ORAL at 09:31

## 2022-02-20 ASSESSMENT — PAIN SCALES - GENERAL
PAINLEVEL_OUTOF10: 0
PAINLEVEL_OUTOF10: 0
PAINLEVEL_OUTOF10: 5

## 2022-02-20 NOTE — PROGRESS NOTES
Dr. Christophe Jaramillo, DO,    Renal function has improved with SCr now 1.4. Apixaban  has been returned to 5 mg bid. Renal Function Assessment:    Date Body Weight IBW  Adjusted BW SCr  CrCl Dialysis status   2/20/2022 120 lb (54.4 kg)  Ideal body weight: 47.8 kg (105 lb 6.1 oz)  Adjusted ideal body weight: 50.5 kg (111 lb 3.7 oz) Serum creatinine: 1.4 mg/dL (H) 02/20/22 0436  Estimated creatinine clearance: 25 mL/min (A) N/a           These changes were made per protocol according to the [facility] Renal Function-Based Dose Adjustments Policy      Please contact pharmacy with any questions regarding these changes.     Roland Garcia [facility]  2/20/2022  7:53 AM

## 2022-02-20 NOTE — PROGRESS NOTES
[facility] MD Ambrocio Davis MD    [facility] MD Rajwinder Pete MD   Progress note  Patient's Name: Shira Dean  4:56 PM  2/20/2022    Reports of AF over night . Cr improving   She is off diltizem drip     History of Present Ilness:      63 y/o F presenting to hospital with cc of abdominal discomfort , feeling tired and poor po intake that has been going on for 2 weeks . Pt had ct abdomen in ED showing her to have acute diverticulitis . Nephrology asked to see for DAYNA   Pt baseline cr around 0.8 -1 . She presented with cr of 2.8   She was also having metabolic acidosis along with hyperkalemia   Pt was started on bicarboane drip with subsequent improvement in her acidosis   She was also started on lokelema with imrpvement in her K down to normal .   Pt seen earlier  Today she is on RA she is alert oriented x3 she appear euvolemic   She is telling me since being hospitalized she is starting to feel better . Past Medical History:   Diagnosis Date    Arthritis     Asymptomatic PVCs 4/2012    pt felt flutter, no other sx, holter + pvc's, few runs of SVT   [place]    History of Holter monitoring     Hyperlipidemia     Macular degeneration     Nausea & vomiting     Pelvic prolapse     PONV (postoperative nausea and vomiting)     TIA (transient ischemic attack)        Past Surgical History:   Procedure Laterality Date    COLONOSCOPY  2012    ENDOSCOPY, COLON, DIAGNOSTIC      EYE SURGERY      macular hole    HERNIA REPAIR      inguinal    HYSTERECTOMY      OTHER SURGICAL HISTORY  sept 2013    ant elevatetransobturator sling rectocele and cystocele enterocele    SKIN BIOPSY      arms    TRANSESOPHAGEAL ECHOCARDIOGRAM N/A 10/20/2020    TRANSESOPHAGEAL ECHOCARDIOGRAM WITH BUBBLE STUDY performed by Mahsa Ziegler MD at [address] [facility] [place][note 2] reviewed. No pertinent family history. reports that she quit smoking about 27 years ago. She smoked 0.50 packs per day. She has never used smokeless tobacco. She reports that she does not drink alcohol and does not use drugs. Allergies:  Patient has no known allergies. Current Medications:    apixaban (ELIQUIS) tablet 5 mg, BID  bisacodyl (DULCOLAX) EC tablet 10 mg, Daily  potassium chloride (KLOR-CON M) extended release tablet 40 mEq, Once  0.9 % sodium chloride infusion, Continuous  metoprolol succinate (TOPROL XL) extended release tablet 50 mg, BID  dilTIAZem 125 mg in dextrose 5 % 125 mL infusion, Continuous  cefTRIAXone (ROCEPHIN) 1,000 mg in sterile water 10 mL IV syringe, Q24H  pantoprazole (PROTONIX) injection 40 mg, Daily  levothyroxine (SYNTHROID) tablet 25 mcg, Daily  donepezil (ARICEPT) tablet 10 mg, Nightly  sertraline (ZOLOFT) tablet 50 mg, Daily  glucose (GLUTOSE) 40 % oral gel 15 g, PRN  glucagon (rDNA) injection 1 mg, PRN  dextrose 5 % solution, PRN  insulin lispro (HUMALOG) injection vial 0-6 Units, Q4H  dextrose bolus (hypoglycemia) 10% 125 mL, PRN   Or  dextrose bolus (hypoglycemia) 10% 250 mL, PRN  prochlorperazine (COMPAZINE) injection 5 mg, Q6H PRN        Review of Systems:   Constitutional: no fevers , no chills , feels ok   Eyes: no eye pain , no itching , no drainage  Ears, nose, mouth, throat, and face: no ear ,nose pain , hearing is ok ,no nasal drainage   Respiratory: no sob ,no cough ,no wheezing . Cardiovascular: no chest pain , no palpitation ,no sob . Gastrointestinal: no nausea, vomiting , constipation , no abdominal pain . Genitourinary:no urinary retention , no burning , dysuria . No polyuria   Hematologic/lymphatic: no bleeding , no cougulation issues . Musculoskeletal:no joint pain , no swelling . Neurological: no headaches ,no weakness , no numbness . Endocrine: no thirst , no weight issues .      Physical exam:   Vital signs /70   Pulse 97   Temp 97.8 °F (36.6 °C) (Axillary)   Resp 16   Ht 5' 1\" (1.549 m)   Wt 120 lb (54.4 kg)   SpO2 93%   BMI 22.67 kg/m²   Gen : NAD , appropriate for stated age . Head : at , nc   Neck : supple , no thyromegaly noted . Eyes : EOMI , PERRLA   CV : RRR , No M/R/G . Lungs: CTAB , no wheezing , good flow heard b/l   Abd : soft , NT , BS + , No Organomegaly appreciated . Skin : soft, dry . Neuro : CN  II-XII grossly intact , no focal neurologic deficit . Psych : cooperative .      Data:   Labs:  CBC with Differential:    Lab Results   Component Value Date    WBC 16.7 02/20/2022    RBC 4.34 02/20/2022    HGB 12.7 02/20/2022    HCT 39.5 02/20/2022     02/20/2022    MCV 91.0 02/20/2022    MCH 29.3 02/20/2022    MCHC 32.2 02/20/2022    RDW 14.6 02/20/2022    NRBC 1.0 02/19/2022    SEGSPCT 81 09/06/2013    METASPCT 2.0 02/19/2022    LYMPHOPCT 8.0 02/20/2022    MONOPCT 9.0 02/20/2022    BASOPCT 0.0 02/20/2022    MONOSABS 1.50 02/20/2022    LYMPHSABS 1.34 02/20/2022    EOSABS 0.33 02/20/2022    BASOSABS 0.00 02/20/2022     CMP:    Lab Results   Component Value Date     02/20/2022    K 3.3 02/20/2022    K 5.1 01/26/2022    CL 90 02/20/2022    CO2 27 02/20/2022    BUN 40 02/20/2022    CREATININE 1.4 02/20/2022    GFRAA 44 02/20/2022    LABGLOM 36 02/20/2022    GLUCOSE 88 02/20/2022    GLUCOSE 75 04/10/2012    PROT 5.4 02/20/2022    LABALBU 2.7 02/20/2022    LABALBU 4.7 04/10/2012    CALCIUM 8.0 02/20/2022    BILITOT 0.6 02/20/2022    ALKPHOS 123 02/20/2022     02/20/2022    ALT 93 02/20/2022     Ionized Calcium:  No results found for: IONCA  Magnesium:    Lab Results   Component Value Date    MG 2.7 02/18/2022     Phosphorus:    Lab Results   Component Value Date    PHOS 3.4 01/31/2022     U/A:    Lab Results   Component Value Date    COLORU Yellow 02/18/2022    PHUR 5.0 02/18/2022    WBCUA 1-3 02/18/2022    RBCUA 1-3 02/18/2022    BACTERIA RARE 02/18/2022    CLARITYU Clear 02/18/2022    SPECGRAV 1.025 02/18/2022    LEUKOCYTESUR Negative 02/18/2022    UROBILINOGEN 0.2 02/18/2022    BILIRUBINUR SMALL 02/18/2022    BLOODU Negative 02/18/2022    GLUCOSEU Negative 02/18/2022     Microalbumen/Creatinine ratio:  No components found for: RUCREAT  Iron Saturation:  No components found for: PERCENTFE  TIBC:    Lab Results   Component Value Date    TIBC 191 08/04/2021     FERRITIN:  No results found for: FERRITIN     Imaging:  CT HEAD WO CONTRAST   Final Result   No acute intracranial abnormality. CT ABDOMEN PELVIS WO CONTRAST Additional Contrast? None   Final Result   Sigmoid diverticulosis with wall thickening and associated inflammatory   stranding, compatible with diverticulitis. Adjacent foci of gas which are   possibly extraluminal, raising concern for underlying microperforation. No   extraluminal fluid collection. Bilateral pleural effusions with moderate ascites, possibly reflecting   patient's volume status. XR CHEST PORTABLE   Final Result   Bibasilar atelectasis.              Assessment    -Acute kidney injury related to decrease effective volume due to volume depletion     -Hyponateremia felt to be related to volume depletion and complicated by her drinking good amount of water .    -Metabolic acidosis due to bicarbonate loss with diarrhea along with DAYNA     -Hyperkalemia due to decrease effective volume downgrading renin angiotension system , DAYNA and metabolic acidosis     -Acute diverticulits     -hx of Moderate to severe mitral regurgitation    Plan :     Cr improving   Reports of AF/RVR over night now resolved     -if good po intake then ok to stop iv fluids     -replace K     -avoid nephrotoxins    -f/u serial BMPs, UO             Electronically signed by Steve Castellanos MD on 2/20/2022 at 4:56 PM

## 2022-02-20 NOTE — CONSULTS
[facility]        CARDIOLOGY CONSULT                        PATIENT SEEN IN CONSULTFOR:  A Fib with RVR    Hospital Day: 55 Maranda Bustos is a 64year old patient known to me. HISTORY OF PRESENT ILLNESS: The patient is a 75-year-old female wirh history of PAF s/p DCCV 1/31/22-On eliquis for [facility], VHD, CMP-Ef 35% presented to the emergency department with a complaint of nausea, vomiting, and lower abdominal pain. Patient states in general she has not felt well for a week or 2. States that yesterday she was having lightheadedness with position changes. Felt unsteady. States she lives with her daughter. Patient states her appetite has been very poor but that she does drink fluids. She states that she drinks approximately 2-16 ounce bottles of water daily and 1 to 2 cups of coffee but is not really eating solids at this time. In the emergency department imaging studies were performed. CT of the abdomen pelvis revealed sigmoid diverticulosis with wall thickening and associated inflammatory stranding, compatible with diverticulitis. Adjacent foci of gas which are possibly extraluminal, raising concern for underlying microperforation. CT of the head was also performed which revealed no acute intracranial abnormalities. Chest x-ray revealed bibasilar atelectasis. Labs reveal hyponatremia with a sodium level of 122. Potassium was 6.7. BUN/creatinine were 67 and 2.8. Admitted and cardiology consulted for A Fib/RVR and CHF. She denies CP, breathing OK, No palpitations or dizzy; No fever. No orthopnea. Compliant with her medications.      ROS: Review of rest of 10 systems negative except as mentioned above    PAST MEDICAL HISTORY:    · Paroxysmal atrial fibrillation, on chronic Eliquis therapy. First diagnosed 12/2021. S/p DCCV 1/31/22  · Chronic HFrEF - EF 35%. · Valvular heart disease. · GERD. · Hyperlipidemia, on statin therapy.    · History of TIA per the patient (slurred speech, right sided weakness) -- evaluated at [facility]. CTA Head and Neck 10/2020 --> 75% stenosis of proximal M1 segment of right MCA. No residual deficits. · Hypothyroidism, on replacement therapy. · Dementia, on Aricept therapy. · History of MARTA thrombus on MARYLOU 10/2021. · Former tobacco smoker.        CARDIAC TESTING     MARYLOU (Dr. Omar Rogers, 10/2020)     30-Day Event Monitor (10/2020)  PVCs. SV ectopy. No tachyarrhythmias.      Lexiscan Stress Test (12/2021)  FINDINGS: The overall quality of the study was excellent. Left ventricular cavity size was noted to be normal.  Rotational analog analysis demonstrated minimal attenuation. The gated SPECT stress imaging in the short, vertical long, and  horizontal long axis demonstrated normal homogeneous tracer  distribution throughout the myocardium. The resting images normal.   Gated SPECT left ventricular ejection fraction was calculated to be  53%, with normal wall motion. Impression: The myocardial perfusion imaging was normal.  Overall left ventricular systolic function was normal without regional  wall motion abnormalities. Low risk study.     MARYLOU 1/31/22    Summary   No thrombus in left atrium or left atrial appendage. Normal left ventricular chamber size. Moderately reduced LV function, EF about 35%. Left atrium is enlarged. Interatrial septum intact. Normal right ventricle structure and function. Right atrium is enlarged. There is moderate mitral regurgitation. No mitral valve prolapse. There is mild to moderate aortic regurgitation. There is mild tricuspid regurgitation. Normal aortic root size. No evidence of pericardial effusion. No intra cardiac mass.    Compared to prior TTE echo from 1/28/22 and 10/2021      PAST SURGICAL HISTORY:    Past Surgical History         Past Surgical History:   Procedure Laterality Date    COLONOSCOPY   2012    ENDOSCOPY, COLON, DIAGNOSTIC        EYE SURGERY         macular hole    in the last 72 hours. TSH: No results for input(s): TSH in the last 72 hours. HgA1c:     BNP: No results for input(s): BNP in the last 72 hours. PT/INR: No results for input(s): PROTIME, INR in the last 72 hours. APTT:No results for input(s): APTT in the last 72 hours. CARDIAC ENZYMES:No results for input(s): CKTOTAL, CKMB, CKMBINDEX, TROPONINI in the last 72 hours. FASTING LIPID PANEL:  Lab Results   Component Value Date    CHOL 133 2022    HDL 37 2022    LDLCALC 85 2022    TRIG 54 2022     LIVER PROFILE:  Recent Labs     22  0508 22  0436   * 134*   ALT 91* 93*   LABALBU 2.6* 2.7*       Current Inpatient Medications:   apixaban  5 mg Oral BID    bisacodyl  10 mg Oral Daily    [Held by provider] sodium zirconium cyclosilicate  10 g Oral Daily    metoprolol succinate  50 mg Oral BID    cefTRIAXone (ROCEPHIN) IV  1,000 mg IntraVENous Q24H    pantoprazole  40 mg IntraVENous Daily    levothyroxine  25 mcg Oral Daily    donepezil  10 mg Oral Nightly    sertraline  50 mg Oral Daily    insulin lispro  0-6 Units SubCUTAneous Q4H       IV Infusions (if any):   sodium chloride 75 mL/hr at 22 0221    dilTIAZem (CARDIZEM) 125 mg in dextrose 5% 125 mL infusion 12.5 mg/hr (22 0932)    dextrose           PHYSICAL EXAM:     CONSTITUTIONAL:   /70   Pulse 114   Temp 97.8 °F (36.6 °C) (Axillary)   Resp 16   Ht 5' 1\" (1.549 m)   Wt 120 lb (54.4 kg)   SpO2 93%   BMI 22.67 kg/m²   Pulse  Av.5  Min: 113  Max: 133  Systolic (99CXX), OMM:739 , Min:85 , YXB:690    Diastolic (58MXG), NXS:31, Min:52, Max:77    In general, this is a well developed, well nourished who appears stated age. awake, alert, no apparent distress  HEENT: eyes -conjunctivae pink,  Throat - Oral mucosa pink. Neck-  no stridor, no noted enlargement of the thyroid, no carotid bruit.  no tenderness, + jugular venous distention   RESPIRATORY: Chest symmetrical and non-tender to palpation. No accessory muscle use. Lung auscultation - few rhonchi  CARDIOVASCULAR:     Heart Inspection shows no noted pulsations  Heart Palpation - no palpable thrills    Heart Ausculation - Irregularly irregular rate and rhythm, 2/6 systolic murmur, No s3 or rub.   + lower extremity edema, Distal pulses palpable, no cyanosis   ABDOMEN: Soft, nontender, nondistended. Bowel sounds present. No Palpable masses  MS: good muscle strength and tone. : Deferred  Rectal Exam: Deferred  SKIN: warm and dry no statis dermatitis or ulcers   NEURO / PSYCH: oriented to person, place        ASSESSMENT/PLAN:       Paroxysmal atrial fibrillation RVR.  Had DCCV 1/31/22 - Increase Metoprolol for rate control and wean iv Cardizem - Monitor BP; Give one dose of iv Digoxin; Continue Apixaban for [facility]. If Creatineine >1.5 decrease Eliquis dose to 2.5mg BID MARYLOU/DCCV if refractory tachycardia.      Acute on chronic HFrEF - EF 35%; -ve 1520cc. Resume home Diuretics at low dose monitor daily Wts, I/Os, BP, renal Fn     Cardiomyopathy - EF 35% by MARYLOU 1/31/22 - Normal EF in 10/2021, Likely tachycardia mediated.  Nonischemic Stress test in 12/2021, EF 53%. Continue Metoprolol, If BP tolerate add Entresto. Later add Jardiance. No further testing, She is DNR CCA.     History of Left atrial appendage thrombus in 10/2020 - Resolved by MARYLOU 1/31/2022 - On Eliquis     Borderline elevated troponin - Does not consistent with ACS, likely demand ischemia from CHF, tachycardia, No CP     Hypokalemia - Supplemented potassium     DAYNA - Better - Monitor [facility]      VHD - Moderate MR, Mild AR and TR     History of TIA     Hyperlipidemia - On Statin     Hypothyroidism - On HRT            Above recommendations discussed with her.         Electronically signed by Corey Bradford MD on 2/20/2022 at 9:54 AM  [facility]) Cardiology

## 2022-02-20 NOTE — PROGRESS NOTES
Dr. Rodriguez Spray notifed of pts SBP of less than 90. This RN was instructed to leave the cardizem running.

## 2022-02-20 NOTE — PROGRESS NOTES
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 Has no pain. Tolerating full liquids.  No BMs    Objective:  /70   Pulse 120   Temp 97.8 °F (36.6 °C) (Axillary)   Resp 16   Ht 5' 1\" (1.549 m)   Wt 120 lb (54.4 kg)   SpO2 93%   BMI 22.67 kg/m²   Labs:  Recent Labs     02/18/22  0524 02/19/22  0508 02/20/22  0436   WBC 22.2* 19.3* 16.7*   HGB 14.2 12.1 12.7   HCT 44.8 35.7 39.5     Lab Results   Component Value Date    CREATININE 1.4 (H) 02/20/2022    BUN 40 (H) 02/20/2022     (L) 02/20/2022    K 3.3 (L) 02/20/2022    CL 90 (L) 02/20/2022    CO2 27 02/20/2022     Recent Labs     02/18/22  0524   LIPASE 107*     CBC with Differential:    Lab Results   Component Value Date    WBC 16.7 02/20/2022    RBC 4.34 02/20/2022    HGB 12.7 02/20/2022    HCT 39.5 02/20/2022     02/20/2022    MCV 91.0 02/20/2022    MCH 29.3 02/20/2022    MCHC 32.2 02/20/2022    RDW 14.6 02/20/2022    NRBC 1.0 02/19/2022    SEGSPCT 81 09/06/2013    METASPCT 2.0 02/19/2022    LYMPHOPCT 8.0 02/20/2022    MONOPCT 9.0 02/20/2022    BASOPCT 0.0 02/20/2022    MONOSABS 1.50 02/20/2022    LYMPHSABS 1.34 02/20/2022    EOSABS 0.33 02/20/2022    BASOSABS 0.00 02/20/2022     CMP:    Lab Results   Component Value Date     02/20/2022    K 3.3 02/20/2022 K 5.1 01/26/2022    CL 90 02/20/2022    CO2 27 02/20/2022    BUN 40 02/20/2022    CREATININE 1.4 02/20/2022    GFRAA 44 02/20/2022    LABGLOM 36 02/20/2022    GLUCOSE 88 02/20/2022    GLUCOSE 75 04/10/2012    PROT 5.4 02/20/2022    LABALBU 2.7 02/20/2022    LABALBU 4.7 04/10/2012    CALCIUM 8.0 02/20/2022    BILITOT 0.6 02/20/2022    ALKPHOS 123 02/20/2022     02/20/2022    ALT 93 02/20/2022       General appearance:  NAD  Head: NCAT, PERRLA, EOMI, red conjunctiva  Neck: supple, no masses  Lungs: CTAB, equal chest rise bilateral  Heart: Reg rate  Abdomen: soft, nondistended, nontender, no guarding or rebound.   Skin; no lesions  Gu: no cva tenderness  Extremities: extremities normal, atraumatic, no cyanosis or edema      Assessment/Plan:  Guido Beltrán is a 64 y.o. female with acute diverticulitis of the sigmoid colon with microperforation, leukocytosis, DAYNA    Improving overall  Low fiber diet  Continue IV antibiotics  Stool softener      Pedro Younger MD  2/20/2022  8:10 AM

## 2022-02-20 NOTE — PROGRESS NOTES
Internal Medicine Progress Note    [facility]    Matt Carey. Antonia Segura., [email or web address].[email or web address]. Leena Friedman D.O., PATY Puckett D.O. Gabo Addison, MSN, APRN, NP-C  Jarad Ngo, MSN, APRN-CNP     Primary Care Physician: Alec Devine DO   Admitting Physician:  Tomas Guerra DO  Admission date and time: 2/18/2022  4:46 AM    Room:  [address] [place] [address]  Admitting diagnosis: Hyperkalemia [E87.5]  Hyponatremia [E87.1]  Prolonged Q-T interval on ECG [R94.31]  DAYNA (acute kidney injury) [facility]) [N17.9]    Patient Name: Jennifer Rodrigez  MRN: [id number]    Date of Service: 2/20/2022     Subjective:  Shefali Licea is a 64 y.o. female who was seen and examined today,2/20/2022, at the bedside. Patient complains some constipation. Has requests Dulcolax tablets and prune juice. Diet is being progress as surgery. Patient had episode of rapid atrial fibrillation yesterday and currently off Cardizem drip. Rate still somewhat tachycardic. She denies any chest pain or shortness of breath    No family present during my examination. Review of System:   Constitutional:   Denies fever or chills, weight loss or gain, +fatigue. HEENT:   Denies ear pain, sore throat, sinus or eye problems. Cardiovascular:   Denies any chest pain, irregular heartbeats, or palpitations. Still having occasional fast heart rate  Respiratory:   Denies shortness of breath, coughing, sputum production, hemoptysis, or wheezing. Gastrointestinal:   Denies nausea, vomiting, diarrhea, or constipation. Denies any abdominal pain. Genitourinary:    Denies any urgency, frequency, hematuria. Voiding  without difficulty. Extremities:   Denies lower extremity swelling, edema or cyanosis. Neurology:    Denies any headache or focal neurological deficits, Generalized weakness and instability. Psch:   Denies being anxious or depressed. Musculoskeletal:    Denies  myalgias, joint complaints or back pain. Integumentary:   Denies any rashes, ulcers, or excoriations. Denies bruising. Hematologic/Lymphatic:  Denies bruising or bleeding. Physical Exam:  I/O this shift: In: 480 [P.O.:480]  Out: 450 [Urine:450]    Intake/Output Summary (Last 24 hours) at 2/20/2022 1138  Last data filed at 2/20/2022 0945  Gross per 24 hour   Intake 480 ml   Output 2000 ml   Net -1520 ml   I/O last 3 completed shifts:  In: -   Out: 2200 [Urine:2200]  Patient Vitals for the past 96 hrs (Last 3 readings):   Weight   02/18/22 0457 120 lb (54.4 kg)     Vital Signs:   Blood pressure 111/70, pulse 114, temperature 97.8 °F (36.6 °C), temperature source Axillary, resp. rate 16, height 5' 1\" (1.549 m), weight 120 lb (54.4 kg), SpO2 93 %, not currently breastfeeding. General appearance:  Alert, responsive, oriented to person, place, and time. Well preserved, alert, no distress. Head:  Normocephalic. No masses, lesions or tenderness. Eyes:  PERRLA. EOMI. Sclera clear. Buccal mucosa moist.  ENT:  Ears normal. Mucosa normal.  Neck:    Supple. Trachea midline. No thyromegaly. No JVD. No bruits. Heart:    Irregular rhythm atrial fibrillation with tachycardia,  grade 1/6 murmur. Lungs:    Symmetrical. Clear to auscultation bilaterally. No wheezes. No rhonchi. No rales. Abdomen:   Soft. Non-tender. Non-distended. Bowel sounds positive. No organomegaly or masses. No pain on palpation. Extremities:    Peripheral pulses present. No peripheral edema. No ulcers. No cyanosis. No clubbing. Neurologic:    Alert x 3. No focal deficit. Cranial nerves grossly intact. No focal weakness. Psych:   Behavior is normal. Mood appears normal. Speech is not rapid and/or pressured. Musculoskeletal:   Spine ROM normal. Moves all extremities. Gait not assessed. Integumentary:  No rashes  Skin normal color and texture.   Genitalia/Breast:  Deferred    Medication:  Scheduled Meds:   apixaban  5 mg Oral BID    bisacodyl  10 mg Oral Daily    [Held by provider] sodium zirconium cyclosilicate  10 g Oral Daily    metoprolol succinate  50 mg Oral BID    cefTRIAXone (ROCEPHIN) IV  1,000 mg IntraVENous Q24H    pantoprazole  40 mg IntraVENous Daily    levothyroxine  25 mcg Oral Daily    donepezil  10 mg Oral Nightly    sertraline  50 mg Oral Daily    insulin lispro  0-6 Units SubCUTAneous Q4H     Continuous Infusions:   sodium chloride 75 mL/hr at 02/20/22 0221    dilTIAZem (CARDIZEM) 125 mg in dextrose 5% 125 mL infusion 12.5 mg/hr (02/20/22 0932)    dextrose         Objective Data:  CBC with Differential:    Lab Results   Component Value Date    WBC 16.7 02/20/2022    RBC 4.34 02/20/2022    HGB 12.7 02/20/2022    HCT 39.5 02/20/2022     02/20/2022    MCV 91.0 02/20/2022    MCH 29.3 02/20/2022    MCHC 32.2 02/20/2022    RDW 14.6 02/20/2022    NRBC 1.0 02/19/2022    SEGSPCT 81 09/06/2013    METASPCT 2.0 02/19/2022    LYMPHOPCT 8.0 02/20/2022    MONOPCT 9.0 02/20/2022    BASOPCT 0.0 02/20/2022    MONOSABS 1.50 02/20/2022    LYMPHSABS 1.34 02/20/2022    EOSABS 0.33 02/20/2022    BASOSABS 0.00 02/20/2022     CMP:    Lab Results   Component Value Date     02/20/2022    K 3.3 02/20/2022    K 5.1 01/26/2022    CL 90 02/20/2022    CO2 27 02/20/2022    BUN 40 02/20/2022    CREATININE 1.4 02/20/2022    GFRAA 44 02/20/2022    LABGLOM 36 02/20/2022    GLUCOSE 88 02/20/2022    GLUCOSE 75 04/10/2012    PROT 5.4 02/20/2022    LABALBU 2.7 02/20/2022    LABALBU 4.7 04/10/2012    CALCIUM 8.0 02/20/2022    BILITOT 0.6 02/20/2022    ALKPHOS 123 02/20/2022     02/20/2022    ALT 93 02/20/2022       Wound Documentation:   Incision 09/04/13 Perineum (Active)   Number of days: 3090       Assessment:  · Acute diverticulitis with imaging results concerning for microperforation  · Leukocytosis secondary to #1  · Hyperkalemia  · Hyponatremia  · Acute kidney injury   · Metabolic acidosis  · Paroxysmal atrial fibrillation  · Acute on chronic diastolic heart failure  · Hyperlipidemia  · Moderate to severe mitral regurgitation  · Macular degeneration  · History of tobacco abuse  · History of TIAs  · Gastroesophageal reflux disease  · Hx of LA appendage thrombus    Plan:     · Patient had episode of a rapid atrial fibrillation yesterday. Currently off Cardizem drip. Rate still mildly tachycardic will give a dose of Lanoxin. Cardiology is following  · Progress diet according to surgery  · Dulcolax and prune juice per patient  · Continue to follow appropriate lab  · Continue antibiotics for diverticular disease    More than 50% of my  time was spent at the bedside counseling/coordinating care with the patient and/or family with face to face contact. This time was spent reviewing notes and laboratory data as well as instructing and counseling the patient. Time I spent with the family or surrogate(s) is included only if the patient was incapable of providing the necessary information or participating in medical decisions. I also discussed the differential diagnosis and all of the proposed management plans with the patient and individuals accompanying the patient. Caesar Marino requires this high level of physician care and nursing on the Telemetry unit due the complexity of decision management and chance of rapid decline or death. Continued cardiac monitoring and higher level of nursing are required. I am readily available for any further decision-making and intervention.          Frankey Gladden, DO, D.O., [facility]  11:38 AM  2/20/2022

## 2022-02-21 ENCOUNTER — ANESTHESIA EVENT (OUTPATIENT)
Dept: POSTOP/PACU | Age: 77
DRG: 391 | End: 2022-02-21
Payer: MEDICARE

## 2022-02-21 LAB
ALBUMIN SERPL-MCNC: 2.9 G/DL (ref 3.5–5.2)
ALP BLD-CCNC: 95 U/L (ref 35–104)
ALT SERPL-CCNC: 70 U/L (ref 0–32)
ANION GAP SERPL CALCULATED.3IONS-SCNC: 10 MMOL/L (ref 7–16)
ANION GAP SERPL CALCULATED.3IONS-SCNC: 11 MMOL/L (ref 7–16)
AST SERPL-CCNC: 74 U/L (ref 0–31)
BASOPHILS ABSOLUTE: 0.06 E9/L (ref 0–0.2)
BASOPHILS RELATIVE PERCENT: 0.4 % (ref 0–2)
BILIRUB SERPL-MCNC: 0.6 MG/DL (ref 0–1.2)
BUN BLDV-MCNC: 15 MG/DL (ref 6–23)
BUN BLDV-MCNC: 19 MG/DL (ref 6–23)
CALCIUM SERPL-MCNC: 8.3 MG/DL (ref 8.6–10.2)
CALCIUM SERPL-MCNC: 8.4 MG/DL (ref 8.6–10.2)
CHLORIDE BLD-SCNC: 93 MMOL/L (ref 98–107)
CHLORIDE BLD-SCNC: 95 MMOL/L (ref 98–107)
CO2: 24 MMOL/L (ref 22–29)
CO2: 25 MMOL/L (ref 22–29)
CREAT SERPL-MCNC: 0.7 MG/DL (ref 0.5–1)
CREAT SERPL-MCNC: 0.8 MG/DL (ref 0.5–1)
EOSINOPHILS ABSOLUTE: 0.25 E9/L (ref 0.05–0.5)
EOSINOPHILS RELATIVE PERCENT: 1.5 % (ref 0–6)
GFR AFRICAN AMERICAN: >60
GFR AFRICAN AMERICAN: >60
GFR NON-AFRICAN AMERICAN: >60 ML/MIN/1.73
GFR NON-AFRICAN AMERICAN: >60 ML/MIN/1.73
GLUCOSE BLD-MCNC: 81 MG/DL (ref 74–99)
GLUCOSE BLD-MCNC: 95 MG/DL (ref 74–99)
HCT VFR BLD CALC: 40.2 % (ref 34–48)
HEMOGLOBIN: 13.1 G/DL (ref 11.5–15.5)
IMMATURE GRANULOCYTES #: 0.48 E9/L
IMMATURE GRANULOCYTES %: 3 % (ref 0–5)
LYMPHOCYTES ABSOLUTE: 1.55 E9/L (ref 1.5–4)
LYMPHOCYTES RELATIVE PERCENT: 9.6 % (ref 20–42)
MCH RBC QN AUTO: 30 PG (ref 26–35)
MCHC RBC AUTO-ENTMCNC: 32.6 % (ref 32–34.5)
MCV RBC AUTO: 92.2 FL (ref 80–99.9)
METER GLUCOSE: 100 MG/DL (ref 74–99)
METER GLUCOSE: 105 MG/DL (ref 74–99)
METER GLUCOSE: 123 MG/DL (ref 74–99)
METER GLUCOSE: 90 MG/DL (ref 74–99)
MONOCYTES ABSOLUTE: 1.44 E9/L (ref 0.1–0.95)
MONOCYTES RELATIVE PERCENT: 8.9 % (ref 2–12)
NEUTROPHILS ABSOLUTE: 12.43 E9/L (ref 1.8–7.3)
NEUTROPHILS RELATIVE PERCENT: 76.6 % (ref 43–80)
PDW BLD-RTO: 14.6 FL (ref 11.5–15)
PLATELET # BLD: 588 E9/L (ref 130–450)
PMV BLD AUTO: 10.5 FL (ref 7–12)
POTASSIUM SERPL-SCNC: 4.4 MMOL/L (ref 3.5–5)
POTASSIUM SERPL-SCNC: 5 MMOL/L (ref 3.5–5)
RBC # BLD: 4.36 E12/L (ref 3.5–5.5)
SODIUM BLD-SCNC: 129 MMOL/L (ref 132–146)
SODIUM BLD-SCNC: 129 MMOL/L (ref 132–146)
TOTAL PROTEIN: 6.1 G/DL (ref 6.4–8.3)
WBC # BLD: 16.2 E9/L (ref 4.5–11.5)

## 2022-02-21 PROCEDURE — 2500000003 HC RX 250 WO HCPCS: Performed by: INTERNAL MEDICINE

## 2022-02-21 PROCEDURE — 6370000000 HC RX 637 (ALT 250 FOR IP): Performed by: INTERNAL MEDICINE

## 2022-02-21 PROCEDURE — 36415 COLL VENOUS BLD VENIPUNCTURE: CPT

## 2022-02-21 PROCEDURE — 99233 SBSQ HOSP IP/OBS HIGH 50: CPT | Performed by: INTERNAL MEDICINE

## 2022-02-21 PROCEDURE — 2060000000 HC ICU INTERMEDIATE R&B

## 2022-02-21 PROCEDURE — 94761 N-INVAS EAR/PLS OXIMETRY MLT: CPT

## 2022-02-21 PROCEDURE — 93005 ELECTROCARDIOGRAM TRACING: CPT | Performed by: INTERNAL MEDICINE

## 2022-02-21 PROCEDURE — 6360000002 HC RX W HCPCS: Performed by: INTERNAL MEDICINE

## 2022-02-21 PROCEDURE — 80053 COMPREHEN METABOLIC PANEL: CPT

## 2022-02-21 PROCEDURE — 2580000003 HC RX 258: Performed by: INTERNAL MEDICINE

## 2022-02-21 PROCEDURE — 80048 BASIC METABOLIC PNL TOTAL CA: CPT

## 2022-02-21 PROCEDURE — 97165 OT EVAL LOW COMPLEX 30 MIN: CPT

## 2022-02-21 PROCEDURE — 97530 THERAPEUTIC ACTIVITIES: CPT

## 2022-02-21 PROCEDURE — 99232 SBSQ HOSP IP/OBS MODERATE 35: CPT | Performed by: SURGERY

## 2022-02-21 PROCEDURE — 85025 COMPLETE CBC W/AUTO DIFF WBC: CPT

## 2022-02-21 PROCEDURE — 82962 GLUCOSE BLOOD TEST: CPT

## 2022-02-21 PROCEDURE — 97535 SELF CARE MNGMENT TRAINING: CPT

## 2022-02-21 RX ORDER — SODIUM CHLORIDE 9 MG/ML
INJECTION, SOLUTION INTRAVENOUS CONTINUOUS
Status: DISCONTINUED | OUTPATIENT
Start: 2022-02-21 | End: 2022-02-23 | Stop reason: HOSPADM

## 2022-02-21 RX ORDER — PANTOPRAZOLE SODIUM 40 MG/1
40 TABLET, DELAYED RELEASE ORAL
Status: DISCONTINUED | OUTPATIENT
Start: 2022-02-21 | End: 2022-02-23 | Stop reason: HOSPADM

## 2022-02-21 RX ORDER — DIGOXIN 0.25 MG/ML
125 INJECTION INTRAMUSCULAR; INTRAVENOUS DAILY
Status: DISCONTINUED | OUTPATIENT
Start: 2022-02-21 | End: 2022-02-23 | Stop reason: HOSPADM

## 2022-02-21 RX ORDER — SODIUM CHLORIDE 0.9 % (FLUSH) 0.9 %
5-40 SYRINGE (ML) INJECTION EVERY 12 HOURS SCHEDULED
Status: DISCONTINUED | OUTPATIENT
Start: 2022-02-21 | End: 2022-02-23 | Stop reason: HOSPADM

## 2022-02-21 RX ORDER — CEFDINIR 300 MG/1
300 CAPSULE ORAL EVERY 12 HOURS SCHEDULED
Status: DISCONTINUED | OUTPATIENT
Start: 2022-02-21 | End: 2022-02-23 | Stop reason: HOSPADM

## 2022-02-21 RX ORDER — NITROGLYCERIN 0.4 MG/1
0.4 TABLET SUBLINGUAL EVERY 5 MIN PRN
Status: DISCONTINUED | OUTPATIENT
Start: 2022-02-21 | End: 2022-02-23 | Stop reason: HOSPADM

## 2022-02-21 RX ORDER — METRONIDAZOLE 500 MG/1
500 TABLET ORAL EVERY 8 HOURS SCHEDULED
Status: DISCONTINUED | OUTPATIENT
Start: 2022-02-21 | End: 2022-02-23 | Stop reason: HOSPADM

## 2022-02-21 RX ORDER — SODIUM CHLORIDE 0.9 % (FLUSH) 0.9 %
5-40 SYRINGE (ML) INJECTION PRN
Status: DISCONTINUED | OUTPATIENT
Start: 2022-02-21 | End: 2022-02-23 | Stop reason: HOSPADM

## 2022-02-21 RX ORDER — FUROSEMIDE 10 MG/ML
20 INJECTION INTRAMUSCULAR; INTRAVENOUS 2 TIMES DAILY
Status: DISCONTINUED | OUTPATIENT
Start: 2022-02-21 | End: 2022-02-23

## 2022-02-21 RX ORDER — SODIUM CHLORIDE 9 MG/ML
25 INJECTION, SOLUTION INTRAVENOUS PRN
Status: DISCONTINUED | OUTPATIENT
Start: 2022-02-21 | End: 2022-02-23 | Stop reason: HOSPADM

## 2022-02-21 RX ADMIN — DONEPEZIL HYDROCHLORIDE 10 MG: 5 TABLET, FILM COATED ORAL at 21:26

## 2022-02-21 RX ADMIN — CEFDINIR 300 MG: 300 CAPSULE ORAL at 21:26

## 2022-02-21 RX ADMIN — CEFDINIR 300 MG: 300 CAPSULE ORAL at 08:17

## 2022-02-21 RX ADMIN — APIXABAN 5 MG: 5 TABLET, FILM COATED ORAL at 21:27

## 2022-02-21 RX ADMIN — METOPROLOL SUCCINATE 50 MG: 50 TABLET, EXTENDED RELEASE ORAL at 08:12

## 2022-02-21 RX ADMIN — APIXABAN 5 MG: 5 TABLET, FILM COATED ORAL at 08:12

## 2022-02-21 RX ADMIN — LEVOTHYROXINE SODIUM 25 MCG: 25 TABLET ORAL at 06:10

## 2022-02-21 RX ADMIN — AMIODARONE HYDROCHLORIDE 1 MG/MIN: 50 INJECTION, SOLUTION INTRAVENOUS at 13:43

## 2022-02-21 RX ADMIN — METRONIDAZOLE 500 MG: 500 TABLET ORAL at 08:17

## 2022-02-21 RX ADMIN — SODIUM CHLORIDE, PRESERVATIVE FREE 10 ML: 5 INJECTION INTRAVENOUS at 21:27

## 2022-02-21 RX ADMIN — METRONIDAZOLE 500 MG: 500 TABLET ORAL at 21:30

## 2022-02-21 RX ADMIN — AMIODARONE HYDROCHLORIDE 0.5 MG/MIN: 50 INJECTION, SOLUTION INTRAVENOUS at 19:30

## 2022-02-21 RX ADMIN — FUROSEMIDE 20 MG: 10 INJECTION, SOLUTION INTRAMUSCULAR; INTRAVENOUS at 16:58

## 2022-02-21 RX ADMIN — AMIODARONE HYDROCHLORIDE 150 MG: 50 INJECTION, SOLUTION INTRAVENOUS at 13:27

## 2022-02-21 RX ADMIN — METRONIDAZOLE 500 MG: 500 TABLET ORAL at 13:54

## 2022-02-21 RX ADMIN — BISACODYL 10 MG: 5 TABLET, COATED ORAL at 08:12

## 2022-02-21 RX ADMIN — METOPROLOL SUCCINATE 50 MG: 50 TABLET, EXTENDED RELEASE ORAL at 21:27

## 2022-02-21 RX ADMIN — PANTOPRAZOLE SODIUM 40 MG: 40 TABLET, DELAYED RELEASE ORAL at 08:17

## 2022-02-21 RX ADMIN — DIGOXIN 125 MCG: 250 INJECTION, SOLUTION INTRAMUSCULAR; INTRAVENOUS; PARENTERAL at 17:40

## 2022-02-21 RX ADMIN — SERTRALINE 50 MG: 50 TABLET, FILM COATED ORAL at 08:12

## 2022-02-21 RX ADMIN — AMIODARONE HYDROCHLORIDE 0.5 MG/MIN: 50 INJECTION, SOLUTION INTRAVENOUS at 21:28

## 2022-02-21 RX ADMIN — FUROSEMIDE 20 MG: 10 INJECTION, SOLUTION INTRAMUSCULAR; INTRAVENOUS at 13:38

## 2022-02-21 ASSESSMENT — LIFESTYLE VARIABLES: SMOKING_STATUS: 0

## 2022-02-21 ASSESSMENT — PAIN SCALES - GENERAL
PAINLEVEL_OUTOF10: 0

## 2022-02-21 NOTE — PROGRESS NOTES
[address] [facility]  [facility] [place]        Date:2022                                                  Patient Name: Pham Andersen    MRN: [id number]    : 1945    Room: [address] [place] [address] pt transferred to [facility]       Evaluating OT: Alex Lozada OTR/L; [id number]     Referring Provider and Specific Provider Orders/Date:      22  OT eval and treat  Start:  22,   End:  22,   ONE TIME,   Standing Count:  1 Occurrences,   R         Idella Gain, DO     Placement Recommendation: HHOT       Diagnosis:   1. DAYNA (acute kidney injury) ([facility] [address]    2. Hyperkalemia    3. Hyponatremia    4.  Prolonged Q-T interval on ECG         Surgery: none       Pertinent Medical History:       Past Medical History:   Diagnosis Date    Arthritis     Asymptomatic PVCs 2012    pt felt flutter, no other sx, holter + pvc's, few runs of SVT   [place]    History of Holter monitoring     Hyperlipidemia     Macular degeneration     Nausea & vomiting     Pelvic prolapse     PONV (postoperative nausea and vomiting)     TIA (transient ischemic attack)          Past Surgical History:   Procedure Laterality Date    COLONOSCOPY      ENDOSCOPY, COLON, DIAGNOSTIC      EYE SURGERY      macular hole    HERNIA REPAIR      inguinal    HYSTERECTOMY      OTHER SURGICAL HISTORY  2013    ant elevatetransobturator sling rectocele and cystocele enterocele    SKIN BIOPSY      arms    TRANSESOPHAGEAL ECHOCARDIOGRAM N/A 10/20/2020    TRANSESOPHAGEAL ECHOCARDIOGRAM WITH BUBBLE STUDY performed by Juice Green MD at [facility] ENDOSCOPY      Precautions:  Fall Risk     Assessment of current deficits    [x] Functional mobility  [x]ADLs  [x] Strength               []Cognition    [x] Functional transfers   [x] IADLs         [] Safety Awareness   [x]Endurance    [] Fine Coordination              [x] Balance      [] Vision/perception   []Sensation     []Gross Motor Coordination  [] ROM  [] Delirium                   [] Motor Control     OT PLAN OF CARE   OT POC based on physician orders, patient diagnosis and results of clinical assessment    Frequency/Duration 1-3 days/wk for 2 weeks PRN     Specific OT Treatment Interventions to include:   * Instruction/training on adapted ADL techniques and AE recommendations to increase functional independence within precautions       * Training on energy conservation strategies, correct breathing pattern and techniques to improve independence/tolerance for self-care routine  * Functional transfer/mobility training/DME recommendations for increased independence, safety, and fall prevention  * Patient/Family education to increase follow through with safety techniques and functional independence  * Recommendation of environmental modifications for increased safety with functional transfers/mobility and ADLs  * Therapeutic exercise to improve motor endurance, ROM, and functional strength for ADLs/functional transfers  * Therapeutic activities to facilitate/challenge dynamic balance, stand tolerance for increased safety and independence with ADLs  * Positioning to improve skin integrity, interaction with environment and functional independence    Recommended Adaptive Equipment: TBD      Home Living: alone; single family home, 1 story, 3 steps to enter with B rails, tub shower, laundry in the basement. Equipment owned: wheeled walker, cane, unused tub transfer bench, grab bars, rollator    Prior Level of Function: Independent with ADLs , Independent with IADLs; ambulated with rollator    Pain Level: no pain; Nursing notified.       Cognition: A&O: 4/4; Follows 2 step directions   Memory: fair    Sequencing: fair    Problem solving: fair    Judgement/safety: fair     [facility]   AM-PAC Daily Activity Inpatient   How much help for putting on and taking off regular lower body clothing?: A Little  How much help for Bathing?: A Little  How much help for Toileting?: A Little  How much help for putting on and taking off regular upper body clothing?: A Little  How much help for taking care of personal grooming?: A Little  How much help for eating meals?: None  AM-PAC Inpatient Daily Activity Raw Score: 19  AM-PAC Inpatient ADL T-Scale Score : 40.22  ADL Inpatient CMS 0-100% Score: 42.8  ADL Inpatient CMS G-Code Modifier : CK     Functional Assessment:    Initial Eval Status  Date: 2/21/22 Treatment Status  Date: STGs = LTGs  Time frame: 10-14 days   Feeding Independent   Independent    Grooming Minimal Assist   Independent    UB Dressing Minimal Assist to doff and don hospital gown and don outer gown as robe while seated EOB. Independent    LB Dressing Supervision to don socks at [address] for hygiene and to stand at sink level to wash and dry hands. Independent    Bed Mobility  Supine to sit: Minimal Assist   Sit to supine: N/T as pt was up in chair   Supine to sit: Independent   Sit to supine: Independent    Functional Transfers Minimal assist from EOB to wheeled walker. Minimal for transfer to and from commode with minimal verbal cues to use grab bar for safe commode transfer. Transfer training with verbal cues for hand placement throughout session to improve safety. Independent    Functional Mobility Minimal assist with wheeled walker to and from bathroom and up to bedside chair, verbal cues for walker sequence and safety.    Modified [place]    Balance Sitting:     Static: good     Dynamic: fair   Standing: fair  with wheeled walker     Activity Tolerance fair   good    Visual/  Perceptual Glasses: yes                 Hand Dominance: left      AROM (PROM) Strength Additional Info:    RUE  WFL 4/5 good  and wfl FMC/dexterity noted during ADL tasks     LUE WFL 4/5 good  and wfl FMC/dexterity noted during ADL tasks       Hearing: [facility]   Sensation:  No c/o numbness or tingling  Tone: WFL   Edema: none     Comments: Upon arrival the patient was supine. At end of session, patient was up in chair with call light and phone within reach, all lines and tubes intact. Overall patient demonstrated decreased independence and safety during completion of ADL/functional transfer/mobility tasks. Pt would benefit from continued skilled OT to increase safety and independence with completion of ADL/IADL tasks for functional independence and quality of life. Treatment: OT treatment provided this date includes:    Instruction/training on safety and adapted techniques for completion of ADLs    Instruction/training on safe functional mobility/transfer techniques    Instruction/training on energy conservation/work simplification for completion of ADLs    Proper Positioning/Alignment    Rehab Potential: Good for established goals. Patient / Family Goal: return home and restart her HHOTPT      Patient and/or family were instructed on functional diagnosis, prognosis/goals and OT plan of care. Demonstrated good understanding. Eval Complexity: Low    Time In: 11:30am   Time Out: 11:59am    Total Treatment Time: 9      Min Units   OT Eval Low 97165  X  1    OT Eval Medium 76379      OT Eval High 52414      OT Re-Eval 45881            ADL/Self Care 38532  9   1    Therapeutic Activities 84417     Therapeutic Ex 42536       Orthotic Management 16471       Manual 20256     Neuro Re-Ed 26545       Non-Billable Time        Evaluation Time additionally includes thorough review of current medical information, gathering information on past medical history/social history and prior level of function, interpretation of standardized testing/informal observation of tasks, assessment of data and development of plan of care and goals.         Evaluating OT: Stephanie Hurtado OTR/L; [id number]

## 2022-02-21 NOTE — PROGRESS NOTES
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 Wants to go home     Objective:  /83   Pulse 104   Temp 97 °F (36.1 °C) (Oral)   Resp 16   Ht 5' 1\" (1.549 m)   Wt 120 lb (54.4 kg)   SpO2 95%   BMI 22.67 kg/m²   Labs:  Recent Labs     02/19/22  0508 02/20/22  0436 02/21/22  0536   WBC 19.3* 16.7* 16.2*   HGB 12.1 12.7 13.1   HCT 35.7 39.5 40.2     Lab Results   Component Value Date    CREATININE 0.8 02/21/2022    BUN 19 02/21/2022     (L) 02/21/2022    K 4.4 02/21/2022    CL 95 (L) 02/21/2022    CO2 24 02/21/2022     No results for input(s): LIPASE, AMYLASE in the last 72 hours.   CBC with Differential:    Lab Results   Component Value Date    WBC 16.2 02/21/2022    RBC 4.36 02/21/2022    HGB 13.1 02/21/2022    HCT 40.2 02/21/2022     02/21/2022    MCV 92.2 02/21/2022    MCH 30.0 02/21/2022    MCHC 32.6 02/21/2022    RDW 14.6 02/21/2022    NRBC 1.0 02/19/2022    SEGSPCT 81 09/06/2013    METASPCT 2.0 02/19/2022    LYMPHOPCT 9.6 02/21/2022    MONOPCT 8.9 02/21/2022    BASOPCT 0.4 02/21/2022    MONOSABS 1.44 02/21/2022    LYMPHSABS 1.55 02/21/2022    EOSABS 0.25 02/21/2022    BASOSABS 0.06 02/21/2022     CMP:    Lab Results   Component Value Date     02/21/2022    K 4.4 02/21/2022    K 5.1 01/26/2022    CL 95 02/21/2022    CO2 24 02/21/2022    BUN 19 02/21/2022    CREATININE 0.8 02/21/2022    GFRAA >60 02/21/2022    LABGLOM >60 02/21/2022    GLUCOSE 81 02/21/2022    GLUCOSE 75 04/10/2012    PROT 6.1 02/21/2022    LABALBU 2.9 02/21/2022    LABALBU 4.7 04/10/2012    CALCIUM 8.3 02/21/2022    BILITOT 0.6 02/21/2022    ALKPHOS 95 02/21/2022    AST 74 02/21/2022    ALT 70 02/21/2022       General appearance:  NAD  Head: NCAT, PERRLA, EOMI, red conjunctiva  Neck: supple, no masses  Lungs: CTAB, equal chest rise bilateral  Heart: Reg rate  Abdomen: soft, nondistended, nontender, no guarding or rebound. Skin; no lesions  Gu: no cva tenderness  Extremities: extremities normal, atraumatic, no cyanosis or edema      Assessment/Plan:  Andreea Bacon is a 64 y.o. female with acute diverticulitis of the sigmoid colon with microperforation, leukocytosis, DAYNA      Continue diet  Continue IV antibiotics  Would like to see further improvement of leukocytosis prior to discharge but no further surgical plans       Karolina Lua MD  2/21/2022  7:14 AM    As above.

## 2022-02-21 NOTE — PROGRESS NOTES
INPATIENT CARDIOLOGY FOLLOW-UP    Name: Pham Andersen    Age: 64 y.o. Date of Admission: 2/18/2022  4:46 AM    Date of Service: 2/21/2022    Primary Cardiologist: Dr. Nicholas Fong    Chief Complaint: Follow-up for atrial fibrillation with RVR    Interim History:  No new overnight cardiac complaints. Currently with no complaints of CP, SOB, palpitations, dizziness, or lightheadedness.   In atrial fibrillation with rates ranging from 100 to 140 bpm.    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    Pain of right foot    Atrial fibrillation with RVR ([address]    Aneurysm ([address]    Atrial fibrillation with rapid ventricular response ([address]    Hyperkalemia    Diverticulitis with microperforation    Sigmoid diverticulitis       Current Medications:    Current Facility-Administered Medications:     cefdinir (OMNICEF) capsule 300 mg, 300 mg, Oral, 2 times per day, Idella Gain, DO, 300 mg at 02/21/22 3221    metroNIDAZOLE (FLAGYL) tablet 500 mg, 500 mg, Oral, 3 times per day, Idella Gain, DO, 500 mg at 02/21/22 1354    pantoprazole (PROTONIX) tablet 40 mg, 40 mg, Oral, QAM , Smith Black, DO, 40 mg at 02/21/22 8878    furosemide (LASIX) injection 20 mg, 20 mg, IntraVENous, BID, Shannon Oliver MD, 20 mg at 02/21/22 1338    [COMPLETED] amiodarone (CORDARONE) 150 mg in dextrose 5 % 100 mL bolus, 150 mg, IntraVENous, Once, Stopped at 02/21/22 1351 **FOLLOWED BY** amiodarone (CORDARONE) 450 mg in dextrose 5 % 250 mL infusion, 1 mg/min, IntraVENous, Continuous, Last Rate: 33.3 mL/hr at 02/21/22 1343, 1 mg/min at 02/21/22 1343 **FOLLOWED BY** amiodarone to auscultation bilaterally. No wheezes, rales, or rhonchi. Cardiac: PMI nondisplaced, Regular rhythm with a normal rate, S1 & S2 normal, no murmurs  Abdomen: Soft, nontender, +bowel sounds  Extremities: Moves all extremities x 4, no lower extremity edema  Neurologic: No focal motor deficits apparent, normal mood and affect  Peripheral Pulses: Intact posterior tibial pulses bilaterally    Intake/Output:    Intake/Output Summary (Last 24 hours) at 2/21/2022 1432  Last data filed at 2/21/2022 1351  Gross per 24 hour   Intake 580 ml   Output 3450 ml   Net -2870 ml     I/O this shift:  In: 340 [P.O.:240;  I.V.:100]  Out: 1350 [Urine:1350]    Laboratory Tests:  Recent Labs     02/20/22  0436 02/20/22  1727 02/21/22  0536   * 130* 129*   K 3.3* 3.8 4.4   CL 90* 95* 95*   CO2 27 25 24   BUN 40* 30* 19   CREATININE 1.4* 1.0 0.8   GLUCOSE 88 151* 81   CALCIUM 8.0* 8.1* 8.3*     Lab Results   Component Value Date    MG 2.7 02/18/2022     Recent Labs     02/19/22  0508 02/20/22  0436 02/21/22  0536   ALKPHOS 80 123* 95   ALT 91* 93* 70*   * 134* 74*   PROT 5.4* 5.4* 6.1*   BILITOT 0.7 0.6 0.6   LABALBU 2.6* 2.7* 2.9*     Recent Labs     02/19/22  0508 02/20/22  0436 02/21/22  0536   WBC 19.3* 16.7* 16.2*   RBC 4.11 4.34 4.36   HGB 12.1 12.7 13.1   HCT 35.7 39.5 40.2   MCV 86.9 91.0 92.2   MCH 29.4 29.3 30.0   MCHC 33.9 32.2 32.6   RDW 14.3 14.6 14.6   * 562* 588*   MPV 11.0 10.7 10.5     Lab Results   Component Value Date    CKTOTAL 107 09/06/2013    CKMB 1.4 09/06/2013    TROPONINI 0.02 09/06/2013     Lab Results   Component Value Date    INR 1.5 12/24/2021    PROTIME 17.3 (H) 12/24/2021     Lab Results   Component Value Date    TSH 1.360 01/27/2022     Lab Results   Component Value Date    LABA1C 5.5 11/03/2021     No results found for: EAG  Lab Results   Component Value Date    CHOL 133 01/27/2022    CHOL 182 11/03/2021    CHOL 235 (H) 08/04/2021     Lab Results   Component Value Date    TRIG 54 01/27/2022    TRIG 98 11/03/2021    TRIG 104 08/04/2021     Lab Results   Component Value Date    HDL 37 01/27/2022    HDL 56 11/03/2021    HDL 54 08/04/2021     Lab Results   Component Value Date    LDLCALC 85 01/27/2022    LDLCALC 106 (H) 11/03/2021    LDLCALC 160 (H) 08/04/2021     Lab Results   Component Value Date    LABVLDL 11 01/27/2022    LABVLDL 20 11/03/2021    LABVLDL 21 08/04/2021     No results found for: CHOLHDLRATIO  No results for input(s): PROBNP in the last 72 hours. Cardiac Tests:    ASSESSMENT/PLAN:         Paroxysmal atrial fibrillation RVR.  Had DCCV 1/31/22 -IV Cardizem was weaned yesterday. Toprol-XL was increased to 50 twice daily-IV digoxin given x1 yesterday.; Continue Apixaban for [address]. This is her second episode of atrial fibrillation this year. I will initiate her on amiodarone. We will plan for MARYLOU guided cardioversion to be done tomorrow after amio load is completed. She has been compliant  with her anticoagulation but does have a history of left atrial appendage thrombus. Thus, we will proceed with MARYLOU prior to cardioversion. Will reverse her DNR CCA status for procedure.     Acute on chronic HFrEF - EF 35%; -ve 1520cc.  She does appear hypervolemic on examination. I will start Lasix 20 IV twice daily. Monitor daily weights, renal function and urine output. Cardiomyopathy - EF 35% by MARYLOU 1/31/22 - Normal EF in 10/2021, Likely tachycardia mediated.  Nonischemic Stress test in 12/2021, EF 53%. Continue Metoprolol, If BP tolerate add Entresto.  Later add Jamesetta Copper further testing, She is DNR CCA.   Will reassess EF after 3 months of maintenance of sinus rhythm.     History of Left atrial appendage thrombus in 10/2020 - Resolved by MARYLOU 1/31/2022 - On Eliquis     Borderline elevated troponin - Does not consistent with ACS, likely demand ischemia from CHF, tachycardia, No CP     Hypokalemia - Supplemented potassium     [facility] - [facility] Moderate MR, Mild AR and TR     History of TIA     Hyperlipidemia - On Statin     Hypothyroidism - On HRT. Cami Celestin MD, [facility]    NOTE: This report was transcribed using voice recognition software. Every effort was made to ensure accuracy; however, inadvertent computerized transcription errors may be present.

## 2022-02-21 NOTE — PROGRESS NOTES
Physical Therapy    Room #: 9401/8257-93  Date: 2022       Patient Name: Shree Dumont  : 1945      MRN: [id number]     Patient unavailable for physical therapy eval due to unavailable/not appropriate per nursing due to tachycardia; given med with good result however nursing request to see post cardio conversion tomorrow after 1pm.   Thank you.        Edith Cuenca, PT

## 2022-02-21 NOTE — CARE COORDINATION
2/21/2022 1020 CM note: No covid testing. Met with patient for transition of care needs. Pt resides alone in a ranch home, 3 atep entry. She relays her dtr Reece Calles lives 10 minutes away. Pt is able to do self care and her family assists with shopping, cleaning,transportation. PCP is Dr Tatiana Mata and uses [facility] as well as [employer] mail out service. She owns ww and a w/c(does not use). Pt is active with [facility] for SN/PT and received BERENICE orders. Pt plans to return home at [place], her dtr will provide ride. Vero WISDOM    ADDENDUM: Pt transferred to [facility] after IV Cardizem gtt started. Vero WISDOM    Advance Care Planning   Healthcare Decision Maker:    Primary Decision Maker: Svitlana Mendez  Child - [phone]    Secondary Decision Maker: Kady Nieto [address] [place], [place] [address]A - Other - [phone]    Click here to complete Healthcare Decision Makers including selection of the Healthcare Decision Maker Relationship (ie \"Primary\").          Readmission Assessment  Number of Days since last admission?: 8-30 days  Previous Disposition: Home with Home Health  Who is being Interviewed: Patient  What was the patient's/caregiver's perception as to why they think they needed to return back to the hospital?: Other (Comment) (lightheaded)  Did you visit your Primary Care Physician after you left the hospital, before you returned this time?: Yes  Did you see a specialist, such as Cardiac, Pulmonary, Orthopedic Physician, etc. after you left the hospital?: No  Who advised the patient to return to the hospital?: Self-referral  Does the patient report anything that got in the way of taking their medications?: No  In our efforts to provide the best possible care to you and others like you, can you think of anything that we could have done to help you after you left the hospital the first time, so that you might not have needed to return so soon?: Other (Comment) (no, continue [facility])

## 2022-02-21 NOTE — CONSULTS
Patient currently admitted with diagnosis of Systolic heart failure. Patient was engaged during the consultation. She was engaged and asked appropriate questions throughout the education session. I met with patient at bedside regarding new consult for HF RN. Provided patient with CHF clinic folder and review information. All questions or concerns were answered at this time. Patient is known to Dr. Gilmar Perla and PCP is Dr. Tatiana Mata educated about the importance of scheduling follow up appointment with both PCP and cardiology 3-7 days of discharge. Future Appointments   Date Time Provider Camron Godinez   3/31/2022  2:00 PM [facility]            We reviewed the introduction to Heart Failure, the HF zones, signs and symptoms to report on day 1 of onset, medications, medication compliance, the importance of obtaining daily weights, following a low sodium diet, reading food labels for the sodium content, keeping physician appointments, and smoking cessation. We discussed writing / tracking daily weights on a calendar / log because a 5 pound gain in 1 week can sneak up if you are not tracking it. You can also take your charted weights to your doctor appointments to be reviewed. I advised patient they can reduce the risk for Heart Failure exacerbations by modifying / controlling the risk factors. We discussed self-managed care which includes the following:  to take medications as prescribed, report any intolerable side effects of medications to the cardiologist / doctor, do not just stop taking the medication; follow a cardiac heart healthy / low sodium diet; weigh yourself daily, exercise regularly- per doctor recommendation and not to smoke or use an excess amount of alcohol. We discussed calling the cardiologist / doctor with a weight gain of 3 pounds in one day or a total of 5 pounds or more in one week.  Also, if you should have a significant weight loss of 3# or more in one day to call the doctor, they may need to decrease or hold the diuretic dose. On days you feels nauseated and not eating / drinking, having emesis or diarrhea,  informed to call the cardiologist  / doctor, they may need to decrease or hold diuretic to avoid dehydration. I stressed the importance of informing their cardiologist the first day of onset of any of the signs and symptoms in the \"Yellow Zone\" of the HF Zones. Patient verbalizes understanding. Greater than 30 minutes was spent educating patient. BNP:   Lab Results   Component Value Date    PROBNP 23,589 (H) 01/26/2022       History of:    has a past medical history of Arthritis, Asymptomatic PVCs, History of Holter monitoring, Hyperlipidemia, Macular degeneration, Nausea & vomiting, Pelvic prolapse, PONV (postoperative nausea and vomiting), and TIA (transient ischemic attack). has a past surgical history that includes Colonoscopy (2012); Endoscopy, colon, diagnostic; hernia repair; Hysterectomy; eye surgery; skin biopsy; other surgical history (sept 2013); and transesophageal echocardiogram (N/A, 10/20/2020). Medications:    cefdinir  300 mg Oral 2 times per day    metroNIDAZOLE  500 mg Oral 3 times per day    pantoprazole  40 mg Oral QAM AC    apixaban  5 mg Oral BID    bisacodyl  10 mg Oral Daily    metoprolol succinate  50 mg Oral BID    levothyroxine  25 mcg Oral Daily    donepezil  10 mg Oral Nightly    sertraline  50 mg Oral Daily    insulin lispro  0-6 Units SubCUTAneous Q4H      dextrose         Code Status:Prior    The patient is ordered:  Diet: ADULT ORAL NUTRITION SUPPLEMENT; Breakfast; Other Oral Supplement; Prune juice daily  ADULT DIET;  Regular; Low Fiber   Sodium controlled diet Yes  Fluid restriction daily ordered (fluid restriction recommended if patient is hyponatremic and/or diuretic is initiated or increased) Yes  FR:   Daily Weights:   Patient Vitals for the past 96 hrs (Last 3 readings):   Weight 02/18/22 0457 120 lb (54.4 kg)     I/O:     Intake/Output Summary (Last 24 hours) at 2/21/2022 1129  Last data filed at 2/21/2022 1106  Gross per 24 hour   Intake 730 ml   Output 3200 ml   Net -2470 ml        The Heart Failure Booklet given to the patient with additional patient education addressing:  · What is Heart Failure? · Things You Can Do to Live Well with HF  · How to Take Your Medications  · How to Eat Less Salt  · Exercising Well with Heart Failure  · Signs and symptoms of HF to report  · Weight Yourself Each Day  · Home Self Management- activity, weight tracking, taking medications as prescribed, meals /diet planning (sodium and fluid restriction), how to read food labels, keeping physician follow ups, smoking cessation, follow the Heart Failure Zones  · The Heart Failure zones  · Sodium content pamphlet  · What foods I should avoid tip sheet    Instructed  to call 911 if you have any of the following symptoms:    ·    Struggling to breathe unrelieved with rest,  ·    Having chest pain, confusion or can't think clearly  ·    Have confusion or cant think clearly    Readmission Risk Score: 22.3 ( )        Discharge Plan:  I placed the Heart Failure Home Instructions in patient's discharge instructions. Per Heart Failure GWTG, the patient should have a follow-up appointment made within 7 days of discharge. Jose Rosario RN   Heart Failure Navigator      CONGESTIVE HEART FAILURE (CHF) AHA GUIDELINES  (Must be completed for Primary Diagnosis CHF or History of CHF)    Type of CHF:    [] Acute   [] Chronic     [x] Acute on Chronic     Ventricular Function Assessment (check):      [] HFpEF  [] HFpEF, borderline  [] HFpEF, improved  [x] HFrEF    Echocardiogram: 1/31/22    No thrombus in left atrium or left atrial appendage. Normal left ventricular chamber size. Moderately reduced LV function, EF about 35%. Left atrium is enlarged. Interatrial septum intact.    Normal right ventricle structure and function. Right atrium is enlarged. There is moderate mitral regurgitation. No mitral valve prolapse. There is mild to moderate aortic regurgitation. There is mild tricuspid regurgitation. Normal aortic root size. No evidence of pericardial effusion. No intra cardiac mass.                                                           Angiotensin-Converting-Enzyme (ACE) inhibitor ordered:  [] Yes  [x] No (specify contraindication):    [] Contraindicated  [] Hypotensive patient who was at immediate risk of cardiogenic shock  [] Hospitalized patient who experienced marked azotemia  [] Other Contraindications  [] Not Eligible  [] Not Tolerant  [] Patient Reason  [] System Reason  [] Other Reason    Angiotensin II receptor blockers (ARB) ordered:  [] Yes  [x] No (specify contraindication):    [] Contraindicated  [] Hypotensive patient who was at immediate risk of cardiogenic shock  [] Hospitalized patient who experienced marked azotemia  [] Other Contraindications    ARNI - Angiotensin Receptor Neprilysin Inhibitor ordered:  [] Yes  [x] No (specify contraindication):    [] ACE inhibitor use within the prior 36 hours  [] Allergy  [] Hyperkalemia  [] Hypotension  [] Renal dysfunction defined as creatinine > 2.5 mg/dL in men or > 2.0 mg/dL in women  [] Other Contraindications  [] Not Eligible  [] Not Tolerant  [] Patient Reason  []System Reason  []Other Reason      Beta Blocker (Carvedilol, Metoprolol Succinate, or Bisoprolol) ordered:    [x] Yes  [] No (specify contraindication):    [] Contraindicated  [] Asthma  [] Fluid Overload  [] Low Blood Pressure  [] Patient recently treated with an intravenous positive inotropic agent  [] Other Contraindications  [] Not Eligible  [] Not Tolerant  [] Patient Reason  [] System Reason    SGLT2 Inhibitor ordered:  [] Yes  [x] No (specify contraindication):    [] Contraindicated  [] Patient currently on dialysis  [] Ketoacidosis  [] Known hypersensitivity to the medication  [] Type I diabetes (not approved for use in patients with Type I diabetes due to increased risk of ketoacidosis)  [] Other Contraindications  [] Not Eligible  [] Not Tolerant  [] Patient Reason  [] System Reason  [] Other Reason    Aldosterone Antagonist ordered:  [] Yes  [x] No (specify contraindication):    [] Contraindicated  [] Allergy due to aldosterone receptor antagonist  [] Hyperkalemia  [] Renal dysfunction defined as creatinine >2.5 mg/dL in men or >2.0 mg/dL in women.   [] Other contraindications  [] Not Eligible  [] Not Tolerant  [] Patient Reason  [] System Reason  [] Other Reason

## 2022-02-21 NOTE — PROGRESS NOTES
Pt was reported to be on cardiziem gtt per previous RN. Replacement RN found patient off cardiac drip for indeterminate Timeframe. /80 and HR 94- 114. DR ballard notified of incident, he wants cardiizem gtt on hold for now.

## 2022-02-21 NOTE — PROGRESS NOTES
Internal Medicine Progress Note    [facility]    Matt Carey. Antonia Segura., [email or web address].[email or web address]. Leena Friedman D.O., PATY Puckett D.O. Gabo Addison, MSN, APRN, NP-C  Jarad Ngo, MSN, APRN-CNP     Primary Care Physician: Alec Devine DO   Admitting Physician:  Tomas Guerra DO  Admission date and time: 2/18/2022  4:46 AM    Room:  [address] [place] [address]  Admitting diagnosis: Hyperkalemia [E87.5]  Hyponatremia [E87.1]  Prolonged Q-T interval on ECG [R94.31]  DAYNA (acute kidney injury) [facility]) [N17.9]    Patient Name: Jennifer Rodrigez  MRN: [id number]    Date of Service: 2/21/2022     Subjective:  Shefali Licea is a 64 y.o. female who was seen and examined today,2/21/2022, at the bedside. Shefali Licea is resting very comfortably during my examination this morning consuming breakfast.  Her abdominal pain has entirely resolved. Her atrial fibrillation is now rate controlled and Cardizem infusion has been discontinued. She is requesting removal of the Pino catheter. No family members were present during my examination. She is anxious for discharge. Review of System:   Constitutional:   Improving malaise and fatigue. HEENT:   Denies ear pain, sore throat, sinus or eye problems. Cardiovascular:   Denies any chest pain, irregular heartbeats, or palpitations. Still having occasional fast heart rate  Respiratory:   Denies shortness of breath, coughing, sputum production, hemoptysis, or wheezing. Gastrointestinal:   Resolution of her presenting abdominal pain. No nausea. Tolerating a diet. Genitourinary:    Currently voiding with use of a Pino catheter which is causing irritation. Extremities:   Denies lower extremity swelling, edema or cyanosis. Neurology:    Denies any headache or focal neurological deficits. Admits to chronic weakness and deconditioning. Psch:   Denies being anxious or depressed.   Musculoskeletal:    Denies  myalgias, joint complaints or back pain.   Integumentary:   Denies any rashes, ulcers, or excoriations. Denies bruising. Hematologic/Lymphatic:  Denies bruising or bleeding. Physical Exam:  No intake/output data recorded. Intake/Output Summary (Last 24 hours) at 2/21/2022 0811  Last data filed at 2/21/2022 0530  Gross per 24 hour   Intake 970 ml   Output 2550 ml   Net -1580 ml   I/O last 3 completed shifts: In: 970 [P.O.:970]  Out: 3100 [Urine:3100]  Patient Vitals for the past 96 hrs (Last 3 readings):   Weight   02/18/22 0457 120 lb (54.4 kg)     Vital Signs:   Blood pressure 132/71, pulse 104, temperature 98 °F (36.7 °C), temperature source Oral, resp. rate 18, height 5' 1\" (1.549 m), weight 120 lb (54.4 kg), SpO2 92 %, not currently breastfeeding. General appearance:  Alert, responsive, oriented to person, place, and time. Well preserved, alert, no distress. Head:  Normocephalic. No masses, lesions or tenderness. Eyes:  PERRLA. EOMI. Sclera clear. Buccal mucosa moist.  ENT:  Ears normal. Mucosa normal.  Neck:    Supple. Trachea midline. No thyromegaly. No JVD. No bruits. Heart:    Atrial fibrillation with current rate control. Systolic murmurs appreciated  Lungs:    Symmetrical. Clear to auscultation bilaterally. No wheezes. No rhonchi. No rales. Abdomen:   Very mildly tender to palpation diffusely with voluntary guarding elicited. No rebound tenderness. No peritoneal signs. Extremities:    Peripheral pulses present. No peripheral edema. No ulcers. No cyanosis. No clubbing. Neurologic:    Alert x 3. No focal deficit. Cranial nerves grossly intact. No focal weakness. Psych:   Behavior is normal. Mood appears normal. Speech is not rapid and/or pressured. Musculoskeletal:   Spine ROM normal. Moves all extremities. Gait not assessed. Integumentary:  No rashes  Skin normal color and texture. Genitalia/Breast:  Voiding with use of a Pino catheter.     Medication:  Scheduled Meds:   cefdinir  300 mg Oral 2 times per day off Cardizem infusion and cardiac medications have been adjusted as per the cardiovascular team.  Anticoagulation therapy is being employed. We will transition from IV to oral antibiotics. IV fluids will be discontinued as renal function has returned to baseline. Pino catheter will be removed. She will work with the therapy teams and the ultimate goal is for discharge tomorrow. More than 50% of my  time was spent at the bedside counseling/coordinating care with the patient and/or family with face to face contact. This time was spent reviewing notes and laboratory data as well as instructing and counseling the patient. Time I spent with the family or surrogate(s) is included only if the patient was incapable of providing the necessary information or participating in medical decisions. I also discussed the differential diagnosis and all of the proposed management plans with the patient and individuals accompanying the patient. Allie Banda requires this high level of physician care and nursing on the Telemetry unit due the complexity of decision management and chance of rapid decline or death. Continued cardiac monitoring and higher level of nursing are required. I am readily available for any further decision-making and intervention.          Artur Borja DO, D.O., FACOI  8:11 AM  2/21/2022

## 2022-02-21 NOTE — PROGRESS NOTES
[facility] MD Regino Velazquez MD    [facility] PATIENT’S [facility] OF [facility] MD Kera Fontana MD   Progress note  Patient's Name: Raeann Kerns  1:01 PM  2/21/2022    Comfortable   On RA   DAYNA resolved       History of Present Ilness:      65 y/o F presenting to hospital with cc of abdominal discomfort , feeling tired and poor po intake that has been going on for 2 weeks . Pt had ct abdomen in ED showing her to have acute diverticulitis . Nephrology asked to see for DAYNA   Pt baseline cr around 0.8 -1 . She presented with cr of 2.8   She was also having metabolic acidosis along with hyperkalemia   Pt was started on bicarboane drip with subsequent improvement in her acidosis   She was also started on lokelema with imrpvement in her K down to normal .   Pt seen earlier  Today she is on RA she is alert oriented x3 she appear euvolemic   She is telling me since being hospitalized she is starting to feel better . Past Medical History:   Diagnosis Date    Arthritis     Asymptomatic PVCs 4/2012    pt felt flutter, no other sx, holter + pvc's, few runs of SVT   [facility]    History of Holter monitoring     Hyperlipidemia     Macular degeneration     Nausea & vomiting     Pelvic prolapse     PONV (postoperative nausea and vomiting)     TIA (transient ischemic attack)        Past Surgical History:   Procedure Laterality Date    COLONOSCOPY  2012    ENDOSCOPY, COLON, DIAGNOSTIC      EYE SURGERY      macular hole    HERNIA REPAIR      inguinal    HYSTERECTOMY      OTHER SURGICAL HISTORY  sept 2013    ant elevatetransobturator sling rectocele and cystocele enterocele    SKIN BIOPSY      arms    TRANSESOPHAGEAL ECHOCARDIOGRAM N/A 10/20/2020    TRANSESOPHAGEAL ECHOCARDIOGRAM WITH BUBBLE STUDY performed by Kia Layton MD at [address] [facility] reviewed. No pertinent family history. reports that she quit smoking about 27 years ago. She smoked 0.50 packs per day. She has never used smokeless tobacco. She reports that she does not drink alcohol and does not use drugs. Allergies:  Patient has no known allergies. Current Medications:    cefdinir (OMNICEF) capsule 300 mg, 2 times per day  metroNIDAZOLE (FLAGYL) tablet 500 mg, 3 times per day  pantoprazole (PROTONIX) tablet 40 mg, QAM AC  furosemide (LASIX) injection 20 mg, BID  amiodarone (CORDARONE) 150 mg in dextrose 5 % 100 mL bolus, Once   Followed by  amiodarone (CORDARONE) 450 mg in dextrose 5 % 250 mL infusion, Continuous   Followed by  amiodarone (CORDARONE) 450 mg in dextrose 5 % 250 mL infusion, Continuous  apixaban (ELIQUIS) tablet 5 mg, BID  bisacodyl (DULCOLAX) EC tablet 10 mg, Daily  metoprolol succinate (TOPROL XL) extended release tablet 50 mg, BID  levothyroxine (SYNTHROID) tablet 25 mcg, Daily  donepezil (ARICEPT) tablet 10 mg, Nightly  sertraline (ZOLOFT) tablet 50 mg, Daily  glucose (GLUTOSE) 40 % oral gel 15 g, PRN  glucagon (rDNA) injection 1 mg, PRN  dextrose 5 % solution, PRN  insulin lispro (HUMALOG) injection vial 0-6 Units, Q4H  dextrose bolus (hypoglycemia) 10% 125 mL, PRN   Or  dextrose bolus (hypoglycemia) 10% 250 mL, PRN  prochlorperazine (COMPAZINE) injection 5 mg, Q6H PRN        Review of Systems:   Constitutional: no fevers , no chills , feels ok   Eyes: no eye pain , no itching , no drainage  Ears, nose, mouth, throat, and face: no ear ,nose pain , hearing is ok ,no nasal drainage   Respiratory: no sob ,no cough ,no wheezing . Cardiovascular: no chest pain , no palpitation ,no sob . Gastrointestinal: no nausea, vomiting , constipation , no abdominal pain . Genitourinary:no urinary retention , no burning , dysuria . No polyuria   Hematologic/lymphatic: no bleeding , no cougulation issues . Musculoskeletal:no joint pain , no swelling . Neurological: no headaches ,no weakness , no numbness . Endocrine: no thirst , no weight issues .      Physical exam:   Vital signs /77 Pulse 125   Temp 97.5 °F (36.4 °C) (Infrared)   Resp 16   Ht 5' 1\" (1.549 m)   Wt 120 lb (54.4 kg)   SpO2 93%   BMI 22.67 kg/m²   Gen : NAD , appropriate for stated age . Head : at , nc   Neck : supple , no thyromegaly noted . Eyes : EOMI , PERRLA   CV : RRR , No M/R/G . Lungs: CTAB , no wheezing , good flow heard b/l   Abd : soft , NT , BS + , No Organomegaly appreciated . Skin : soft, dry . Neuro : CN  II-XII grossly intact , no focal neurologic deficit . Psych : cooperative .      Data:   Labs:  CBC with Differential:    Lab Results   Component Value Date    WBC 16.2 02/21/2022    RBC 4.36 02/21/2022    HGB 13.1 02/21/2022    HCT 40.2 02/21/2022     02/21/2022    MCV 92.2 02/21/2022    MCH 30.0 02/21/2022    MCHC 32.6 02/21/2022    RDW 14.6 02/21/2022    NRBC 1.0 02/19/2022    SEGSPCT 81 09/06/2013    METASPCT 2.0 02/19/2022    LYMPHOPCT 9.6 02/21/2022    MONOPCT 8.9 02/21/2022    BASOPCT 0.4 02/21/2022    MONOSABS 1.44 02/21/2022    LYMPHSABS 1.55 02/21/2022    EOSABS 0.25 02/21/2022    BASOSABS 0.06 02/21/2022     CMP:    Lab Results   Component Value Date     02/21/2022    K 4.4 02/21/2022    K 5.1 01/26/2022    CL 95 02/21/2022    CO2 24 02/21/2022    BUN 19 02/21/2022    CREATININE 0.8 02/21/2022    GFRAA >60 02/21/2022    LABGLOM >60 02/21/2022    GLUCOSE 81 02/21/2022    GLUCOSE 75 04/10/2012    PROT 6.1 02/21/2022    LABALBU 2.9 02/21/2022    LABALBU 4.7 04/10/2012    CALCIUM 8.3 02/21/2022    BILITOT 0.6 02/21/2022    ALKPHOS 95 02/21/2022    AST 74 02/21/2022    ALT 70 02/21/2022     Ionized Calcium:  No results found for: IONCA  Magnesium:    Lab Results   Component Value Date    MG 2.7 02/18/2022     Phosphorus:    Lab Results   Component Value Date    PHOS 3.4 01/31/2022     U/A:    Lab Results   Component Value Date    COLORU Yellow 02/18/2022    PHUR 5.0 02/18/2022    WBCUA 1-3 02/18/2022    RBCUA 1-3 02/18/2022    BACTERIA RARE 02/18/2022    CLARITYU Clear 02/18/2022 SPECGRAV 1.025 02/18/2022    LEUKOCYTESUR Negative 02/18/2022    UROBILINOGEN 0.2 02/18/2022    BILIRUBINUR SMALL 02/18/2022    BLOODU Negative 02/18/2022    GLUCOSEU Negative 02/18/2022     Microalbumen/Creatinine ratio:  No components found for: RUCREAT  Iron Saturation:  No components found for: PERCENTFE  TIBC:    Lab Results   Component Value Date    TIBC 191 08/04/2021     FERRITIN:  No results found for: FERRITIN     Imaging:  CT HEAD WO CONTRAST   Final Result   No acute intracranial abnormality. CT ABDOMEN PELVIS WO CONTRAST Additional Contrast? None   Final Result   Sigmoid diverticulosis with wall thickening and associated inflammatory   stranding, compatible with diverticulitis. Adjacent foci of gas which are   possibly extraluminal, raising concern for underlying microperforation. No   extraluminal fluid collection. Bilateral pleural effusions with moderate ascites, possibly reflecting   patient's volume status. XR CHEST PORTABLE   Final Result   Bibasilar atelectasis.              Assessment    -Acute kidney injury related to decrease effective volume due to volume depletion     -Hyponateremia felt to be related to volume depletion and complicated by her drinking good amount of water .    -Metabolic acidosis due to bicarbonate loss with diarrhea along with DAYNA     -Hyperkalemia due to decrease effective volume downgrading renin angiotension system , DAYNA and metabolic acidosis     -Acute diverticulits     -hx of Moderate to severe mitral regurgitation    Plan :     -Cr back to baseline   -She is off iv fluids   -started on lasix per cardiology    -f/u serial BMPs, UO                 Electronically signed by Sarah Lamb MD on 2/21/2022 at 1:01 PM

## 2022-02-21 NOTE — PROGRESS NOTES
Physical Therapy    Physical Therapy Initial Evaluation/Plan of Care    Room #:  2382/9190-11  Patient Name: Pete Knapp  YOB: 1945  MRN: [id number]    Date of Service: 2/22/2022     Tentative placement recommendation: Home Health Physical Therapy with 24/7 assist  Equipment recommendation: Patient has needed equipment       Evaluating Physical Therapist: Luigi Sepulveda, PT  #[id number]      Specific Provider Orders/Date/Referring Provider :  02/21/22 0815   PT eval and treat Start: 02/21/22 0815, End: 02/21/22 0815, ONE TIME, Standing Count: 1 Occurrences, R    Doylene Ser, DO      Admitting Diagnosis:   Hyperkalemia [E87.5]  Hyponatremia [E87.1]  Prolonged Q-T interval on ECG [R94.31]  DAYNA (acute kidney injury) ([address] [N17.9]    Admitted with    nausea, emesis, lower abdominal pain, diarrhea  Critical potassium  Surgery: cardioconversion today  Visit Diagnoses       Codes    DAYNA (acute kidney injury) ([address]    -  Primary N17.9    Hyponatremia     E87.1    Prolonged Q-T interval on ECG     R94.31          Patient Active Problem List   Diagnosis    Pelvic prolapse    Hyperlipidemia    GERD (gastroesophageal reflux disease)    PUD (peptic ulcer disease)    Urinary incontinence    Osteoarthritis    Palpitations    Precordial pain    Thrombophilia ([address]    Alzheimer's disease, unspecified    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    Pain of right foot    Atrial fibrillation with RVR ([address]    Aneurysm ([address]    Atrial fibrillation with rapid ventricular response ([address]    Hyperkalemia    Diverticulitis with microperforation    Sigmoid diverticulitis    PAF (paroxysmal atrial fibrillation) ([address]    Acute decompensated heart failure ([address]    NICM (nonischemic cardiomyopathy) ([address]        ASSESSMENT of Current Deficits Patient exhibits decreased balance and endurance impairing functional mobility, gait distance and tolerance to activity are barriers to d/c and require skilled intervention during hospital stay to attain pre hospital level of function. Decreased strength, balance and endurance  increases patient's risk for fall. PHYSICAL THERAPY  PLAN OF CARE       Physical therapy plan of care is established based on physician order,  patient diagnosis and clinical assessment    Current Treatment Recommendations:    -Standing Balance: Perform strengthening exercises in standing to promote motor control with or without upper extremity support   -Gait: Gait training and Standing activities to improve: base of support, weight shift, weight bearing    -Endurance: Utilize Supervised activities to increase level of endurance to allow for safe functional mobility including transfers and gait     PT long term treatment goals are located in below grid    Patient and or family understand(s) diagnosis, prognosis, and plan of care. Frequency of treatments: Patient will be seen  daily.          Prior Level of Function: Patient ambulated with wheeled walker    Rehab Potential: good    for baseline    Past medical history:   Past Medical History:   Diagnosis Date    Arthritis     Asymptomatic PVCs 4/2012    pt felt flutter, no other sx, holter + pvc's, few runs of SVT   [facility]    History of Holter monitoring     Hyperlipidemia     Macular degeneration     Nausea & vomiting     Pelvic prolapse     PONV (postoperative nausea and vomiting)     TIA (transient ischemic attack)      Past Surgical History:   Procedure Laterality Date    COLONOSCOPY  2012    ENDOSCOPY, COLON, DIAGNOSTIC      EYE SURGERY      macular hole    HERNIA REPAIR      inguinal    HYSTERECTOMY      OTHER SURGICAL HISTORY  sept 2013    ant elevatetransobturator sling rectocele and cystocele enterocele    SKIN BIOPSY      arms    TRANSESOPHAGEAL ECHOCARDIOGRAM N/A 10/20/2020    TRANSESOPHAGEAL ECHOCARDIOGRAM WITH BUBBLE STUDY performed by Shay Trotter MD at [address] [place] [address]:    Precautions: , falls and alarm ,    Social history: Patient lives alone in a ranch home  with 3 steps  to enter bilateral Rail  Tub shower laundry in basement dtr lives close by    Equipment owned: Wheelchair and iGuiders,  [employer], tub transfer bench, grab bars rollator (4 wheeled walker with seat and brakes)     [address]   How much difficulty turning over in bed?: None  How much difficulty sitting down on / standing up from a chair with arms?: A Little  How much difficulty moving from lying on back to sitting on side of bed?: None  How much help from another person moving to and from a bed to a chair?: A Little  How much help from another person needed to walk in hospital room?: A Little  How much help from another person for climbing 3-5 steps with a railing?: A Little  AM-PAC Inpatient Mobility Raw Score : 20  AM-PAC Inpatient T-Scale Score : 47.67  Mobility Inpatient CMS 0-100% Score: 35.83  Mobility Inpatient CMS G-Code Modifier : [address] cleared patient for PT evaluation. The admitting diagnosis and active problem list as listed above have been reviewed prior to the initiation of this evaluation. OBJECTIVE;   Initial Evaluation  Date: 2/21/2022 Treatment Date:     Short Term/ Long Term   Goals   Was pt agreeable to Eval/treatment? Yes  To be met in 3 days   Pain level   0/10        Bed Mobility    Rolling: Independent    Supine to sit:  Independent    Sit to supine: Independent    Scooting: Independent        Transfers Sit to stand: Supervision  from bed, chair and commode  Sit to stand: Independent     Ambulation    25 and 100  feet using  wheeled walker with Supervision    cues for proper hand placement   100 feet using  wheeled walker with Independent    Stair negotiation: ascended and descended   Not assessed     3 steps with rail independent    ROM Within functional limits    Increase range of motion 10% of affected joints    Strength BUE:  refer to OT eval  RLE: 4-/5  LLE:  4-/5  Increase strength in affected mm groups by 1/3 grade   Balance Sitting EOB:  good    Dynamic Standing:  fair with wheeled walker   Sitting EOB:  good    Dynamic Standing: good with wheeled walker      Patient is Alert & Oriented x person, place, time and situation and follows directions    Sensation:  Patient  denies numbness/tingling   Edema:  no   Endurance: good  minus    Vitals: room air   Blood Pressure at rest  Blood Pressure during session    Heart Rate at rest 62 Heart Rate during session 73   SPO2 at rest  %  SPO2 during session  %     Patient education  Patient educated on role of Physical Therapy, risks of immobility, safety and plan of care,  importance of mobility while in hospital  and safety      Patient response to education:   Pt verbalized understanding Pt demonstrated skill Pt requires further education in this area   Yes Partial Yes      Treatment:  Patient practiced and was instructed/facilitated in the following treatment: Patient   Sat edge of bed 5 minutes with Independent to increase dynamic sitting balance and activity tolerance. seated and standing challenges and gait     Therapeutic Exercises:  not performed         At end of session, patient in chair with  call light and phone within reach,  all lines and tubes intact, nursing notified. Patient would benefit from continued skilled Physical Therapy to improve functional independence and quality of life. Patient's/ family goals   home    Time in  26  Time out  430    Total Treatment Time  0 minutes    Evaluation time includes thorough review of current medical information, gathering information on past medical history/social history and prior level of function, completion of standardized testing/informal observation of tasks, assessment of data, and development of Plan of care and goals.      CPT codes:  Low Complexity PT evaluation (13140)  No treatment billed    Kodi Lucia PT

## 2022-02-22 ENCOUNTER — HOSPITAL ENCOUNTER (OUTPATIENT)
Dept: POSTOP/PACU | Age: 77
Discharge: HOME | DRG: 391 | End: 2022-02-22
Payer: MEDICARE

## 2022-02-22 ENCOUNTER — ANESTHESIA (OUTPATIENT)
Dept: POSTOP/PACU | Age: 77
DRG: 391 | End: 2022-02-22
Payer: MEDICARE

## 2022-02-22 VITALS
SYSTOLIC BLOOD PRESSURE: 114 MMHG | DIASTOLIC BLOOD PRESSURE: 57 MMHG | OXYGEN SATURATION: 96 % | RESPIRATION RATE: 16 BRPM | HEART RATE: 60 BPM

## 2022-02-22 VITALS — SYSTOLIC BLOOD PRESSURE: 123 MMHG | DIASTOLIC BLOOD PRESSURE: 53 MMHG | OXYGEN SATURATION: 95 %

## 2022-02-22 LAB
ACANTHOCYTES: ABNORMAL
ALBUMIN SERPL-MCNC: 2.7 G/DL (ref 3.5–5.2)
ALP BLD-CCNC: 94 U/L (ref 35–104)
ALT SERPL-CCNC: 47 U/L (ref 0–32)
ANION GAP SERPL CALCULATED.3IONS-SCNC: 10 MMOL/L (ref 7–16)
ANION GAP SERPL CALCULATED.3IONS-SCNC: 10 MMOL/L (ref 7–16)
ANISOCYTOSIS: ABNORMAL
AST SERPL-CCNC: 40 U/L (ref 0–31)
BASOPHILS ABSOLUTE: 0 E9/L (ref 0–0.2)
BASOPHILS RELATIVE PERCENT: 0.3 % (ref 0–2)
BILIRUB SERPL-MCNC: 0.5 MG/DL (ref 0–1.2)
BUN BLDV-MCNC: 12 MG/DL (ref 6–23)
BUN BLDV-MCNC: 14 MG/DL (ref 6–23)
BURR CELLS: ABNORMAL
CALCIUM SERPL-MCNC: 7.8 MG/DL (ref 8.6–10.2)
CALCIUM SERPL-MCNC: 7.9 MG/DL (ref 8.6–10.2)
CHLORIDE BLD-SCNC: 92 MMOL/L (ref 98–107)
CHLORIDE BLD-SCNC: 94 MMOL/L (ref 98–107)
CO2: 23 MMOL/L (ref 22–29)
CO2: 24 MMOL/L (ref 22–29)
CREAT SERPL-MCNC: 0.8 MG/DL (ref 0.5–1)
CREAT SERPL-MCNC: 0.8 MG/DL (ref 0.5–1)
EKG ATRIAL RATE: 129 BPM
EKG Q-T INTERVAL: 316 MS
EKG QRS DURATION: 102 MS
EKG QTC CALCULATION (BAZETT): 462 MS
EKG R AXIS: 40 DEGREES
EKG T AXIS: 39 DEGREES
EKG VENTRICULAR RATE: 129 BPM
EOSINOPHILS ABSOLUTE: 0.5 E9/L (ref 0.05–0.5)
EOSINOPHILS RELATIVE PERCENT: 2.6 % (ref 0–6)
GFR AFRICAN AMERICAN: >60
GFR AFRICAN AMERICAN: >60
GFR NON-AFRICAN AMERICAN: >60 ML/MIN/1.73
GFR NON-AFRICAN AMERICAN: >60 ML/MIN/1.73
GLUCOSE BLD-MCNC: 116 MG/DL (ref 74–99)
GLUCOSE BLD-MCNC: 96 MG/DL (ref 74–99)
HCT VFR BLD CALC: 43 % (ref 34–48)
HEMOGLOBIN: 13.5 G/DL (ref 11.5–15.5)
LV EF: 43 %
LVEF MODALITY: NORMAL
LYMPHOCYTES ABSOLUTE: 1.15 E9/L (ref 1.5–4)
LYMPHOCYTES RELATIVE PERCENT: 6.1 % (ref 20–42)
MCH RBC QN AUTO: 28.8 PG (ref 26–35)
MCHC RBC AUTO-ENTMCNC: 31.4 % (ref 32–34.5)
MCV RBC AUTO: 91.9 FL (ref 80–99.9)
METER GLUCOSE: 102 MG/DL (ref 74–99)
METER GLUCOSE: 129 MG/DL (ref 74–99)
METER GLUCOSE: 96 MG/DL (ref 74–99)
MONOCYTES ABSOLUTE: 1.15 E9/L (ref 0.1–0.95)
MONOCYTES RELATIVE PERCENT: 6.1 % (ref 2–12)
NEUTROPHILS ABSOLUTE: 16.24 E9/L (ref 1.8–7.3)
NEUTROPHILS RELATIVE PERCENT: 85.2 % (ref 43–80)
NUCLEATED RED BLOOD CELLS: 0.9 /100 WBC
OVALOCYTES: ABNORMAL
PDW BLD-RTO: 14.7 FL (ref 11.5–15)
PLATELET # BLD: 608 E9/L (ref 130–450)
PMV BLD AUTO: 10.5 FL (ref 7–12)
POIKILOCYTES: ABNORMAL
POLYCHROMASIA: ABNORMAL
POTASSIUM SERPL-SCNC: 3.8 MMOL/L (ref 3.5–5)
POTASSIUM SERPL-SCNC: 3.8 MMOL/L (ref 3.5–5)
RBC # BLD: 4.68 E12/L (ref 3.5–5.5)
SCHISTOCYTES: ABNORMAL
SODIUM BLD-SCNC: 126 MMOL/L (ref 132–146)
SODIUM BLD-SCNC: 127 MMOL/L (ref 132–146)
TARGET CELLS: ABNORMAL
TOTAL PROTEIN: 5.6 G/DL (ref 6.4–8.3)
WBC # BLD: 19.1 E9/L (ref 4.5–11.5)

## 2022-02-22 PROCEDURE — 80048 BASIC METABOLIC PNL TOTAL CA: CPT

## 2022-02-22 PROCEDURE — 6370000000 HC RX 637 (ALT 250 FOR IP): Performed by: INTERNAL MEDICINE

## 2022-02-22 PROCEDURE — 97161 PT EVAL LOW COMPLEX 20 MIN: CPT | Performed by: PHYSICAL THERAPIST

## 2022-02-22 PROCEDURE — 6360000002 HC RX W HCPCS: Performed by: INTERNAL MEDICINE

## 2022-02-22 PROCEDURE — 99232 SBSQ HOSP IP/OBS MODERATE 35: CPT | Performed by: SURGERY

## 2022-02-22 PROCEDURE — 2580000003 HC RX 258: Performed by: NURSE ANESTHETIST, CERTIFIED REGISTERED

## 2022-02-22 PROCEDURE — 92960 CARDIOVERSION ELECTRIC EXT: CPT | Performed by: INTERNAL MEDICINE

## 2022-02-22 PROCEDURE — 6360000002 HC RX W HCPCS: Performed by: NURSE ANESTHETIST, CERTIFIED REGISTERED

## 2022-02-22 PROCEDURE — 93325 DOPPLER ECHO COLOR FLOW MAPG: CPT

## 2022-02-22 PROCEDURE — 80053 COMPREHEN METABOLIC PANEL: CPT

## 2022-02-22 PROCEDURE — 2060000000 HC ICU INTERMEDIATE R&B

## 2022-02-22 PROCEDURE — 92960 CARDIOVERSION ELECTRIC EXT: CPT

## 2022-02-22 PROCEDURE — 2580000003 HC RX 258: Performed by: INTERNAL MEDICINE

## 2022-02-22 PROCEDURE — B24BZZ4 ULTRASONOGRAPHY OF HEART WITH AORTA, TRANSESOPHAGEAL: ICD-10-PCS | Performed by: INTERNAL MEDICINE

## 2022-02-22 PROCEDURE — 36415 COLL VENOUS BLD VENIPUNCTURE: CPT

## 2022-02-22 PROCEDURE — 93312 ECHO TRANSESOPHAGEAL: CPT

## 2022-02-22 PROCEDURE — 99024 POSTOP FOLLOW-UP VISIT: CPT | Performed by: INTERNAL MEDICINE

## 2022-02-22 PROCEDURE — 85025 COMPLETE CBC W/AUTO DIFF WBC: CPT

## 2022-02-22 PROCEDURE — 5A2204Z RESTORATION OF CARDIAC RHYTHM, SINGLE: ICD-10-PCS | Performed by: INTERNAL MEDICINE

## 2022-02-22 PROCEDURE — 82962 GLUCOSE BLOOD TEST: CPT

## 2022-02-22 RX ORDER — EPHEDRINE SULFATE 50 MG/ML
INJECTION INTRAVENOUS PRN
Status: DISCONTINUED | OUTPATIENT
Start: 2022-02-22 | End: 2022-02-22 | Stop reason: SDUPTHER

## 2022-02-22 RX ORDER — SODIUM CHLORIDE 9 MG/ML
INJECTION, SOLUTION INTRAVENOUS CONTINUOUS PRN
Status: DISCONTINUED | OUTPATIENT
Start: 2022-02-22 | End: 2022-02-22 | Stop reason: SDUPTHER

## 2022-02-22 RX ORDER — EPHEDRINE SULFATE/0.9% NACL/PF 50 MG/5 ML
SYRINGE (ML) INTRAVENOUS
Status: DISPENSED
Start: 2022-02-22 | End: 2022-02-23

## 2022-02-22 RX ORDER — PROPOFOL 10 MG/ML
INJECTION, EMULSION INTRAVENOUS PRN
Status: DISCONTINUED | OUTPATIENT
Start: 2022-02-22 | End: 2022-02-22 | Stop reason: SDUPTHER

## 2022-02-22 RX ORDER — AMIODARONE HYDROCHLORIDE 200 MG/1
200 TABLET ORAL 2 TIMES DAILY
Status: DISCONTINUED | OUTPATIENT
Start: 2022-02-22 | End: 2022-02-23 | Stop reason: HOSPADM

## 2022-02-22 RX ADMIN — METRONIDAZOLE 500 MG: 500 TABLET ORAL at 20:07

## 2022-02-22 RX ADMIN — METRONIDAZOLE 500 MG: 500 TABLET ORAL at 05:24

## 2022-02-22 RX ADMIN — FUROSEMIDE 20 MG: 10 INJECTION, SOLUTION INTRAMUSCULAR; INTRAVENOUS at 07:59

## 2022-02-22 RX ADMIN — SERTRALINE 50 MG: 50 TABLET, FILM COATED ORAL at 08:00

## 2022-02-22 RX ADMIN — DONEPEZIL HYDROCHLORIDE 10 MG: 5 TABLET, FILM COATED ORAL at 20:06

## 2022-02-22 RX ADMIN — APIXABAN 5 MG: 5 TABLET, FILM COATED ORAL at 08:00

## 2022-02-22 RX ADMIN — SODIUM CHLORIDE, PRESERVATIVE FREE 10 ML: 5 INJECTION INTRAVENOUS at 08:20

## 2022-02-22 RX ADMIN — CEFDINIR 300 MG: 300 CAPSULE ORAL at 20:06

## 2022-02-22 RX ADMIN — DIGOXIN 125 MCG: 250 INJECTION, SOLUTION INTRAMUSCULAR; INTRAVENOUS; PARENTERAL at 08:01

## 2022-02-22 RX ADMIN — SODIUM CHLORIDE, PRESERVATIVE FREE 10 ML: 5 INJECTION INTRAVENOUS at 20:07

## 2022-02-22 RX ADMIN — PROPOFOL 20 MG: 10 INJECTION, EMULSION INTRAVENOUS at 13:27

## 2022-02-22 RX ADMIN — AMIODARONE HYDROCHLORIDE 200 MG: 200 TABLET ORAL at 16:38

## 2022-02-22 RX ADMIN — PROPOFOL 100 MG: 10 INJECTION, EMULSION INTRAVENOUS at 13:25

## 2022-02-22 RX ADMIN — LEVOTHYROXINE SODIUM 25 MCG: 25 TABLET ORAL at 05:24

## 2022-02-22 RX ADMIN — PROPOFOL 20 MG: 10 INJECTION, EMULSION INTRAVENOUS at 13:31

## 2022-02-22 RX ADMIN — FUROSEMIDE 20 MG: 10 INJECTION, SOLUTION INTRAMUSCULAR; INTRAVENOUS at 16:38

## 2022-02-22 RX ADMIN — METRONIDAZOLE 500 MG: 500 TABLET ORAL at 14:59

## 2022-02-22 RX ADMIN — PROPOFOL 20 MG: 10 INJECTION, EMULSION INTRAVENOUS at 13:37

## 2022-02-22 RX ADMIN — EPHEDRINE SULFATE 5 MG: 50 INJECTION INTRAVENOUS at 13:46

## 2022-02-22 RX ADMIN — METOPROLOL SUCCINATE 50 MG: 50 TABLET, EXTENDED RELEASE ORAL at 08:00

## 2022-02-22 RX ADMIN — PROPOFOL 20 MG: 10 INJECTION, EMULSION INTRAVENOUS at 13:29

## 2022-02-22 RX ADMIN — APIXABAN 5 MG: 5 TABLET, FILM COATED ORAL at 20:07

## 2022-02-22 RX ADMIN — METOPROLOL SUCCINATE 50 MG: 50 TABLET, EXTENDED RELEASE ORAL at 20:07

## 2022-02-22 RX ADMIN — PANTOPRAZOLE SODIUM 40 MG: 40 TABLET, DELAYED RELEASE ORAL at 05:24

## 2022-02-22 RX ADMIN — CEFDINIR 300 MG: 300 CAPSULE ORAL at 08:00

## 2022-02-22 RX ADMIN — SODIUM CHLORIDE: 9 INJECTION, SOLUTION INTRAVENOUS at 13:15

## 2022-02-22 ASSESSMENT — PAIN SCALES - GENERAL
PAINLEVEL_OUTOF10: 0

## 2022-02-22 NOTE — ANESTHESIA PRE PROCEDURE
Department of Anesthesiology  Preprocedure Note       Name:  Vic Baker   Age:  64 y.o.  :  1945                                          MRN:  [id number]         Date:  2022      Surgeon: * No surgeons listed *    Procedure: * No procedures listed *    Medications prior to admission:   Prior to Admission medications    Medication Sig Start Date End Date Taking? Authorizing Provider   donepezil (ARICEPT) 10 MG tablet Take 1 tablet by mouth nightly 22   Yrn Rio, DO   benzonatate (TESSALON) 200 MG capsule Take 1 capsule by mouth 3 times daily as needed for Cough 2/2/22 3/4/22  Yrn Rio, DO   potassium chloride (KLOR-CON) 10 MEQ extended release tablet Take 2 tablets by mouth 2 times daily 22   Yrn Rio, DO   levothyroxine (SYNTHROID) 25 MCG tablet Take 1 tablet by mouth Daily 22 Rio, DO   atorvastatin (LIPITOR) 20 MG tablet TAKE ONE TABLET BY MOUTH EVERY EVENING 22 Rio, DO   furosemide (LASIX) 40 MG tablet Take 1 tablet by mouth 2 times daily 22 Rio, DO   metoprolol succinate (TOPROL XL) 50 MG extended release tablet Take 1 tablet by mouth 2 times daily 22   ELIO Leon - CNP   Calcium Polycarbophil (FIBER-CAPS PO) Take 1 capsule by mouth at bedtime    Historical Provider, MD   FOLIC ACID PO Take 1 tablet by mouth daily    Historical Provider, MD   diphenoxylate-atropine (DIPHENATOL) 2.5-0.025 MG per tablet Take 1 tablet by mouth 4 times daily as needed for Diarrhea for up to 30 days.  22  Yrn Pate, DO   ondansetron (ZOFRAN-ODT) 4 MG disintegrating tablet Take 1 tablet by mouth 3 times daily as needed for Nausea 22  Yrn Rio, DO   famotidine (PEPCID) 40 MG tablet Take 1 tablet by mouth every evening 22   Yrn Pate, DO   sertraline (ZOLOFT) 100 MG tablet TAKE 1 TABLET BY MOUTH  DAILY 21   Yrn Pate, DO   apixaban (ELIQUIS) 5 MG TABS tablet Take 1 tablet by mouth 2 times daily 2/15/21   Rylee Butler DO   therapeutic multivitamin-minerals [facility]) tablet Take 1 tablet by mouth daily. Historical Provider, MD       Current medications:    No current facility-administered medications for this encounter. No current outpatient medications on file.      Facility-Administered Medications Ordered in Other Encounters   Medication Dose Route Frequency Provider Last Rate Last Admin    insulin lispro (HUMALOG) injection vial 0-6 Units  0-6 Units SubCUTAneous 4x Daily AC & HS Froilan Montanez DO        cefdinir (OMNICEF) capsule 300 mg  300 mg Oral 2 times per day Nelly Alvarado DO   300 mg at 02/22/22 0800    metroNIDAZOLE (FLAGYL) tablet 500 mg  500 mg Oral 3 times per day Nelly Alvarado DO   500 mg at 02/22/22 0524    pantoprazole (PROTONIX) tablet 40 mg  40 mg Oral QAM AC Nelly Alvarado DO   40 mg at 02/22/22 5854    furosemide (LASIX) injection 20 mg  20 mg IntraVENous BID Caleb Win MD   20 mg at 02/22/22 0759    amiodarone (CORDARONE) 450 mg in dextrose 5 % 250 mL infusion  0.5 mg/min IntraVENous Continuous Caleb Win MD 16.7 mL/hr at 02/22/22 0524 0.5 mg/min at 02/22/22 0524    0.9 % sodium chloride infusion   IntraVENous Continuous Caleb Win MD        sodium chloride flush 0.9 % injection 5-40 mL  5-40 mL IntraVENous 2 times per day Caleb Win MD   10 mL at 02/22/22 0820    sodium chloride flush 0.9 % injection 5-40 mL  5-40 mL IntraVENous PRN Caleb Win MD        0.9 % sodium chloride infusion  25 mL IntraVENous PRN Caleb Win MD        nitroGLYCERIN (NITROSTAT) SL tablet 0.4 mg  0.4 mg SubLINGual Q5 Min PRN Shirley Montanez DO        digoxin (LANOXIN) injection 125 mcg  125 mcg IntraVENous Daily Caleb Win MD   125 mcg at 02/22/22 0801    apixaban (ELIQUIS) tablet 5 mg  5 mg Oral BID Hugo Rebolledo MD   5 mg at 02/22/22 0800    bisacodyl (DULCOLAX) EC tablet 10 mg  10 mg Oral Daily Froilan Montanez DO   10 mg at 02/21/22 4545    metoprolol succinate (TOPROL XL) extended release tablet 50 mg  50 mg Oral BID Juice Green MD   50 mg at 02/22/22 0800    levothyroxine (SYNTHROID) tablet 25 mcg  25 mcg Oral Daily De Leon Pesa Bisel, DO   25 mcg at 02/22/22 0524    donepezil (ARICEPT) tablet 10 mg  10 mg Oral Nightly De Leon Pesa Bisel, DO   10 mg at 02/21/22 2126    sertraline (ZOLOFT) tablet 50 mg  50 mg Oral Daily Froilan E Bisel, DO   50 mg at 02/22/22 0800    glucose (GLUTOSE) 40 % oral gel 15 g  15 g Oral PRN De Leon Pesa Bisel, DO        glucagon (rDNA) injection 1 mg  1 mg IntraMUSCular PRN Froilan E Bisel, DO        dextrose 5 % solution  100 mL/hr IntraVENous PRN Froilan E Bisel, DO        dextrose bolus (hypoglycemia) 10% 125 mL  125 mL IntraVENous PRN Froilan E Bisel, DO        Or    dextrose bolus (hypoglycemia) 10% 250 mL  250 mL IntraVENous PRN De Leon Pesa Bisel, DO        prochlorperazine (COMPAZINE) injection 5 mg  5 mg IntraVENous Q6H PRN Deleta Stanton, DO   5 mg at 02/18/22 2108       Allergies:  No Known Allergies    Problem List:    Patient Active Problem List   Diagnosis Code    Pelvic prolapse N81.9    Hyperlipidemia E78.5    GERD (gastroesophageal reflux disease) K21.9    PUD (peptic ulcer disease) K27.9    Urinary incontinence R32    Osteoarthritis M19.90    Palpitations R00.2    Precordial pain R07.2    Thrombophilia (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 D23.71    Pain of right foot M79.671    Atrial fibrillation with RVR (HCC) I48.91    Aneurysm (HCC) I72.9    Atrial fibrillation with rapid ventricular response (HCC) I48.91    Hyperkalemia E87.5    Diverticulitis with microperforation K57.20    Sigmoid diverticulitis K57.32       Past Medical History:        Diagnosis Date    Arthritis     Asymptomatic PVCs 4/2012    pt felt flutter, no other sx, holter + pvc's, few runs of SVT   [facility]    History of Holter monitoring     Hyperlipidemia     Macular degeneration     Nausea & vomiting     Pelvic prolapse     PONV (postoperative nausea and vomiting)     TIA (transient ischemic attack)        Past Surgical History:        Procedure Laterality Date    COLONOSCOPY  2012    ENDOSCOPY, COLON, DIAGNOSTIC      EYE SURGERY      macular hole    HERNIA REPAIR      inguinal    HYSTERECTOMY      OTHER SURGICAL HISTORY  2013    ant elevatetransobturator sling rectocele and cystocele enterocele    SKIN BIOPSY      arms    TRANSESOPHAGEAL ECHOCARDIOGRAM N/A 10/20/2020    TRANSESOPHAGEAL ECHOCARDIOGRAM WITH BUBBLE STUDY performed by Rosie España MD at [address] History:    Social History     Tobacco Use    Smoking status: Former Smoker     Packs/day: 0.50     Quit date: 1994     Years since quittin.7    Smokeless tobacco: Never Used   Substance Use Topics    Alcohol use: No     Comment: Coffee 1 cup a day                                 Counseling given: Not Answered      Vital Signs (Current):   Vitals:    22 1230   BP: 123/87   Pulse: 113   Resp: 16   SpO2: 94%                                              BP Readings from Last 3 Encounters:   22 129/83   22 123/87   22 122/80       NPO Status:                                                                                 BMI:   Wt Readings from Last 3 Encounters:   22 128 lb 14.4 oz (58.5 kg)   22 121 lb (54.9 kg)   22 116 lb (52.6 kg)     There is no height or weight on file to calculate BMI.    CBC:   Lab Results   Component Value Date    WBC 19.1 2022    RBC 4.68 2022    HGB 13.5 2022    HCT 43.0 2022    MCV 91.9 2022    RDW 14.7 2022     2022       CMP:   Lab Results   Component Value Date     2022    K 3.8 2022    K 5.1 2022    CL 94 2022    CO2 23 2022    BUN 14 2022    CREATININE 0.8 2022    GFRAA >60 02/22/2022    LABGLOM >60 02/22/2022    GLUCOSE 96 02/22/2022    GLUCOSE 75 04/10/2012    PROT 5.6 02/22/2022    CALCIUM 7.9 02/22/2022    BILITOT 0.5 02/22/2022    ALKPHOS 94 02/22/2022    AST 40 02/22/2022    ALT 47 02/22/2022       POC Tests: No results for input(s): POCGLU, POCNA, POCK, POCCL, POCBUN, POCHEMO, POCHCT in the last 72 hours. Coags:   Lab Results   Component Value Date    PROTIME 17.3 12/24/2021    INR 1.5 12/24/2021       HCG (If Applicable): No results found for: PREGTESTUR, PREGSERUM, HCG, HCGQUANT     ABGs: No results found for: PHART, PO2ART, KRI0LOQ, DAE7PIO, BEART, S8OLPGPX     Type & Screen (If Applicable):  No results found for: LABABO, LABRH    Drug/Infectious Status (If Applicable):  No results found for: HIV, HEPCAB    COVID-19 Screening (If Applicable):   Lab Results   Component Value Date    COVID19 Not Detected 01/26/2022    COVID19 Not Detected 01/26/2022         Anesthesia Evaluation  Patient summary reviewed   history of anesthetic complications: PONV. Airway: Mallampati: II  TM distance: >3 FB   Neck ROM: full  Mouth opening: > = 3 FB Dental:    (+) upper dentures and partials      Pulmonary: breath sounds clear to auscultation      (-) not a current smoker                           Cardiovascular:    (+) valvular problems/murmurs (mild AS, mild MR on TTE 9/20): AS and MR, dysrhythmias: SVT and atrial fibrillation, hyperlipidemia        Rhythm: regular  Rate: normal                    Neuro/Psych:   (+) TIA, psychiatric history:            GI/Hepatic/Renal:   (+) GERD:, PUD,           Endo/Other:    (+) hypothyroidism: arthritis:., electrolyte abnormalities (hyponatremia), . Abdominal:         (-) obese       Vascular: negative vascular ROS. Other Findings:               Anesthesia Plan      MAC     ASA 3     (Atrial fibrillation )  Induction: intravenous. Anesthetic plan and risks discussed with patient. Plan discussed with CRNA.         DOS STAFF ADDENDUM:    Pt seen and examined, chart reviewed (including anesthesia, drug and allergy history). Anesthetic plan, risks, benefits, alternatives, and personnel involved discussed with patient. Patient verbalized an understanding and agrees to proceed. Plan discussed with care team members and agreed upon.     Pradeep Joseph MD  Staff Anesthesiologist  1:05 PM        Pradeep Joseph MD   2/22/2022

## 2022-02-22 NOTE — ANESTHESIA POSTPROCEDURE EVALUATION
Department of Anesthesiology  Postprocedure Note    Patient: Jennifer Rodrigez  MRN: [id number]  YOB: 1945  Date of evaluation: 2/22/2022  Time:  2:39 PM     Procedure Summary     Date: 02/22/22 Room / Location: [facility] PACU; [facility] ECHO    Anesthesia Start: 6361 Anesthesia Stop:     Procedure: SJWZ MARYLOU CARDIOVERSION Diagnosis: Afib ([address]    Scheduled Providers:  Responsible Provider:     Anesthesia Type: MAC ASA Status: 3          Anesthesia Type: No value filed. Jaxon Phase I: Jaxon Score: 10    Jaxon Phase II:      Last vitals: Reviewed and per EMR flowsheets.        Anesthesia Post Evaluation    Patient location during evaluation: PACU  Patient participation: complete - patient participated  Level of consciousness: awake  Airway patency: patent  Nausea & Vomiting: no nausea and no vomiting  Complications: no  Cardiovascular status: hemodynamically stable  Respiratory status: acceptable  Hydration status: euvolemic

## 2022-02-22 NOTE — PROGRESS NOTES
[facility] MD Migel Ireland MD    [facility] PATIENT’S [facility] MD Nancy Parrish MD   Progress note  Patient's Name: Kathe Oneil  10:16 AM  2/22/2022      Seen in her room on RA lying flat   On amio drip   Comfortable   No LE edema       History of Present Ilness:      65 y/o F presenting to hospital with cc of abdominal discomfort , feeling tired and poor po intake that has been going on for 2 weeks . Pt had ct abdomen in ED showing her to have acute diverticulitis . Nephrology asked to see for DAYNA   Pt baseline cr around 0.8 -1 . She presented with cr of 2.8   She was also having metabolic acidosis along with hyperkalemia   Pt was started on bicarboane drip with subsequent improvement in her acidosis   She was also started on lokelema with imrpvement in her K down to normal .   Pt seen earlier  Today she is on RA she is alert oriented x3 she appear euvolemic   She is telling me since being hospitalized she is starting to feel better . Past Medical History:   Diagnosis Date    Arthritis     Asymptomatic PVCs 4/2012    pt felt flutter, no other sx, holter + pvc's, few runs of SVT   [place]    History of Holter monitoring     Hyperlipidemia     Macular degeneration     Nausea & vomiting     Pelvic prolapse     PONV (postoperative nausea and vomiting)     TIA (transient ischemic attack)        Past Surgical History:   Procedure Laterality Date    COLONOSCOPY  2012    ENDOSCOPY, COLON, DIAGNOSTIC      EYE SURGERY      macular hole    HERNIA REPAIR      inguinal    HYSTERECTOMY      OTHER SURGICAL HISTORY  sept 2013    ant elevatetransobturator sling rectocele and cystocele enterocele    SKIN BIOPSY      arms    TRANSESOPHAGEAL ECHOCARDIOGRAM N/A 10/20/2020    TRANSESOPHAGEAL ECHOCARDIOGRAM WITH BUBBLE STUDY performed by Alexandru Rayo MD at [address] [facility] reviewed. No pertinent family history.      reports that she quit smoking about 27 years ago. She smoked 0.50 packs per day. She has never used smokeless tobacco. She reports that she does not drink alcohol and does not use drugs. Allergies:  Patient has no known allergies. Current Medications:    cefdinir (OMNICEF) capsule 300 mg, 2 times per day  metroNIDAZOLE (FLAGYL) tablet 500 mg, 3 times per day  pantoprazole (PROTONIX) tablet 40 mg, QAM AC  furosemide (LASIX) injection 20 mg, BID  amiodarone (CORDARONE) 450 mg in dextrose 5 % 250 mL infusion, Continuous  0.9 % sodium chloride infusion, Continuous  sodium chloride flush 0.9 % injection 5-40 mL, 2 times per day  sodium chloride flush 0.9 % injection 5-40 mL, PRN  0.9 % sodium chloride infusion, PRN  nitroGLYCERIN (NITROSTAT) SL tablet 0.4 mg, Q5 Min PRN  digoxin (LANOXIN) injection 125 mcg, Daily  apixaban (ELIQUIS) tablet 5 mg, BID  bisacodyl (DULCOLAX) EC tablet 10 mg, Daily  metoprolol succinate (TOPROL XL) extended release tablet 50 mg, BID  levothyroxine (SYNTHROID) tablet 25 mcg, Daily  donepezil (ARICEPT) tablet 10 mg, Nightly  sertraline (ZOLOFT) tablet 50 mg, Daily  glucose (GLUTOSE) 40 % oral gel 15 g, PRN  glucagon (rDNA) injection 1 mg, PRN  dextrose 5 % solution, PRN  insulin lispro (HUMALOG) injection vial 0-6 Units, Q4H  dextrose bolus (hypoglycemia) 10% 125 mL, PRN   Or  dextrose bolus (hypoglycemia) 10% 250 mL, PRN  prochlorperazine (COMPAZINE) injection 5 mg, Q6H PRN        Review of Systems:   Constitutional: no fevers , no chills , feels ok   Eyes: no eye pain , no itching , no drainage  Ears, nose, mouth, throat, and face: no ear ,nose pain , hearing is ok ,no nasal drainage   Respiratory: no sob ,no cough ,no wheezing . Cardiovascular: no chest pain , no palpitation ,no sob . Gastrointestinal: no nausea, vomiting , constipation , no abdominal pain . Genitourinary:no urinary retention , no burning , dysuria . No polyuria   Hematologic/lymphatic: no bleeding , no cougulation issues .    Musculoskeletal:no joint pain , no swelling . Neurological: no headaches ,no weakness , no numbness . Endocrine: no thirst , no weight issues . Physical exam:   Vital signs /83   Pulse 130   Temp 98.8 °F (37.1 °C) (Infrared)   Resp 18   Ht 5' 1\" (1.549 m)   Wt 128 lb 14.4 oz (58.5 kg)   SpO2 98%   BMI 24.36 kg/m²   Gen : NAD , appropriate for stated age . Head : at , nc   Neck : supple , no thyromegaly noted . Eyes : EOMI , PERRLA   CV : RRR , No M/R/G . Lungs: CTAB , no wheezing , good flow heard b/l   Abd : soft , NT , BS + , No Organomegaly appreciated . Skin : soft, dry . Neuro : CN  II-XII grossly intact , no focal neurologic deficit . Psych : cooperative .      Data:   Labs:  CBC with Differential:    Lab Results   Component Value Date    WBC 19.1 02/22/2022    RBC 4.68 02/22/2022    HGB 13.5 02/22/2022    HCT 43.0 02/22/2022     02/22/2022    MCV 91.9 02/22/2022    MCH 28.8 02/22/2022    MCHC 31.4 02/22/2022    RDW 14.7 02/22/2022    NRBC 0.9 02/22/2022    SEGSPCT 81 09/06/2013    METASPCT 2.0 02/19/2022    LYMPHOPCT 6.1 02/22/2022    MONOPCT 6.1 02/22/2022    BASOPCT 0.3 02/22/2022    MONOSABS 1.15 02/22/2022    LYMPHSABS 1.15 02/22/2022    EOSABS 0.50 02/22/2022    BASOSABS 0.00 02/22/2022     CMP:    Lab Results   Component Value Date     02/22/2022    K 3.8 02/22/2022    K 5.1 01/26/2022    CL 94 02/22/2022    CO2 23 02/22/2022    BUN 14 02/22/2022    CREATININE 0.8 02/22/2022    GFRAA >60 02/22/2022    LABGLOM >60 02/22/2022    GLUCOSE 96 02/22/2022    GLUCOSE 75 04/10/2012    PROT 5.6 02/22/2022    LABALBU 2.7 02/22/2022    LABALBU 4.7 04/10/2012    CALCIUM 7.9 02/22/2022    BILITOT 0.5 02/22/2022    ALKPHOS 94 02/22/2022    AST 40 02/22/2022    ALT 47 02/22/2022     Ionized Calcium:  No results found for: IONCA  Magnesium:    Lab Results   Component Value Date    MG 2.7 02/18/2022     Phosphorus:    Lab Results   Component Value Date    PHOS 3.4 01/31/2022     U/A:    Lab Results   Component Value Date    COLORU Yellow 02/18/2022    PHUR 5.0 02/18/2022    WBCUA 1-3 02/18/2022    RBCUA 1-3 02/18/2022    BACTERIA RARE 02/18/2022    CLARITYU Clear 02/18/2022    SPECGRAV 1.025 02/18/2022    LEUKOCYTESUR Negative 02/18/2022    UROBILINOGEN 0.2 02/18/2022    BILIRUBINUR SMALL 02/18/2022    BLOODU Negative 02/18/2022    GLUCOSEU Negative 02/18/2022     Microalbumen/Creatinine ratio:  No components found for: RUCREAT  Iron Saturation:  No components found for: PERCENTFE  TIBC:    Lab Results   Component Value Date    TIBC 191 08/04/2021     FERRITIN:  No results found for: FERRITIN     Imaging:  CT HEAD WO CONTRAST   Final Result   No acute intracranial abnormality. CT ABDOMEN PELVIS WO CONTRAST Additional Contrast? None   Final Result   Sigmoid diverticulosis with wall thickening and associated inflammatory   stranding, compatible with diverticulitis. Adjacent foci of gas which are   possibly extraluminal, raising concern for underlying microperforation. No   extraluminal fluid collection. Bilateral pleural effusions with moderate ascites, possibly reflecting   patient's volume status. XR CHEST PORTABLE   Final Result   Bibasilar atelectasis. Assessment    -Acute kidney injury related to decrease effective volume due to volume depletion     -Hyponateremia felt to be related to volume depletion and complicated by her drinking good amount of water .    -Metabolic acidosis due to bicarbonate loss with diarrhea along with DAYNA     -Hyperkalemia due to decrease effective volume downgrading renin angiotension system , DAYNA and metabolic acidosis     -Acute diverticulits     -hx of Moderate to severe mitral regurgitation    Plan :      DAYNA resolved   On amio drip   Lying flat on RA   JVD 1+                     Electronically signed by Malina Kolb MD on 2/22/2022 at 10:16 AM

## 2022-02-22 NOTE — PROGRESS NOTES
GENERAL SURGERY  DAILY PROGRESS NOTE  2/22/2022    CHIEF COMPLAINT:  Chief Complaint   Patient presents with    Nausea     x2 days    Emesis       SUBJECTIVE:  Continues to feel well. No abdominal pain, nausea or vomiting  NPO for MARYLOU today    OBJECTIVE:  /77   Pulse 101   Temp 97.7 °F (36.5 °C) (Infrared)   Resp 16   Ht 5' 1\" (1.549 m)   Wt 128 lb 14.4 oz (58.5 kg)   SpO2 94%   BMI 24.36 kg/m²     GENERAL:  NAD. A&Ox3. LUNGS:  No increased work of breathing. CARDIOVASCULAR: RR  ABDOMEN:  Soft, non-distended, non-tender. No guarding, rigidity, rebound. ASSESSMENT/PLAN:  64 y.o. female with acute diverticulitis with microperforation, leukocytosis, DAYNA. AFib RVR -- cardiology following, on amio gtt, for MARYLOU & cardioversion  Abdominal exam remains benign  OK for diet from surgery POV after procedures    Rachel Lai,   Surgery Resident PGY-4  2/22/2022  7:14 AM      General Surgery Progress Note  [facility]    Patient's Name/Date of Birth: Ruby Dean / 1945    Date: February 22, 2022     Surgeon: Shad Ríos MD    Chief Complaint: diverticulitis    Patient Active Problem List   Diagnosis    Pelvic prolapse    Hyperlipidemia    GERD (gastroesophageal reflux disease)    PUD (peptic ulcer disease)    Urinary incontinence    Osteoarthritis    Palpitations    Precordial pain    Thrombophilia ([address]    Alzheimer's disease, unspecified    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 Tolerating reg diet.      Objective:  /83   Pulse 130   Temp 98.8 °F (37.1 °C) (Infrared)   Resp 18   Ht 5' 1\" (1.549 m)   Wt 128 lb 14.4 oz (58.5 kg)   SpO2 98%   BMI 24.36 kg/m²   Labs:  Recent Labs     02/20/22  7671 02/21/22  0536 02/22/22  0440   WBC 16.7* 16.2* 19.1*   HGB 12.7 13.1 13.5   HCT 39.5 40.2 43.0     Lab Results   Component Value Date    CREATININE 0.8 02/22/2022    BUN 14 02/22/2022     (L) 02/22/2022    K 3.8 02/22/2022    CL 94 (L) 02/22/2022    CO2 23 02/22/2022     No results for input(s): LIPASE, AMYLASE in the last 72 hours. CBC with Differential:    Lab Results   Component Value Date    WBC 19.1 02/22/2022    RBC 4.68 02/22/2022    HGB 13.5 02/22/2022    HCT 43.0 02/22/2022     02/22/2022    MCV 91.9 02/22/2022    MCH 28.8 02/22/2022    MCHC 31.4 02/22/2022    RDW 14.7 02/22/2022    NRBC 0.9 02/22/2022    SEGSPCT 81 09/06/2013    METASPCT 2.0 02/19/2022    LYMPHOPCT 6.1 02/22/2022    MONOPCT 6.1 02/22/2022    BASOPCT 0.3 02/22/2022    MONOSABS 1.15 02/22/2022    LYMPHSABS 1.15 02/22/2022    EOSABS 0.50 02/22/2022    BASOSABS 0.00 02/22/2022     CMP:    Lab Results   Component Value Date     02/22/2022    K 3.8 02/22/2022    K 5.1 01/26/2022    CL 94 02/22/2022    CO2 23 02/22/2022    BUN 14 02/22/2022    CREATININE 0.8 02/22/2022    GFRAA >60 02/22/2022    LABGLOM >60 02/22/2022    GLUCOSE 96 02/22/2022    GLUCOSE 75 04/10/2012    PROT 5.6 02/22/2022    LABALBU 2.7 02/22/2022    LABALBU 4.7 04/10/2012    CALCIUM 7.9 02/22/2022    BILITOT 0.5 02/22/2022    ALKPHOS 94 02/22/2022    AST 40 02/22/2022    ALT 47 02/22/2022       General appearance:  NAD  Head: NCAT, PERRLA, EOMI, red conjunctiva  Neck: supple, no masses  Lungs: CTAB, equal chest rise bilateral  Heart: Reg rate  Abdomen: soft, nondistended, nontender, no guarding or rebound.   Skin; no lesions  Gu: no cva tenderness  Extremities: extremities normal, atraumatic, no cyanosis or edema      Assessment/Plan:  Bj Anderson is a 64 y.o. female with acute diverticulitis of the sigmoid colon with microperforation, leukocytosis, DAYNA    Stable from a GI standpoint  Low fiber diet  Continue IV antibiotics - mild increase in leukocytosis today - monitor  Stool softener  For MARYLOU today    Ward Gomez MD  2/22/2022  9:39 AM

## 2022-02-22 NOTE — PLAN OF CARE
Problem: Skin Integrity:  Goal: Will show no infection signs and symptoms  Description: Will show no infection signs and symptoms  Outcome: Met This Shift  Goal: Absence of new skin breakdown  Description: Absence of new skin breakdown  Outcome: Met This Shift     Problem: Falls - Risk of:  Goal: Will remain free from falls  Description: Will remain free from falls  2/22/2022 1027 by Hanna Bonilla RN  Outcome: Met This Shift  2/22/2022 0314 by Félix Irwin RN  Outcome: Met This Shift  Goal: Absence of physical injury  Description: Absence of physical injury  2/22/2022 1027 by Hanna Bonilla RN  Outcome: Met This Shift  2/22/2022 0314 by Félix Irwin RN  Outcome: Met This Shift     Problem: Pain:  Goal: Pain level will decrease  Description: Pain level will decrease  2/22/2022 1027 by Hanna Bonilla RN  Outcome: Met This Shift  2/22/2022 0314 by Félix Irwin RN  Outcome: Met This Shift  Goal: Control of acute pain  Description: Control of acute pain  2/22/2022 1027 by Hanna Bonilla RN  Outcome: Met This Shift  2/22/2022 0314 by Félix Irwin RN  Outcome: Met This Shift  Goal: Control of chronic pain  Description: Control of chronic pain  2/22/2022 1027 by Hanna Bonilla RN  Outcome: Met This Shift  2/22/2022 0314 by Félix Irwin RN  Outcome: Met This Shift

## 2022-02-22 NOTE — CARE COORDINATION
SOCIAL WORK / DISCHARGE PLANNING:  COVID testing not done. Pt out of room for MARYLOU with cardioversion. Previous notes indicate plan home alone as previous, with [address], [address] at [place]. Family supportive, assist with shopping, cleaning and transportation. Has all necessary dme. Addendum: 427pm. Sw met with pt's dtr and JAYDEN who express concern that pt needs short term stay at [place] [id number] for rehab. Pt has had multiple admissions recently. If pt is agreeable they would like [facility] or maybe [facility], as dtr works there. Per [facility] rep currently not beds until 2/24. NO PRECERT needed. Sw will discuss with pt and also see if physician will follow.            Electronically signed by MERCEDES Rodriguez on 2/22/2022 at 2:48 PM

## 2022-02-22 NOTE — PROGRESS NOTES
Internal Medicine Progress Note    [facility]    [facility]. Ari To., SHANTANUOCHAR. Alexsandra Barton D.O., KIRILL Cordova, MSN, APRN, NP-C  Anai Caro. Soco Hightower, MSN, APRN-CNP     Primary Care Physician: Monica Luu DO   Admitting Physician:  Valeria Mireles DO  Admission date and time: 2/18/2022  4:46 AM    Room:  [address] [place], [place] [address]  Admitting diagnosis: Hyperkalemia [E87.5]  Hyponatremia [E87.1]  Prolonged Q-T interval on ECG [R94.31]  DAYNA (acute kidney injury) [facility]) [N17.9]    Patient Name: Lis Galvez  MRN: [id number]    Date of Service: 2/22/2022     Subjective:  Hari Downey is a 64 y.o. female who was seen and examined today,2/22/2022, at the bedside. Hari Downey flipped back into atrial fibrillation with rapid ventricular response yesterday. She is maintained on an amiodarone infusion with plans for cardioversion today. She is otherwise feeling well and has no new complaints. Nursing was present during the examination and states her heart rate reached 170s with ambulation yesterday. Review of System:   Constitutional:   Improving malaise and fatigue. HEENT:   Denies ear pain, sore throat, sinus or eye problems. Cardiovascular:   Denies any chest pain, irregular heartbeats, or palpitations. Still having occasional fast heart rate  Respiratory:   Denies shortness of breath, coughing, sputum production, hemoptysis, or wheezing. Gastrointestinal:   Resolution of her presenting abdominal pain. No nausea. Tolerating a diet. Genitourinary:    Currently voiding with use of a Pino catheter which is causing irritation. Extremities:   Denies lower extremity swelling, edema or cyanosis. Neurology:    Denies any headache or focal neurological deficits. Admits to chronic weakness and deconditioning. Psch:   Denies being anxious or depressed. Musculoskeletal:    Denies  myalgias, joint complaints or back pain.    Integumentary: Denies any rashes, ulcers, or excoriations. Denies bruising. Hematologic/Lymphatic:  Denies bruising or bleeding. Physical Exam:  I/O this shift:  In: -   Out: 100 [Urine:100]    Intake/Output Summary (Last 24 hours) at 2/22/2022 0850  Last data filed at 2/22/2022 0807  Gross per 24 hour   Intake 458 ml   Output 2450 ml   Net -1992 ml   I/O last 3 completed shifts: In: 458 [P.O.:240; I.V.:218]  Out: 4150 [Urine:4150]  Patient Vitals for the past 96 hrs (Last 3 readings):   Weight   02/21/22 1245 128 lb 14.4 oz (58.5 kg)     Vital Signs:   Blood pressure 129/83, pulse 130, temperature 98.8 °F (37.1 °C), temperature source Infrared, resp. rate 18, height 5' 1\" (1.549 m), weight 128 lb 14.4 oz (58.5 kg), SpO2 98 %, not currently breastfeeding. General appearance:  Alert, responsive, oriented to person, place, and time. Well preserved, alert, no distress. Head:  Normocephalic. No masses, lesions or tenderness. Eyes:  PERRLA. EOMI. Sclera clear. Buccal mucosa moist.  ENT:  Ears normal. Mucosa normal.  Neck:    Supple. Trachea midline. No thyromegaly. No JVD. No bruits. Heart:    Atrial fibrillation with current rate control. Systolic murmurs appreciated  Lungs:    Symmetrical. Clear to auscultation bilaterally. No wheezes. No rhonchi. No rales. Abdomen:   Very mildly tender to palpation diffusely with voluntary guarding elicited. No rebound tenderness. No peritoneal signs. Extremities:    Peripheral pulses present. No peripheral edema. No ulcers. No cyanosis. No clubbing. Neurologic:    Alert x 3. No focal deficit. Cranial nerves grossly intact. No focal weakness. Psych:   Behavior is normal. Mood appears normal. Speech is not rapid and/or pressured. Musculoskeletal:   Spine ROM normal. Moves all extremities. Gait not assessed. Integumentary:  No rashes  Skin normal color and texture. Genitalia/Breast:  Voiding with use of a Pino catheter.     Medication:  Scheduled Meds:   cefdinir  300 mg Oral 2 times per day    metroNIDAZOLE  500 mg Oral 3 times per day    pantoprazole  40 mg Oral QAM AC    furosemide  20 mg IntraVENous BID    sodium chloride flush  5-40 mL IntraVENous 2 times per day    digoxin  125 mcg IntraVENous Daily    apixaban  5 mg Oral BID    bisacodyl  10 mg Oral Daily    metoprolol succinate  50 mg Oral BID    levothyroxine  25 mcg Oral Daily    donepezil  10 mg Oral Nightly    sertraline  50 mg Oral Daily    insulin lispro  0-6 Units SubCUTAneous Q4H     Continuous Infusions:   amiodarone 0.5 mg/min (02/22/22 0524)    sodium chloride      sodium chloride      dextrose         Objective Data:  CBC with Differential:    Lab Results   Component Value Date    WBC 19.1 02/22/2022    RBC 4.68 02/22/2022    HGB 13.5 02/22/2022    HCT 43.0 02/22/2022     02/22/2022    MCV 91.9 02/22/2022    MCH 28.8 02/22/2022    MCHC 31.4 02/22/2022    RDW 14.7 02/22/2022    NRBC 0.9 02/22/2022    SEGSPCT 81 09/06/2013    METASPCT 2.0 02/19/2022    LYMPHOPCT 6.1 02/22/2022    MONOPCT 6.1 02/22/2022    BASOPCT 0.3 02/22/2022    MONOSABS 1.15 02/22/2022    LYMPHSABS 1.15 02/22/2022    EOSABS 0.50 02/22/2022    BASOSABS 0.00 02/22/2022     CMP:    Lab Results   Component Value Date     02/22/2022    K 3.8 02/22/2022    K 5.1 01/26/2022    CL 94 02/22/2022    CO2 23 02/22/2022    BUN 14 02/22/2022    CREATININE 0.8 02/22/2022    GFRAA >60 02/22/2022    LABGLOM >60 02/22/2022    GLUCOSE 96 02/22/2022    GLUCOSE 75 04/10/2012    PROT 5.6 02/22/2022    LABALBU 2.7 02/22/2022    LABALBU 4.7 04/10/2012    CALCIUM 7.9 02/22/2022    BILITOT 0.5 02/22/2022    ALKPHOS 94 02/22/2022    AST 40 02/22/2022    ALT 47 02/22/2022       Assessment:  1. Acute diverticulitis with microperforation  2. Paroxysmal atrial fibrillation with variable ventricular response  3. Acute renal failure with resolution  4. Chronic, compensated diastolic congestive heart failure  5. Hyperlipidemia  6.  Moderate to severe mitral regurgitation  7. Macular degeneration  8. History of tobacco abuse  9. History of TIAs  10. Gastroesophageal reflux disease  11. Hx of [place] appendage thrombus    Plan:   Jessika Ngo is doing very well from a diverticular standpoint and has been transitioned to oral antibiotic therapy. Unfortunately, she continues to deal with paroxysmal atrial fibrillation and developed rapid ventricular response yesterday. Amiodarone infusion is being employed. She last underwent cardioversion January 31 and will undergo repeat cardioversion today. Chronic comorbidities are otherwise well controlled. We appreciate the input from the cardiovascular team.    More than 50% of my  time was spent at the bedside counseling/coordinating care with the patient and/or family with face to face contact. This time was spent reviewing notes and laboratory data as well as instructing and counseling the patient. Time I spent with the family or surrogate(s) is included only if the patient was incapable of providing the necessary information or participating in medical decisions. I also discussed the differential diagnosis and all of the proposed management plans with the patient and individuals accompanying the patient. Jessica Blanco requires this high level of physician care and nursing on the Telemetry unit due the complexity of decision management and chance of rapid decline or death. Continued cardiac monitoring and higher level of nursing are required. I am readily available for any further decision-making and intervention.          Tyrel Salmon DO, D.O., [place]  8:50 AM  2/22/2022

## 2022-02-22 NOTE — PROGRESS NOTES
INPATIENT CARDIOLOGY FOLLOW-UP    Name: Linda Rivera    Age: 64 y.o. Date of Admission: 2/18/2022  4:46 AM    Date of Service: 2/22/2022    Primary Cardiologist: Dr. Polo Diaz    Chief Complaint: Follow-up for atrial fibrillation with RVR    Interim History:  No new overnight cardiac complaints. Currently with no complaints of CP, SOB, palpitations, dizziness, or lightheadedness. In atrial fibrillation with rates ranging from 100 to 140 bpm.    Underwent successful MARYLOU guided cardioversion today. Now back to normal sinus rhythm. 1.8 L negative since yesterday.   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    Pain of right foot    Atrial fibrillation with RVR ([address]    Aneurysm ([address]    Atrial fibrillation with rapid ventricular response ([address]    Hyperkalemia    Diverticulitis with microperforation    Sigmoid diverticulitis       Current Medications:    Current Facility-Administered Medications:     insulin lispro (HUMALOG) injection vial 0-6 Units, 0-6 Units, SubCUTAneous, 4x Daily AC & HS, Froilan Montanez DO    ePHEDrine 50-0.9 MG/5ML-% injection, , , ,     amiodarone (CORDARONE) tablet 200 mg, 200 mg, Oral, BID, Zena Dickson MD    cefdinir (OMNICEF) capsule 300 mg, 300 mg, Oral, 2 times per day, [employer], DO, 300 mg at 02/22/22 0800    metroNIDAZOLE (FLAGYL) tablet 500 mg, 500 mg, Oral, 3 times per day, [employer], DO, 500 mg at 02/22/22 1459    pantoprazole (PROTONIX) tablet 40 mg, 40 mg, Oral, [facility] Smith PICKENS DO, 40 mg at 02/22/22 0524    furosemide (LASIX) injection 20 mg, 20 mg, IntraVENous, BID, Salbador Yates, MD, 20 mg at 22 0759    [COMPLETED] amiodarone (CORDARONE) 150 mg in dextrose 5 % 100 mL bolus, 150 mg, IntraVENous, Once, Stopped at 22 1351 **FOLLOWED BY** [] amiodarone (CORDARONE) 450 mg in dextrose 5 % 250 mL infusion, 1 mg/min, IntraVENous, Continuous, Last Rate: 33.3 mL/hr at 22 1343, 1 mg/min at 22 1343 **FOLLOWED BY** amiodarone (CORDARONE) 450 mg in dextrose 5 % 250 mL infusion, 0.5 mg/min, IntraVENous, Continuous, Svitlana Tierney MD, Stopped at 22 1509    0.9 % sodium chloride infusion, , IntraVENous, Continuous, Svitlana Tierney MD    sodium chloride flush 0.9 % injection 5-40 mL, 5-40 mL, IntraVENous, 2 times per day, Svitlana Tierney MD, 10 mL at 22 0820    sodium chloride flush 0.9 % injection 5-40 mL, 5-40 mL, IntraVENous, PRN, Svitlana Tierney MD    0.9 % sodium chloride infusion, 25 mL, IntraVENous, PRN, Svitlana Tierney MD    nitroGLYCERIN (NITROSTAT) SL tablet 0.4 mg, 0.4 mg, SubLINGual, Q5 Min PRN, Abdon Montanez DO    digoxin (LANOXIN) injection 125 mcg, 125 mcg, IntraVENous, Daily, Svitlana Tierney MD, 125 mcg at 22 0801    apixaban (ELIQUIS) tablet 5 mg, 5 mg, Oral, BID, Karen Naik MD, 5 mg at 22 0800    bisacodyl (DULCOLAX) EC tablet 10 mg, 10 mg, Oral, Daily, Froilan Montanez DO, 10 mg at 22 0159    metoprolol succinate (TOPROL XL) extended release tablet 50 mg, 50 mg, Oral, BID, Karen Naik MD, 50 mg at 22 0800    levothyroxine (SYNTHROID) tablet 25 mcg, 25 mcg, Oral, Daily, Abdon Montanez DO, 25 mcg at 22 0524    donepezil (ARICEPT) tablet 10 mg, 10 mg, Oral, Nightly, Abdon Montanez DO, 10 mg at 226    sertraline (ZOLOFT) tablet 50 mg, 50 mg, Oral, Daily, Froilan Montanez DO, 50 mg at 22 0800    glucose (GLUTOSE) 40 % oral gel 15 g, 15 g, Oral, PRN, Abdon Montanez DO    glucagon (rDNA) injection 1 mg, 1 mg, IntraMUSCular, PRN, Froilan Montanez DO    dextrose 5 % solution, 100 mL/hr, IntraVENous, PRN, [place] [employer] Bisel, DO    dextrose bolus (hypoglycemia) 10% 125 mL, 125 mL, IntraVENous, PRN **OR** dextrose bolus (hypoglycemia) 10% 250 mL, 250 mL, IntraVENous, PRN, Korin Coins Bisel, DO    prochlorperazine (COMPAZINE) injection 5 mg, 5 mg, IntraVENous, Q6H PRN, Aaron Dee, DO, 5 mg at 02/18/22 1646    Physical Exam:  /60   Pulse 67   Temp 98 °F (36.7 °C) (Infrared)   Resp 18   Ht 5' 1\" (1.549 m)   Wt 128 lb 14.4 oz (58.5 kg)   SpO2 93%   BMI 24.36 kg/m²   Wt Readings from Last 3 Encounters:   02/21/22 128 lb 14.4 oz (58.5 kg)   02/16/22 121 lb (54.9 kg)   02/02/22 116 lb (52.6 kg)     Appearance: Awake, alert, no acute respiratory distress  Skin: Intact, no rash  Head: Normocephalic, atraumatic  Eyes: EOMI, no conjunctival erythema  ENMT: No pharyngeal erythema, MMM, no rhinorrhea  Neck: Supple, no elevated JVP, no carotid bruits  Lungs: Clear to auscultation bilaterally. No wheezes, rales, or rhonchi. Cardiac: PMI nondisplaced, Regular rhythm with a normal rate, S1 & S2 normal, no murmurs  Abdomen: Soft, nontender, +bowel sounds  Extremities: Moves all extremities x 4, no lower extremity edema  Neurologic: No focal motor deficits apparent, normal mood and affect  Peripheral Pulses: Intact posterior tibial pulses bilaterally    Intake/Output:    Intake/Output Summary (Last 24 hours) at 2/22/2022 1541  Last data filed at 2/22/2022 1354  Gross per 24 hour   Intake 518 ml   Output 1650 ml   Net -1132 ml     I/O this shift:   In: 400 [I.V.:400]  Out: 650 [Urine:650]    Laboratory Tests:  Recent Labs     02/21/22  0536 02/21/22  1734 02/22/22  0440   * 129* 127*   K 4.4 5.0 3.8   CL 95* 93* 94*   CO2 24 25 23   BUN 19 15 14   CREATININE 0.8 0.7 0.8   GLUCOSE 81 95 96   CALCIUM 8.3* 8.4* 7.9*     Lab Results   Component Value Date    MG 2.7 02/18/2022     Recent Labs     02/20/22  0436 02/21/22  0536 02/22/22  0440   ALKPHOS 123* 95 94   ALT 93* 70* 47*   * 74* 40*   PROT 5. 4* 6.1* 5.6*   BILITOT 0.6 0.6 0.5   LABALBU 2.7* 2.9* 2.7*     Recent Labs     02/20/22  0436 02/21/22  0536 02/22/22  0440   WBC 16.7* 16.2* 19.1*   RBC 4.34 4.36 4.68   HGB 12.7 13.1 13.5   HCT 39.5 40.2 43.0   MCV 91.0 92.2 91.9   MCH 29.3 30.0 28.8   MCHC 32.2 32.6 31.4*   RDW 14.6 14.6 14.7   * 588* 608*   MPV 10.7 10.5 10.5     Lab Results   Component Value Date    CKTOTAL 107 09/06/2013    CKMB 1.4 09/06/2013    TROPONINI 0.02 09/06/2013     Lab Results   Component Value Date    INR 1.5 12/24/2021    PROTIME 17.3 (H) 12/24/2021     Lab Results   Component Value Date    TSH 1.360 01/27/2022     Lab Results   Component Value Date    LABA1C 5.5 11/03/2021     No results found for: EAG  Lab Results   Component Value Date    CHOL 133 01/27/2022    CHOL 182 11/03/2021    CHOL 235 (H) 08/04/2021     Lab Results   Component Value Date    TRIG 54 01/27/2022    TRIG 98 11/03/2021    TRIG 104 08/04/2021     Lab Results   Component Value Date    HDL 37 01/27/2022    HDL 56 11/03/2021    HDL 54 08/04/2021     Lab Results   Component Value Date    LDLCALC 85 01/27/2022    LDLCALC 106 (H) 11/03/2021    LDLCALC 160 (H) 08/04/2021     Lab Results   Component Value Date    LABVLDL 11 01/27/2022    LABVLDL 20 11/03/2021    LABVLDL 21 08/04/2021     No results found for: CHOLHDLRATIO  No results for input(s): PROBNP in the last 72 hours. Cardiac Tests:  MARYLOU 2/22/22:   Ejection fraction is visually estimated at 40-45%. Atrial fibrillation   affects the accuracy of interpretation. Right ventricle global systolic function is low normal .   No evidence of thrombus within left atrium/ left atrial appendage. Emptying velocity is low at 19 cms/s. No evidence of thrombus or mass in the right atrium. Mild to moderate mitral regurgitation is present. Mild-to-moderate aortic regurgitation is noted. Mild to moderate tricuspid regurgitation.    Mild-moderate focal atherosclerosis in the descending aorta.    ASSESSMENT/PLAN:         Paroxysmal atrial fibrillation RVR.  Had DCCV 1/31/22 -IV Cardizem was weaned yesterday. Toprol-XL was increased to 50 twice daily.; Continue Apixaban for [address]. This is her second episode of atrial fibrillation this year. Successful does not show any recurrence guided cardioversion. On IV amiodarone currently. We will start amiodarone 200 mg twice daily. To be decreased to 200 mg daily in 1 week. Stop drip 1 to 2 hours after first dose is given. She has been compliant  with her anticoagulation but does have a history of left atrial appendage thrombus. MARYLOU did not show any recurrence of left atrial appendage thrombus     Acute on chronic HFrEF - EF 35%; 1.8 L negative since yesterday. Overall 6 L negative this admission.  She does appear hypervolemic on examination. Continue Lasix 20 IV twice daily for 1 more day. We will likely switch to Demadex 20 mg daily starting from tomorrow. Cardiomyopathy - EF 35% by MARYLOU 1/31/22 - Normal EF in 10/2021, Likely tachycardia mediated.  Nonischemic Stress test in 12/2021, EF 53%. Continue Metoprolol, If BP tolerate add Entresto.  Later add Sierra Ringling further testing, She is DNR CCA. Will reassess EF after 3 months of maintenance of sinus rhythm.     History of Left atrial appendage thrombus in 10/2020 - Resolved by MARYLOU 1/31/2022 - On Eliquis     Borderline elevated troponin - Does not consistent with ACS, likely demand ischemia from CHF, tachycardia, No CP     Hypokalemia - Supplemented potassium     DAYNA - Better - Monitor Cratiniene      VHD - Moderate MR, Mild AR and TR     History of TIA     Hyperlipidemia - On Statin     Hypothyroidism - On HRT. Lauren Freeman MD, [address] Cardiology    NOTE: This report was transcribed using voice recognition software. Every effort was made to ensure accuracy; however, inadvertent computerized transcription errors may be present.

## 2022-02-22 NOTE — PROGRESS NOTES
1230 Patient arrived to PACU and hooked up to monitor. 0 Dr. Jay Lisa arrived to pact  1325 Time out preformed  1326 Patient pre medicated  1327 Marva started  1333 marva finished  1334 Patient shocked in sync mode at 150 joules. 1338 Patient shocked in sync mode at 200 joules and converted to SB.   1354 patient resting comfortabley in Pacu , not complaining of any pain.

## 2022-02-23 VITALS
TEMPERATURE: 98 F | SYSTOLIC BLOOD PRESSURE: 118 MMHG | OXYGEN SATURATION: 98 % | WEIGHT: 128.9 LBS | HEART RATE: 62 BPM | HEIGHT: 61 IN | RESPIRATION RATE: 18 BRPM | BODY MASS INDEX: 24.34 KG/M2 | DIASTOLIC BLOOD PRESSURE: 57 MMHG

## 2022-02-23 LAB
ALBUMIN SERPL-MCNC: 2.8 G/DL (ref 3.5–5.2)
ALP BLD-CCNC: 85 U/L (ref 35–104)
ALT SERPL-CCNC: 37 U/L (ref 0–32)
ANION GAP SERPL CALCULATED.3IONS-SCNC: 9 MMOL/L (ref 7–16)
AST SERPL-CCNC: 36 U/L (ref 0–31)
BASOPHILS ABSOLUTE: 0 E9/L (ref 0–0.2)
BASOPHILS RELATIVE PERCENT: 0.4 % (ref 0–2)
BILIRUB SERPL-MCNC: 0.5 MG/DL (ref 0–1.2)
BLOOD CULTURE, ROUTINE: NORMAL
BUN BLDV-MCNC: 18 MG/DL (ref 6–23)
BURR CELLS: ABNORMAL
CALCIUM SERPL-MCNC: 8.1 MG/DL (ref 8.6–10.2)
CHLORIDE BLD-SCNC: 96 MMOL/L (ref 98–107)
CO2: 26 MMOL/L (ref 22–29)
CREAT SERPL-MCNC: 0.7 MG/DL (ref 0.5–1)
CULTURE, BLOOD 2: NORMAL
EOSINOPHILS ABSOLUTE: 0.46 E9/L (ref 0.05–0.5)
EOSINOPHILS RELATIVE PERCENT: 2.6 % (ref 0–6)
GFR AFRICAN AMERICAN: >60
GFR NON-AFRICAN AMERICAN: >60 ML/MIN/1.73
GLUCOSE BLD-MCNC: 94 MG/DL (ref 74–99)
HCT VFR BLD CALC: 39.9 % (ref 34–48)
HEMOGLOBIN: 12.8 G/DL (ref 11.5–15.5)
LYMPHOCYTES ABSOLUTE: 0.71 E9/L (ref 1.5–4)
LYMPHOCYTES RELATIVE PERCENT: 4.4 % (ref 20–42)
MCH RBC QN AUTO: 29.4 PG (ref 26–35)
MCHC RBC AUTO-ENTMCNC: 32.1 % (ref 32–34.5)
MCV RBC AUTO: 91.5 FL (ref 80–99.9)
METER GLUCOSE: 122 MG/DL (ref 74–99)
METER GLUCOSE: 86 MG/DL (ref 74–99)
MONOCYTES ABSOLUTE: 1.07 E9/L (ref 0.1–0.95)
MONOCYTES RELATIVE PERCENT: 6.1 % (ref 2–12)
NEUTROPHILS ABSOLUTE: 15.49 E9/L (ref 1.8–7.3)
NEUTROPHILS RELATIVE PERCENT: 86.8 % (ref 43–80)
OVALOCYTES: ABNORMAL
PDW BLD-RTO: 14.8 FL (ref 11.5–15)
PLATELET # BLD: 562 E9/L (ref 130–450)
PMV BLD AUTO: 10 FL (ref 7–12)
POIKILOCYTES: ABNORMAL
POLYCHROMASIA: ABNORMAL
POTASSIUM SERPL-SCNC: 4.2 MMOL/L (ref 3.5–5)
RBC # BLD: 4.36 E12/L (ref 3.5–5.5)
SARS-COV-2, NAAT: NOT DETECTED
SODIUM BLD-SCNC: 131 MMOL/L (ref 132–146)
TOTAL PROTEIN: 5.7 G/DL (ref 6.4–8.3)
WBC # BLD: 17.8 E9/L (ref 4.5–11.5)

## 2022-02-23 PROCEDURE — 80053 COMPREHEN METABOLIC PANEL: CPT

## 2022-02-23 PROCEDURE — 6370000000 HC RX 637 (ALT 250 FOR IP): Performed by: INTERNAL MEDICINE

## 2022-02-23 PROCEDURE — 82962 GLUCOSE BLOOD TEST: CPT

## 2022-02-23 PROCEDURE — 36415 COLL VENOUS BLD VENIPUNCTURE: CPT

## 2022-02-23 PROCEDURE — 2580000003 HC RX 258: Performed by: INTERNAL MEDICINE

## 2022-02-23 PROCEDURE — 99233 SBSQ HOSP IP/OBS HIGH 50: CPT | Performed by: INTERNAL MEDICINE

## 2022-02-23 PROCEDURE — 85025 COMPLETE CBC W/AUTO DIFF WBC: CPT

## 2022-02-23 PROCEDURE — 6360000002 HC RX W HCPCS: Performed by: INTERNAL MEDICINE

## 2022-02-23 PROCEDURE — 87635 SARS-COV-2 COVID-19 AMP PRB: CPT

## 2022-02-23 RX ORDER — FUROSEMIDE 10 MG/ML
40 INJECTION INTRAMUSCULAR; INTRAVENOUS 2 TIMES DAILY
Status: DISCONTINUED | OUTPATIENT
Start: 2022-02-23 | End: 2022-02-23 | Stop reason: HOSPADM

## 2022-02-23 RX ORDER — METRONIDAZOLE 500 MG/1
500 TABLET ORAL EVERY 8 HOURS SCHEDULED
Qty: 21 TABLET | Refills: 0 | Status: SHIPPED | OUTPATIENT
Start: 2022-02-23 | End: 2022-03-02

## 2022-02-23 RX ORDER — CEFDINIR 300 MG/1
300 CAPSULE ORAL EVERY 12 HOURS SCHEDULED
Qty: 14 CAPSULE | Refills: 0 | Status: SHIPPED | OUTPATIENT
Start: 2022-02-23 | End: 2022-03-02

## 2022-02-23 RX ORDER — TORSEMIDE 20 MG/1
20 TABLET ORAL DAILY
Qty: 30 TABLET | Refills: 3 | Status: SHIPPED | OUTPATIENT
Start: 2022-02-23 | End: 2022-03-10 | Stop reason: SDUPTHER

## 2022-02-23 RX ORDER — METOPROLOL SUCCINATE 50 MG/1
50 TABLET, EXTENDED RELEASE ORAL 2 TIMES DAILY
Qty: 30 TABLET | Refills: 3 | Status: SHIPPED | OUTPATIENT
Start: 2022-02-23 | End: 2022-03-10 | Stop reason: SDUPTHER

## 2022-02-23 RX ORDER — AMIODARONE HYDROCHLORIDE 200 MG/1
TABLET ORAL
Qty: 44 TABLET | Refills: 0 | Status: SHIPPED | OUTPATIENT
Start: 2022-02-23 | End: 2022-03-10

## 2022-02-23 RX ADMIN — PANTOPRAZOLE SODIUM 40 MG: 40 TABLET, DELAYED RELEASE ORAL at 06:00

## 2022-02-23 RX ADMIN — SERTRALINE 50 MG: 50 TABLET, FILM COATED ORAL at 09:05

## 2022-02-23 RX ADMIN — METRONIDAZOLE 500 MG: 500 TABLET ORAL at 05:59

## 2022-02-23 RX ADMIN — LEVOTHYROXINE SODIUM 25 MCG: 25 TABLET ORAL at 05:59

## 2022-02-23 RX ADMIN — FUROSEMIDE 20 MG: 10 INJECTION, SOLUTION INTRAMUSCULAR; INTRAVENOUS at 09:08

## 2022-02-23 RX ADMIN — DIGOXIN 125 MCG: 250 INJECTION, SOLUTION INTRAMUSCULAR; INTRAVENOUS; PARENTERAL at 09:08

## 2022-02-23 RX ADMIN — SODIUM CHLORIDE, PRESERVATIVE FREE 10 ML: 5 INJECTION INTRAVENOUS at 09:07

## 2022-02-23 RX ADMIN — METOPROLOL SUCCINATE 50 MG: 50 TABLET, EXTENDED RELEASE ORAL at 09:05

## 2022-02-23 RX ADMIN — METRONIDAZOLE 500 MG: 500 TABLET ORAL at 14:51

## 2022-02-23 RX ADMIN — CEFDINIR 300 MG: 300 CAPSULE ORAL at 09:05

## 2022-02-23 RX ADMIN — APIXABAN 5 MG: 5 TABLET, FILM COATED ORAL at 09:05

## 2022-02-23 RX ADMIN — AMIODARONE HYDROCHLORIDE 200 MG: 200 TABLET ORAL at 09:05

## 2022-02-23 ASSESSMENT — PAIN SCALES - GENERAL: PAINLEVEL_OUTOF10: 0

## 2022-02-23 NOTE — PROGRESS NOTES
report called to Latosha Mak at [address]    Discharged per life fleet with all personal belongings

## 2022-02-23 NOTE — DISCHARGE INSTR - COC
Continuity of Care Form    Patient Name: Jo Dimas   :  1945  MRN:  [id number]    Admit date:  2022  Discharge date:  2022    Code Status Order: DNR-CCA   Advance Directives:      Admitting Physician:  Cheryl Finn DO  PCP: Nupur Dean DO    Discharging Nurse: [facility] Unit/Room#: 7422/7927-88  Discharging Unit Phone Number: [phone]    Emergency Contact:   Extended Emergency Contact Information  Primary Emergency Contact: Sylviecarolina Easton   [place] [address] [place]  Phone: [phone]  Mobile Phone: [phone]  Relation: Child   needed? No  Secondary Emergency Contact: Cristiano Steele [address]  Home Phone: [phone]  Mobile Phone: [phone]  Relation: Other  Preferred language: English   needed?  No    Past Surgical History:  Past Surgical History:   Procedure Laterality Date    COLONOSCOPY      ENDOSCOPY, COLON, DIAGNOSTIC      EYE SURGERY      macular hole    HERNIA REPAIR      inguinal    HYSTERECTOMY      OTHER SURGICAL HISTORY  2013    ant elevatetransobturator sling rectocele and cystocele enterocele    SKIN BIOPSY      arms    TRANSESOPHAGEAL ECHOCARDIOGRAM N/A 10/20/2020    TRANSESOPHAGEAL ECHOCARDIOGRAM WITH BUBBLE STUDY performed by Beth Nuñez MD at [facility] ENDOSCOPY       Immunization History:   Immunization History   Administered Date(s) Administered    COVID-19, [employer], Primary or Immunocompromised, PF, 100mcg/0.5mL 2021, 2021    Pneumococcal Conjugate 13-valent ([id number]) 10/26/2017    Pneumococcal Polysaccharide (Inoennkmr11) 10/15/2014       Active Problems:  Patient Active Problem List   Diagnosis Code    Pelvic prolapse N81.9    Hyperlipidemia E78.5    GERD (gastroesophageal reflux disease) K21.9    PUD (peptic ulcer disease) K27.9    Urinary incontinence R32    Osteoarthritis M19.90    Palpitations R00.2    Precordial pain R07.2    Thrombophilia (HCC) D68.59 Alzheimer's disease, unspecified G30.9    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 D23.71    Pain of right foot M79.671    Atrial fibrillation with RVR (HCC) I48.91    Aneurysm (HCC) I72.9    Atrial fibrillation with rapid ventricular response (HCC) I48.91    Hyperkalemia E87.5    Diverticulitis with microperforation K57.20    Sigmoid diverticulitis K57.32    PAF (paroxysmal atrial fibrillation) ([facility]) I48.0    Acute decompensated heart failure (HCC) I50.9    NICM (nonischemic cardiomyopathy) ([facility] I42.8       Isolation/Infection:   Isolation            No Isolation          Patient Infection Status       Infection Onset Added Last Indicated Last Indicated By Review Planned Expiration Resolved Resolved By    None active    Resolved    COVID-19 (Rule Out) 01/26/22 01/26/22 01/26/22 COVID-19 (Ordered)   01/26/22 Rule-Out Test Resulted    COVID-19 (Rule Out) 12/23/21 12/23/21 12/23/21 Respiratory Panel, Molecular, with COVID-19 (Restricted: peds pts or suitable admitted adults) (Ordered)   12/24/21 Rule-Out Test Resulted    COVID-19 (Rule Out) 10/14/20 10/14/20 10/14/20 Covid-19 Ambulatory (Ordered)   10/16/20 Rule-Out Test Resulted            Nurse Assessment:  Last Vital Signs: BP (!) 118/57   Pulse 62   Temp 98 °F (36.7 °C)   Resp 18   Ht 5' 1\" (1.549 m)   Wt 128 lb 14.4 oz (58.5 kg)   SpO2 98%   BMI 24.36 kg/m²     Last documented pain score (0-10 scale): Pain Level: 0  Last Weight:   Wt Readings from Last 1 Encounters:   02/21/22 128 lb 14.4 oz (58.5 kg)     Mental Status:  oriented, alert, coherent, and logical    IV Access:  - None    Nursing Mobility/ADLs:  Walking   Assisted  Transfer  Assisted  Bathing  Assisted  Dressing  Assisted  Toileting  Assisted  Feeding  Independent  Med Admin  Assisted  Med Delivery   whole    Wound Care Documentation and Therapy:        Elimination:  Continence:    Bowel: Yes  Bladder: Yes  Urinary Catheter: None   Colostomy/Ileostomy/Ileal Conduit: No       Date of Last BM: 02/23/2022    Intake/Output Summary (Last 24 hours) at 2/23/2022 1116  Last data filed at 2/22/2022 2010  Gross per 24 hour   Intake 400 ml   Output 125 ml   Net 275 ml     I/O last 3 completed shifts: In: 518 [I.V.:518]  Out: 65 [Urine:725]    Safety Concerns:     None    Impairments/Disabilities:      None    Nutrition Therapy:  Current Nutrition Therapy:   - Oral Diet:  General    Routes of Feeding: Oral  Liquids: Thin Liquids  Daily Fluid Restriction: no  Last Modified Barium Swallow with Video (Video Swallowing Test): not done    Treatments at the Time of Hospital Discharge:   Respiratory Treatments: N/A  Oxygen Therapy:  is not on home oxygen therapy.   Ventilator:    - No ventilator support    Rehab Therapies: Physical Therapy and Occupational Therapy  Weight Bearing Status/Restrictions: No weight bearing restirctions  Other Medical Equipment (for information only, NOT a DME order):  none  Other Treatments: N/A    Patient's personal belongings (please select all that are sent with patient):  Dentures partials    RN SIGNATURE:  Electronically signed by Mark Peña RN on 2/23/22 at 2:03 PM EST    CASE MANAGEMENT/SOCIAL WORK SECTION    Inpatient Status Date: 2/18/22    Readmission Risk Assessment Score:  Readmission Risk              Risk of Unplanned Readmission:  25           Discharging to Facility/ Agency   Name: [facility] [address], phone [phone]    Address:  Phone:  Fax:    Dialysis Facility (if applicable)   Name:  Address:  Dialysis Schedule:  Phone:  Fax:    / signature: Electronically signed by MERCEDES Combs on 2/23/22 at 11:16 AM EST    PHYSICIAN SECTION    Prognosis: Good    Condition at Discharge: Stable    Rehab Potential (if transferring to Rehab): {Prognosis:[id number]}    Recommended Labs or Other Treatments After Discharge: ***    Physician Certification: I certify the above information and transfer of Rosalia Trotter  is necessary for the continuing treatment of the diagnosis listed and that she requires [place] for less 30 days.      Update Admission H&P: [id number]}    PHYSICIAN SIGNATURE:  Electronically signed by Sheron Keller DO on 2/23/22 at 11:19 AM EST

## 2022-02-23 NOTE — CARE COORDINATION
SOCIAL WORK / DISCHARGE PLANNING:  COVID neg 2/23. Karen spoke with [employer], SOV rep and pt was actually approved for [place] [facility] , phone [phone]. Sw called pt's Jennifer CARPENTER and discussed this and he is agreeable. Transport had been arranged via [address] [place] [address] for SREEKANTH Ling and this was changed to [employer]. 3pm. Karen called [employer] charge she will notify [employer]. NO PRECERT. HENS was completed for [employer] and liaison will have them forward it to [employer].                   Electronically signed by MERCEDES Christy on 2/23/2022 at 3:08 PM

## 2022-02-23 NOTE — CARE COORDINATION
SOCIAL WORK / DISCHARGE PLANNING:  RAPID COVID TEST NEEDED PRIOR TO DC. Sw met with pt at bedside to discuss transition to care. Sw spoke with her about short stay at [place] [address] and she is agreeable as recommended by physician. Sw asked if she had choices and she said [facility] [place] where her dtr works or also agreeable to [place]. Referral made to [facility], [place] rep , can accept at [place] [facility], [address], Fax [phone], skilled. NO PRECERT needed. TREVOR will need signed by physician. HENS form initiated, will need completed when dc order is obtained. ADDENDUM: 1149AM - ANDRESSA form completed.            Electronically signed by MERCEDES Colon on 2/23/2022 at 11:14 AM

## 2022-02-23 NOTE — PROGRESS NOTES
GENERAL SURGERY  DAILY PROGRESS NOTE  2/23/2022    CHIEF COMPLAINT:  Chief Complaint   Patient presents with    Nausea     x2 days    Emesis       SUBJECTIVE:  No acute events overnight. No complaints. Underwent MARYLOU & Cardioversion yesterday    OBJECTIVE:  /61   Pulse 65   Temp 98 °F (36.7 °C) (Infrared)   Resp 18   Ht 5' 1\" (1.549 m)   Wt 128 lb 14.4 oz (58.5 kg)   SpO2 96%   BMI 24.36 kg/m²     GENERAL:  NAD. A&Ox3. LUNGS:  No increased work of breathing. CARDIOVASCULAR: RR  ABDOMEN:  Soft, non-distended, non-tender. No guarding, rigidity, rebound. ASSESSMENT/PLAN:  64 y.o. female with acute diverticulitis with microperforation, leukocytosis, DAYNA. AFib RVR improved -- cardiology following s/p MARYLOU & Cardioversion 2/22    Afebrile. WBC decreased today. On PO Abx. Abdominal exam remains benign  OK for diet from surgery POV after procedures    Dennys Hawthorne DO  Surgery Resident PGY-4  2/23/2022  6:20 AM     As above.

## 2022-02-23 NOTE — PROGRESS NOTES
INPATIENT CARDIOLOGY FOLLOW-UP    Name: Anthony Vega    Age: 64 y.o. Date of Admission: 2/18/2022  4:46 AM    Date of Service: 2/23/2022    Primary Cardiologist: Dr. Donna Mallory    Chief Complaint: Follow-up for atrial fibrillation with RVR    Interim History:  No new overnight cardiac complaints. Currently with no complaints of CP, SOB, palpitations, dizziness, or lightheadedness. Has been in normal sinus rhythm. No recurrence of atrial fibrillation. Off oxygen    Underwent successful MARYLOU guided cardioversion yesterday. Now back to normal sinus rhythm. 1.8 L negative since yesterday.   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    Pain of right foot    Atrial fibrillation with RVR ([address]    Aneurysm ([address]    Atrial fibrillation with rapid ventricular response (HCC)    Hyperkalemia    Diverticulitis with microperforation    Sigmoid diverticulitis    PAF (paroxysmal atrial fibrillation) (HCC)    Acute decompensated heart failure (HCC)    NICM (nonischemic cardiomyopathy) ([facility])       Current Medications:    Current Facility-Administered Medications:     furosemide (LASIX) injection 40 mg, 40 mg, IntraVENous, BID, All David MD    insulin lispro (HUMALOG) injection vial 0-6 Units, 0-6 Units, SubCUTAneous, 4x Daily AC & HS, Froilan Montanez DO    amiodarone (CORDARONE) tablet 200 mg, 200 mg, Oral, BID, All David MD, 200 mg at 02/23/22 2788    cefdinir (OMNICEF) capsule 300 mg, 300 mg, Oral, 2 times per day, Chris Gordillo DO, 300 mg at 02/23/22 8992    metroNIDAZOLE (FLAGYL) tablet 500 mg, 500 mg, Oral, 3 times per day, Hardik Bertrand Carrillo DO, 500 mg at 02/23/22 0559    pantoprazole (PROTONIX) tablet 40 mg, 40 mg, Oral, QAM AC, Smith Black, DO, 40 mg at 02/23/22 0600    0.9 % sodium chloride infusion, , IntraVENous, Continuous, Roxana Nevarez MD    sodium chloride flush 0.9 % injection 5-40 mL, 5-40 mL, IntraVENous, 2 times per day, Roxana Nevarez MD, 10 mL at 02/23/22 0907    sodium chloride flush 0.9 % injection 5-40 mL, 5-40 mL, IntraVENous, PRN, Roxana Nevarez MD    0.9 % sodium chloride infusion, 25 mL, IntraVENous, PRN, Roxana Nevarez MD    nitroGLYCERIN (NITROSTAT) SL tablet 0.4 mg, 0.4 mg, SubLINGual, Q5 Min PRN, Fatuma Montanez DO    digoxin (LANOXIN) injection 125 mcg, 125 mcg, IntraVENous, Daily, Roxana Nevarez MD, 125 mcg at 02/23/22 0908    apixaban (ELIQUIS) tablet 5 mg, 5 mg, Oral, BID, Michael Duran MD, 5 mg at 02/23/22 3046    bisacodyl (DULCOLAX) EC tablet 10 mg, 10 mg, Oral, Daily, Froilan Montanez DO, 10 mg at 02/21/22 2652    metoprolol succinate (TOPROL XL) extended release tablet 50 mg, 50 mg, Oral, BID, Michael Duran MD, 50 mg at 02/23/22 3658    levothyroxine (SYNTHROID) tablet 25 mcg, 25 mcg, Oral, Daily, Fatuma Montanez DO, 25 mcg at 02/23/22 0559    donepezil (ARICEPT) tablet 10 mg, 10 mg, Oral, Nightly, Froilan Montanez DO, 10 mg at 02/22/22 2006    sertraline (ZOLOFT) tablet 50 mg, 50 mg, Oral, Daily, Froilan Montanez DO, 50 mg at 02/23/22 0905    glucose (GLUTOSE) 40 % oral gel 15 g, 15 g, Oral, PRN, Fatuma Montanez DO    glucagon (rDNA) injection 1 mg, 1 mg, IntraMUSCular, PRN, Froilan Montanez,     dextrose 5 % solution, 100 mL/hr, IntraVENous, PRN, Froilan Montanez, DO    dextrose bolus (hypoglycemia) 10% 125 mL, 125 mL, IntraVENous, PRN **OR** dextrose bolus (hypoglycemia) 10% 250 mL, 250 mL, IntraVENous, PRN, Fatuma Gemma Biswasel, DO    prochlorperazine (COMPAZINE) injection 5 mg, 5 mg, IntraVENous, Q6H PRN, Severo Fraire DO, 5 mg at 02/18/22 7899    Physical Exam:  BP (!) 118/57   Pulse 62   Temp 98 °F (36.7 °C)   Resp 18   Ht 5' 1\" (1.549 m)   Wt 128 lb 14.4 oz (58.5 kg)   SpO2 98%   BMI 24.36 kg/m²   Wt Readings from Last 3 Encounters:   02/21/22 128 lb 14.4 oz (58.5 kg)   02/16/22 121 lb (54.9 kg)   02/02/22 116 lb (52.6 kg)     Appearance: Awake, alert, no acute respiratory distress  Skin: Intact, no rash  Head: Normocephalic, atraumatic  Eyes: EOMI, no conjunctival erythema  ENMT: No pharyngeal erythema, MMM, no rhinorrhea  Neck: Supple, JVP elevated at 11 cm, no carotid bruits  Lungs: Clear to auscultation bilaterally. No wheezes, rales, or rhonchi. Cardiac: PMI nondisplaced, Regular rhythm with a normal rate, S1 & S2 normal, no murmurs  Abdomen: Soft, nontender, +bowel sounds  Extremities: Moves all extremities x 4, no lower extremity edema  Neurologic: No focal motor deficits apparent, normal mood and affect  Peripheral Pulses: Intact posterior tibial pulses bilaterally    Intake/Output:    Intake/Output Summary (Last 24 hours) at 2/23/2022 1305  Last data filed at 2/22/2022 2010  Gross per 24 hour   Intake 400 ml   Output 75 ml   Net 325 ml     No intake/output data recorded.     Laboratory Tests:  Recent Labs     02/22/22  0440 02/22/22  1647 02/23/22  0519   * 126* 131*   K 3.8 3.8 4.2   CL 94* 92* 96*   CO2 23 24 26   BUN 14 12 18   CREATININE 0.8 0.8 0.7   GLUCOSE 96 116* 94   CALCIUM 7.9* 7.8* 8.1*     Lab Results   Component Value Date    MG 2.7 02/18/2022     Recent Labs     02/21/22  0536 02/22/22  0440 02/23/22  0519   ALKPHOS 95 94 85   ALT 70* 47* 37*   AST 74* 40* 36*   PROT 6.1* 5.6* 5.7*   BILITOT 0.6 0.5 0.5   LABALBU 2.9* 2.7* 2.8*     Recent Labs     02/21/22  0536 02/22/22  0440 02/23/22  0519   WBC 16.2* 19.1* 17.8*   RBC 4.36 4.68 4.36   HGB 13.1 13.5 12.8   HCT 40.2 43.0 39.9   MCV 92.2 91.9 91.5   MCH 30.0 28.8 29.4   MCHC 32.6 31.4* 32.1   RDW 14.6 14.7 14.8   * 608* 562*   MPV 10.5 10.5 10.0     Lab Results   Component Value Date    CKTOTAL 107 09/06/2013 CKMB 1.4 09/06/2013    TROPONINI 0.02 09/06/2013     Lab Results   Component Value Date    INR 1.5 12/24/2021    PROTIME 17.3 (H) 12/24/2021     Lab Results   Component Value Date    TSH 1.360 01/27/2022     Lab Results   Component Value Date    LABA1C 5.5 11/03/2021     No results found for: EAG  Lab Results   Component Value Date    CHOL 133 01/27/2022    CHOL 182 11/03/2021    CHOL 235 (H) 08/04/2021     Lab Results   Component Value Date    TRIG 54 01/27/2022    TRIG 98 11/03/2021    TRIG 104 08/04/2021     Lab Results   Component Value Date    HDL 37 01/27/2022    HDL 56 11/03/2021    HDL 54 08/04/2021     Lab Results   Component Value Date    LDLCALC 85 01/27/2022    LDLCALC 106 (H) 11/03/2021    LDLCALC 160 (H) 08/04/2021     Lab Results   Component Value Date    LABVLDL 11 01/27/2022    LABVLDL 20 11/03/2021    LABVLDL 21 08/04/2021     No results found for: CHOLHDLRATIO  No results for input(s): PROBNP in the last 72 hours. Cardiac Tests:  MARYLOU 2/22/22:   Ejection fraction is visually estimated at 40-45%. Atrial fibrillation   affects the accuracy of interpretation. Right ventricle global systolic function is low normal .   No evidence of thrombus within left atrium/ left atrial appendage. Emptying velocity is low at 19 cms/s. No evidence of thrombus or mass in the right atrium. Mild to moderate mitral regurgitation is present. Mild-to-moderate aortic regurgitation is noted. Mild to moderate tricuspid regurgitation. Mild-moderate focal atherosclerosis in the descending aorta. ASSESSMENT/PLAN:         Paroxysmal atrial fibrillation RVR.  Had DCCV 1/31/22 - Toprol-XL was increased to 50 twice daily.; Continue Apixaban for [address]. This is her second episode of atrial fibrillation this year. Status post successful cardioversion yesterday on amiodarone 200 mg twice daily. to be decreased to 200 mg daily in 1 week.        She has been compliant  with her anticoagulation but does have a history of left atrial appendage thrombus. MARYLOU did not show any recurrence of left atrial appendage thrombus     Acute on chronic HFrEF - EF 35%; Overall 6 L negative this admission.  She does appear hypervolemic on examination. Will increase Lasix to 40 IV twice daily. We will likely switch to Demadex 20 mg daily starting from tomorrow. Cardiomyopathy - EF 35% by MARYLOU 1/31/22 - Normal EF in 10/2021, Likely tachycardia mediated.  Nonischemic Stress test in 12/2021, EF 53%. Continue Metoprolol, If BP tolerate add Entresto.  Later add Bryan Bloodgood further testing, She is DNR CCA. Will reassess EF after 3 months of maintenance of sinus rhythm.     History of Left atrial appendage thrombus in 10/2020 - Resolved by MARYLOU 1/31/2022 - On Eliquis     Borderline elevated troponin - Does not consistent with ACS, likely demand ischemia from CHF, tachycardia, No CP     Hypokalemia - Supplemented potassium     DAYNA - Better - Monitor Cratiniene      VHD - Moderate MR, Mild AR and TR     History of TIA     Hyperlipidemia - On Statin     Hypothyroidism - On HRT. Meena Torres MD, [facility]    NOTE: This report was transcribed using voice recognition software. Every effort was made to ensure accuracy; however, inadvertent computerized transcription errors may be present.

## 2022-02-23 NOTE — DISCHARGE SUMMARY
Internal Medicine Progress Note     [facility]     Demarcus Garcia. Renate Valles., CHRISTIANO.DANIELE.CJinnyOJinnyI. Tayla España D.O., SHANTANUOJinnyI. Ольга Caruso D.O. Conrado Ormond, MSN, APRN, NP-C  Marla Rojas. Ayana Chin, MSN, APRN-CNP       Internal Medicine  Discharge Summary    NAME: Cindy Valle  :  1945  MRN:  [id number]  PCP:James Xiong DO  ADMITTED: 2022      DISCHARGED: 22    ADMITTING PHYSICIAN: Demarcus Montanez DO    CONSULTANT(S):   IP CONSULT TO GENERAL SURGERY  IP CONSULT TO NEPHROLOGY  IP CONSULT TO GI  IP CONSULT TO CARDIOLOGY  IP CONSULT TO SOCIAL WORK  IP CONSULT TO SOCIAL WORK  IP CONSULT TO HEART FAILURE NURSE/COORDINATOR     ADMITTING DIAGNOSIS:   Hyperkalemia [E87.5]  Hyponatremia [E87.1]  Prolonged Q-T interval on ECG [R94.31]  DAYNA (acute kidney injury) ([facility] [address] [N17.9]     DISCHARGE DIAGNOSES:   1. Acute diverticulitis with microperforation  2. Paroxysmal atrial fibrillation with variable ventricular response is post successful cardioversion  3. Acute renal failure with resolution  4. Chronic, compensated diastolic congestive heart failure  5. Hyperlipidemia  6. Moderate to severe mitral regurgitation  7. Macular degeneration  8. History of tobacco abuse  9. History of TIAs  10. Gastroesophageal reflux disease  11. Hx of LA appendage thrombus    BRIEF HISTORY OF PRESENT ILLNESS:   The patient is a 22-year-old female who presented to the emergency department with a complaint of nausea, vomiting, and lower abdominal pain. Patient states in general she has not felt well for a week or 2. States that yesterday she was having lightheadedness with position changes. Felt unsteady. States she lives with her daughter. Patient states her appetite has been very poor but that she does drink fluids. She states that she drinks approximately 2-16 ounce bottles of water daily and 1 to 2 cups of coffee but is not really eating solids at this time.   In the ID                  Olimpia         Itzel Ramos DO                                Physician   Accession      [id number]     Referring  Number                        Physician   Date of Birth  1945     Winfield Garett Rucker MALINA   Age            64 year(s)     Interpreting     Sweta 62                                Physician        Physician Cardiology                                                 Mathew Kaur MD                                 Any Other  Procedure Type of Study   MARYLOU procedure:Transesophageal Echo (MARYLOU). Procedure Date Date: 02/22/2022 Start: 12:55 PM Study Location: OR Technical Quality: Adequate visualization Indications:Atrial fibrillation. Patient Status: Routine Height: 61 inches Weight: 128 pounds BSA: 1.56 m^2 BMI: 24.19 kg/m^2 Rhythm: Atrial fibrillation HR: 130 bpm MARYLOU Performed By: the attending and the sonographer  Findings   Left Ventricle  Ejection fraction is visually estimated at 40-45%. Atrial fibrillation  affects the accuracy of interpretation. Right Ventricle  Right ventricle global systolic function is low normal .   Left Atrium  No evidence of thrombus within left atrium/ left atrial appendage. Emptying velocity is low at 19 cms/s. Right Atrium  No evidence of thrombus or mass in the right atrium. Mitral Valve  Mild to moderate mitral regurgitation is present. Tricuspid Valve  Mild to moderate tricuspid regurgitation. Aortic Valve  Mild-to-moderate aortic regurgitation is noted. Pulmonic Valve  Physiologic and/or trace pulmonic regurgitation present. Aorta  Mild-moderate focal atherosclerosis in the descending aorta. Conclusions   Summary  Ejection fraction is visually estimated at 40-45%. Atrial fibrillation  affects the accuracy of interpretation. Right ventricle global systolic function is low normal .  No evidence of thrombus within left atrium/ left atrial appendage. Emptying velocity is low at 19 cms/s.   No evidence of thrombus or mass in the right atrium. Mild to moderate mitral regurgitation is present. Mild-to-moderate aortic regurgitation is noted. Mild to moderate tricuspid regurgitation. Mild-moderate focal atherosclerosis in the descending aorta. Signature   ----------------------------------------------------------------  Electronically signed by Cailin Nascimento MD(Interpreting  physician) on 02/22/2022 03:19 PM  ----------------------------------------------------------------  [email or web address]    ECHO Transesophageal    Result Date: 1/31/2022  Transesophageal Echocardiography Report (MARYLOU)   Demographics   Patient Name   Simi Jones          Gender           Female                 [id number] [facility] [phone]       Room Number      4585  Number   Account #      [de-identified]      Procedure Date   01/31/2022   Corporate ID                  Ordering         Mary Macario MD                                Physician   Accession      [id number]     Referring  Number                        Physician   Date of Birth  1945     [facility]   Age            64 year(s)     Interpreting     Clematisvkayngyola 62                                Physician        Physician Cardiology                                                 Beth Nuñez MD                                 Any Other  Procedure Type of Study   MARYLOU procedure:Transesophageal Echo (MARYLOU). Procedure Date Date: 01/31/2022 Start: 01:07 PM Study Location: OR Technical Quality: Adequate visualization Indications:Atrial fibrillation. Patient Status: Routine Height: 61 inches Weight: 129 pounds BSA: 1.57 m^2 BMI: 24.37 kg/m^2 Rhythm: Atrial fibrillation HR: 107 bpm MARYLOU Performed By: the attending and the sonographer  Findings   Left Ventricle  Normal left ventricular chamber size.   Moderately reduced LV function, EF about 35%.   Right Ventricle  Normal right ventricle structure and function. Left Atrium  Left atrium is enlarged. Interatrial septum intact. No thrombus in left atrium or left atrial appendage. Right Atrium  Right atrium is enlarged. Mitral Valve  Normal mitral valve structure. There is moderate mitral regurgitation. No mitral valve prolapse. Tricuspid Valve  There is mild tricuspid regurgitation. Aortic Valve  Normal aortic valve structure. There is mild to moderate aortic regurgitation (one central and one  perivalvular jet)   Pulmonic Valve  The pulmonic valve was not well visualized. Pericardial Effusion  No evidence of pericardial effusion. Pericardium appears normal.   Pleural Effusion  No evidence of pleural effusion. Aorta  Normal aortic root and ascending aorta. Conclusions   Summary  No thrombus in left atrium or left atrial appendage. Normal left ventricular chamber size. Moderately reduced LV function, EF about 35%. Left atrium is enlarged. Interatrial septum intact. Normal right ventricle structure and function. Right atrium is enlarged. There is moderate mitral regurgitation. No mitral valve prolapse. There is mild to moderate aortic regurgitation. There is mild tricuspid regurgitation. Normal aortic root size. No evidence of pericardial effusion. No intra cardiac mass.   Compared to prior TTE echo from 1/28/22 and 10/2021/   Signature   ----------------------------------------------------------------  Electronically signed by Jenny Gonzalez MD(Interpreting  physician) on 01/31/2022 03:47 PM  ----------------------------------------------------------------  M-Mode/2D Measurements & Calculations  Doppler Measurements & Calculations   MR Velocity: 4.83 m/s  MV FELIZ PISA: 0.66 cm^2  MR VTI: 135.2 cm  Alias Velocity: 0.8 m/sPISA Radius: 0.8 cm   PISA area: 4.02 cm^2MR flow rate: 319.99 ml/sMR volume:89.23 ml [email or web address] [id number]    CT ABDOMEN PELVIS WO CONTRAST Additional Contrast? None    Result Date: 2/18/2022  EXAMINATION: CT OF THE ABDOMEN AND PELVIS WITHOUT CONTRAST 2/18/2022 6:32 am TECHNIQUE: CT of the abdomen and pelvis was performed without the administration of intravenous contrast. Multiplanar reformatted images are provided for review. Dose modulation, iterative reconstruction, and/or weight based adjustment of the mA/kV was utilized to reduce the radiation dose to as low as reasonably achievable. COMPARISON: 01/26/2022 HISTORY: ORDERING SYSTEM PROVIDED HISTORY: n/v diarrhea, abd pain, lower TECHNOLOGIST PROVIDED HISTORY: Reason for exam:->n/v diarrhea, abd pain, lower Additional Contrast?->None Decision Support Exception - unselect if not a suspected or confirmed emergency medical condition->Emergency Medical Condition (MA) FINDINGS: Lower Chest: There are right greater than left pleural effusions with associated atelectasis. There is cardiomegaly. No pericardial effusion. Organs: The liver, gallbladder, spleen, pancreas, adrenals, and right kidney are within normal limits. There is a left inferior renal pole cyst.  No hydronephrosis. GI/Bowel: There is diverticulosis. There is wall thickening with associated inflammatory stranding involving the proximal sigmoid colon. There are foci of gas which are possibly extraluminal.  The remainder of the GI tract is unremarkable. Pelvis: The urinary bladder is partially filled. There is small volume intravesicular gas. The uterus is absent. Peritoneum/Retroperitoneum: There is moderate free fluid throughout the abdomen and pelvis. There is no pathologic lymphadenopathy. Aorta is normal in caliber without acute abnormality. Bones/Soft Tissues:  No acute abnormality of the visualized osseous structures.      Sigmoid diverticulosis with wall thickening and associated inflammatory stranding, compatible with diverticulitis. Adjacent foci of gas which are possibly extraluminal, raising concern for underlying microperforation. No extraluminal fluid collection. Bilateral pleural effusions with moderate ascites, possibly reflecting patient's volume status. CT ABDOMEN PELVIS WO CONTRAST    Result Date: 1/26/2022  EXAMINATION: CT OF THE ABDOMEN AND PELVIS WITHOUT CONTRAST 1/26/2022 3:27 pm TECHNIQUE: CT of the abdomen and pelvis was performed without the administration of intravenous contrast. Multiplanar reformatted images are provided for review. Dose modulation, iterative reconstruction, and/or weight based adjustment of the mA/kV was utilized to reduce the radiation dose to as low as reasonably achievable. COMPARISON: None HISTORY: ORDERING SYSTEM PROVIDED HISTORY: abd pain, diarrhea, tender RUQ TECHNOLOGIST PROVIDED HISTORY: Reason for exam:->abd pain, diarrhea, tender RUQ FINDINGS: Lower Chest: Lung bases demonstrate partially visualized bilateral pleural effusions moderate right and small left with adjacent opacifications, atelectasis versus airspace disease, along with interstitial edema pattern. Organs: Liver without focal lesion, limited due to lack of intravenous contrast.  Gallbladder unremarkable. No biliary dilatation. Pancreas and spleen unremarkable. Adrenals without distinct nodule. Kidneys without hydronephrosis or hydroureter. GI/Bowel: No mechanical obstructive process evident as there is no disproportionate dilatation of bowel. Pelvis: No bulky pelvic adenopathy or free fluid. Peritoneum/Retroperitoneum: Mild mesenteric edema with small volume ascites, greatest in the right perihepatic region and within the pelvic cul-de-sac. Bones/Soft Tissues: No acute osseous findings. Mild-to-moderate diffuse body wall edema.      1.  Bilateral pleural effusions, right greater than left, moderate right and small left involvement, with adjacent opacifications of atelectasis versus airspace disease. 2.  Small volume abdominopelvic ascites along with mild to moderate body wall edema. 3.  No mechanical obstructive process or loculated fluid collection within the abdomen or pelvis. RECOMMENDATIONS: Unavailable     XR CHEST STANDARD (2 VW)    Result Date: 2/7/2022  EXAMINATION: TWO XRAY VIEWS OF THE CHEST 2/7/2022 6:58 am COMPARISON: 26 January 2022 HISTORY: ORDERING SYSTEM PROVIDED HISTORY: Acute on chronic right-sided congestive heart failure [facility]) TECHNOLOGIST PROVIDED HISTORY: Reason for exam:->CHF FINDINGS: Pleural effusions have resolved. Mild opacity at the bases may relate to atelectasis and or infiltrate. It is quite subtle. Heart and pulmonary vascularity are normal.     Resolution of pleural effusions. Mild infiltrate and or atelectasis suggested at the bases. XR CHEST STANDARD (2 VW)    Result Date: 1/25/2022  EXAMINATION: TWO XRAY VIEWS OF THE CHEST 1/25/2022 2:35 pm COMPARISON: 01/04/2022 HISTORY: ORDERING SYSTEM PROVIDED HISTORY: SOB (shortness of breath) TECHNOLOGIST PROVIDED HISTORY: Reason for exam:->as above FINDINGS: The cardiac silhouette is within normals. There are patchy bibasilar infiltrates. There is minimal blunting of the right and left costophrenic angles unchanged compared to prior study. The upper lobes are clear. Patchy bibasilar infiltrates Blunting of the right and left costophrenic angles favored to represent trace bilateral pleural effusions. XR ABDOMEN (KUB) (SINGLE AP VIEW)    Result Date: 2/16/2022  EXAMINATION: ONE SUPINE XRAY VIEW(S) OF THE ABDOMEN 2/16/2022 2:59 pm COMPARISON: Abdominopelvic CT 26 January 2022 and abdominal radiograph 5 September 2013 HISTORY: ORDERING SYSTEM PROVIDED HISTORY: Generalized abdominal pain TECHNOLOGIST PROVIDED HISTORY: Reason for exam:->pain FINDINGS: No free air, bowel wall pneumatosis or dilated bowel.   No abnormal accumulation of fecal material within the lower GI tract.  A dystrophic calcification in the right pelvis is redemonstrated. No active process. See above. CT HEAD WO CONTRAST    Result Date: 2/18/2022  EXAMINATION: CT OF THE HEAD WITHOUT CONTRAST  2/18/2022 6:32 am TECHNIQUE: CT of the head was performed without the administration of intravenous contrast. Dose modulation, iterative reconstruction, and/or weight based adjustment of the mA/kV was utilized to reduce the radiation dose to as low as reasonably achievable. COMPARISON: 01/28/2022 HISTORY: ORDERING SYSTEM PROVIDED HISTORY: lightheaded TECHNOLOGIST PROVIDED HISTORY: Has a \"code stroke\" or \"stroke alert\" been called? ->No Reason for exam:->lightheaded Decision Support Exception - unselect if not a suspected or confirmed emergency medical condition->Emergency Medical Condition (MA) FINDINGS: BRAIN/VENTRICLES: There is no acute intracranial hemorrhage, mass effect or midline shift. Asymmetric calcification in the right basal ganglia. No abnormal extra-axial fluid collection. The gray-white differentiation is maintained without evidence of an acute infarct. 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 There is no evidence of hydrocephalus. ORBITS: The visualized portion of the orbits demonstrate no acute abnormality. SINUSES: The visualized paranasal sinuses and mastoid air cells demonstrate no acute abnormality. SOFT TISSUES/SKULL:  No acute abnormality of the visualized skull or soft tissues. No acute intracranial abnormality. CT HEAD WO CONTRAST    Result Date: 1/28/2022  EXAMINATION: CT OF THE HEAD WITHOUT CONTRAST  1/28/2022 1:03 pm TECHNIQUE: CT of the head was performed without the administration of intravenous contrast. Dose modulation, iterative reconstruction, and/or weight based adjustment of the mA/kV was utilized to reduce the radiation dose to as low as reasonably achievable. COMPARISON: CT head without contrast, 02/01/2021.  HISTORY: ORDERING SYSTEM PROVIDED HISTORY: Rule out bleed/ confusion TECHNOLOGIST PROVIDED HISTORY: Has a \"code stroke\" or \"stroke alert\" been called? ->No Reason for exam:->Rule out bleed/ confusion FINDINGS: BRAIN/VENTRICLES: No mass effect, edema or hemorrhage is seen. Chronic area of infarction is seen in the right frontal lobe. No significant volume loss is seen in the brain. Moderate chronic microvascular ischemic changes are noted. No hydrocephalus or extra-axial fluid is seen. ORBITS: Prosthetic lenses are seen in the globes bilaterally. The orbits are otherwise grossly unremarkable. SINUSES: The visualized paranasal sinuses and mastoid air cells demonstrate no acute abnormality. SOFT TISSUES/SKULL:  No acute abnormality of the visualized skull or soft tissues. 1.  No acute intracranial abnormality. 2. Small chronic right frontal lobe infarction. 3. Moderate chronic microvascular ischemic changes. RECOMMENDATIONS: Unavailable     XR CHEST PORTABLE    Result Date: 2/18/2022  EXAMINATION: ONE XRAY VIEW OF THE CHEST 2/18/2022 5:48 am COMPARISON: 02/07/2022 HISTORY: ORDERING SYSTEM PROVIDED HISTORY: ro chf TECHNOLOGIST PROVIDED HISTORY: Reason for exam:->ro chf FINDINGS: There is borderline cardiomegaly. The lungs are clear except for bibasilar atelectasis. No focal consolidation or kathryn edema. Bibasilar atelectasis. XR CHEST PORTABLE    Result Date: 1/26/2022  EXAMINATION: ONE XRAY VIEW OF THE CHEST 1/26/2022 1:44 pm COMPARISON: 25 January 2022 HISTORY: ORDERING SYSTEM PROVIDED HISTORY: chest pain TECHNOLOGIST PROVIDED HISTORY: Reason for exam:->chest pain FINDINGS: There are redemonstrated bilateral pleural effusions. Adjacent atelectasis and or infiltrate at the bases appears slightly greater. Stable cardiomediastinal silhouette. Bilateral pleural effusions with slightly greater adjacent bibasilar infiltrate and or atelectasis.      US CAROTID ARTERY BILATERAL    Result Date: 1/28/2022  EXAMINATION: ULTRASOUND EVALUATION OF THE CAROTID ARTERIES 1/28/2022 TECHNIQUE: Duplex ultrasound using B-mode/gray scaled imaging, Doppler spectral analysis and color flow Doppler was obtained of the carotid arteries. COMPARISON: None. HISTORY: ORDERING SYSTEM PROVIDED HISTORY: ams TECHNOLOGIST PROVIDED HISTORY: Reason for exam:->ams What reading provider will be dictating this exam?->CRC FINDINGS: RIGHT: The right common carotid artery demonstrates peak systolic velocities of 26.5 cm/sec in the proximal and distal segments respectively. The right internal carotid artery demonstrates the systolic velocities of 64.7 cm/sec in the proximal, mid and distal segments respectively. The external carotid artery is patent. The vertebral artery demonstrates normal antegrade flow. No evidence of focal atherosclerotic plaque. There is minimal intimal thickening within the distal common carotid artery extending into the bulb. ICA/CCA ratio of 2.1 LEFT: The left common carotid artery demonstrates peak systolic velocities of 16.5 cm/sec in the proximal and distal segments respectively. The left internal carotid artery demonstrates the systolic velocities of 55.1 cm/sec in the proximal, mid and distal segments respectively. The external carotid artery is patent. The vertebral artery demonstrates normal antegrade flow. No evidence of focal atherosclerotic plaque. ICA/CCA ratio of 2.0     The right internal carotid artery demonstrates 0-50% stenosis. The left internal carotid artery demonstrates 0-50% stenosis. Bilateral vertebral arteries are patent with flow in the normal direction. RECOMMENDATIONS: Unavailable         HOSPITAL COURSE:   Sandra Bell did very well throughout the hospitalization. She was found to be suffering from perforated diverticulitis and was transitioned from IV to oral medications with appropriate response. White blood cell count trended downward and her abdominal pain resolved. She began tolerating a diet.   Unfortunately, she dealt with recurrence of atrial fibrillation and rapid ventricular response. The cardiovascular team provided consultation. She was placed empirically on IV amiodarone and this will be transitioned to oral upon discharge. She underwent repeat cardioversion and this was successful. She has been resumed on anticoagulation therapy. After a long discussion and discussion with her daughter, it was determined she would benefit from temporary rehabilitation. This has been arranged accordingly she is acceptable for discharge to the nursing home facility today. BRIEF PHYSICAL EXAMINATION AND LABORATORIES ON DAY OF DISCHARGE:  VITALS:  BP (!) 118/57   Pulse 62   Temp 98 °F (36.7 °C)   Resp 18   Ht 5' 1\" (1.549 m)   Wt 128 lb 14.4 oz (58.5 kg)   SpO2 98%   BMI 24.36 kg/m²     HEENT:  PERRLA. EOMI. Sclera clear. Buccal mucosa moist.    Neck:  Supple. Trachea midline. No thyromegaly. No JVD. No bruits. Heart:  Rhythm regular, rate controlled. No murmurs. Lungs:  Symmetrical. Clear to auscultation bilaterally. No wheezes. No rhonchi. No rales. Abdomen: Soft. Non-tender. Non-distended. Bowel sounds positive. No organomegaly or masses. No pain on palpation    Extremities:  Peripheral pulses present. No peripheral edema. No ulcers. Neurologic:  Alert x 3. No focal deficit. Cranial nerves grossly intact. Skin:  No petechia. No hemorrhage. No wounds. DISPOSITION:  The patient's condition is good. At this time the patient is without objective evidence of an acute process requiring continuing hospitalization or inpatient management. They are stable for discharge with outpatient follow-up. I have spoken with the patient and discussed the results of the current hospitalization, in addition to providing specific details for the plan of care and counseling regarding the diagnosis and prognosis.   The plan has been discussed in detail and they are aware of the specific conditions for emergent return, as well as the importance of follow-up. Their questions are answered at this time and they are agreeable with the plan for discharge to nursing home    DISCHARGE MEDICATIONS:   Current Discharge Medication List           Details   amiodarone (CORDARONE) 200 MG tablet Take 1 tablet by mouth 2 times daily for 7 days, THEN 1 tablet daily. Qty: 44 tablet, Refills: 0      cefdinir (OMNICEF) 300 MG capsule Take 1 capsule by mouth every 12 hours for 7 days  Qty: 14 capsule, Refills: 0      torsemide (DEMADEX) 20 MG tablet Take 1 tablet by mouth daily  Qty: 30 tablet, Refills: 3      metroNIDAZOLE (FLAGYL) 500 MG tablet Take 1 tablet by mouth every 8 hours for 7 days  Qty: 21 tablet, Refills: 0              Details   metoprolol succinate (TOPROL XL) 50 MG extended release tablet Take 1 tablet by mouth 2 times daily  Qty: 30 tablet, Refills: 3      sertraline (ZOLOFT) 50 MG tablet Take 1 tablet by mouth daily  Qty: 30 tablet, Refills: 3              Details   donepezil (ARICEPT) 10 MG tablet Take 1 tablet by mouth nightly  Qty: 30 tablet, Refills: 5      potassium chloride (KLOR-CON) 10 MEQ extended release tablet Take 2 tablets by mouth 2 times daily  Qty: 180 tablet, Refills: 5    Associated Diagnoses: Orthopnea      levothyroxine (SYNTHROID) 25 MCG tablet Take 1 tablet by mouth Daily  Qty: 90 tablet, Refills: 5    Associated Diagnoses: Acquired hypothyroidism      atorvastatin (LIPITOR) 20 MG tablet TAKE ONE TABLET BY MOUTH EVERY EVENING  Qty: 90 tablet, Refills: 5    Associated Diagnoses: TIA (transient ischemic attack); Mixed hyperlipidemia      Calcium Polycarbophil (FIBER-CAPS PO) Take 1 capsule by mouth at bedtime      FOLIC ACID PO Take 1 tablet by mouth daily      diphenoxylate-atropine (DIPHENATOL) 2.5-0.025 MG per tablet Take 1 tablet by mouth 4 times daily as needed for Diarrhea for up to 30 days.   Qty: 120 tablet, Refills: 5    Associated Diagnoses: Functional diarrhea      ondansetron (ZOFRAN-ODT) 4 MG disintegrating tablet Take 1 tablet by mouth 3 times daily as needed for Nausea  Qty: 90 tablet, Refills: 3    Associated Diagnoses: Nausea      famotidine (PEPCID) 40 MG tablet Take 1 tablet by mouth every evening  Qty: 30 tablet, Refills: 5    Associated Diagnoses: Nausea; Gastroesophageal reflux disease without esophagitis      apixaban (ELIQUIS) 5 MG TABS tablet Take 1 tablet by mouth 2 times daily  Qty: 180 tablet, Refills: 5    Associated Diagnoses: Thrombophilia (HCC)      therapeutic multivitamin-minerals (THERAGRAN-M) tablet Take 1 tablet by mouth daily. FOLLOW UP/INSTRUCTIONS:  · This patient is instructed to follow-up with her primary care physician. · Patient is instructed to follow-up with the consults listed above as directed by them. · she is instructed to resume home medications and take new medications as indicated in the list above. · If the patient has a recurrence of symptoms, she is instructed to go to the ED. Preparing for this patient's discharge, including paperwork, orders, instructions, and meeting with patient did require > 40 minutes.     Olivia Martinez DO   2/23/2022  11:19 AM

## 2022-03-03 NOTE — PROGRESS NOTES
Physician Progress Note      PATIENT:               Arnold Kim  CSN #:                  [id number]  :                       1945  ADMIT DATE:       2022 4:46 AM  DISCH DATE:        2022 5:11 PM  RESPONDING  PROVIDER #:        Jamshid Kerns DO          QUERY TEXT:    Patient admitted with Acute diverticulitis with micro perforation. Noted   documentation on H/P, medicine progress notes and Cardiology consult of \"Acute   on chronic diastolic heart failure\". Documentation on discharge summary of   \"Chronic, compensated diastolic congestive heart failure\". If possible, please document in progress notes and discharge summary if you   are evaluating and /or treating any of the following: The medical record reflects the following:  Risk Factors: per documentation diagnosis of CHF  Clinical Indicators: per H and P Acute on chronic diastolic heart failure, per   Cardiology consult Acute on chronic HFrEF - EF 35%; -ve 1520cc, per CXR   report  Bibasilar atelectasis, per CT scan Bilateral pleural effusions with   moderate ascites, possibly reflecting  patient's volume status, per discharge summary Chronic, compensated diastolic   congestive heart failure  Treatment: Cardiology consult, IV Lasix, CXR, lab work monitoring    Thank you  Tomas Martinez RN CCDS  Options provided:  -- Acute on chronic diastolic heart failure  -- Chronic, compensated diastolic congestive heart failure  -- Other - I will add my own diagnosis  -- Disagree - Not applicable / Not valid  -- Disagree - Clinically unable to determine / Unknown  -- Refer to Clinical Documentation Reviewer    PROVIDER RESPONSE TEXT:    After study, this patient has Acute on chronic diastolic heart failure.     Query created by: Lou Espinoza on 3/2/2022 10:24 AM      Electronically signed by:  Jamshid Kerns DO 3/3/2022 5:53 AM

## 2022-03-09 ENCOUNTER — CARE COORDINATION (OUTPATIENT)
Dept: CASE MANAGEMENT | Age: 77
End: 2022-03-09

## 2022-03-09 ENCOUNTER — CARE COORDINATION (OUTPATIENT)
Dept: CARE COORDINATION | Age: 77
End: 2022-03-09

## 2022-03-09 DIAGNOSIS — K57.20 DIVERTICULITIS OF COLON WITH PERFORATION: Primary | ICD-10-CM

## 2022-03-09 PROCEDURE — 1111F DSCHRG MED/CURRENT MED MERGE: CPT | Performed by: FAMILY MEDICINE

## 2022-03-09 SDOH — ECONOMIC STABILITY: HOUSING INSECURITY: IN THE LAST 12 MONTHS, HOW MANY PLACES HAVE YOU LIVED?: 1

## 2022-03-09 SDOH — ECONOMIC STABILITY: INCOME INSECURITY: IN THE LAST 12 MONTHS, WAS THERE A TIME WHEN YOU WERE NOT ABLE TO PAY THE MORTGAGE OR RENT ON TIME?: NO

## 2022-03-09 SDOH — ECONOMIC STABILITY: TRANSPORTATION INSECURITY
IN THE PAST 12 MONTHS, HAS LACK OF TRANSPORTATION KEPT YOU FROM MEETINGS, WORK, OR FROM GETTING THINGS NEEDED FOR DAILY LIVING?: NO

## 2022-03-09 SDOH — HEALTH STABILITY: PHYSICAL HEALTH: ON AVERAGE, HOW MANY MINUTES DO YOU ENGAGE IN EXERCISE AT THIS LEVEL?: 0 MIN

## 2022-03-09 SDOH — HEALTH STABILITY: PHYSICAL HEALTH: ON AVERAGE, HOW MANY DAYS PER WEEK DO YOU ENGAGE IN MODERATE TO STRENUOUS EXERCISE (LIKE A BRISK WALK)?: 0 DAYS

## 2022-03-09 SDOH — ECONOMIC STABILITY: HOUSING INSECURITY
IN THE LAST 12 MONTHS, WAS THERE A TIME WHEN YOU DID NOT HAVE A STEADY PLACE TO SLEEP OR SLEPT IN A SHELTER (INCLUDING NOW)?: NO

## 2022-03-09 SDOH — ECONOMIC STABILITY: TRANSPORTATION INSECURITY
IN THE PAST 12 MONTHS, HAS THE LACK OF TRANSPORTATION KEPT YOU FROM MEDICAL APPOINTMENTS OR FROM GETTING MEDICATIONS?: NO

## 2022-03-09 ASSESSMENT — SOCIAL DETERMINANTS OF HEALTH (SDOH)
HOW OFTEN DO YOU ATTENT MEETINGS OF THE CLUB OR ORGANIZATION YOU BELONG TO?: MORE THAN 4 TIMES PER YEAR
DO YOU BELONG TO ANY CLUBS OR ORGANIZATIONS SUCH AS CHURCH GROUPS UNIONS, FRATERNAL OR ATHLETIC GROUPS, OR SCHOOL GROUPS?: YES
HOW OFTEN DO YOU GET TOGETHER WITH FRIENDS OR RELATIVES?: MORE THAN THREE TIMES A WEEK
IN A TYPICAL WEEK, HOW MANY TIMES DO YOU TALK ON THE PHONE WITH FAMILY, FRIENDS, OR NEIGHBORS?: MORE THAN THREE TIMES A WEEK
HOW OFTEN DO YOU ATTEND CHURCH OR RELIGIOUS SERVICES?: MORE THAN 4 TIMES PER YEAR

## 2022-03-09 NOTE — CARE COORDINATION
Ambulatory Care Coordination Note  3/9/2022  CM Risk Score: 5  Charlson 10 Year Mortality Risk Score: 98%     ACC: Chuy Richards, ARTIS    Summary Note: Explained the Care Coordination Program to patient's POAs. Verbalized understanding and is agreeable to service. Denies s/s CHF Exacerbation. Discussed Zone Tool and verbalizes understanding ACM will send via [employer]. Today's weight: Not known. Pt is to keep a log    Pt has appt with PCP tomorrow, 3/10/22. Daughter and son-in-law have requests: They would like PCP to talk to the pt about how serious her hospital stay was, as pt does not feel it was a big deal  They would like to know if pt has any fluid restrictions. They are concerned that when pt was in the hospital, her potassium was high. She takes her diuretic in the am with ons potassium dose, and then takes one in the evening. They worry that the same thing will happen with and elevated potassium. Declined referrals at this time to Aleyda Benavides, and Pharmacist    FOLLOW-UP PLAN:    Discuss:   -Is pt keeping a log for her weights? Questions answered by PCP?  -CHF Management/Zone Tool  -Current Weight  Falls/Near Falls? -OV AVS Reinforcement  -Appointment Reminders  -Material sent this encounter: Welcome letter, CHF, Fall Precautions, Bleeding Precautions, HTN, CVA, Where to go for Care  -Referral: Declined    Next Anticipated Outreach by [address] Team:  1 Week            Ambulatory Care Coordination Assessment    Care Coordination Protocol  Program Enrollment: Complex Care  Referral from Primary Care Provider: No  Week 1 - Initial Assessment     Do you have all of your prescriptions and are they filled?: Yes  Barriers to medication adherence: None  Are you able to afford your medications?: Yes  How often do you have trouble taking your medications the way you have been told to take them?: I always take them as prescribed.      Do you have Home O2 Therapy?: No      Ability to seek help/take action for Emergent Urgent situations i.e. fire, crime, inclement weather or health crisis. : Independent  Ability to ambulate to restroom: Independent  Ability handle personal hygeine needs (bathing/dressing/grooming): Independent  Ability to manage Medications: Needs Assistance  Ability to prepare Food Preparation: Independent  Ability to maintain home (clean home, laundry): Independent  Ability to drive and/or has transportation: Dependent  Ability to do shopping: Needs Assistance  Ability to manage finances: Dependent     Current Housing: Private Residence        Per the Fall Risk Screening, did the patient have 2 or more falls or 1 fall with injury in the past year?: No     Frequent urination at night?: No  Do you use rails/bars?: Yes  Do you have a non-slip tub mat?: Yes     Are you experiencing loss of meaning?: No     Thinking about your patient's physical health needs, are there any symptoms or problems (risk indicators) you are unsure about that require further investigation?: Mild vague physical symptoms or problems; but do not impact on daily life or are not of concern to patient   Are the patients physical health problems impacting on their mental well-being?: Mild impact on mental well-being e.g. \"\"feeling fed-up\"\", \"\"reduced enjoyment\"\"   Are there any problems with your patients lifestyle behaviors (alcohol, drugs, diet, exercise) that are impacting on physical or mental well-being?: Some mild concern of potential negative impact on well-being   Do you have any other concerns about your patients mental well-being?  How would you rate their severity and impact on the patient?: Mild problems - don't interfere with function   How would you rate their home environment in terms of safety and stability (including domestic violence, insecure housing, neighbor harassment)?: Safe, stable, but with some inconsistency   How do daily activities impact on the patient's well-being? (include current or anticipated unemployment, work, caregiving, access to transportation or other): Some general dissatisfaction but no concern   How would you rate their social network (family, work, friends)?: Adequate participation with social networks   How would you rate their financial resources (including ability to afford all required medical care)?: Financially secure, some resource challenges   How wells does the patient now understand their health and well-being (symptoms, signs or risk factors) and what they need to do to manage their health?: Reasonable to good understanding and already engages in managing health or is willing to undertake better management   How well do you think your patient can engage in healthcare discussions? (Barriers include language, deafness, aphasia, alcohol or drug problems, learning difficulties, concentration): Adequate communication, with or without minor barriers   Do other services need to be involved to help this patient?: Other care/services in place and adequate   Are current services involved with this patient well-coordinated? (Include coordination with other services you are now recommendation): Required care/services in place and adequately coordinated   Suggested Interventions and Community Resources  Fall Risk Prevention: In Process Home Health Services: Not Started (Comment: Constantino Jacome)   Medication Assistance Program: Declined   Medi Set or Pill Pack: Declined   Pharmacist: Declined   Registered Dietician: Declined   Social Work: Declined   Transportation Services: Declined   Zone Management Tools: In Process         Set up/Review Goals, Set up/Review an Education Plan, Schedule an appointment with the patient's PCP              Prior to Admission medications    Medication Sig Start Date End Date Taking? Authorizing Provider   amiodarone (CORDARONE) 200 MG tablet Take 1 tablet by mouth 2 times daily for 7 days, THEN 1 tablet daily.  2/23/22 4/1/22  Mary Hawkins,    torsemide (DEMADEX) 20 MG tablet Take 1 tablet by mouth daily 2/23/22   Camilo Daily, DO   metoprolol succinate (TOPROL XL) 50 MG extended release tablet Take 1 tablet by mouth 2 times daily 2/23/22   Camilo Daily, DO   sertraline (ZOLOFT) 50 MG tablet Take 1 tablet by mouth daily 2/24/22   Camilo Daily, DO   donepezil (ARICEPT) 10 MG tablet Take 1 tablet by mouth nightly 2/7/22   Jesus Steiner, DO   potassium chloride (KLOR-CON) 10 MEQ extended release tablet Take 2 tablets by mouth 2 times daily 2/2/22   Jesus Steiner, DO   levothyroxine (SYNTHROID) 25 MCG tablet Take 1 tablet by mouth Daily 2/2/22   Jesus Steiner, DO   atorvastatin (LIPITOR) 20 MG tablet TAKE ONE TABLET BY MOUTH EVERY EVENING 2/2/22   Jesus Steiner, DO   Calcium Polycarbophil (FIBER-CAPS PO) Take 1 capsule by mouth at bedtime    Historical Provider, MD   FOLIC ACID PO Take 1 tablet by mouth daily    Historical Provider, MD   ondansetron (ZOFRAN-ODT) 4 MG disintegrating tablet Take 1 tablet by mouth 3 times daily as needed for Nausea 1/25/22 4/25/22  Jesus Steiner,    famotidine (PEPCID) 40 MG tablet Take 1 tablet by mouth every evening 1/25/22   Jesus Steiner, DO   apixaban (ELIQUIS) 5 MG TABS tablet Take 1 tablet by mouth 2 times daily 2/15/21   Jesus Steiner,    therapeutic multivitamin-minerals [facility]) tablet Take 1 tablet by mouth daily.     Historical Provider, MD       Future Appointments   Date Time Provider Camron Godinez   3/10/2022  1:45 PM Jesus Steiner DO [facility]   3/31/2022  2:00 PM Jesus Steiner DO [facility]   4/5/2022  1:30 PM Mily Valladares MD [facility]   4/11/2022  2:30 PM Jesus Steiner DO [facility]     ,   Congestive Heart Failure Assessment    Do you understand a low sodium diet?: Yes  Do you understand how to read food labels?: No  How many restaurant meals do you eat per week?: 0  Do you salt your food before tasting it?: No     No patient-reported symptoms      Symptoms:  None: Yes Symptom course: stable      and   General Assessment    Do you have any symptoms that are causing concern?: No

## 2022-03-09 NOTE — CARE COORDINATION
[facility] Transitions Initial Follow Up Call    Call within 2 business days of discharge: Yes    Patient: Pete Knapp Patient : 1945   MRN: [id number]  Reason for Admission: diverticulitis with microperforation  Discharge Date: 22 RARS: Readmission Risk Score: 21.5 ( )      Last Discharge [facility]       Complaint Diagnosis Description Type Department Provider    22 Nausea; Emesis DAYNA (acute kidney injury) ([facility] [address] . .. ED to Hosp-Admission (Discharged) (ADMITTED) [facility] Carla Arreola DO; Elzbieta Kiran,... Acute Care Course:   Pt to [facility]  to  with a large intestine perforation and abscess and DAYNA. She had n/v and lower abd pain. She then went ot [facility] from  to 3/8 with a d/c home with [facility]    PCP on 3/10    Sig Hx:   Afib, OA, HF, HLD, Alzheimers, GERD, urinary incontinence, Peptic ulcer, cardiomyopathy, HTN      DME: walker, shower chair    Conversation:   Spoke with Yohannes Ferreira after 2 IDs. States his mother-in-law is doing well and getting around with the walker. They have her pills situated. HH has not called but he expects a call soon from Critical access hospital, [facility]. She slept well last night. And has a good appetite. She lives with them so they can tend to her needs. She has a good appetite and avoids seeds d/t diverticulitis. Reviewed appts and aware of PCP appt tomorrow. Reviewed meds as to their purpose and timing of delivery. Educated to bring list to PCP. Pt is able to swallow th KCL pill. Pt doing well and they are agreeable to calls. Follow up plan: Will hand off to [address] [place] [address] Initial Call    Was this an external facility discharge?  No Discharge Facility: [facility] to be reviewed by the provider   Additional needs identified to be addressed with provider: No  medications-1111F needs reviewed and signed             Method of communication with provider : none      Advance Care Planning:   Does patient have an Advance Directive: DNR-arrrest and HPOA present    Was this a readmission? No  Patient stated reason for admission: n/v  Patients top risk factors for readmission: medical condition-HF    Care Transition Nurse (CTN) contacted the family by telephone to perform post hospital discharge assessment. Verified name and  with family as identifiers. Provided introduction to self, and explanation of the CTN role. CTN reviewed discharge instructions, medical action plan and red flags with family who verbalized understanding. Family given an opportunity to ask questions and does not have any further questions or concerns at this time. Were discharge instructions available to patient? Yes. Reviewed appropriate site of care based on symptoms and resources available to patient including: PCP. The family agrees to contact the PCP office for questions related to their healthcare. Medication reconciliation was performed with family, who verbalizes understanding of administration of home medications. Advised obtaining a 90-day supply of all daily and as-needed medications. Covid Risk Education  Vaccinated: needs booster    Reviewed and educated family on any new and changed medications related to discharge diagnosis. Was patient discharged with a pulse oximeter? No Discussed and confirmed pulse oximeter discharge instructions and when to notify provider or seek emergency care. CTN provided contact information. Plan for follow-up call in 5-7 days based on severity of symptoms and risk factors.   Plan for next call: referral to ambulatory care [facility] Fabiola Low Transitions 24 Hour Call    Do you have any ongoing symptoms?: No  Do you have all of your prescriptions and are they filled?: Yes  Have you scheduled your follow up appointment?: Yes  How are you going to get to your appointment?: Car - family or friend to transport  Were you discharged with any Home Care or Post Acute Services: Yes  Post Acute Services: [facility] 2[facility])  Do you feel like you have everything you need to keep you well at home?: Yes  Care Transitions Interventions         Follow Up  Future Appointments   Date Time Provider Camron Godinez   3/10/2022  1:45 PM DO Samy Iniguez [facility]   3/31/2022  2:00 PM DO Samy Iniguez [facility]   4/5/2022  1:30 PM Haily Gallegos MD [facility]   4/11/2022  2:30 PM Garett Carrillo DO Traphill [facility]       Carlos Enrique Gonzalez, BSN, RN   [address] Transition Nurse  [phone]

## 2022-03-10 ENCOUNTER — OFFICE VISIT (OUTPATIENT)
Dept: FAMILY MEDICINE CLINIC | Age: 77
End: 2022-03-10
Payer: MEDICARE

## 2022-03-10 VITALS
OXYGEN SATURATION: 97 % | HEART RATE: 50 BPM | HEIGHT: 61 IN | DIASTOLIC BLOOD PRESSURE: 70 MMHG | SYSTOLIC BLOOD PRESSURE: 118 MMHG | WEIGHT: 128 LBS | BODY MASS INDEX: 24.17 KG/M2 | RESPIRATION RATE: 18 BRPM

## 2022-03-10 DIAGNOSIS — R11.0 NAUSEA: ICD-10-CM

## 2022-03-10 DIAGNOSIS — F02.80 EARLY ONSET ALZHEIMER'S DEMENTIA WITHOUT BEHAVIORAL DISTURBANCE (HCC): ICD-10-CM

## 2022-03-10 DIAGNOSIS — I48.91 ATRIAL FIBRILLATION WITH RVR (HCC): ICD-10-CM

## 2022-03-10 DIAGNOSIS — I50.9 ACUTE DECOMPENSATED HEART FAILURE (HCC): ICD-10-CM

## 2022-03-10 DIAGNOSIS — R53.83 FATIGUE, UNSPECIFIED TYPE: Primary | ICD-10-CM

## 2022-03-10 DIAGNOSIS — K21.9 GASTROESOPHAGEAL REFLUX DISEASE WITHOUT ESOPHAGITIS: ICD-10-CM

## 2022-03-10 DIAGNOSIS — I72.9 ANEURYSM (HCC): ICD-10-CM

## 2022-03-10 DIAGNOSIS — E78.2 MIXED HYPERLIPIDEMIA: ICD-10-CM

## 2022-03-10 DIAGNOSIS — G45.9 TIA (TRANSIENT ISCHEMIC ATTACK): ICD-10-CM

## 2022-03-10 DIAGNOSIS — E03.9 ACQUIRED HYPOTHYROIDISM: ICD-10-CM

## 2022-03-10 DIAGNOSIS — G30.0 EARLY ONSET ALZHEIMER'S DEMENTIA WITHOUT BEHAVIORAL DISTURBANCE (HCC): ICD-10-CM

## 2022-03-10 PROCEDURE — 1111F DSCHRG MED/CURRENT MED MERGE: CPT | Performed by: FAMILY MEDICINE

## 2022-03-10 PROCEDURE — 99495 TRANSJ CARE MGMT MOD F2F 14D: CPT | Performed by: FAMILY MEDICINE

## 2022-03-10 RX ORDER — AMIODARONE HYDROCHLORIDE 200 MG/1
200 TABLET ORAL DAILY
Qty: 90 TABLET | Refills: 5 | Status: SHIPPED
Start: 2022-03-10 | End: 2022-03-19 | Stop reason: SDUPTHER

## 2022-03-10 RX ORDER — ATORVASTATIN CALCIUM 20 MG/1
TABLET, FILM COATED ORAL
Qty: 90 TABLET | Refills: 5 | Status: SHIPPED
Start: 2022-03-10 | End: 2022-09-04 | Stop reason: SDUPTHER

## 2022-03-10 RX ORDER — FAMOTIDINE 40 MG/1
40 TABLET, FILM COATED ORAL EVERY EVENING
Qty: 90 TABLET | Refills: 5 | Status: SHIPPED
Start: 2022-03-10 | End: 2022-09-13

## 2022-03-10 RX ORDER — METOPROLOL SUCCINATE 50 MG/1
50 TABLET, EXTENDED RELEASE ORAL 2 TIMES DAILY
Qty: 180 TABLET | Refills: 5 | Status: SHIPPED
Start: 2022-03-10 | End: 2022-04-05 | Stop reason: DRUGHIGH

## 2022-03-10 RX ORDER — DONEPEZIL HYDROCHLORIDE 5 MG/1
5 TABLET, FILM COATED ORAL 2 TIMES DAILY
Qty: 180 TABLET | Refills: 5 | Status: SHIPPED
Start: 2022-03-10 | End: 2022-04-24 | Stop reason: SDUPTHER

## 2022-03-10 RX ORDER — DONEPEZIL HYDROCHLORIDE 10 MG/1
10 TABLET, FILM COATED ORAL NIGHTLY
Qty: 90 TABLET | Refills: 1 | Status: CANCELLED | OUTPATIENT
Start: 2022-03-10

## 2022-03-10 RX ORDER — AMIODARONE HYDROCHLORIDE 200 MG/1
TABLET ORAL
Qty: 90 TABLET | Refills: 1 | Status: CANCELLED | OUTPATIENT
Start: 2022-03-10 | End: 2022-04-16

## 2022-03-10 RX ORDER — FOLIC ACID 1 MG/1
1 TABLET ORAL DAILY
Qty: 90 TABLET | Refills: 5 | Status: SHIPPED
Start: 2022-03-10 | End: 2022-09-18 | Stop reason: SDUPTHER

## 2022-03-10 RX ORDER — DIPHENOXYLATE HYDROCHLORIDE AND ATROPINE SULFATE 2.5; .025 MG/1; MG/1
1 TABLET ORAL 4 TIMES DAILY PRN
COMMUNITY

## 2022-03-10 RX ORDER — POTASSIUM CHLORIDE 750 MG/1
10 TABLET, FILM COATED, EXTENDED RELEASE ORAL 2 TIMES DAILY
Qty: 180 TABLET | Refills: 5 | Status: SHIPPED
Start: 2022-03-10 | End: 2022-09-04 | Stop reason: SDUPTHER

## 2022-03-10 RX ORDER — TORSEMIDE 20 MG/1
20 TABLET ORAL DAILY
Qty: 90 TABLET | Refills: 5 | Status: SHIPPED
Start: 2022-03-10 | End: 2022-10-16 | Stop reason: SDUPTHER

## 2022-03-13 NOTE — PROGRESS NOTES
Post-Discharge Transitional Care  Follow Up      Vasu Luis   YOB: 1945    Date of Office Visit:  3/10/2022  Date of Hospital Admission: 2/18/22  Date of Hospital Discharge: 2/23/22  Risk of hospital readmission (high >=14%. Medium >=10%) :Readmission Risk Score: 21.5 ( )      Care management risk score Rising risk (score 2-5) and Complex Care (Scores >=6): 5     Non face to face  following discharge, date last encounter closed (first attempt may have been earlier): 3/9/2022  2:21 PM    Call initiated 2 business days of discharge: Yes    ASSESSMENT/PLAN:   Fatigue, unspecified type  -     CBC; Future  -     Comprehensive Metabolic Panel; Future  -     Comprehensive Metabolic Panel; Future  TIA (transient ischemic attack)  -     atorvastatin (LIPITOR) 20 MG tablet; TAKE ONE TABLET BY MOUTH EVERY EVENING, Disp-90 tablet, R-5Normal  Mixed hyperlipidemia  -     atorvastatin (LIPITOR) 20 MG tablet; TAKE ONE TABLET BY MOUTH EVERY EVENING, Disp-90 tablet, R-5Normal  Nausea  -     famotidine (PEPCID) 40 MG tablet; Take 1 tablet by mouth every evening, Disp-90 tablet, R-5Normal  Gastroesophageal reflux disease without esophagitis  -     famotidine (PEPCID) 40 MG tablet; Take 1 tablet by mouth every evening, Disp-90 tablet, R-5Normal  Atrial fibrillation with RVR (HCC)  -     amiodarone (CORDARONE) 200 MG tablet; Take 1 tablet by mouth daily, Disp-90 tablet, R-5Normal  -     donepezil (ARICEPT) 5 MG tablet; Take 1 tablet by mouth in the morning and at bedtime, Disp-180 tablet, R-5Normal  -     potassium chloride (K-TAB) 10 MEQ extended release tablet; Take 1 tablet by mouth 2 times daily, Disp-180 tablet, R-5Normal  -     NV DISCHARGE MEDS RECONCILED W/ CURRENT OUTPATIENT MED LIST  Early onset Alzheimer's dementia without behavioral disturbance ([facility] [address]  -     Niko Pruett DPM, Podiatry, [facility])  Acquired hypothyroidism  -     TSH;  Future  Acute decompensated heart failure ([facility] [address]  -     DC DISCHARGE MEDS RECONCILED W/ CURRENT OUTPATIENT MED LIST      Medical Decision Making: moderate complexity  Return in 2 weeks (on 3/24/2022). On this date 3/10/2022 I have spent 30 minutes reviewing previous notes, test results and face to face with the patient discussing the diagnosis and importance of compliance with the treatment plan as well as documenting on the day of the visit. Subjective:   HPI:  Follow up of Hospital problems/diagnosis(es): afib, hear failure    Inpatient course: Discharge summary reviewed- see chart. 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    Pain of right foot    Atrial fibrillation with RVR ([address]    Aneurysm ([address]    Atrial fibrillation with rapid ventricular response (HCC)    Hyperkalemia    Diverticulitis with microperforation    Sigmoid diverticulitis    PAF (paroxysmal atrial fibrillation) ([address]    Acute decompensated heart failure ([address]    NICM (nonischemic cardiomyopathy) ([address]       Medications listed as ordered at the time of discharge from hospital  [unfilled]      Medications marked \"taking\" at this time  Outpatient Medications Marked as Taking for the 3/10/22 encounter (Office Visit) with Feliberto Ellison, DO   Medication Sig Dispense Refill    diphenoxylate-atropine (LOMOTIL) 2.5-0.025 MG per tablet Take 1 tablet by mouth 4 times daily as needed for Diarrhea.       metoprolol succinate (TOPROL XL) 50 MG extended release tablet Take 1 tablet by mouth 2 times daily 180 tablet 5    torsemide (DEMADEX) 20 MG tablet Take 1 tablet by mouth daily 90 tablet 5    atorvastatin (LIPITOR) 20 MG tablet TAKE ONE TABLET BY MOUTH EVERY EVENING 90 tablet 5    famotidine (PEPCID) 40 MG tablet Take 1 tablet by mouth every evening 90 tablet 5    folic acid (FOLVITE) 1 MG tablet Take 1 tablet by mouth daily 90 tablet 5    amiodarone (CORDARONE) 200 MG tablet Take 1 tablet by mouth daily 90 tablet 5    donepezil (ARICEPT) 5 MG tablet Take 1 tablet by mouth in the morning and at bedtime 180 tablet 5    potassium chloride (K-TAB) 10 MEQ extended release tablet Take 1 tablet by mouth 2 times daily 180 tablet 5    sertraline (ZOLOFT) 50 MG tablet Take 1 tablet by mouth daily 30 tablet 3    levothyroxine (SYNTHROID) 25 MCG tablet Take 1 tablet by mouth Daily 90 tablet 5    Calcium Polycarbophil (FIBER-CAPS PO) Take 1 capsule by mouth at bedtime      ondansetron (ZOFRAN-ODT) 4 MG disintegrating tablet Take 1 tablet by mouth 3 times daily as needed for Nausea 90 tablet 3    apixaban (ELIQUIS) 5 MG TABS tablet Take 1 tablet by mouth 2 times daily 180 tablet 5    therapeutic multivitamin-minerals (THERAGRAN-M) tablet Take 1 tablet by mouth daily. Medications patient taking as of now reconciled against medications ordered at time of hospital discharge: Yes    A comprehensive review of systems was negative except for what was noted in the HPI.     Objective:    /70 (Site: Left Upper Arm, Position: Sitting)   Pulse 50   Resp 18   Ht 5' 1\" (1.549 m)   Wt 128 lb (58.1 kg)   SpO2 97%   BMI 24.19 kg/m²   General Appearance: alert and oriented to person, place and time, well developed and well- nourished, in no acute distress  Skin: warm and dry, no rash or erythema  Head: normocephalic and atraumatic  Eyes: pupils equal, round, and reactive to light, extraocular eye movements intact, conjunctivae normal  ENT: tympanic membrane, external ear and ear canal normal bilaterally, nose without deformity, nasal mucosa and turbinates normal without polyps  Neck: supple and non-tender without mass, no thyromegaly or thyroid nodules, no cervical lymphadenopathy  Pulmonary/Chest: clear to auscultation bilaterally- no wheezes, rales or rhonchi, normal air movement, no respiratory distress  Cardiovascular: normal rate, regular rhythm, normal S1 and S2, no murmurs, rubs, clicks, or gallops, distal pulses intact, no carotid bruits  Abdomen: soft, non-tender, non-distended, normal bowel sounds, no masses or organomegaly  Breast: appear normal, no suspicious masses, no skin or nipple changes or axillary nodes  Extremities: no cyanosis, clubbing or edema  Musculoskeletal: normal range of motion, no joint swelling, deformity or tenderness  Neurologic: reflexes normal and symmetric, no cranial nerve deficit, gait, coordination and speech normal      An electronic signature was used to authenticate this note.   --David Coronado, DO

## 2022-03-16 ENCOUNTER — CARE COORDINATION (OUTPATIENT)
Dept: CARE COORDINATION | Age: 77
End: 2022-03-16

## 2022-03-16 NOTE — CARE COORDINATION
Heart Failure Education outreach Date/Time: 3/16/2022 9:12 AM    Ambulatory Care Manager (ACM) contacted the family by telephone to perform Ambulatory Care Coordination. Verified name and  with family as identifiers. Provided introduction to self, and explanation of the Ambulatory Care Manager's role. ACM reviewed that a Health Healthy tips for the Spring packet has been sent to [facility]. ACM reviewed CHF zones, daily weights, fluid restriction, the importance of low sodium diet and healthy tips packet with the family. Instructed family to call their PCP if they have a weight gain of 3 lbs in 2 days or 5 lbs in a week. Patient reminded that there is a physician on call 24 hours a day / 7 days a week should the patient have questions or concerns. The family verbalized understanding. Pt's son, Ga Rogel, states pharmacy states [facility] will not fill Amiodarone until Friday. Pt has enough until Tuesday. He is calling [facility] Friday to see if they will expedite shipping. If they are not going to be delivered until Tuesday, Ga Rogel will call me.

## 2022-03-18 ENCOUNTER — CARE COORDINATION (OUTPATIENT)
Dept: CARE COORDINATION | Age: 77
End: 2022-03-18

## 2022-03-18 NOTE — CARE COORDINATION
Pt' son left a voicemail: They would like to know if pt has any fluid restrictions. They are concerned that when pt was in the hospital, her potassium was high. She takes her diuretic in the am with ons potassium dose, and then takes one in the evening. They worry that the same thing will happen with and elevated potassium.   Please advise

## 2022-03-18 NOTE — CARE COORDINATION
Pt's son, Anel Spears, called ACM  He states he no longer has questions about the diuretic/potassium. Her Amiodarone will not arrive until Thursday from [address] She only has enough through Tuesday. He is requesting a script for 7 pills to get her through until her script comes in. He contacted [facility] to try to expedite, but Thursday is the quickest they will be delivered. He understands he may have to pay out of pocket. If able, please send Script for 1 week of Amiodarone to [facility] in [place]. Please advise, and I will notify him either way.

## 2022-03-18 NOTE — LETTER
March 21, 2022    [address] [facility] [address]      Dear Vikas Nazario:    I just wanted you to know Dr Inna Naidu sent in a prescription for Amiodarone for enough quantity to get you through until you receive your mail order prescription. If you have any questions or concerns, please don't hesitate to call.     Sincerely,    Frances Vargas MSN, RN, [facility]  Ambulatory Care Manager  Cell: [phone]

## 2022-03-19 DIAGNOSIS — I48.91 ATRIAL FIBRILLATION WITH RVR (HCC): ICD-10-CM

## 2022-03-19 RX ORDER — AMIODARONE HYDROCHLORIDE 200 MG/1
200 TABLET ORAL DAILY
Qty: 10 TABLET | Refills: 0 | Status: SHIPPED
Start: 2022-03-19 | End: 2022-06-14 | Stop reason: DRUGHIGH

## 2022-03-23 DIAGNOSIS — E03.9 ACQUIRED HYPOTHYROIDISM: ICD-10-CM

## 2022-03-23 DIAGNOSIS — R53.83 FATIGUE, UNSPECIFIED TYPE: ICD-10-CM

## 2022-03-23 LAB
ALBUMIN SERPL-MCNC: 3.9 G/DL (ref 3.5–5.2)
ALP BLD-CCNC: 104 U/L (ref 35–104)
ALT SERPL-CCNC: 21 U/L (ref 0–32)
ANION GAP SERPL CALCULATED.3IONS-SCNC: 11 MMOL/L (ref 7–16)
AST SERPL-CCNC: 32 U/L (ref 0–31)
BILIRUB SERPL-MCNC: 0.3 MG/DL (ref 0–1.2)
BUN BLDV-MCNC: 24 MG/DL (ref 6–23)
CALCIUM SERPL-MCNC: 9.3 MG/DL (ref 8.6–10.2)
CHLORIDE BLD-SCNC: 97 MMOL/L (ref 98–107)
CO2: 30 MMOL/L (ref 22–29)
CREAT SERPL-MCNC: 1.1 MG/DL (ref 0.5–1)
GFR AFRICAN AMERICAN: 58
GFR NON-AFRICAN AMERICAN: 48 ML/MIN/1.73
GLUCOSE BLD-MCNC: 77 MG/DL (ref 74–99)
HCT VFR BLD CALC: 43.3 % (ref 34–48)
HEMOGLOBIN: 13.5 G/DL (ref 11.5–15.5)
MCH RBC QN AUTO: 28.8 PG (ref 26–35)
MCHC RBC AUTO-ENTMCNC: 31.2 % (ref 32–34.5)
MCV RBC AUTO: 92.3 FL (ref 80–99.9)
PDW BLD-RTO: 16.5 FL (ref 11.5–15)
PLATELET # BLD: 378 E9/L (ref 130–450)
PMV BLD AUTO: 11.5 FL (ref 7–12)
POTASSIUM SERPL-SCNC: 4.2 MMOL/L (ref 3.5–5)
RBC # BLD: 4.69 E12/L (ref 3.5–5.5)
SODIUM BLD-SCNC: 138 MMOL/L (ref 132–146)
TOTAL PROTEIN: 7.6 G/DL (ref 6.4–8.3)
TSH SERPL DL<=0.05 MIU/L-ACNC: 6.87 UIU/ML (ref 0.27–4.2)
WBC # BLD: 11.8 E9/L (ref 4.5–11.5)

## 2022-04-05 ENCOUNTER — OFFICE VISIT (OUTPATIENT)
Dept: CARDIOLOGY CLINIC | Age: 77
End: 2022-04-05
Payer: MEDICARE

## 2022-04-05 VITALS
SYSTOLIC BLOOD PRESSURE: 102 MMHG | HEIGHT: 61 IN | RESPIRATION RATE: 16 BRPM | HEART RATE: 47 BPM | WEIGHT: 109 LBS | DIASTOLIC BLOOD PRESSURE: 52 MMHG | BODY MASS INDEX: 20.58 KG/M2

## 2022-04-05 DIAGNOSIS — I51.3 LEFT ATRIAL THROMBUS: ICD-10-CM

## 2022-04-05 DIAGNOSIS — R00.1 SINUS BRADYCARDIA: Primary | ICD-10-CM

## 2022-04-05 DIAGNOSIS — I48.0 PAROXYSMAL ATRIAL FIBRILLATION (HCC): ICD-10-CM

## 2022-04-05 DIAGNOSIS — I38 VHD (VALVULAR HEART DISEASE): ICD-10-CM

## 2022-04-05 PROCEDURE — G8399 PT W/DXA RESULTS DOCUMENT: HCPCS | Performed by: INTERNAL MEDICINE

## 2022-04-05 PROCEDURE — G8420 CALC BMI NORM PARAMETERS: HCPCS | Performed by: INTERNAL MEDICINE

## 2022-04-05 PROCEDURE — 4040F PNEUMOC VAC/ADMIN/RCVD: CPT | Performed by: INTERNAL MEDICINE

## 2022-04-05 PROCEDURE — 1036F TOBACCO NON-USER: CPT | Performed by: INTERNAL MEDICINE

## 2022-04-05 PROCEDURE — 93000 ELECTROCARDIOGRAM COMPLETE: CPT | Performed by: INTERNAL MEDICINE

## 2022-04-05 PROCEDURE — 1123F ACP DISCUSS/DSCN MKR DOCD: CPT | Performed by: INTERNAL MEDICINE

## 2022-04-05 PROCEDURE — G8427 DOCREV CUR MEDS BY ELIG CLIN: HCPCS | Performed by: INTERNAL MEDICINE

## 2022-04-05 PROCEDURE — 1090F PRES/ABSN URINE INCON ASSESS: CPT | Performed by: INTERNAL MEDICINE

## 2022-04-05 PROCEDURE — 99214 OFFICE O/P EST MOD 30 MIN: CPT | Performed by: INTERNAL MEDICINE

## 2022-04-05 RX ORDER — BENZONATATE 100 MG/1
100 CAPSULE ORAL 3 TIMES DAILY PRN
COMMUNITY
End: 2022-09-13

## 2022-04-05 RX ORDER — METOPROLOL SUCCINATE 50 MG/1
50 TABLET, EXTENDED RELEASE ORAL DAILY
Qty: 90 TABLET | Refills: 3 | Status: SHIPPED
Start: 2022-04-05 | End: 2022-05-20 | Stop reason: SDUPTHER

## 2022-04-05 NOTE — PROGRESS NOTES
OFFICE VISIT     PRIMARY CARE PHYSICIAN:      Chente Skelton DO       ALLERGIES / SENSITIVITIES:      No Known Allergies       REVIEWED MEDICATIONS:        Current Outpatient Medications:     benzonatate (TESSALON) 100 MG capsule, Take 100 mg by mouth 3 times daily as needed for Cough, Disp: , Rfl:     amiodarone (CORDARONE) 200 MG tablet, Take 1 tablet by mouth daily, Disp: 10 tablet, Rfl: 0    diphenoxylate-atropine (LOMOTIL) 2.5-0.025 MG per tablet, Take 1 tablet by mouth 4 times daily as needed for Diarrhea., Disp: , Rfl:     metoprolol succinate (TOPROL XL) 50 MG extended release tablet, Take 1 tablet by mouth 2 times daily, Disp: 180 tablet, Rfl: 5    torsemide (DEMADEX) 20 MG tablet, Take 1 tablet by mouth daily, Disp: 90 tablet, Rfl: 5    atorvastatin (LIPITOR) 20 MG tablet, TAKE ONE TABLET BY MOUTH EVERY EVENING, Disp: 90 tablet, Rfl: 5    famotidine (PEPCID) 40 MG tablet, Take 1 tablet by mouth every evening, Disp: 90 tablet, Rfl: 5    folic acid (FOLVITE) 1 MG tablet, Take 1 tablet by mouth daily, Disp: 90 tablet, Rfl: 5    donepezil (ARICEPT) 5 MG tablet, Take 1 tablet by mouth in the morning and at bedtime, Disp: 180 tablet, Rfl: 5    potassium chloride (K-TAB) 10 MEQ extended release tablet, Take 1 tablet by mouth 2 times daily, Disp: 180 tablet, Rfl: 5    sertraline (ZOLOFT) 50 MG tablet, Take 1 tablet by mouth daily, Disp: 30 tablet, Rfl: 3    levothyroxine (SYNTHROID) 25 MCG tablet, Take 1 tablet by mouth Daily, Disp: 90 tablet, Rfl: 5    Calcium Polycarbophil (FIBER-CAPS PO), Take 1 capsule by mouth at bedtime, Disp: , Rfl:     ondansetron (ZOFRAN-ODT) 4 MG disintegrating tablet, Take 1 tablet by mouth 3 times daily as needed for Nausea, Disp: 90 tablet, Rfl: 3    apixaban (ELIQUIS) 5 MG TABS tablet, Take 1 tablet by mouth 2 times daily, Disp: 180 tablet, Rfl: 5    therapeutic multivitamin-minerals (THERAGRAN-M) tablet, Take 1 tablet by mouth daily. , Disp: , Rfl:       S: REASON FOR VISIT:       Chief Complaint   Patient presents with   [id number] [facility] follow up, patient has no complaints          History of Present Illness:       Office Visit for follow up of VHD, A Fib, CMP< Hx of MARTA thrombus, Post hospital f/u visit              64 yr with Hx of VHD, CMP, HTN, MARTA thrombus came for f/u visit   Had DCCV 22    Admitted in Feb for diverticulitis, CHF, A fib, s/p DCCV 22   Having Endoscopy 22 - held her Eliquis   Patient is compliant with all medications   Faizan any exertional chest pain or short of breath   No palpitations, dizzy or syncope. Active at home   Try to watch diet          Past Medical History:   Diagnosis Date    Arthritis     Asymptomatic PVCs 2012    pt felt flutter, no other sx, holter + pvc's, few runs of SVT   [place]    History of Holter monitoring     Hyperlipidemia     Macular degeneration     Nausea & vomiting     Pelvic prolapse     PONV (postoperative nausea and vomiting)     TIA (transient ischemic attack)             Past Surgical History:   Procedure Laterality Date    COLONOSCOPY      ENDOSCOPY, COLON, DIAGNOSTIC      EYE SURGERY      macular hole    HERNIA REPAIR      inguinal    HYSTERECTOMY      OTHER SURGICAL HISTORY  2013    ant elevatetransobturator sling rectocele and cystocele enterocele    SKIN BIOPSY      arms    TRANSESOPHAGEAL ECHOCARDIOGRAM N/A 10/20/2020    TRANSESOPHAGEAL ECHOCARDIOGRAM WITH BUBBLE STUDY performed by Amrit Estrada MD at [address] reviewed. No pertinent family history.        Social History     Tobacco Use    Smoking status: Former Smoker     Packs/day: 0.50     Quit date: 1994     Years since quittin.8    Smokeless tobacco: Never Used   Substance Use Topics    Alcohol use: No     Comment: Coffee 1 cup a day          Review of Systems:    Constitutional: negative for fever and chills, or significant weight loss  HEENT: negative for velocity is low at 19 cms/s. No evidence of thrombus or mass in the right atrium. Mild to moderate mitral regurgitation is present. Mild-to-moderate aortic regurgitation is noted. Mild to moderate tricuspid regurgitation. Mild-moderate focal atherosclerosis in the descending aorta. Stress test 12/23/2021  Impression       The myocardial perfusion imaging was normal.       Overall left ventricular systolic function was normal without regional   wall motion abnormalities.       Low risk study.         Event - 10/8/21 -11/6/2021- PVCs and PACs                MARYLOU 10/20/21   Summary   Large irregular mobile echogenic mass noted in the left atrial appendage   and left atrium near mouth of MARTA suggestive of thrombus.   Moderate to severe central jet of mitral regurgitation - ERO 0.3cm2, PISA 0.6cm, RV 76cc.   Normal left ventricular chamber size.   Normal left ventricular systolic function.   Left atrium is enlarged.   Interatrial septum intact.   Agitated saline injection showed no right to left shunt.   Normal right ventricle size and function.   No mitral valve prolapse.   There is trace aortic regurgitation.   There is mild tricuspid regurgitation.   Normal aortic root size.   No evidence of pericardial effusion.   Pericardium appears normal.   No comparison study available in the [employer]. CTA head 10/6/20  Impression    1. No acute intracranial abnormality. 2. 75% stenosis in the proximal M1 segment of the right MCA. 3. Otherwise, no acute abnormality or flow-limiting stenosis in the remainder    of the major arteries of the head and neck. 4. 2.3-2.4 mm prominent infundibulum at the origins of the bilateral    posterior communicating arteries.  No definite intracranial aneurysm.          CTA neck 10/6/20  Impression    1. No acute intracranial abnormality. 2. 75% stenosis in the proximal M1 segment of the right MCA.     3. Otherwise, no acute abnormality or flow-limiting stenosis in the remainder    of the major arteries of the head and neck. 4. 2.3-2.4 mm prominent infundibulum at the origins of the bilateral    posterior communicating arteries.  No definite intracranial aneurysm.       Treadmill Stress test - 6/2018  1. Exercise EKG was normal.   2. The patient experienced no chest pain with exercise. 3. Beckman treadmill score was 7 implying low risk of acute ischemic   events.     4. Exercise capacity was average. A/P:   ASSESSMENT / PLAN:    Alhaji Balderas was seen today for atrial fibrillation. Diagnoses and all orders for this visit:      Sinus bradycardia -  Asymptomatic, Decrease Metoprolol, Monitor BP and HR  -     EKG 12 Lead     Paroxysmal atrial fibrillation (HCC) - Dx 12/23/2021 - High [id number] VASc score-On Eliquis for [address]. Decrease Eliquis to 2.5mg TWICE daily if Creatinine >1.5 or age [de-identified]   -     EKG 12 Lead    Cardiomyopathy - EF 40-45% by MARYLOU 2/22/22 - Normal EF in 10/2021, Likely tachycardia mediated.  Nonischemic Stress test in 12/2021, EF 53%. Continue Metoprolol, If BP tolerate add Entresto.  Later add [place] Rather further testing, She is DNR CCA.     History of Left atrial appendage thrombus in 10/2021 - Resolved by MARYLOU 1/31/2022 - On Eliquis     VHD - Mild to moderate MR, TR, AR - Low dose ACEi if BP/renal Fn tolerate. Dyslipidemia -  On Statin     History of TIA (transient ischemic attack)      Preventive Cardiology: Low cholesterol diet, regular exercise as tolerate discussed. Other orders  -     EKG 12 Lead     Above recommendations discussed her and her family   The patient's current medication list, allergies, problem list and results of prior tests were reviewed at today's visit   All questions answered about cardiac diagnoses and cardiac medications. Continue current medications. Monitor BP and heart rates. Compliance with medications and f/u with all physicians discussed.    Risk factor modification based on risk profile discussed. Call if any exertional chest pain, short of breath, dizzy or palpitations. Follow up in 4 months or earlier if needed.          [facility]  [address], [place], [address] [facility]  [phone]

## 2022-04-06 ENCOUNTER — CARE COORDINATION (OUTPATIENT)
Dept: CARE COORDINATION | Age: 77
End: 2022-04-06

## 2022-04-06 NOTE — CARE COORDINATION
Ambulatory Care Coordination Note  4/6/2022  CM Risk Score: 5  Charlson 10 Year Mortality Risk Score: 98%     ACC: Sofya Acosta, ARTIS    Summary Note: Denies s/s CHF Exacerbation. Discussed Zone Tool and verbalizes understanding. Today's weight: stable    Saw Dr Chuck Hall yesterday, and pt's son states she is doing well. Only changes: Toprol XL is now only once a day. Denies falls/near falls    Goes tomorrow to GI for Endoscopy, and she will find out about the colonoscopy at that appt:  Dr Paulo Naidu:    Discuss:   -Endoscopy results? Colonoscopy scheduled? -CHF Management/Zone Tool  -Current Weight  Falls/Near Falls? -Appointment Reminders    Next Anticipated Outreach by [address] Team:  2 Weeks          Care Coordination Interventions    Program Enrollment: Complex Care  Referral from Primary Care Provider: No  Suggested Interventions and Community Resources  Fall Risk Prevention: In Process (Comment: Fall Precautions)  Home Health Services: Not Started (Comment: [facility])  Medication Assistance Program: Declined  Medi Set or Pill Pack: Declined  Pharmacist: Declined  Registered Dietician: Declined  Social Work: Declined  Transportation Support: Declined  Zone Management Tools: In Process (Comment: CHF)         Goals Addressed                 This Visit's Progress     Conditions and Symptoms   On track     I will schedule office visits, as directed by my provider. I will keep my appointment or reschedule if I have to cancel. I will notify my provider of any barriers to my plan of care. I will follow my Zone Management tool to seek urgent or emergent care. I will notify my provider of any symptoms that indicate a worsening of my condition. Barriers: lack of education  Plan for overcoming my barriers: Work with care team, follow poc  Confidence: 10/10  Anticipated Goal Completion Date: 6/9/22         Medication Management   On track     I will take my medication as directed.   I will notify my provider of any problems with medications, like adverse effects or side effects. I will notify my provider/Care Coordinator if I am unable to afford my medications. I will notify my provider for advice before I stop taking any of my medication. Barriers: lack of education  Plan for overcoming my barriers: Ask questions, voice concerns  Confidence: 10/10  Anticipated Goal Completion Date: 6/9/22            Prior to Admission medications    Medication Sig Start Date End Date Taking? Authorizing Provider   benzonatate (TESSALON) 100 MG capsule Take 100 mg by mouth 3 times daily as needed for Cough    Historical Provider, MD   metoprolol succinate (TOPROL XL) 50 MG extended release tablet Take 1 tablet by mouth daily 4/5/22   Rodrigo Martinez MD   amiodarone (CORDARONE) 200 MG tablet Take 1 tablet by mouth daily 3/19/22   Concha Bunde, DO   diphenoxylate-atropine (LOMOTIL) 2.5-0.025 MG per tablet Take 1 tablet by mouth 4 times daily as needed for Diarrhea.     Historical Provider, MD   torsemide (DEMADEX) 20 MG tablet Take 1 tablet by mouth daily 3/10/22   Concha Bunde, DO   atorvastatin (LIPITOR) 20 MG tablet TAKE ONE TABLET BY MOUTH EVERY EVENING 3/10/22   Concha Bunde, DO   famotidine (PEPCID) 40 MG tablet Take 1 tablet by mouth every evening 3/10/22   Concha Bunde, DO   folic acid (FOLVITE) 1 MG tablet Take 1 tablet by mouth daily 3/10/22   Concha Bunde, DO   donepezil (ARICEPT) 5 MG tablet Take 1 tablet by mouth in the morning and at bedtime 3/10/22   Concha Bunde, DO   potassium chloride (K-TAB) 10 MEQ extended release tablet Take 1 tablet by mouth 2 times daily 3/10/22   Concha Bunde, DO   sertraline (ZOLOFT) 50 MG tablet Take 1 tablet by mouth daily 2/24/22   Lillie Filler, DO   levothyroxine (SYNTHROID) 25 MCG tablet Take 1 tablet by mouth Daily 2/2/22 Leatha Bunde, DO   Calcium Polycarbophil (FIBER-CAPS PO) Take 1 capsule by mouth at bedtime    Historical Provider, MD ondansetron (ZOFRAN-ODT) 4 MG disintegrating tablet Take 1 tablet by mouth 3 times daily as needed for Nausea 1/25/22 4/25/22  Adarsh Bernal DO   apixaban (ELIQUIS) 5 MG TABS tablet Take 1 tablet by mouth 2 times daily 2/15/21   Adarsh Bernal DO   therapeutic multivitamin-minerals [facility]) tablet Take 1 tablet by mouth daily.     Historical Provider, MD       Future Appointments   Date Time Provider Camron Godinez   6/14/2022  2:30 PM DO Anuradha Fenton [facility]      and   Congestive Heart Failure Assessment    Do you understand a low sodium diet?: Yes  Do you understand how to read food labels?: No  How many restaurant meals do you eat per week?: 0  Do you salt your food before tasting it?: No     No patient-reported symptoms      Symptoms:  None: Yes      Symptom course: stable  Weight trend: stable  Salt intake watch compared to last visit: stable

## 2022-04-20 ENCOUNTER — CARE COORDINATION (OUTPATIENT)
Dept: CARE COORDINATION | Age: 77
End: 2022-04-20

## 2022-04-20 RX ORDER — OMEPRAZOLE 20 MG/1
20 CAPSULE, DELAYED RELEASE ORAL DAILY
COMMUNITY
End: 2022-08-01 | Stop reason: SDUPTHER

## 2022-04-20 NOTE — CARE COORDINATION
Ambulatory Care Coordination Note  4/20/2022  CM Risk Score: 5  Charlson 10 Year Mortality Risk Score: 98%     ACC: Eleazar Garnica RN    Summary Note: Denies s/s CHF Exacerbation. Discussed Zone Tool and verbalizes understanding. Today's weight: 109#    Pt's son, Alisa Seip, states pt had colonoscopy done yesterday by Dr Koki Nieves. She has a follow up appt with him 5/10/22 to discuss biopsy results. Pt was placed on Omeprazole 20mg daily. ACM updated the medication list.    Denies falls/near falls. FOLLOW-UP PLAN:    Discuss:   -How did follow up appt with Dr Koki Nieves go? Changes? Results from biopsy? -CHF Management/Zone Tool  -Current Weight  Falls/Near Falls? -Appointment Reminders    Next Anticipated Outreach by [address] Team:  3 Weeks            Care Coordination Interventions    Program Enrollment: Complex Care  Referral from Primary Care Provider: No  Suggested Interventions and Community Resources  Fall Risk Prevention: In Process (Comment: Fall Precautions)  Home Health Services: Not Started (Comment: [facility])  Medication Assistance Program: Declined  Medi Set or Pill Pack: Declined  Pharmacist: Declined  Registered Dietician: Declined  Social Work: Declined  Transportation Support: Declined  Zone Management Tools: In Process (Comment: CHF)         Goals Addressed                 This Visit's Progress     Conditions and Symptoms   On track     I will schedule office visits, as directed by my provider. I will keep my appointment or reschedule if I have to cancel. I will notify my provider of any barriers to my plan of care. I will follow my Zone Management tool to seek urgent or emergent care. I will notify my provider of any symptoms that indicate a worsening of my condition.     Barriers: lack of education  Plan for overcoming my barriers: Work with care team, follow poc  Confidence: 10/10  Anticipated Goal Completion Date: 6/9/22         Medication Management   On track     I will take my medication as directed. I will notify my provider of any problems with medications, like adverse effects or side effects. I will notify my provider/Care Coordinator if I am unable to afford my medications. I will notify my provider for advice before I stop taking any of my medication. Barriers: lack of education  Plan for overcoming my barriers: Ask questions, voice concerns  Confidence: 10/10  Anticipated Goal Completion Date: 6/9/22            Prior to Admission medications    Medication Sig Start Date End Date Taking? Authorizing Provider   omeprazole (PRILOSEC) 20 MG delayed release capsule Take 20 mg by mouth daily   Yes [facility] Historical Provider   benzonatate (TESSALON) 100 MG capsule Take 100 mg by mouth 3 times daily as needed for Cough    Historical Provider, MD   metoprolol succinate (TOPROL XL) 50 MG extended release tablet Take 1 tablet by mouth daily 4/5/22   Nyla Hollingsworth MD   amiodarone (CORDARONE) 200 MG tablet Take 1 tablet by mouth daily 3/19/22   Adarsh Bernal DO   diphenoxylate-atropine (LOMOTIL) 2.5-0.025 MG per tablet Take 1 tablet by mouth 4 times daily as needed for Diarrhea.     Historical Provider, MD   torsemide (DEMADEX) 20 MG tablet Take 1 tablet by mouth daily 3/10/22   Adarsh Bernal,    atorvastatin (LIPITOR) 20 MG tablet TAKE ONE TABLET BY MOUTH EVERY EVENING 3/10/22   Adarsh Bernal, DO   famotidine (PEPCID) 40 MG tablet Take 1 tablet by mouth every evening 3/10/22   Adarsh Bernal, DO   folic acid (FOLVITE) 1 MG tablet Take 1 tablet by mouth daily 3/10/22   Adarsh Bernal, DO   donepezil (ARICEPT) 5 MG tablet Take 1 tablet by mouth in the morning and at bedtime 3/10/22   Adarsh Bernal, DO   potassium chloride (K-TAB) 10 MEQ extended release tablet Take 1 tablet by mouth 2 times daily 3/10/22   Adarsh Bernal DO   sertraline (ZOLOFT) 50 MG tablet Take 1 tablet by mouth daily 2/24/22   Tyler Finn,    levothyroxine (SYNTHROID) 25 MCG tablet Take 1 tablet by mouth Daily 2/2/22   Kari Shukla DO   Calcium Polycarbophil (FIBER-CAPS PO) Take 1 capsule by mouth at bedtime    Historical Provider, MD   ondansetron (ZOFRAN-ODT) 4 MG disintegrating tablet Take 1 tablet by mouth 3 times daily as needed for Nausea 1/25/22 4/25/22  Kari Shukla DO   apixaban (ELIQUIS) 5 MG TABS tablet Take 1 tablet by mouth 2 times daily 2/15/21   Kari Shukla DO   therapeutic multivitamin-minerals [facility]) tablet Take 1 tablet by mouth daily.     Historical Provider, MD       Future Appointments   Date Time Provider Camron Godinez   6/14/2022  2:30 PM DO Anuradha [facility]     ,   Congestive Heart Failure Assessment    Do you understand a low sodium diet?: Yes  Do you understand how to read food labels?: No  How many restaurant meals do you eat per week?: 0  Do you salt your food before tasting it?: No     No patient-reported symptoms      Symptoms:  None: Yes      Symptom course: stable  Patient-reported weight (lb): 109  Weight trend: stable  Salt intake watch compared to last visit: stable      and   General Assessment    Do you have any symptoms that are causing concern?: No

## 2022-04-24 DIAGNOSIS — I48.91 ATRIAL FIBRILLATION WITH RVR (HCC): ICD-10-CM

## 2022-04-26 RX ORDER — DONEPEZIL HYDROCHLORIDE 5 MG/1
5 TABLET, FILM COATED ORAL 2 TIMES DAILY
Qty: 180 TABLET | Refills: 0 | Status: SHIPPED
Start: 2022-04-26 | End: 2022-05-03

## 2022-05-03 ENCOUNTER — TELEPHONE (OUTPATIENT)
Dept: FAMILY MEDICINE CLINIC | Age: 77
End: 2022-05-03

## 2022-05-03 RX ORDER — DONEPEZIL HYDROCHLORIDE 10 MG/1
10 TABLET, FILM COATED ORAL NIGHTLY
Qty: 90 TABLET | Refills: 5 | Status: SHIPPED
Start: 2022-05-03 | End: 2022-09-04 | Stop reason: SDUPTHER

## 2022-05-03 NOTE — TELEPHONE ENCOUNTER
Insurance wont cover donepezil 5 mg bid max quantity is 1   They will cover donepezil 10mg one daily

## 2022-05-11 ENCOUNTER — CARE COORDINATION (OUTPATIENT)
Dept: CARE COORDINATION | Age: 77
End: 2022-05-11

## 2022-05-11 NOTE — CARE COORDINATION
Ambulatory Care Coordination Note  5/11/2022  CM Risk Score: 5  Charlson 10 Year Mortality Risk Score: 98%     ACC: Elena Yarbrough    Summary Note: CHF:  Denies s/s CHF Exacerbation. Discussed Zone Tool and verbalizes understanding. Today's weight: Son states her weight has not changed and she is doing good. Son was concerned about pulse rate being 48-50's. She has an appointment with Dr Lucia Torres and they will discuss then. Happy with biopsy report, normal.    FOLLOW-UP PLAN:    Discuss:   -Pulse rate  -CHF Management/Zone Tool  -Current Weight  Falls/Near Falls? -Appointment Reminders    Next Anticipated Outreach by [address] Team:  1 Month          Care Coordination Interventions    Program Enrollment: Complex Care  Referral from Primary Care Provider: No  Suggested Interventions and Community Resources  Fall Risk Prevention: In Process (Comment: Fall Precautions)  Home Health Services: Not Started (Comment: [address])  Medication Assistance Program: Declined  Medi Set or Pill Pack: Declined  Pharmacist: Declined  Registered Dietician: Declined  Social Work: Declined  Transportation Support: Declined  Zone Management Tools: In Process (Comment: CHF)         Goals Addressed                 This Visit's Progress     Conditions and Symptoms   On track     I will schedule office visits, as directed by my provider. I will keep my appointment or reschedule if I have to cancel. I will notify my provider of any barriers to my plan of care. I will follow my Zone Management tool to seek urgent or emergent care. I will notify my provider of any symptoms that indicate a worsening of my condition. Barriers: lack of education  Plan for overcoming my barriers: Work with care team, follow poc  Confidence: 10/10  Anticipated Goal Completion Date: 6/9/22         Medication Management   On track     I will take my medication as directed.   I will notify my provider of any problems with medications, like adverse effects or side effects. I will notify my provider/Care Coordinator if I am unable to afford my medications. I will notify my provider for advice before I stop taking any of my medication. Barriers: lack of education  Plan for overcoming my barriers: Ask questions, voice concerns  Confidence: 10/10  Anticipated Goal Completion Date: 6/9/22            Prior to Admission medications    Medication Sig Start Date End Date Taking? Authorizing Provider   donepezil (ARICEPT) 10 MG tablet Take 1 tablet by mouth nightly 5/3/22   Kristie Morrissey DO   omeprazole (PRILOSEC) 20 MG delayed release capsule Take 20 mg by mouth daily    [facility] Historical Provider   benzonatate (TESSALON) 100 MG capsule Take 100 mg by mouth 3 times daily as needed for Cough    Historical Provider, MD   metoprolol succinate (TOPROL XL) 50 MG extended release tablet Take 1 tablet by mouth daily 4/5/22   Shantona Drivers, MD   amiodarone (CORDARONE) 200 MG tablet Take 1 tablet by mouth daily 3/19/22   Kristie Morrissey DO   diphenoxylate-atropine (LOMOTIL) 2.5-0.025 MG per tablet Take 1 tablet by mouth 4 times daily as needed for Diarrhea.     Historical Provider, MD   torsemide (DEMADEX) 20 MG tablet Take 1 tablet by mouth daily 3/10/22   Kristie Yuni, DO   atorvastatin (LIPITOR) 20 MG tablet TAKE ONE TABLET BY MOUTH EVERY EVENING 3/10/22   Kristie Yuni, DO   famotidine (PEPCID) 40 MG tablet Take 1 tablet by mouth every evening 3/10/22   Kristie Yuni, DO   folic acid (FOLVITE) 1 MG tablet Take 1 tablet by mouth daily 3/10/22   Kristie Yuni, DO   potassium chloride (K-TAB) 10 MEQ extended release tablet Take 1 tablet by mouth 2 times daily 3/10/22   Kristie Yuni, DO   sertraline (ZOLOFT) 50 MG tablet Take 1 tablet by mouth daily 2/24/22   Geno Carlson,    levothyroxine (SYNTHROID) 25 MCG tablet Take 1 tablet by mouth Daily 2/2/22   Kristie Yuni, DO   Calcium Polycarbophil (FIBER-CAPS PO) Take 1 capsule by mouth at bedtime    Historical Provider, MD   apixaban (ELIQUIS) 5 MG TABS tablet Take 1 tablet by mouth 2 times daily 2/15/21   Chente Skelton DO   therapeutic multivitamin-minerals [facility]) tablet Take 1 tablet by mouth daily.     Historical Provider, MD       Future Appointments   Date Time Provider Camron Godinez   6/14/2022  2:30 PM [facility]      and   Congestive Heart Failure Assessment    Do you understand a low sodium diet?: Yes  Do you understand how to read food labels?: No  How many restaurant meals do you eat per week?: 0  Do you salt your food before tasting it?: No     No patient-reported symptoms      Symptoms:  None: Yes

## 2022-05-13 NOTE — CARE COORDINATION
Spoke with son regarding medication and gave information on metoprolol per Dr Cassandra Fernández,    Decrease Metoprolol to 50mg ONCE daily - Hold Metoprolol if heart  rate <60 or BP top number <100. If need to continually hold, call Dr Andry Hayes. Son states he will continue to monitor and call physician if warranted.

## 2022-05-19 ENCOUNTER — TELEPHONE (OUTPATIENT)
Dept: CARDIOLOGY CLINIC | Age: 77
End: 2022-05-19

## 2022-05-19 NOTE — TELEPHONE ENCOUNTER
Patients son states patients pulse has been 50-52 range. She has only been taking 25 mg of metoprolol instead of 50 mg. Her bp has been running 134/49 and yesterday it was 152/54. Patient would like to know what to do. Please advise.

## 2022-05-19 NOTE — TELEPHONE ENCOUNTER
Decrease Metoprolol to 12.5mg ONCE daily  Send Rx to her pharmacy for [facility] 24/26 TWICE daily (For her CHF, HTN) - Monitor BP, They can  some samples at our office  Monitor BP and HR.  OV 2-4 weeks

## 2022-05-20 ENCOUNTER — CARE COORDINATION (OUTPATIENT)
Dept: CARE COORDINATION | Age: 77
End: 2022-05-20

## 2022-05-20 RX ORDER — METOPROLOL SUCCINATE 25 MG/1
12.5 TABLET, EXTENDED RELEASE ORAL DAILY
Qty: 45 TABLET | Refills: 2 | Status: SHIPPED | OUTPATIENT
Start: 2022-05-20 | End: 2022-09-13 | Stop reason: SDUPTHER

## 2022-05-20 NOTE — CARE COORDINATION
Heart Failure Education outreach Date/Time: 2022 12:07 PM    Ambulatory Care Manager (ACM) contacted the patient by telephone to perform Ambulatory Care Coordination. Verified name and  with patient as identifiers. Provided introduction to self, and explanation of the Ambulatory Care Manager's role. ACM reviewed that a Health Healthy tips for the Summer packet has been sent to [employer]. ACM reviewed CHF zones, daily weights, fluid restriction, the importance of low sodium diet and healthy tips packet with the patient. Instructed patient to call their PCP if they have a weight gain of 3 lbs in 2 days or 5 lbs in a week. Patient reminded that there is a physician on call 24 hours a day / 7 days a week should the patient have questions or concerns. The patient verbalized understanding. Noted note by Dr Cyndie Stroud in pt's chart:  \"Decrease Metoprolol to 12.5mg ONCE daily  Send Rx to her pharmacy for [facility]  TWICE daily (For her CHF, HTN) - Monitor BP, They can  some samples at our office  Monitor BP and HR.  OV 2-4 weeks\"  Pt's son is aware and would like it to be sent to the [facility] Rx (Mail in). He will then  samples from the office for [facility].

## 2022-05-20 NOTE — TELEPHONE ENCOUNTER
I sent the Rx for Metoprolol 12.5mg and Entresto 24/26  for 90 days to [facility] Rx

## 2022-06-03 ENCOUNTER — OFFICE VISIT (OUTPATIENT)
Dept: CARDIOLOGY CLINIC | Age: 77
End: 2022-06-03
Payer: MEDICARE

## 2022-06-03 VITALS
WEIGHT: 112.8 LBS | RESPIRATION RATE: 18 BRPM | DIASTOLIC BLOOD PRESSURE: 54 MMHG | OXYGEN SATURATION: 95 % | SYSTOLIC BLOOD PRESSURE: 112 MMHG | BODY MASS INDEX: 21.3 KG/M2 | HEIGHT: 61 IN | HEART RATE: 51 BPM

## 2022-06-03 DIAGNOSIS — Z86.73 HISTORY OF TIA (TRANSIENT ISCHEMIC ATTACK): ICD-10-CM

## 2022-06-03 DIAGNOSIS — I38 VHD (VALVULAR HEART DISEASE): ICD-10-CM

## 2022-06-03 DIAGNOSIS — R00.1 SINUS BRADYCARDIA: Primary | ICD-10-CM

## 2022-06-03 DIAGNOSIS — I48.0 PAROXYSMAL ATRIAL FIBRILLATION (HCC): ICD-10-CM

## 2022-06-03 DIAGNOSIS — E78.5 DYSLIPIDEMIA: ICD-10-CM

## 2022-06-03 PROCEDURE — G8427 DOCREV CUR MEDS BY ELIG CLIN: HCPCS | Performed by: INTERNAL MEDICINE

## 2022-06-03 PROCEDURE — 99214 OFFICE O/P EST MOD 30 MIN: CPT | Performed by: INTERNAL MEDICINE

## 2022-06-03 PROCEDURE — 1090F PRES/ABSN URINE INCON ASSESS: CPT | Performed by: INTERNAL MEDICINE

## 2022-06-03 PROCEDURE — 1036F TOBACCO NON-USER: CPT | Performed by: INTERNAL MEDICINE

## 2022-06-03 PROCEDURE — G8399 PT W/DXA RESULTS DOCUMENT: HCPCS | Performed by: INTERNAL MEDICINE

## 2022-06-03 PROCEDURE — G8420 CALC BMI NORM PARAMETERS: HCPCS | Performed by: INTERNAL MEDICINE

## 2022-06-03 PROCEDURE — 93000 ELECTROCARDIOGRAM COMPLETE: CPT | Performed by: INTERNAL MEDICINE

## 2022-06-03 PROCEDURE — 1123F ACP DISCUSS/DSCN MKR DOCD: CPT | Performed by: INTERNAL MEDICINE

## 2022-06-03 RX ORDER — ONDANSETRON 4 MG/1
4 TABLET, FILM COATED ORAL EVERY 8 HOURS PRN
COMMUNITY
End: 2022-09-13

## 2022-06-03 NOTE — PROGRESS NOTES
OFFICE VISIT     PRIMARY CARE PHYSICIAN:      Chente Skelton DO       ALLERGIES / SENSITIVITIES:      No Known Allergies       REVIEWED MEDICATIONS:        Current Outpatient Medications:     ondansetron (ZOFRAN) 4 MG tablet, Take 4 mg by mouth every 8 hours as needed for Nausea or Vomiting, Disp: , Rfl:     sacubitril-valsartan (ENTRESTO) 24-26 MG per tablet, Take 1 tablet by mouth 2 times daily, Disp: 180 tablet, Rfl: 2    metoprolol succinate (TOPROL XL) 25 MG extended release tablet, Take 0.5 tablets by mouth daily, Disp: 45 tablet, Rfl: 2    donepezil (ARICEPT) 10 MG tablet, Take 1 tablet by mouth nightly, Disp: 90 tablet, Rfl: 5    omeprazole (PRILOSEC) 20 MG delayed release capsule, Take 20 mg by mouth daily, Disp: , Rfl:     benzonatate (TESSALON) 100 MG capsule, Take 100 mg by mouth 3 times daily as needed for Cough, Disp: , Rfl:     amiodarone (CORDARONE) 200 MG tablet, Take 1 tablet by mouth daily, Disp: 10 tablet, Rfl: 0    diphenoxylate-atropine (LOMOTIL) 2.5-0.025 MG per tablet, Take 1 tablet by mouth 4 times daily as needed for Diarrhea., Disp: , Rfl:     torsemide (DEMADEX) 20 MG tablet, Take 1 tablet by mouth daily, Disp: 90 tablet, Rfl: 5    atorvastatin (LIPITOR) 20 MG tablet, TAKE ONE TABLET BY MOUTH EVERY EVENING, Disp: 90 tablet, Rfl: 5    folic acid (FOLVITE) 1 MG tablet, Take 1 tablet by mouth daily, Disp: 90 tablet, Rfl: 5    potassium chloride (K-TAB) 10 MEQ extended release tablet, Take 1 tablet by mouth 2 times daily, Disp: 180 tablet, Rfl: 5    sertraline (ZOLOFT) 50 MG tablet, Take 1 tablet by mouth daily, Disp: 30 tablet, Rfl: 3    levothyroxine (SYNTHROID) 25 MCG tablet, Take 1 tablet by mouth Daily, Disp: 90 tablet, Rfl: 5    Calcium Polycarbophil (FIBER-CAPS PO), Take 1 capsule by mouth at bedtime, Disp: , Rfl:     apixaban (ELIQUIS) 5 MG TABS tablet, Take 1 tablet by mouth 2 times daily, Disp: 180 tablet, Rfl: 5    therapeutic multivitamin-minerals (THERAGRAN-M) tablet, Take 1 tablet by mouth daily. , Disp: , Rfl:     famotidine (PEPCID) 40 MG tablet, Take 1 tablet by mouth every evening (Patient not taking: Reported on 6/3/2022), Disp: 90 tablet, Rfl: 5      S: REASON FOR VISIT:       Chief Complaint   Patient presents with    Bradycardia     2 month ov- pt has no cardiac complaints          History of Present Illness:       Office Visit for follow up of CMP, A fib, bradycardia, VHD   64year old with HX of PAF, Bradycadia, VHD, CMP came for f/u of her braducardia   BB decreased due to HR 47 in April visit   No fatigue, tired or dizzy   Home BP and HR noted   C/o nasal drainage and aleergies    No hospitalizations or surgeries since last visit   Patient is compliant with all medications   Faizan any exertional chest pain or short of breath   No palpitations, dizzy or syncope. Active at home   Try to watch diet          Past Medical History:   Diagnosis Date    Arthritis     Asymptomatic PVCs 2012    pt felt flutter, no other sx, holter + pvc's, few runs of SVT   [place]    History of Holter monitoring     Hyperlipidemia     Macular degeneration     Nausea & vomiting     Pelvic prolapse     PONV (postoperative nausea and vomiting)     TIA (transient ischemic attack)             Past Surgical History:   Procedure Laterality Date    COLONOSCOPY      ENDOSCOPY, COLON, DIAGNOSTIC      EYE SURGERY      macular hole    HERNIA REPAIR      inguinal    HYSTERECTOMY      OTHER SURGICAL HISTORY  2013    ant elevatetransobturator sling rectocele and cystocele enterocele    SKIN BIOPSY      arms    TRANSESOPHAGEAL ECHOCARDIOGRAM N/A 10/20/2020    TRANSESOPHAGEAL ECHOCARDIOGRAM WITH BUBBLE STUDY performed by Shahnaz Aranda MD at [address] reviewed. No pertinent family history.        Social History     Tobacco Use    Smoking status: Former Smoker     Packs/day: 0.50     Quit date: 1994     Years since quittin.9    Smokeless visually estimated at 40-45%. Atrial fibrillation affects the accuracy of interpretation.   Right ventricle global systolic function is low normal .   No evidence of thrombus within left atrium/ left atrial appendage.   Emptying velocity is low at 19 cms/s.   No evidence of thrombus or mass in the right atrium.   Mild to moderate mitral regurgitation is present.   Mild-to-moderate aortic regurgitation is noted.   Mild to moderate tricuspid regurgitation.   Mild-moderate focal atherosclerosis in the descending aorta. Stress test 12/23/2021  Impression       The myocardial perfusion imaging was normal.       Overall left ventricular systolic function was normal without regional   wall motion abnormalities.       Low risk study.         Event monitor- 10/8/21 -11/6/2021- PVCs and PACs                MARYLOU 10/20/21   Summary   Large irregular mobile echogenic mass noted in the left atrial appendage   and left atrium near mouth of MARTA suggestive of thrombus.   Moderate to severe central jet of mitral regurgitation - ERO 0.3cm2, PISA 0.6cm, RV 76cc.   Normal left ventricular chamber size.   Normal left ventricular systolic function.   Left atrium is enlarged.   Interatrial septum intact.   Agitated saline injection showed no right to left shunt.   Normal right ventricle size and function.   No mitral valve prolapse.   There is trace aortic regurgitation.   There is mild tricuspid regurgitation.   Normal aortic root size.   No evidence of pericardial effusion.   Pericardium appears normal.   No comparison study available in the local [facility].                  CTA head 10/6/20  Impression    1. No acute intracranial abnormality. 2. 75% stenosis in the proximal M1 segment of the right MCA. 3. Otherwise, no acute abnormality or flow-limiting stenosis in the remainder    of the major arteries of the head and neck.     4. 2.3-2.4 mm prominent infundibulum at the origins of the bilateral    posterior communicating arteries.  No definite intracranial aneurysm.          CTA neck 10/6/20  Impression    1. No acute intracranial abnormality. 2. 75% stenosis in the proximal M1 segment of the right MCA. 3. Otherwise, no acute abnormality or flow-limiting stenosis in the remainder    of the major arteries of the head and neck. 4. 2.3-2.4 mm prominent infundibulum at the origins of the bilateral    posterior communicating arteries.  No definite intracranial aneurysm.       Treadmill Stress test - 6/2018  1. Exercise EKG was normal.   2. The patient experienced no chest pain with exercise. 3. Beckman treadmill score was 7 implying low risk of acute ischemic   events.     4. Exercise capacity was average. A/P:              ASSESSMENT / PLAN:               Buddy Langley was seen today for bradycardia, atrial fibrillation.     Diagnoses and all orders for this visit:     Sinus bradycardia -  Asymptomatic, Metoprolol decreased,  Monitor BP and HR - If symptomatic or HR<50 discontinue BB  -     EKG 12 Lead     Paroxysmal atrial fibrillation (HCC) - Dx 12/23/2021 - High [id number] VASc score-On Eliquis for [facility]. Decrease Eliquis to 2.5mg TWICE daily if Creatinine >1.5 or age [de-identified]   -     EKG 12 Lead     Cardiomyopathy - EF 40-45% by MARYLOU 2/22/22 - Normal EF in 10/2021, Likely tachycardia mediated.  Nonischemic Stress test in 12/2021, EF 53%. Well compensated.  Continue Metoprolol, If BP tolerate add Entresto/ACE-I. Fatemeh Right add Jardiance.  No further testing, She is DNR CCA.     History of Left atrial appendage thrombus in 10/2021 - Resolved by MARYLOU 1/31/2022 - On Eliquis     VHD - Mild to moderate MR, TR, AR - Low dose ACEi if BP/renal Fn tolerate.     Dyslipidemia -  On Statin     History of TIA (transient ischemic attack)      Seasonal  Allergies  -Try Nascort/Flonase for nasal allergies, f/u with Dr Rosie Timmons    Preventive Cardiology: Low cholesterol diet, regular exercise as tolerate, and gradual weight loss discussed. Above recommendations discussed her and her family. The patient's current medication list, allergies, problem list and results of prior tests (as available) were reviewed at today's visit   All questions answered about cardiac diagnoses and cardiac medications. Continue current medications. Monitor BP and heart rates. Compliance with medications and f/u with all physicians discussed. Risk factor modification based on risk profile discussed. Call if any exertional chest pain, short of breath, dizzy or palpitations. Follow up in 6 months or earlier if needed.          [facility]  [address], [place], [address] [place] [address]  [phone]

## 2022-06-06 DIAGNOSIS — R53.83 FATIGUE, UNSPECIFIED TYPE: ICD-10-CM

## 2022-06-06 LAB
ALBUMIN SERPL-MCNC: 3.9 G/DL (ref 3.5–5.2)
ALP BLD-CCNC: 85 U/L (ref 35–104)
ALT SERPL-CCNC: 19 U/L (ref 0–32)
ANION GAP SERPL CALCULATED.3IONS-SCNC: 13 MMOL/L (ref 7–16)
AST SERPL-CCNC: 30 U/L (ref 0–31)
BILIRUB SERPL-MCNC: 0.4 MG/DL (ref 0–1.2)
BUN BLDV-MCNC: 17 MG/DL (ref 6–23)
CALCIUM SERPL-MCNC: 9.1 MG/DL (ref 8.6–10.2)
CHLORIDE BLD-SCNC: 97 MMOL/L (ref 98–107)
CO2: 26 MMOL/L (ref 22–29)
CREAT SERPL-MCNC: 1.1 MG/DL (ref 0.5–1)
GFR AFRICAN AMERICAN: 58
GFR NON-AFRICAN AMERICAN: 48 ML/MIN/1.73
GLUCOSE BLD-MCNC: 92 MG/DL (ref 74–99)
POTASSIUM SERPL-SCNC: 4.9 MMOL/L (ref 3.5–5)
SODIUM BLD-SCNC: 136 MMOL/L (ref 132–146)
TOTAL PROTEIN: 7.2 G/DL (ref 6.4–8.3)

## 2022-06-07 ENCOUNTER — CARE COORDINATION (OUTPATIENT)
Dept: CARE COORDINATION | Age: 77
End: 2022-06-07

## 2022-06-07 NOTE — CARE COORDINATION
Ambulatory Care Coordination Note  6/7/2022  CM Risk Score: 1  Charlson 10 Year Mortality Risk Score: 98%     ACC: Carmelita Mendoza RN    Summary Note:   CHF:  Denies s/s CHF Exacerbation. Discussed Zone Tool and verbalizes understanding. Today's weight: stable per pt's son  States pt is keeping active    Per son, it seems like her memory is getting 'a little touchier\". He feels she is getting a little more 'scattered'. It appears to be her short term memory. Son states he is concerned about her potassium level of 4.9. He understands that 5.0 is the high end of normal  Son is aware of upcoming appt with PCP 6/14/22 and plans to discuss it at that time    FOLLOW-UP PLAN:    Discuss:   -CHF Management/Zone Tool  -Current Weight  -Falls/Near Falls? -OV AVS Reinforcement  -Appointment Reminders    Next Anticipated Outreach by [address] Team:  1 Month          Lab Results     None          Care Coordination Interventions    Program Enrollment: Complex Care  Referral from Primary Care Provider: No  Suggested Interventions and Community Resources  Fall Risk Prevention: In Process (Comment: Fall Precautions)  Home Health Services: Not Started (Comment: [facility])  Medication Assistance Program: Declined  Medi Set or Pill Pack: Declined  Pharmacist: Declined  Registered Dietician: Declined  Social Work: Declined  Transportation Support: Declined  Zone Management Tools: In Process (Comment: CHF)         Goals Addressed                 This Visit's Progress     Conditions and Symptoms   On track     I will schedule office visits, as directed by my provider. I will keep my appointment or reschedule if I have to cancel. I will notify my provider of any barriers to my plan of care. I will follow my Zone Management tool to seek urgent or emergent care. I will notify my provider of any symptoms that indicate a worsening of my condition.     Barriers: lack of education  Plan for overcoming my barriers: Work with care team, follow poc  Confidence: 10/10  Anticipated Goal Completion Date: 6/9/22         Medication Management   On track     I will take my medication as directed. I will notify my provider of any problems with medications, like adverse effects or side effects. I will notify my provider/Care Coordinator if I am unable to afford my medications. I will notify my provider for advice before I stop taking any of my medication. Barriers: lack of education  Plan for overcoming my barriers: Ask questions, voice concerns  Confidence: 10/10  Anticipated Goal Completion Date: 6/9/22            Prior to Admission medications    Medication Sig Start Date End Date Taking? Authorizing Provider   ondansetron (ZOFRAN) 4 MG tablet Take 4 mg by mouth every 8 hours as needed for Nausea or Vomiting    Historical Provider, MD   sacubitril-valsartan (ENTRESTO) 24-26 MG per tablet Take 1 tablet by mouth 2 times daily 5/20/22   Cristino Platt MD   metoprolol succinate (TOPROL XL) 25 MG extended release tablet Take 0.5 tablets by mouth daily 5/20/22   Cristino Platt MD   donepezil (ARICEPT) 10 MG tablet Take 1 tablet by mouth nightly 5/3/22   Johnnie Beltran DO   omeprazole (PRILOSEC) 20 MG delayed release capsule Take 20 mg by mouth daily    [facility] Historical Provider   benzonatate (TESSALON) 100 MG capsule Take 100 mg by mouth 3 times daily as needed for Cough    Historical Provider, MD   amiodarone (CORDARONE) 200 MG tablet Take 1 tablet by mouth daily 3/19/22   Johnnie Beltran DO   diphenoxylate-atropine (LOMOTIL) 2.5-0.025 MG per tablet Take 1 tablet by mouth 4 times daily as needed for Diarrhea.     Historical Provider, MD   torsemide (DEMADEX) 20 MG tablet Take 1 tablet by mouth daily 3/10/22   Johnnie Beltran DO   atorvastatin (LIPITOR) 20 MG tablet TAKE ONE TABLET BY MOUTH EVERY EVENING 3/10/22   Johnnie Beltran DO   famotidine (PEPCID) 40 MG tablet Take 1 tablet by mouth every evening  Patient not taking: Reported on 6/3/2022 3/10/22   Thai Blush, DO   folic acid (FOLVITE) 1 MG tablet Take 1 tablet by mouth daily 3/10/22   Thai Blush, DO   potassium chloride (K-TAB) 10 MEQ extended release tablet Take 1 tablet by mouth 2 times daily 3/10/22   Thai Blush, DO   sertraline (ZOLOFT) 50 MG tablet Take 1 tablet by mouth daily 2/24/22   Valentino Sandifer, DO   levothyroxine (SYNTHROID) 25 MCG tablet Take 1 tablet by mouth Daily 2/2/22   Thai Blush, DO   Calcium Polycarbophil (FIBER-CAPS PO) Take 1 capsule by mouth at bedtime    Historical Provider, MD   apixaban (ELIQUIS) 5 MG TABS tablet Take 1 tablet by mouth 2 times daily 2/15/21   Thai Blush, DO   therapeutic multivitamin-minerals [facility]) tablet Take 1 tablet by mouth daily.     Historical Provider, MD       Future Appointments   Date Time Provider Camron Godinez   6/14/2022  2:30 PM Thai Saha DO [facility]   12/2/2022  2:30 PM Sanaz Fenton MD [facility]      and   Congestive Heart Failure Assessment    Do you understand a low sodium diet?: Yes  Do you understand how to read food labels?: No  How many restaurant meals do you eat per week?: 0  Do you salt your food before tasting it?: No     No patient-reported symptoms      Symptoms:  None: Yes      Symptom course: stable  Salt intake watch compared to last visit: stable

## 2022-06-08 NOTE — TELEPHONE ENCOUNTER
Thepotassium is fine and we will recheck, because I am watching renal number. We have discussed in past but they were not ready but its time for her to see neurology. I have put in [facility] neurology and they will call. If he wants her to get in to be sooner we can set up with [place]. Neuro is next step.

## 2022-06-14 ENCOUNTER — OFFICE VISIT (OUTPATIENT)
Dept: FAMILY MEDICINE CLINIC | Age: 77
End: 2022-06-14
Payer: MEDICARE

## 2022-06-14 VITALS
RESPIRATION RATE: 18 BRPM | OXYGEN SATURATION: 97 % | HEART RATE: 51 BPM | BODY MASS INDEX: 21.52 KG/M2 | SYSTOLIC BLOOD PRESSURE: 114 MMHG | DIASTOLIC BLOOD PRESSURE: 72 MMHG | HEIGHT: 61 IN | WEIGHT: 114 LBS

## 2022-06-14 DIAGNOSIS — F03.A0 MILD DEMENTIA: ICD-10-CM

## 2022-06-14 DIAGNOSIS — J30.1 SEASONAL ALLERGIC RHINITIS DUE TO POLLEN: ICD-10-CM

## 2022-06-14 DIAGNOSIS — I50.22 CHRONIC SYSTOLIC (CONGESTIVE) HEART FAILURE (HCC): ICD-10-CM

## 2022-06-14 DIAGNOSIS — I48.91 ATRIAL FIBRILLATION WITH RVR (HCC): ICD-10-CM

## 2022-06-14 DIAGNOSIS — H91.93 BILATERAL HEARING LOSS, UNSPECIFIED HEARING LOSS TYPE: ICD-10-CM

## 2022-06-14 DIAGNOSIS — R79.89 CREATININE ELEVATION: Primary | ICD-10-CM

## 2022-06-14 PROBLEM — N18.30 CHRONIC RENAL DISEASE, STAGE III (HCC): Status: ACTIVE | Noted: 2022-06-14

## 2022-06-14 PROCEDURE — G8427 DOCREV CUR MEDS BY ELIG CLIN: HCPCS | Performed by: FAMILY MEDICINE

## 2022-06-14 PROCEDURE — 1123F ACP DISCUSS/DSCN MKR DOCD: CPT | Performed by: FAMILY MEDICINE

## 2022-06-14 PROCEDURE — G8420 CALC BMI NORM PARAMETERS: HCPCS | Performed by: FAMILY MEDICINE

## 2022-06-14 PROCEDURE — 1090F PRES/ABSN URINE INCON ASSESS: CPT | Performed by: FAMILY MEDICINE

## 2022-06-14 PROCEDURE — G8399 PT W/DXA RESULTS DOCUMENT: HCPCS | Performed by: FAMILY MEDICINE

## 2022-06-14 PROCEDURE — 1036F TOBACCO NON-USER: CPT | Performed by: FAMILY MEDICINE

## 2022-06-14 PROCEDURE — 99213 OFFICE O/P EST LOW 20 MIN: CPT | Performed by: FAMILY MEDICINE

## 2022-06-14 RX ORDER — MEMANTINE HYDROCHLORIDE 5 MG/1
5 TABLET ORAL 2 TIMES DAILY
Qty: 60 TABLET | Refills: 0 | Status: SHIPPED
Start: 2022-06-14 | End: 2022-07-05 | Stop reason: SDUPTHER

## 2022-06-14 RX ORDER — IPRATROPIUM BROMIDE 42 UG/1
2 SPRAY, METERED NASAL 4 TIMES DAILY
Qty: 15 ML | Refills: 5 | Status: SHIPPED
Start: 2022-06-14 | End: 2022-06-14

## 2022-06-14 RX ORDER — IPRATROPIUM BROMIDE 42 UG/1
2 SPRAY, METERED NASAL 4 TIMES DAILY
Qty: 3 EACH | Refills: 5 | Status: SHIPPED | OUTPATIENT
Start: 2022-06-14

## 2022-06-14 RX ORDER — AMIODARONE HYDROCHLORIDE 200 MG/1
200 TABLET ORAL DAILY
Qty: 30 TABLET | Refills: 0 | Status: SHIPPED
Start: 2022-06-14 | End: 2022-09-04 | Stop reason: SDUPTHER

## 2022-06-14 ASSESSMENT — PATIENT HEALTH QUESTIONNAIRE - PHQ9
1. LITTLE INTEREST OR PLEASURE IN DOING THINGS: 0
2. FEELING DOWN, DEPRESSED OR HOPELESS: 0
SUM OF ALL RESPONSES TO PHQ QUESTIONS 1-9: 0
SUM OF ALL RESPONSES TO PHQ9 QUESTIONS 1 & 2: 0
SUM OF ALL RESPONSES TO PHQ QUESTIONS 1-9: 0

## 2022-06-14 ASSESSMENT — LIFESTYLE VARIABLES: HOW OFTEN DO YOU HAVE A DRINK CONTAINING ALCOHOL: NEVER

## 2022-06-14 NOTE — LETTER
[id number] [address] [address]  Phone: [phone]  Fax: [address], DO         June 14, 2022     Patient: Cornelius Mahmood   YOB: 1945   Date of Visit: 6/14/2022       To Whom It May Concern: It is my medical opinion that Kelly Allen requires a disability parking placard for the following reasons:  She cannot walk 200 feet without stopping to rest.  Duration of need: permanent    If you have any questions or concerns, please don't hesitate to call.     Sincerely,        Jose Manuel Carter DO

## 2022-06-16 ASSESSMENT — ENCOUNTER SYMPTOMS
NAUSEA: 0
DIARRHEA: 0
COUGH: 0
RECTAL PAIN: 0
APNEA: 0
FACIAL SWELLING: 0
ABDOMINAL DISTENTION: 0
EYE PAIN: 0
BLOOD IN STOOL: 0
COLOR CHANGE: 0
WHEEZING: 0
BACK PAIN: 1
SHORTNESS OF BREATH: 0
TROUBLE SWALLOWING: 0
PHOTOPHOBIA: 0
VOMITING: 0
EYE DISCHARGE: 0
EYE ITCHING: 0
CONSTIPATION: 0
CHEST TIGHTNESS: 0
ANAL BLEEDING: 0
SINUS PRESSURE: 0
EYE REDNESS: 0
RHINORRHEA: 0
ABDOMINAL PAIN: 0
VOICE CHANGE: 0
STRIDOR: 0
SORE THROAT: 0
CHOKING: 0

## 2022-06-16 NOTE — PROGRESS NOTES
Luis Miguel Hirsch is a 64 y.o. female  . Subjective:      Doing well overall. Following up regularly with cardiology. They adjusted medications a bit and start Entresto. Has not not had any has exacerbations of CHF. Memory is gotten a little worse we discussed starting Namenda. Hearing has diminished. We will have hearing evaluated. Discussed blood work. Creatinine went up a little bit we will recheck this. Patient is to increase fluids that it really put her on any fluid restriction. Discussed vaccinations including COVID vaccination DTaP and shingles vaccine. We will follow-up in 3 months      Review of Systems   Constitutional: Positive for fatigue. Negative for activity change, appetite change, chills, diaphoresis, fever and unexpected weight change. HENT: Negative for congestion, dental problem, drooling, ear discharge, ear pain, facial swelling, hearing loss, mouth sores, nosebleeds, postnasal drip, rhinorrhea, sinus pressure, sneezing, sore throat, tinnitus, trouble swallowing and voice change. Eyes: Negative for photophobia, pain, discharge, redness, itching and visual disturbance. Respiratory: Negative for apnea, cough, choking, chest tightness, shortness of breath, wheezing and stridor. Cardiovascular: Negative for chest pain, palpitations and leg swelling. Gastrointestinal: Negative for abdominal distention, abdominal pain, anal bleeding, blood in stool, constipation, diarrhea, nausea, rectal pain and vomiting. Endocrine: Negative for cold intolerance, heat intolerance, polydipsia, polyphagia and polyuria. Genitourinary: Negative for decreased urine volume, difficulty urinating, dyspareunia, dysuria, enuresis, flank pain, frequency, genital sores, hematuria, menstrual problem, pelvic pain, urgency, vaginal bleeding, vaginal discharge and vaginal pain. Musculoskeletal: Positive for arthralgias and back pain.  Negative for gait problem, joint swelling, myalgias, neck pain and neck stiffness. Skin: Negative for color change, pallor, rash and wound. Allergic/Immunologic: Negative for environmental allergies, food allergies and immunocompromised state. Neurological: Negative for dizziness, tremors, seizures, syncope, facial asymmetry, speech difficulty, weakness, light-headedness, numbness and headaches. Hematological: Negative for adenopathy. Does not bruise/bleed easily. Psychiatric/Behavioral: Negative for agitation, behavioral problems, confusion, decreased concentration, dysphoric mood, hallucinations, self-injury, sleep disturbance and suicidal ideas. The patient is not nervous/anxious and is not hyperactive. Past Medical History:   Diagnosis Date    Arthritis     Asymptomatic PVCs 2012    pt felt flutter, no other sx, holter + pvc's, few runs of SVT   [facility]    History of Holter monitoring     Hyperlipidemia     Macular degeneration     Nausea & vomiting     Pelvic prolapse     PONV (postoperative nausea and vomiting)     TIA (transient ischemic attack)        Social History     Socioeconomic History    Marital status:       Spouse name: Not on file    Number of children: 3    Years of education: Not on file    Highest education level: High school graduate   Occupational History    Occupation: retired   Tobacco Use    Smoking status: Former Smoker     Packs/day: 0.50     Quit date: 1994     Years since quittin.0    Smokeless tobacco: Never Used   Vaping Use    Vaping Use: Never used   Substance and Sexual Activity    Alcohol use: No     Comment: Coffee 1 cup a day     Drug use: No    Sexual activity: Not Currently     Partners: Male     Birth control/protection: Post-menopausal   Other Topics Concern    Not on file   Social History Narrative    Not on file     Social Determinants of Health     Financial Resource Strain: Low Risk     Difficulty of Paying Living Expenses: Not hard at all   Food Insecurity: No Food Insecurity  Worried About Running Out of Food in the Last Year: Never true    Chris of Food in the Last Year: Never true   Transportation Needs: No Transportation Needs    Lack of Transportation (Medical): No    Lack of Transportation (Non-Medical): No   Physical Activity: Inactive    Days of Exercise per Week: 0 days    Minutes of Exercise per Session: 0 min   Stress: Stress Concern Present    Feeling of Stress : To some extent   Social Connections: Moderately Integrated    Frequency of Communication with Friends and Family: More than three times a week    Frequency of Social Gatherings with Friends and Family: More than three times a week    Attends [employer] Services: More than 4 times per year    Active Member of [address] [place] [address] [facility] or Organizations: Yes    Attends Club or Organization Meetings: More than 4 times per year    Marital Status:    Intimate Partner Violence:     Fear of Current or Ex-Partner: Not on file    Emotionally Abused: Not on file    Physically Abused: Not on file    Sexually Abused: Not on file   Housing Stability: [facility] [address] Unable to Pay for Housing in the Last Year: No    Number of Places Lived in the Last Year: 1    Unstable Housing in the Last Year: No       History reviewed. No pertinent family history.     Current Outpatient Medications on File Prior to Visit   Medication Sig Dispense Refill    ondansetron (ZOFRAN) 4 MG tablet Take 4 mg by mouth every 8 hours as needed for Nausea or Vomiting      sacubitril-valsartan (ENTRESTO) 24-26 MG per tablet Take 1 tablet by mouth 2 times daily 180 tablet 2    metoprolol succinate (TOPROL XL) 25 MG extended release tablet Take 0.5 tablets by mouth daily 45 tablet 2    donepezil (ARICEPT) 10 MG tablet Take 1 tablet by mouth nightly 90 tablet 5    omeprazole (PRILOSEC) 20 MG delayed release capsule Take 20 mg by mouth daily      benzonatate (TESSALON) 100 MG capsule Take 100 mg by mouth 3 times daily as needed for Cough      diphenoxylate-atropine (LOMOTIL) 2.5-0.025 MG per tablet Take 1 tablet by mouth 4 times daily as needed for Diarrhea.  torsemide (DEMADEX) 20 MG tablet Take 1 tablet by mouth daily 90 tablet 5    atorvastatin (LIPITOR) 20 MG tablet TAKE ONE TABLET BY MOUTH EVERY EVENING 90 tablet 5    famotidine (PEPCID) 40 MG tablet Take 1 tablet by mouth every evening 90 tablet 5    folic acid (FOLVITE) 1 MG tablet Take 1 tablet by mouth daily 90 tablet 5    potassium chloride (K-TAB) 10 MEQ extended release tablet Take 1 tablet by mouth 2 times daily 180 tablet 5    sertraline (ZOLOFT) 50 MG tablet Take 1 tablet by mouth daily 30 tablet 3    levothyroxine (SYNTHROID) 25 MCG tablet Take 1 tablet by mouth Daily 90 tablet 5    Calcium Polycarbophil (FIBER-CAPS PO) Take 1 capsule by mouth at bedtime      apixaban (ELIQUIS) 5 MG TABS tablet Take 1 tablet by mouth 2 times daily 180 tablet 5    therapeutic multivitamin-minerals (THERAGRAN-M) tablet Take 1 tablet by mouth daily. No current facility-administered medications on file prior to visit. No Known Allergies    I have reviewedher allergies, medications, problem list, medical, social and family history and have updated as needed in the electronic medical record. Objective:     Physical Exam  Vitals and nursing note reviewed. Constitutional:       General: She is not in acute distress. Appearance: She is well-developed. She is not diaphoretic. HENT:      Head: Normocephalic and atraumatic. Right Ear: External ear normal.      Left Ear: External ear normal.      Nose: Nose normal.      Mouth/Throat:      Pharynx: No oropharyngeal exudate. Eyes:      General: No scleral icterus. Right eye: No discharge. Left eye: No discharge. Conjunctiva/sclera: Conjunctivae normal.      Pupils: Pupils are equal, round, and reactive to light. Neck:      Thyroid: No thyromegaly. Vascular: No JVD.       Trachea: No tracheal deviation. Cardiovascular:      Rate and Rhythm: Normal rate and regular rhythm. Heart sounds: Normal heart sounds. No murmur heard. No friction rub. No gallop. Pulmonary:      Effort: Pulmonary effort is normal. No respiratory distress. Breath sounds: Normal breath sounds. No stridor. No wheezing or rales. Chest:      Chest wall: No tenderness. Abdominal:      General: Bowel sounds are normal. There is no distension. Palpations: Abdomen is soft. There is no mass. Tenderness: There is no abdominal tenderness. There is no guarding or rebound. Genitourinary:     Comments: Will follow with own gynecologist for gynecological and breast care. Musculoskeletal:         General: No tenderness. Normal range of motion. Cervical back: Normal range of motion and neck supple. Lymphadenopathy:      Cervical: No cervical adenopathy. Skin:     General: Skin is warm and dry. Coloration: Skin is not pale. Findings: No erythema or rash. Neurological:      Mental Status: She is alert and oriented to person, place, and time. Cranial Nerves: No cranial nerve deficit. Motor: No abnormal muscle tone. Coordination: Coordination normal.      Deep Tendon Reflexes: Reflexes are normal and symmetric. Reflexes normal.   Psychiatric:         Behavior: Behavior normal.         Thought Content: Thought content normal.         Judgment: Judgment normal.         Assessment / Plan:   Claude Sprinkles was seen today for 3 month follow-up. Diagnoses and all orders for this visit:    Creatinine elevation  -     Basic Metabolic Panel; Future    Atrial fibrillation with RVR (HCC)  -     amiodarone (CORDARONE) 200 MG tablet; Take 1 tablet by mouth daily    Chronic systolic (congestive) heart failure    Mild dementia (HCC)  -     memantine (NAMENDA) 5 MG tablet; Take 1 tablet by mouth 2 times daily    Bilateral hearing loss, unspecified hearing loss type  -     [facility] [place], [place]    Seasonal allergic rhinitis due to pollen  -     ipratropium (ATROVENT) 0.06 % nasal spray; 2 sprays by Each Nostril route 4 times daily    Other orders  -     Discontinue: ipratropium (ATROVENT) 0.06 % nasal spray; 2 sprays by Each Nostril route 4 times daily        Willfollow with own gynecologist for gynecological and breast care. Reviewed health maintenance report. Patient is aware of deficiencies and suggested preventative tests.

## 2022-06-22 ENCOUNTER — PATIENT MESSAGE (OUTPATIENT)
Dept: FAMILY MEDICINE CLINIC | Age: 77
End: 2022-06-22

## 2022-06-22 DIAGNOSIS — D68.59 THROMBOPHILIA (HCC): ICD-10-CM

## 2022-06-28 ENCOUNTER — CARE COORDINATION (OUTPATIENT)
Dept: CARE COORDINATION | Age: 77
End: 2022-06-28

## 2022-06-28 NOTE — CARE COORDINATION
Heart Failure Education outreach Date/Time: 2022 3:02 PM    Ambulatory Care Manager (ACM) contacted the family by telephone to perform Ambulatory Care Coordination. Verified name and  with family as identifiers. Provided introduction to self, and explanation of the Ambulatory Care Manager's role. ACM reviewed that a Health Healthy tips for the Summer packet has been sent to [place]. ACM reviewed CHF zones, daily weights, fluid restriction, the importance of low sodium diet and healthy tips packet with the family. Instructed family to call their PCP if they have a weight gain of 3 lbs in 2 days or 5 lbs in a week. Patient reminded that there is a physician on call 24 hours a day / 7 days a week should the patient have questions or concerns. The family verbalized understanding. Pt's son is inquiring about Eliquis. The script was to be sent to [address] [place] [address] [place] Noted that script was faxed last week in encounter dated 22.

## 2022-07-05 ENCOUNTER — PATIENT MESSAGE (OUTPATIENT)
Dept: FAMILY MEDICINE CLINIC | Age: 77
End: 2022-07-05

## 2022-07-05 DIAGNOSIS — F03.A0 MILD DEMENTIA: ICD-10-CM

## 2022-07-05 RX ORDER — MEMANTINE HYDROCHLORIDE 5 MG/1
5 TABLET ORAL 2 TIMES DAILY
Qty: 180 TABLET | Refills: 5 | Status: SHIPPED
Start: 2022-07-05 | End: 2022-10-08 | Stop reason: SDUPTHER

## 2022-07-05 NOTE — TELEPHONE ENCOUNTER
From: Anmol Burgos  To: Dr. Hardik Shea: 7/5/2022 1:14 AM EDT  Subject: Refill    I have no refills of the Memantine and Shaheed jean let me request it. If I am to keep taking it I will need a prescription called in to [facility] for 3 months. Thank you in advance.

## 2022-07-07 ENCOUNTER — CARE COORDINATION (OUTPATIENT)
Dept: CARE COORDINATION | Age: 77
End: 2022-07-07

## 2022-07-07 NOTE — CARE COORDINATION
Ambulatory Care Coordination Note  7/7/2022  CM Risk Score: 6  Charlson 10 Year Mortality Risk Score: 100%     ACC: Simran Saini RN    Summary Note:   DM:  Denies s/s of Hypo/Hyperglycemia. Discussed DM Zone Tool and verbalizes understanding. CHF:  Denies s/s CHF Exacerbation. Discussed Zone Tool and verbalizes understanding. Pt's son states she is doing very well. Pt was with a friend that was around a friend that had tested positive for Covid on Tuesday. Pt has no symptoms. FOLLOW-UP PLAN:    Discuss:   -Covid symptoms?  -DM Management/Zone Tool  -CHF Management/Zone Tool  -Falls/Near Falls? -Appointment Reminders    Next Anticipated Outreach by [address] Team:  3 Weeks      Lab Results     None          Care Coordination Interventions    Program Enrollment: Complex Care  Referral from Primary Care Provider: No  Suggested Interventions and Community Resources  Fall Risk Prevention: In Process (Comment: Fall Precautions)  Home Health Services: Not Started (Comment: [facility])  Medication Assistance Program: Declined  Medi Set or Pill Pack: Declined  Pharmacist: Declined  Registered Dietician: Declined  Social Work: Declined  Transportation Support: Declined  Zone Management Tools: In Process (Comment: CHF)         Goals Addressed                 This Visit's Progress     Conditions and Symptoms   On track     I will schedule office visits, as directed by my provider. I will keep my appointment or reschedule if I have to cancel. I will notify my provider of any barriers to my plan of care. I will follow my Zone Management tool to seek urgent or emergent care. I will notify my provider of any symptoms that indicate a worsening of my condition. Barriers: lack of education  Plan for overcoming my barriers: Work with care team, follow poc  Confidence: 10/10  Anticipated Goal Completion Date: 6/9/22         Medication Management   On track     I will take my medication as directed.   I will notify my provider of any problems with medications, like adverse effects or side effects. I will notify my provider/Care Coordinator if I am unable to afford my medications. I will notify my provider for advice before I stop taking any of my medication. Barriers: lack of education  Plan for overcoming my barriers: Ask questions, voice concerns  Confidence: 10/10  Anticipated Goal Completion Date: 6/9/22            Prior to Admission medications    Medication Sig Start Date End Date Taking? Authorizing Provider   memantine [facility]) 5 MG tablet Take 1 tablet by mouth 2 times daily 7/5/22   Clarene Pont, DO   apixaban (ELIQUIS) 5 MG TABS tablet Take 1 tablet by mouth 2 times daily 6/22/22   Clarene Pont, DO   amiodarone (CORDARONE) 200 MG tablet Take 1 tablet by mouth daily 6/14/22   Clarene Pont, DO   ipratropium (ATROVENT) 0.06 % nasal spray 2 sprays by Each Nostril route 4 times daily 6/14/22   Clarene Pont, DO   ondansetron (ZOFRAN) 4 MG tablet Take 4 mg by mouth every 8 hours as needed for Nausea or Vomiting    Historical Provider, MD   sacubitril-valsartan (ENTRESTO) 24-26 MG per tablet Take 1 tablet by mouth 2 times daily 5/20/22   Isidoro Knapp MD   metoprolol succinate (TOPROL XL) 25 MG extended release tablet Take 0.5 tablets by mouth daily 5/20/22   Isidoro Knapp MD   donepezil (ARICEPT) 10 MG tablet Take 1 tablet by mouth nightly 5/3/22   Natasha Pont, DO   omeprazole (PRILOSEC) 20 MG delayed release capsule Take 20 mg by mouth daily    [facility] Historical Provider   benzonatate (TESSALON) 100 MG capsule Take 100 mg by mouth 3 times daily as needed for Cough    Historical Provider, MD   diphenoxylate-atropine (LOMOTIL) 2.5-0.025 MG per tablet Take 1 tablet by mouth 4 times daily as needed for Diarrhea.     Historical Provider, MD   torsemide (DEMADEX) 20 MG tablet Take 1 tablet by mouth daily 3/10/22   Clarene Pont, DO   atorvastatin (LIPITOR) 20 MG tablet TAKE ONE TABLET BY MOUTH EVERY EVENING 3/10/22   Bradd Erp, DO   famotidine (PEPCID) 40 MG tablet Take 1 tablet by mouth every evening 3/10/22   Bradd Erp, DO   folic acid (FOLVITE) 1 MG tablet Take 1 tablet by mouth daily 3/10/22   Bradd Erp, DO   potassium chloride (K-TAB) 10 MEQ extended release tablet Take 1 tablet by mouth 2 times daily 3/10/22   Bradd Erp, DO   sertraline (ZOLOFT) 50 MG tablet Take 1 tablet by mouth daily 2/24/22   Cleveland Peto, DO   levothyroxine (SYNTHROID) 25 MCG tablet Take 1 tablet by mouth Daily 2/2/22   Bradd Erp, DO   Calcium Polycarbophil (FIBER-CAPS PO) Take 1 capsule by mouth at bedtime    Historical Provider, MD   apixaban (ELIQUIS) 5 MG TABS tablet Take 1 tablet by mouth 2 times daily 11/18/20 6/22/23  Bradd Erp, DO   therapeutic multivitamin-minerals [facility]) tablet Take 1 tablet by mouth daily. Historical Provider, MD       Future Appointments   Date Time Provider Camron Godinez   7/12/2022  1:30 PM LIBAN Dominguez [facility]   9/13/2022  2:30 PM DO Samy Portillo [facility]   12/2/2022  2:30 PM Michael Knott MD [facility]     ,   Diabetes Assessment    Medic Alert ID: No  Meal Planning: None   How often do you test your blood sugar?: No Testing   Do you have barriers with adherence to non-pharmacologic self-management interventions?  (Nutrition/Exercise/Self-Monitoring): No   Have you ever had to go to the ED for symptoms of low blood sugar?: No       No patient-reported symptoms       and   Congestive Heart Failure Assessment    Do you understand a low sodium diet?: Yes  Do you understand how to read food labels?: No  How many restaurant meals do you eat per week?: 0  Do you salt your food before tasting it?: No         Symptoms:

## 2022-07-12 ENCOUNTER — PROCEDURE VISIT (OUTPATIENT)
Dept: AUDIOLOGY | Age: 77
End: 2022-07-12
Payer: MEDICARE

## 2022-07-12 DIAGNOSIS — R79.89 CREATININE ELEVATION: ICD-10-CM

## 2022-07-12 DIAGNOSIS — H90.3 SENSORINEURAL HEARING LOSS (SNHL) OF BOTH EARS: Primary | ICD-10-CM

## 2022-07-12 LAB
ANION GAP SERPL CALCULATED.3IONS-SCNC: 13 MMOL/L (ref 7–16)
BUN BLDV-MCNC: 26 MG/DL (ref 6–23)
CALCIUM SERPL-MCNC: 8.8 MG/DL (ref 8.6–10.2)
CHLORIDE BLD-SCNC: 100 MMOL/L (ref 98–107)
CO2: 25 MMOL/L (ref 22–29)
CREAT SERPL-MCNC: 1.3 MG/DL (ref 0.5–1)
GFR AFRICAN AMERICAN: 48
GFR NON-AFRICAN AMERICAN: 40 ML/MIN/1.73
GLUCOSE BLD-MCNC: 89 MG/DL (ref 74–99)
POTASSIUM SERPL-SCNC: 4.8 MMOL/L (ref 3.5–5)
SODIUM BLD-SCNC: 138 MMOL/L (ref 132–146)

## 2022-07-12 PROCEDURE — 92557 COMPREHENSIVE HEARING TEST: CPT | Performed by: AUDIOLOGIST

## 2022-07-12 PROCEDURE — 92567 TYMPANOMETRY: CPT | Performed by: AUDIOLOGIST

## 2022-07-13 DIAGNOSIS — R79.89 CREATININE ELEVATION: Primary | ICD-10-CM

## 2022-08-01 RX ORDER — OMEPRAZOLE 20 MG/1
20 CAPSULE, DELAYED RELEASE ORAL DAILY
Qty: 90 CAPSULE | Refills: 3 | Status: SHIPPED
Start: 2022-08-01 | End: 2022-09-18 | Stop reason: SDUPTHER

## 2022-08-08 ENCOUNTER — CARE COORDINATION (OUTPATIENT)
Dept: CARE COORDINATION | Age: 77
End: 2022-08-08

## 2022-08-08 NOTE — CARE COORDINATION
Ambulatory Care Coordination Note  8/8/2022    ACC: Shalom Ramos, RN    Summary Note:   CHF:  Denies s/s CHF Exacerbation. Discussed Zone Tool and verbalizes understanding. Denies falls/near falls    Pt was exposed to Covid last call, but never developed symptoms. Pt remains very active and social.    FOLLOW-UP PLAN:    Continue Care Coordination and Assess Plan Of Care  Discuss:   -CHF Management/Zone Tool  -Current Weight  -Falls/Near Falls? -OV AVS Reinforcement  -Appointment Reminders    Next Anticipated Outreach by [address] Team:  1 Month  Anticipated schedule after next Outreach (or sooner if needed): Monthly   Pt has contact information      Lab Results       None            Care Coordination Interventions    Program Enrollment: Complex Care  Referral from Primary Care Provider: No  Suggested Interventions and Community Resources  Fall Risk Prevention: In Process (Comment: Fall Precautions)  Home Health Services: Not Started (Comment: [address] [facility])  Medication Assistance Program: Declined  Medi Set or Pill Pack: Declined  Pharmacist: Declined  Registered Dietician: Declined  Social Work: Declined  Transportation Support: Declined  Zone Management Tools: In Process (Comment: CHF)          Goals Addressed                   This Visit's Progress     Conditions and Symptoms   On track     I will schedule office visits, as directed by my provider. I will keep my appointment or reschedule if I have to cancel. I will notify my provider of any barriers to my plan of care. I will follow my Zone Management tool to seek urgent or emergent care. I will notify my provider of any symptoms that indicate a worsening of my condition. Barriers: lack of education  Plan for overcoming my barriers: Work with care team, follow poc  Confidence: 10/10  Anticipated Goal Completion Date: 6/9/22         Medication Management   On track     I will take my medication as directed.   I will notify my provider of any problems with medications, like adverse effects or side effects. I will notify my provider/Care Coordinator if I am unable to afford my medications. I will notify my provider for advice before I stop taking any of my medication. Barriers: lack of education  Plan for overcoming my barriers: Ask questions, voice concerns  Confidence: 10/10  Anticipated Goal Completion Date: 6/9/22              Prior to Admission medications    Medication Sig Start Date End Date Taking? Authorizing Provider   omeprazole (PRILOSEC) 20 MG delayed release capsule Take 1 capsule by mouth in the morning. 8/1/22   Marisabel Larios, DO   memantine [facility]) 5 MG tablet Take 1 tablet by mouth 2 times daily 7/5/22   Marisabel Larios, DO   apixaban (ELIQUIS) 5 MG TABS tablet Take 1 tablet by mouth 2 times daily 6/22/22   Marisabel Larios, DO   amiodarone (CORDARONE) 200 MG tablet Take 1 tablet by mouth daily 6/14/22   Marisabel Larios, DO   ipratropium (ATROVENT) 0.06 % nasal spray 2 sprays by Each Nostril route 4 times daily 6/14/22   Marisabel Larios, DO   ondansetron (ZOFRAN) 4 MG tablet Take 4 mg by mouth every 8 hours as needed for Nausea or Vomiting    Historical Provider, MD   sacubitril-valsartan (ENTRESTO) 24-26 MG per tablet Take 1 tablet by mouth 2 times daily 5/20/22   Ty Burden MD   metoprolol succinate (TOPROL XL) 25 MG extended release tablet Take 0.5 tablets by mouth daily 5/20/22   Ty Burden MD   donepezil (ARICEPT) 10 MG tablet Take 1 tablet by mouth nightly 5/3/22   Marisabel Larios, DO   benzonatate (TESSALON) 100 MG capsule Take 100 mg by mouth 3 times daily as needed for Cough    Historical Provider, MD   diphenoxylate-atropine (LOMOTIL) 2.5-0.025 MG per tablet Take 1 tablet by mouth 4 times daily as needed for Diarrhea.     Historical Provider, MD   torsemide (DEMADEX) 20 MG tablet Take 1 tablet by mouth daily 3/10/22   Marisabel Larios, DO   atorvastatin (LIPITOR) 20 MG tablet TAKE ONE TABLET BY MOUTH EVERY EVENING 3/10/22   Ta Meraz,    famotidine (PEPCID) 40 MG tablet Take 1 tablet by mouth every evening 3/10/22   Ta Meraz, DO   folic acid (FOLVITE) 1 MG tablet Take 1 tablet by mouth daily 3/10/22   Ta Meraz, DO   potassium chloride (K-TAB) 10 MEQ extended release tablet Take 1 tablet by mouth 2 times daily 3/10/22   Ta Meraz DO   sertraline (ZOLOFT) 50 MG tablet Take 1 tablet by mouth daily 2/24/22   Siddharth Silvana, DO   levothyroxine (SYNTHROID) 25 MCG tablet Take 1 tablet by mouth Daily 2/2/22   Ta Meraz, DO   Calcium Polycarbophil (FIBER-CAPS PO) Take 1 capsule by mouth at bedtime    Historical Provider, MD   apixaban (ELIQUIS) 5 MG TABS tablet Take 1 tablet by mouth 2 times daily 11/18/20 6/22/23  Ta Meraz, DO   therapeutic multivitamin-minerals [facility]) tablet Take 1 tablet by mouth daily.     Historical Provider, MD       Future Appointments   Date Time Provider Camron Godinez   9/13/2022  2:30 PM Ta Meraz DO [facility]   12/2/2022  2:30 PM Rolando Painter MD [facility]    and   Congestive Heart Failure Assessment    Do you understand a low sodium diet?: Yes  Do you understand how to read food labels?: No  How many restaurant meals do you eat per week?: 0  Do you salt your food before tasting it?: No     No patient-reported symptoms      Symptoms:  None: Yes      Symptom course: stable  Weight trend: stable  Salt intake watch compared to last visit: stable

## 2022-08-23 ENCOUNTER — CARE COORDINATION (OUTPATIENT)
Dept: CARE COORDINATION | Age: 77
End: 2022-08-23

## 2022-08-23 NOTE — CARE COORDINATION
Spoke with pt's POA. Needed a refill for Entresto. The cost is $500, because she is now in the donut hole. Offered Prescription Assistance to pt's Ramo Kerns.  He would like to know if there are other medications to take, or if the patient still requires the [facility]  115/58 with medications, HR 52-54. Pt is often tired.       Please advise

## 2022-08-24 RX ORDER — LISINOPRIL 5 MG/1
5 TABLET ORAL DAILY
COMMUNITY
End: 2022-08-24 | Stop reason: SDUPTHER

## 2022-08-24 RX ORDER — LISINOPRIL 5 MG/1
5 TABLET ORAL DAILY
Qty: 90 TABLET | Refills: 3 | Status: SHIPPED
Start: 2022-08-24 | End: 2022-09-13 | Stop reason: SDUPTHER

## 2022-08-24 NOTE — CARE COORDINATION
Spoke with Truong Sarmiento at [facility]. Prescription for Lisinopril 5mg daily sent to [facility] in [facility] as requested.

## 2022-08-24 NOTE — CARE COORDINATION
Duane Paci [facility]) returned call  Gave him Dr Juan Tang suggestion. He agrees, and would like script for Lisinopril 5mg [facility] [place]. [facility] to call and notify Dr Juan Tang office.

## 2022-08-29 DIAGNOSIS — R79.89 CREATININE ELEVATION: ICD-10-CM

## 2022-08-29 LAB
ANION GAP SERPL CALCULATED.3IONS-SCNC: 13 MMOL/L (ref 7–16)
BUN BLDV-MCNC: 21 MG/DL (ref 6–23)
CALCIUM SERPL-MCNC: 9.2 MG/DL (ref 8.6–10.2)
CHLORIDE BLD-SCNC: 98 MMOL/L (ref 98–107)
CO2: 29 MMOL/L (ref 22–29)
CREAT SERPL-MCNC: 1.4 MG/DL (ref 0.5–1)
GFR AFRICAN AMERICAN: 44
GFR NON-AFRICAN AMERICAN: 36 ML/MIN/1.73
GLUCOSE BLD-MCNC: 87 MG/DL (ref 74–99)
POTASSIUM SERPL-SCNC: 4.6 MMOL/L (ref 3.5–5)
SODIUM BLD-SCNC: 140 MMOL/L (ref 132–146)

## 2022-09-04 DIAGNOSIS — I48.91 ATRIAL FIBRILLATION WITH RVR (HCC): ICD-10-CM

## 2022-09-04 DIAGNOSIS — E78.2 MIXED HYPERLIPIDEMIA: ICD-10-CM

## 2022-09-04 DIAGNOSIS — G45.9 TIA (TRANSIENT ISCHEMIC ATTACK): ICD-10-CM

## 2022-09-06 RX ORDER — DONEPEZIL HYDROCHLORIDE 10 MG/1
10 TABLET, FILM COATED ORAL NIGHTLY
Qty: 90 TABLET | Refills: 5 | Status: SHIPPED | OUTPATIENT
Start: 2022-09-06

## 2022-09-06 RX ORDER — ATORVASTATIN CALCIUM 20 MG/1
TABLET, FILM COATED ORAL
Qty: 90 TABLET | Refills: 5 | Status: SHIPPED
Start: 2022-09-06 | End: 2022-09-13 | Stop reason: SDUPTHER

## 2022-09-06 RX ORDER — AMIODARONE HYDROCHLORIDE 200 MG/1
200 TABLET ORAL DAILY
Qty: 30 TABLET | Refills: 0 | Status: SHIPPED
Start: 2022-09-06 | End: 2022-10-10

## 2022-09-06 RX ORDER — POTASSIUM CHLORIDE 750 MG/1
10 TABLET, FILM COATED, EXTENDED RELEASE ORAL 2 TIMES DAILY
Qty: 180 TABLET | Refills: 5 | Status: SHIPPED
Start: 2022-09-06 | End: 2022-09-13 | Stop reason: SDUPTHER

## 2022-09-07 ENCOUNTER — CARE COORDINATION (OUTPATIENT)
Dept: CARE COORDINATION | Age: 77
End: 2022-09-07

## 2022-09-07 NOTE — CARE COORDINATION
Remote Patient Monitoring Enrollment Note      Date/Time:  9/7/2022 12:48 PM    Offered patient enrollment in the [facility] Remote Patient Monitoring (RPM) program for in home monitoring for CHF and HTN. Patient Would like to discuss further with family for RPM services. All questions about RPM program answered at this time.

## 2022-09-13 ENCOUNTER — OFFICE VISIT (OUTPATIENT)
Dept: FAMILY MEDICINE CLINIC | Age: 77
End: 2022-09-13
Payer: MEDICARE

## 2022-09-13 VITALS
OXYGEN SATURATION: 98 % | SYSTOLIC BLOOD PRESSURE: 118 MMHG | RESPIRATION RATE: 18 BRPM | BODY MASS INDEX: 23.03 KG/M2 | DIASTOLIC BLOOD PRESSURE: 60 MMHG | WEIGHT: 122 LBS | HEART RATE: 50 BPM | HEIGHT: 61 IN

## 2022-09-13 DIAGNOSIS — I10 PRIMARY HYPERTENSION: ICD-10-CM

## 2022-09-13 DIAGNOSIS — G45.9 TIA (TRANSIENT ISCHEMIC ATTACK): ICD-10-CM

## 2022-09-13 DIAGNOSIS — R73.01 IFG (IMPAIRED FASTING GLUCOSE): ICD-10-CM

## 2022-09-13 DIAGNOSIS — R53.83 FATIGUE, UNSPECIFIED TYPE: ICD-10-CM

## 2022-09-13 DIAGNOSIS — E03.9 ACQUIRED HYPOTHYROIDISM: ICD-10-CM

## 2022-09-13 DIAGNOSIS — E53.8 FOLIC ACID DEFICIENCY: ICD-10-CM

## 2022-09-13 DIAGNOSIS — I48.91 ATRIAL FIBRILLATION WITH RVR (HCC): ICD-10-CM

## 2022-09-13 DIAGNOSIS — F03.A0 MILD DEMENTIA: Primary | ICD-10-CM

## 2022-09-13 DIAGNOSIS — D68.59 THROMBOPHILIA (HCC): ICD-10-CM

## 2022-09-13 DIAGNOSIS — E78.2 MIXED HYPERLIPIDEMIA: ICD-10-CM

## 2022-09-13 PROCEDURE — 1123F ACP DISCUSS/DSCN MKR DOCD: CPT | Performed by: FAMILY MEDICINE

## 2022-09-13 PROCEDURE — 1036F TOBACCO NON-USER: CPT | Performed by: FAMILY MEDICINE

## 2022-09-13 PROCEDURE — G8420 CALC BMI NORM PARAMETERS: HCPCS | Performed by: FAMILY MEDICINE

## 2022-09-13 PROCEDURE — G8427 DOCREV CUR MEDS BY ELIG CLIN: HCPCS | Performed by: FAMILY MEDICINE

## 2022-09-13 PROCEDURE — G8399 PT W/DXA RESULTS DOCUMENT: HCPCS | Performed by: FAMILY MEDICINE

## 2022-09-13 PROCEDURE — 99214 OFFICE O/P EST MOD 30 MIN: CPT | Performed by: FAMILY MEDICINE

## 2022-09-13 PROCEDURE — 1090F PRES/ABSN URINE INCON ASSESS: CPT | Performed by: FAMILY MEDICINE

## 2022-09-13 RX ORDER — METOPROLOL SUCCINATE 25 MG/1
12.5 TABLET, EXTENDED RELEASE ORAL DAILY
Qty: 45 TABLET | Refills: 5
Start: 2022-09-13

## 2022-09-13 RX ORDER — POTASSIUM CHLORIDE 750 MG/1
10 TABLET, FILM COATED, EXTENDED RELEASE ORAL 2 TIMES DAILY
Qty: 180 TABLET | Refills: 5
Start: 2022-09-13

## 2022-09-13 RX ORDER — ATORVASTATIN CALCIUM 20 MG/1
TABLET, FILM COATED ORAL
Qty: 90 TABLET | Refills: 5
Start: 2022-09-13

## 2022-09-13 RX ORDER — LISINOPRIL 5 MG/1
5 TABLET ORAL DAILY
Qty: 90 TABLET | Refills: 3
Start: 2022-09-13

## 2022-09-13 ASSESSMENT — PATIENT HEALTH QUESTIONNAIRE - PHQ9
SUM OF ALL RESPONSES TO PHQ QUESTIONS 1-9: 0
1. LITTLE INTEREST OR PLEASURE IN DOING THINGS: 0
2. FEELING DOWN, DEPRESSED OR HOPELESS: 0
SUM OF ALL RESPONSES TO PHQ QUESTIONS 1-9: 0
SUM OF ALL RESPONSES TO PHQ9 QUESTIONS 1 & 2: 0

## 2022-09-13 ASSESSMENT — LIFESTYLE VARIABLES: HOW OFTEN DO YOU HAVE A DRINK CONTAINING ALCOHOL: NEVER

## 2022-09-18 ASSESSMENT — ENCOUNTER SYMPTOMS
APNEA: 0
COUGH: 0
COLOR CHANGE: 0
SORE THROAT: 0
CHOKING: 0
STRIDOR: 0
CHEST TIGHTNESS: 0
VOMITING: 0
SHORTNESS OF BREATH: 0
TROUBLE SWALLOWING: 0
RHINORRHEA: 0
EYE ITCHING: 0
RECTAL PAIN: 0
EYE PAIN: 0
WHEEZING: 0
NAUSEA: 0
BLOOD IN STOOL: 0
ABDOMINAL PAIN: 0
CONSTIPATION: 0
EYE DISCHARGE: 0
ANAL BLEEDING: 0
FACIAL SWELLING: 0
ABDOMINAL DISTENTION: 0
BACK PAIN: 0
PHOTOPHOBIA: 0
VOICE CHANGE: 0
EYE REDNESS: 0
DIARRHEA: 0
SINUS PRESSURE: 0

## 2022-09-19 RX ORDER — OMEPRAZOLE 20 MG/1
20 CAPSULE, DELAYED RELEASE ORAL DAILY
Qty: 90 CAPSULE | Refills: 3 | Status: SHIPPED | OUTPATIENT
Start: 2022-09-19

## 2022-09-19 RX ORDER — FOLIC ACID 1 MG/1
1 TABLET ORAL DAILY
Qty: 90 TABLET | Refills: 5 | Status: SHIPPED | OUTPATIENT
Start: 2022-09-19

## 2022-09-19 NOTE — PROGRESS NOTES
Aleta Villatoro is a 64 y.o. female  . Subjective:      Feeling well patient feeling well. No dizziness. No shortness of breath. No orthopnea. Blood pressures been well controlled. No orthopnea. Patient needs refills. We will follow-up with cardiology. Review of Systems   Constitutional:  Negative for activity change, appetite change, chills, diaphoresis, fatigue, fever and unexpected weight change. HENT:  Negative for congestion, dental problem, drooling, ear discharge, ear pain, facial swelling, hearing loss, mouth sores, nosebleeds, postnasal drip, rhinorrhea, sinus pressure, sneezing, sore throat, tinnitus, trouble swallowing and voice change. Eyes:  Negative for photophobia, pain, discharge, redness, itching and visual disturbance. Respiratory:  Negative for apnea, cough, choking, chest tightness, shortness of breath, wheezing and stridor. Cardiovascular:  Negative for chest pain, palpitations and leg swelling. Gastrointestinal:  Negative for abdominal distention, abdominal pain, anal bleeding, blood in stool, constipation, diarrhea, nausea, rectal pain and vomiting. Endocrine: Negative for cold intolerance, heat intolerance, polydipsia, polyphagia and polyuria. Genitourinary:  Negative for decreased urine volume, difficulty urinating, dyspareunia, dysuria, enuresis, flank pain, frequency, genital sores, hematuria, menstrual problem, pelvic pain, urgency, vaginal bleeding, vaginal discharge and vaginal pain. Musculoskeletal:  Negative for arthralgias, back pain, gait problem, joint swelling, myalgias, neck pain and neck stiffness. Skin:  Negative for color change, pallor, rash and wound. Allergic/Immunologic: Negative for environmental allergies, food allergies and immunocompromised state. Neurological:  Negative for dizziness, tremors, seizures, syncope, facial asymmetry, speech difficulty, weakness, light-headedness, numbness and headaches.    Hematological:  Negative for adenopathy. Does not bruise/bleed easily. Psychiatric/Behavioral:  Negative for agitation, behavioral problems, confusion, decreased concentration, dysphoric mood, hallucinations, self-injury, sleep disturbance and suicidal ideas. The patient is not nervous/anxious and is not hyperactive. Past Medical History:   Diagnosis Date    Arthritis     Asymptomatic PVCs 2012    pt felt flutter, no other sx, holter + pvc's, few runs of SVT   [place]    History of Holter monitoring     Hyperlipidemia     Macular degeneration     Nausea & vomiting     Pelvic prolapse     PONV (postoperative nausea and vomiting)     TIA (transient ischemic attack)        Social History     Socioeconomic History    Marital status:      Spouse name: Not on file    Number of children: 3    Years of education: Not on file    Highest education level: High school graduate   Occupational History    Occupation: retired   Tobacco Use    Smoking status: Former     Packs/day: 0.50     Types: Cigarettes     Quit date: 1994     Years since quittin.2    Smokeless tobacco: Never   Vaping Use    Vaping Use: Never used   Substance and Sexual Activity    Alcohol use: No     Comment: Coffee 1 cup a day     Drug use: No    Sexual activity: Not Currently     Partners: Male     Birth control/protection: Post-menopausal   Other Topics Concern    Not on file   Social History Narrative    Not on file     Social Determinants of Health     Financial Resource Strain: Low Risk     Difficulty of Paying Living Expenses: Not hard at all   Food Insecurity: No Food Insecurity    Worried About Running Out of Food in the Last Year: Never true    [address] in the Last Year: Never true   Transportation Needs: No Transportation Needs    Lack of Transportation (Medical): No    Lack of Transportation (Non-Medical):  No   Physical Activity: Inactive    Days of Exercise per Week: 0 days    Minutes of Exercise per Session: 0 min   Stress: Stress Concern Present    Feeling of Stress : To some extent   Social Connections: Moderately Integrated    Frequency of Communication with Friends and Family: More than three times a week    Frequency of Social Gatherings with Friends and Family: More than three times a week    Attends Scientologist Services: More than 4 times per year    Active Member of [address] [place] [address] or Organizations: Yes    Attends Club or Organization Meetings: More than 4 times per year    Marital Status:    Intimate Partner Violence: Not on file   Housing Stability: Low Risk     Unable to Pay for Housing in the Last Year: No    Number of Places Lived in the Last Year: 1    Unstable Housing in the Last Year: No       No family history on file. Current Outpatient Medications on File Prior to Visit   Medication Sig Dispense Refill    donepezil (ARICEPT) 10 MG tablet Take 1 tablet by mouth nightly 90 tablet 5    amiodarone (CORDARONE) 200 MG tablet Take 1 tablet by mouth daily 30 tablet 0    omeprazole (PRILOSEC) 20 MG delayed release capsule Take 1 capsule by mouth in the morning. 90 capsule 3    memantine (NAMENDA) 5 MG tablet Take 1 tablet by mouth 2 times daily 180 tablet 5    ipratropium (ATROVENT) 0.06 % nasal spray 2 sprays by Each Nostril route 4 times daily 3 each 5    diphenoxylate-atropine (LOMOTIL) 2.5-0.025 MG per tablet Take 1 tablet by mouth 4 times daily as needed for Diarrhea.      torsemide (DEMADEX) 20 MG tablet Take 1 tablet by mouth daily 90 tablet 5    folic acid (FOLVITE) 1 MG tablet Take 1 tablet by mouth daily 90 tablet 5    sertraline (ZOLOFT) 50 MG tablet Take 1 tablet by mouth daily 30 tablet 3    levothyroxine (SYNTHROID) 25 MCG tablet Take 1 tablet by mouth Daily 90 tablet 5    Calcium Polycarbophil (FIBER-CAPS PO) Take 1 capsule by mouth at bedtime       No current facility-administered medications on file prior to visit.        No Known Allergies    I have reviewedher allergies, medications, problem list, medical, social and family history and have updated as needed in the electronic medical record. Objective:     Physical Exam  Vitals and nursing note reviewed. Constitutional:       General: She is not in acute distress. Appearance: She is well-developed. She is not diaphoretic. HENT:      Head: Normocephalic and atraumatic. Right Ear: External ear normal.      Left Ear: External ear normal.      Nose: Nose normal.      Mouth/Throat:      Pharynx: No oropharyngeal exudate. Eyes:      General: No scleral icterus. Right eye: No discharge. Left eye: No discharge. Conjunctiva/sclera: Conjunctivae normal.      Pupils: Pupils are equal, round, and reactive to light. Neck:      Thyroid: No thyromegaly. Vascular: No JVD. Trachea: No tracheal deviation. Cardiovascular:      Rate and Rhythm: Normal rate and regular rhythm. Heart sounds: Normal heart sounds. No murmur heard. No friction rub. No gallop. Pulmonary:      Effort: Pulmonary effort is normal. No respiratory distress. Breath sounds: Normal breath sounds. No stridor. No wheezing or rales. Chest:      Chest wall: No tenderness. Abdominal:      General: Bowel sounds are normal. There is no distension. Palpations: Abdomen is soft. There is no mass. Tenderness: There is no abdominal tenderness. There is no guarding or rebound. Genitourinary:     Comments: Will follow with own gynecologist for gynecological and breast care. Musculoskeletal:         General: No tenderness. Normal range of motion. Cervical back: Normal range of motion and neck supple. Lymphadenopathy:      Cervical: No cervical adenopathy. Skin:     General: Skin is warm and dry. Coloration: Skin is not pale. Findings: No erythema or rash. Neurological:      Mental Status: She is alert and oriented to person, place, and time. Cranial Nerves: No cranial nerve deficit. Motor: No abnormal muscle tone.       Coordination: Coordination normal.      Deep Tendon Reflexes: Reflexes are normal and symmetric. Reflexes normal.   Psychiatric:         Behavior: Behavior normal.         Thought Content: Thought content normal.         Judgment: Judgment normal.       Assessment / Plan:   Kristen Wilson was seen today for 3 month follow-up. Diagnoses and all orders for this visit:    Mild dementia ([facility] [address] [address]    TIA (transient ischemic attack)  -     atorvastatin (LIPITOR) 20 MG tablet; TAKE ONE TABLET BY MOUTH EVERY EVENING    Mixed hyperlipidemia  -     atorvastatin (LIPITOR) 20 MG tablet; TAKE ONE TABLET BY MOUTH EVERY EVENING  -     Lipid Panel; Future    Atrial fibrillation with RVR ([facility])  -     potassium chloride (K-TAB) 10 MEQ extended release tablet; Take 1 tablet by mouth 2 times daily  -     metoprolol succinate (TOPROL XL) 25 MG extended release tablet; Take 0.5 tablets by mouth daily    Thrombophilia ([facility])  -     apixaban (ELIQUIS) 5 MG TABS tablet; Take 1 tablet by mouth 2 times daily    Fatigue, unspecified type  -     CBC; Future  -     Comprehensive Metabolic Panel; Future    Acquired hypothyroidism  -     TSH; Future  -     T4, Free; Future    Folic acid deficiency  -     Vitamin B12 & Folate; Future    IFG (impaired fasting glucose)  -     Hemoglobin A1C; Future    Primary hypertension  -     lisinopril (PRINIVIL;ZESTRIL) 5 MG tablet; Take 1 tablet by mouth daily      Willfollow with own gynecologist for gynecological and breast care. Reviewed health maintenance report. Patient is aware of deficiencies and suggested preventative tests.

## 2022-10-07 ENCOUNTER — CARE COORDINATION (OUTPATIENT)
Dept: CARE COORDINATION | Age: 77
End: 2022-10-07

## 2022-10-07 NOTE — CARE COORDINATION
Ambulatory Care Coordination Note  10/7/2022    ACC: Stephen Wiggins RN    CHF:  Denies s/s CHF Exacerbation. Discussed Zone Tool and verbalizes understanding. Today's weight: Did not weigh    Pt had a fall. States she was gardening, and sometimes when she bends over she gets a little dizzy. BP was not taken  Pt without injury. Fall not witnessed. Pt called neighbor to help her up. Pt is now using a wheeled walker    Offered patient enrollment in the Remote Patient Monitoring (RPM) program for in-home monitoring: Patient declined. FOLLOW-UP PLAN:    Continue Care Coordination and Assess Plan Of Care  Discuss:   -CHF Management/Zone Tool  -Falls/Near Falls? -Appointment Reminders  -Assess Graduation     Next Anticipated Outreach by [address] Team:  1 Month      Lab Results       None            Care Coordination Interventions    Program Enrollment: Complex Care  Referral from Primary Care Provider: No  Suggested Interventions and Community Resources  Fall Risk Prevention: In Process (Comment: Fall Precautions)  Home Health Services: Not Started (Comment: [address])  Medication Assistance Program: Declined  Medi Set or Pill Pack: Declined  Pharmacist: Declined  Registered Dietician: Declined  Social Work: Declined  Transportation Support: Declined  Zone Management Tools: In Process (Comment: CHF)          Goals Addressed                   This Visit's Progress     Conditions and Symptoms   On track     I will schedule office visits, as directed by my provider. I will keep my appointment or reschedule if I have to cancel. I will notify my provider of any barriers to my plan of care. I will follow my Zone Management tool to seek urgent or emergent care. I will notify my provider of any symptoms that indicate a worsening of my condition.     Barriers: lack of education  Plan for overcoming my barriers: Work with care team, follow poc  Confidence: 10/10  Anticipated Goal Completion Date: 6/9/22 Medication Management   On track     I will take my medication as directed. I will notify my provider of any problems with medications, like adverse effects or side effects. I will notify my provider/Care Coordinator if I am unable to afford my medications. I will notify my provider for advice before I stop taking any of my medication. Barriers: lack of education  Plan for overcoming my barriers: Ask questions, voice concerns  Confidence: 10/10  Anticipated Goal Completion Date: 6/9/22              Prior to Admission medications    Medication Sig Start Date End Date Taking? Authorizing Provider   folic acid (FOLVITE) 1 MG tablet Take 1 tablet by mouth daily 9/19/22   Karla Cage,    omeprazole (PRILOSEC) 20 MG delayed release capsule Take 1 capsule by mouth daily 9/19/22   Karla Cage, DO   atorvastatin (LIPITOR) 20 MG tablet TAKE ONE TABLET BY MOUTH EVERY EVENING 9/13/22   Karla Cage, DO   potassium chloride (K-TAB) 10 MEQ extended release tablet Take 1 tablet by mouth 2 times daily 9/13/22   Karla Cage DO   lisinopril (PRINIVIL;ZESTRIL) 5 MG tablet Take 1 tablet by mouth daily 9/13/22   Karla Cage, DO   apixaban (ELIQUIS) 5 MG TABS tablet Take 1 tablet by mouth 2 times daily 9/13/22   Karla Cage, DO   metoprolol succinate (TOPROL XL) 25 MG extended release tablet Take 0.5 tablets by mouth daily 9/13/22   Karla Cage, DO   donepezil (ARICEPT) 10 MG tablet Take 1 tablet by mouth nightly 9/6/22   Karla Cage DO   amiodarone (CORDARONE) 200 MG tablet Take 1 tablet by mouth daily 9/6/22   Karla Cage, DO   memantine (NAMENDA) 5 MG tablet Take 1 tablet by mouth 2 times daily 7/5/22   Karla Cage DO   ipratropium (ATROVENT) 0.06 % nasal spray 2 sprays by Each Nostril route 4 times daily 6/14/22   Karla Cage,    diphenoxylate-atropine (LOMOTIL) 2.5-0.025 MG per tablet Take 1 tablet by mouth 4 times daily as needed for Diarrhea.     Historical Provider, MD torsemide (DEMADEX) 20 MG tablet Take 1 tablet by mouth daily 3/10/22   Joseph Levin, DO   sertraline (ZOLOFT) 50 MG tablet Take 1 tablet by mouth daily 2/24/22   Buck Chirinos, DO   levothyroxine (SYNTHROID) 25 MCG tablet Take 1 tablet by mouth Daily 2/2/22   Joseph Levin, DO   Calcium Polycarbophil (FIBER-CAPS PO) Take 1 capsule by mouth at bedtime    Historical Provider, MD       Future Appointments   Date Time Provider Camron Godinez   12/2/2022  2:30 PM Cristian Castro MD [facility]   12/14/2022  2:30 PM Joseph Levin, DO [facility]    and   Congestive Heart Failure Assessment    Do you understand a low sodium diet?: Yes  Do you understand how to read food labels?: No  How many restaurant meals do you eat per week?: 0  Do you salt your food before tasting it?: No     No patient-reported symptoms      Symptoms:  None: Yes      Symptom course: stable  Weight trend: stable  Salt intake watch compared to last visit: stable

## 2022-10-08 DIAGNOSIS — I48.91 ATRIAL FIBRILLATION WITH RVR (HCC): ICD-10-CM

## 2022-10-08 DIAGNOSIS — F03.A0 MILD DEMENTIA: ICD-10-CM

## 2022-10-08 DIAGNOSIS — E03.9 ACQUIRED HYPOTHYROIDISM: ICD-10-CM

## 2022-10-10 RX ORDER — MEMANTINE HYDROCHLORIDE 5 MG/1
5 TABLET ORAL 2 TIMES DAILY
Qty: 180 TABLET | Refills: 5 | Status: SHIPPED | OUTPATIENT
Start: 2022-10-10

## 2022-10-10 RX ORDER — LEVOTHYROXINE SODIUM 0.03 MG/1
25 TABLET ORAL DAILY
Qty: 90 TABLET | Refills: 0 | Status: SHIPPED | OUTPATIENT
Start: 2022-10-10

## 2022-10-10 RX ORDER — AMIODARONE HYDROCHLORIDE 200 MG/1
TABLET ORAL
Qty: 30 TABLET | Refills: 2 | Status: SHIPPED | OUTPATIENT
Start: 2022-10-10

## 2022-10-10 NOTE — TELEPHONE ENCOUNTER
Thank you for info. Have her continue fall precautions. We may want to adjust medications. I would hate to now since she had been doing so well.  If mor dizziness type issues occur we will make some adjustments

## 2022-10-20 ENCOUNTER — TELEPHONE (OUTPATIENT)
Dept: CARDIOLOGY CLINIC | Age: 77
End: 2022-10-20

## 2022-10-20 RX ORDER — TORSEMIDE 20 MG/1
20 TABLET ORAL DAILY
Qty: 90 TABLET | Refills: 1 | Status: SHIPPED | OUTPATIENT
Start: 2022-10-20

## 2022-10-20 NOTE — TELEPHONE ENCOUNTER
Patient has 2 abscess teeth that need pulled. Dentist is asking her to hold her Eliquis prior and if she needs antibiotics prior.  Please advise

## 2022-10-24 ENCOUNTER — CARE COORDINATION (OUTPATIENT)
Dept: CARE COORDINATION | Age: 77
End: 2022-10-24

## 2022-12-02 ENCOUNTER — OFFICE VISIT (OUTPATIENT)
Dept: CARDIOLOGY CLINIC | Age: 77
End: 2022-12-02

## 2022-12-02 VITALS
BODY MASS INDEX: 23.6 KG/M2 | DIASTOLIC BLOOD PRESSURE: 62 MMHG | SYSTOLIC BLOOD PRESSURE: 110 MMHG | HEART RATE: 52 BPM | RESPIRATION RATE: 16 BRPM | WEIGHT: 125 LBS | HEIGHT: 61 IN

## 2022-12-02 DIAGNOSIS — R00.1 SINUS BRADYCARDIA: ICD-10-CM

## 2022-12-02 DIAGNOSIS — I34.0 NONRHEUMATIC MITRAL VALVE REGURGITATION: ICD-10-CM

## 2022-12-02 DIAGNOSIS — I48.0 PAROXYSMAL ATRIAL FIBRILLATION (HCC): Primary | ICD-10-CM

## 2022-12-02 DIAGNOSIS — I38 VHD (VALVULAR HEART DISEASE): ICD-10-CM

## 2022-12-02 DIAGNOSIS — Z86.73 HISTORY OF TIA (TRANSIENT ISCHEMIC ATTACK): ICD-10-CM

## 2022-12-02 NOTE — PROGRESS NOTES
OFFICE VISIT     PRIMARY CARE PHYSICIAN:      Indra Ricks DO       ALLERGIES / SENSITIVITIES:      No Known Allergies       REVIEWED MEDICATIONS:        Current Outpatient Medications:     torsemide (DEMADEX) 20 MG tablet, Take 1 tablet by mouth daily, Disp: 90 tablet, Rfl: 1    memantine (NAMENDA) 5 MG tablet, Take 1 tablet by mouth 2 times daily, Disp: 180 tablet, Rfl: 5    levothyroxine (SYNTHROID) 25 MCG tablet, Take 1 tablet by mouth Daily, Disp: 90 tablet, Rfl: 0    amiodarone (CORDARONE) 200 MG tablet, TAKE 1 TABLET BY MOUTH EVERY DAY, Disp: 30 tablet, Rfl: 2    folic acid (FOLVITE) 1 MG tablet, Take 1 tablet by mouth daily, Disp: 90 tablet, Rfl: 5    omeprazole (PRILOSEC) 20 MG delayed release capsule, Take 1 capsule by mouth daily, Disp: 90 capsule, Rfl: 3    atorvastatin (LIPITOR) 20 MG tablet, TAKE ONE TABLET BY MOUTH EVERY EVENING, Disp: 90 tablet, Rfl: 5    potassium chloride (K-TAB) 10 MEQ extended release tablet, Take 1 tablet by mouth 2 times daily, Disp: 180 tablet, Rfl: 5    lisinopril (PRINIVIL;ZESTRIL) 5 MG tablet, Take 1 tablet by mouth daily, Disp: 90 tablet, Rfl: 3    apixaban (ELIQUIS) 5 MG TABS tablet, Take 1 tablet by mouth 2 times daily, Disp: 180 tablet, Rfl: 5    donepezil (ARICEPT) 10 MG tablet, Take 1 tablet by mouth nightly, Disp: 90 tablet, Rfl: 5    ipratropium (ATROVENT) 0.06 % nasal spray, 2 sprays by Each Nostril route 4 times daily, Disp: 3 each, Rfl: 5    sertraline (ZOLOFT) 50 MG tablet, Take 1 tablet by mouth daily, Disp: 30 tablet, Rfl: 3    Calcium Polycarbophil (FIBER-CAPS PO), Take 1 capsule by mouth at bedtime, Disp: , Rfl:     diphenoxylate-atropine (LOMOTIL) 2.5-0.025 MG per tablet, Take 1 tablet by mouth 4 times daily as needed for Diarrhea.  (Patient not taking: Reported on 12/2/2022), Disp: , Rfl:       S: REASON FOR VISIT:       Chief Complaint   Patient presents with    Atrial Fibrillation     6 month           History of Present Illness:       Office Visit for follow up of bradycardia, A Fib   76 yr old Mrs Gary Courtney with Hx of A Fib, bradycardia came for f/u visit   C/o occ fatigue/tired, no dizzy   No hospitalizations or surgeries since last visit   Patient is compliant with all medications   Faizan any exertional chest pain or short of breath   No palpitations or syncope. Active at home   Try to watch diet          Past Medical History:   Diagnosis Date    Arthritis     Asymptomatic PVCs 2012    pt felt flutter, no other sx, holter + pvc's, few runs of SVT   [facility]    History of Holter monitoring     Hyperlipidemia     Macular degeneration     Nausea & vomiting     Pelvic prolapse     PONV (postoperative nausea and vomiting)     TIA (transient ischemic attack)             Past Surgical History:   Procedure Laterality Date    COLONOSCOPY      ENDOSCOPY, COLON, DIAGNOSTIC      EYE SURGERY      macular hole    HERNIA REPAIR      inguinal    HYSTERECTOMY (CERVIX STATUS UNKNOWN)      OTHER SURGICAL HISTORY  2013    ant elevatetransobturator sling rectocele and cystocele enterocele    SKIN BIOPSY      arms    TRANSESOPHAGEAL ECHOCARDIOGRAM N/A 10/20/2020    TRANSESOPHAGEAL ECHOCARDIOGRAM WITH BUBBLE STUDY performed by Michael Knott MD at [address] reviewed. No pertinent family history.        Social History     Tobacco Use    Smoking status: Former     Packs/day: 0.50     Types: Cigarettes     Quit date: 1994     Years since quittin.4    Smokeless tobacco: Never   Substance Use Topics    Alcohol use: No     Comment: Coffee 1 cup a day          Review of Systems:    Constitutional: negative for fever and chills, or significant weight loss  HEENT: negative for acute visual symptoms or auditory problems, no dysphagia  Respiratory: negative for cough, wheezing, or hemoptysis  Cardiovascular: negative for chest pain, palpitations, and dyspnea  Gastrointestinal: negative for abdominal pain, diarrhea, melena, nausea and vomiting  Endocrine: Negative for polyuria and polydyspsia  Genitourinary: negative for dysuria and hematuria  Derm: negative for rash and skin lesion(s)  Neurological: negative for tingling, numbness, weakness, seizures  Endocrine: negative for polydipsia and polyuria  Musculoskeletal: negative for pain or tenderness  Psychiatric: negative for anxiety, depression, or suicidal ideations         O:  COMPLETE PHYSICAL EXAM:       /62   Pulse 52   Resp 16   Ht 5' 1\" (1.549 m)   Wt 125 lb (56.7 kg)   BMI 23.62 kg/m²       General:   Patient alert, comfortable, no distress. Appears stated age. HEENT:    Pupils equal, no icterus; Tongue moist.   Neck:              No masses, Thyroid not palpable. No elevated JVD, No carotid bruit. Chest:   Normal configuration, non tender. Lungs:   Clear to auscultation bilaterally except few scattered rhonchi. Cardiovascular:  Regular rhythm, 2/6 systolic murmur, No S3, no palpable thrills. Abdomen:  Soft, Bowel sounds normal, no pulsatile abdominal aorta, no palpable masses. Extremities:  No edema. Distal pulses palpable. No cyanosis   Skin:   Good turgor, warm and dry, no cyanosis. Musculoskeletal: No joint swelling or deformity. Neuro:   Cranial nerves grossly intact; No focal neurologic deficit. Psych:   Alert, good mood and effect. REVIEW OF DIAGNOSTIC TESTS:        Electrocardiogram: Reviewed     MARYLOU 2/22/22:    Summary   Ejection fraction is visually estimated at 40-45%. Atrial fibrillation affects the accuracy of interpretation. Right ventricle global systolic function is low normal .   No evidence of thrombus within left atrium/ left atrial appendage. Emptying velocity is low at 19 cms/s. No evidence of thrombus or mass in the right atrium. Mild to moderate mitral regurgitation is present. Mild-to-moderate aortic regurgitation is noted. Mild to moderate tricuspid regurgitation.    Mild-moderate focal atherosclerosis in the descending aorta. Stress test 12/23/2021  Impression       The myocardial perfusion imaging was normal.       Overall left ventricular systolic function was normal without regional   wall motion abnormalities. Low risk study. Event monitor- 10/8/21 -11/6/2021- PVCs and PACs                MARYLOU 10/20/21   Summary   Large irregular mobile echogenic mass noted in the left atrial appendage   and left atrium near mouth of MARTA suggestive of thrombus. Moderate to severe central jet of mitral regurgitation - ERO 0.3cm2, PISA 0.6cm, RV 76cc. Normal left ventricular chamber size. Normal left ventricular systolic function. Left atrium is enlarged. Interatrial septum intact. Agitated saline injection showed no right to left shunt. Normal right ventricle size and function. No mitral valve prolapse. There is trace aortic regurgitation. There is mild tricuspid regurgitation. Normal aortic root size. No evidence of pericardial effusion. Pericardium appears normal.   No comparison study available in the local [facility]. CTA head 10/6/20  Impression    1. No acute intracranial abnormality. 2. 75% stenosis in the proximal M1 segment of the right MCA. 3. Otherwise, no acute abnormality or flow-limiting stenosis in the remainder    of the major arteries of the head and neck. 4. 2.3-2.4 mm prominent infundibulum at the origins of the bilateral    posterior communicating arteries. No definite intracranial aneurysm. CTA neck 10/6/20  Impression    1. No acute intracranial abnormality. 2. 75% stenosis in the proximal M1 segment of the right MCA. 3. Otherwise, no acute abnormality or flow-limiting stenosis in the remainder    of the major arteries of the head and neck. 4. 2.3-2.4 mm prominent infundibulum at the origins of the bilateral    posterior communicating arteries. No definite intracranial aneurysm.        Treadmill Stress test - 6/2018  1. Exercise EKG was normal.   2. The patient experienced no chest pain with exercise. 3. Beckman treadmill score was 7 implying low risk of acute ischemic   events. 4. Exercise capacity was average. A/P:              ASSESSMENT / PLAN:               Austin Tran was seen today for bradycardia, atrial fibrillation. Diagnoses and all orders for this visit:                Sinus bradycardia -  Tired, occ,  discontinue Metoprolol decreased,  Monitor BP and HR; If still symtomatic, discontinue Amiodarone/EP referral.  -     EKG 12 Lead     Paroxysmal atrial fibrillation ([address] - Dx 12/23/2021 - High [id number] VASc score-On Eliquis for [address]. Decrease Eliquis to 2.5mg TWICE daily if Creatinine >1.5 or age [de-identified]   -     EKG 12 Lead     Cardiomyopathy - EF 40-45% by MARYLOU 2/22/22 - Normal EF in 10/2021, Likely tachycardia mediated. Nonischemic Stress test in 12/2021, EF 53%. Well compensated. Continue Metoprolol, If BP tolerate add Entresto/ACE-I. Later add Jardiance. No further testing, She is DNR [facility]. History of Left atrial appendage thrombus in 10/2021 - Resolved by MARYLOU 1/31/2022 - On Eliquis     VHD - Mild to moderate MR, TR, AR - Low dose ACEi if BP/renal Fn tolerate. Dyslipidemia -  On Statin     History of TIA (transient ischemic attack)      Seasonal  Allergies  -Try Nascort/Flonase for nasal allergies, f/u with Dr Petey Carrizales     Preventive Cardiology: Low cholesterol diet, regular exercise as tolerate, and gradual weight loss discussed. Above recommendations discussed her and her family. The patient's current medication list, allergies, problem list and results of prior tests (as available) were reviewed at today's visit   All questions answered about cardiac diagnoses and cardiac medications. Continue current medications. Monitor BP and heart rates. Compliance with medications and f/u with all physicians discussed.    Risk factor modification based on risk profile discussed. Call if any exertional chest pain, short of breath, dizzy or palpitations. Follow up in 6 months or earlier if needed.          [facility]  [address], [place], [address] [facility]  [phone]

## 2022-12-05 DIAGNOSIS — E78.2 MIXED HYPERLIPIDEMIA: ICD-10-CM

## 2022-12-05 DIAGNOSIS — R53.83 FATIGUE, UNSPECIFIED TYPE: ICD-10-CM

## 2022-12-05 DIAGNOSIS — E53.8 FOLIC ACID DEFICIENCY: ICD-10-CM

## 2022-12-05 DIAGNOSIS — R73.01 IFG (IMPAIRED FASTING GLUCOSE): ICD-10-CM

## 2022-12-05 DIAGNOSIS — E03.9 ACQUIRED HYPOTHYROIDISM: ICD-10-CM

## 2022-12-05 LAB
ALBUMIN SERPL-MCNC: 4.1 G/DL (ref 3.5–5.2)
ALP BLD-CCNC: 93 U/L (ref 35–104)
ALT SERPL-CCNC: 18 U/L (ref 0–32)
ANION GAP SERPL CALCULATED.3IONS-SCNC: 12 MMOL/L (ref 7–16)
AST SERPL-CCNC: 34 U/L (ref 0–31)
BILIRUB SERPL-MCNC: 0.4 MG/DL (ref 0–1.2)
BUN BLDV-MCNC: 25 MG/DL (ref 6–23)
CALCIUM SERPL-MCNC: 9.4 MG/DL (ref 8.6–10.2)
CHLORIDE BLD-SCNC: 98 MMOL/L (ref 98–107)
CHOLESTEROL, TOTAL: 174 MG/DL (ref 0–199)
CO2: 26 MMOL/L (ref 22–29)
CREAT SERPL-MCNC: 1.3 MG/DL (ref 0.5–1)
FOLATE: >20 NG/ML (ref 4.8–24.2)
GFR SERPL CREATININE-BSD FRML MDRD: 42 ML/MIN/1.73
GLUCOSE BLD-MCNC: 72 MG/DL (ref 74–99)
HBA1C MFR BLD: 5.6 % (ref 4–5.6)
HCT VFR BLD CALC: 39.1 % (ref 34–48)
HDLC SERPL-MCNC: 66 MG/DL
HEMOGLOBIN: 12.6 G/DL (ref 11.5–15.5)
LDL CHOLESTEROL CALCULATED: 88 MG/DL (ref 0–99)
MCH RBC QN AUTO: 31.3 PG (ref 26–35)
MCHC RBC AUTO-ENTMCNC: 32.2 % (ref 32–34.5)
MCV RBC AUTO: 97.3 FL (ref 80–99.9)
PDW BLD-RTO: 13.8 FL (ref 11.5–15)
PLATELET # BLD: 339 E9/L (ref 130–450)
PMV BLD AUTO: 11.9 FL (ref 7–12)
POTASSIUM SERPL-SCNC: 5.1 MMOL/L (ref 3.5–5)
RBC # BLD: 4.02 E12/L (ref 3.5–5.5)
SODIUM BLD-SCNC: 136 MMOL/L (ref 132–146)
T4 FREE: 1.55 NG/DL (ref 0.93–1.7)
TOTAL PROTEIN: 7.3 G/DL (ref 6.4–8.3)
TRIGL SERPL-MCNC: 101 MG/DL (ref 0–149)
TSH SERPL DL<=0.05 MIU/L-ACNC: 4.27 UIU/ML (ref 0.27–4.2)
VITAMIN B-12: 466 PG/ML (ref 211–946)
VLDLC SERPL CALC-MCNC: 20 MG/DL
WBC # BLD: 10.5 E9/L (ref 4.5–11.5)

## 2022-12-14 ENCOUNTER — OFFICE VISIT (OUTPATIENT)
Dept: FAMILY MEDICINE CLINIC | Age: 77
End: 2022-12-14

## 2022-12-14 VITALS
DIASTOLIC BLOOD PRESSURE: 80 MMHG | RESPIRATION RATE: 18 BRPM | HEART RATE: 56 BPM | OXYGEN SATURATION: 96 % | WEIGHT: 125 LBS | SYSTOLIC BLOOD PRESSURE: 118 MMHG | BODY MASS INDEX: 23.6 KG/M2 | HEIGHT: 61 IN

## 2022-12-14 DIAGNOSIS — E78.2 MIXED HYPERLIPIDEMIA: ICD-10-CM

## 2022-12-14 DIAGNOSIS — R79.89 CREATININE ELEVATION: ICD-10-CM

## 2022-12-14 DIAGNOSIS — I48.91 ATRIAL FIBRILLATION WITH RVR (HCC): Primary | ICD-10-CM

## 2022-12-14 DIAGNOSIS — R53.83 FATIGUE, UNSPECIFIED TYPE: ICD-10-CM

## 2022-12-14 DIAGNOSIS — R73.01 IFG (IMPAIRED FASTING GLUCOSE): ICD-10-CM

## 2022-12-14 DIAGNOSIS — E03.9 ACQUIRED HYPOTHYROIDISM: ICD-10-CM

## 2022-12-14 DIAGNOSIS — F41.9 ANXIETY: ICD-10-CM

## 2022-12-14 RX ORDER — BUSPIRONE HYDROCHLORIDE 5 MG/1
5 TABLET ORAL 2 TIMES DAILY
Qty: 60 TABLET | Refills: 4 | Status: SHIPPED | OUTPATIENT
Start: 2022-12-14 | End: 2023-01-13

## 2022-12-14 SDOH — ECONOMIC STABILITY: FOOD INSECURITY: WITHIN THE PAST 12 MONTHS, YOU WORRIED THAT YOUR FOOD WOULD RUN OUT BEFORE YOU GOT MONEY TO BUY MORE.: NEVER TRUE

## 2022-12-14 SDOH — ECONOMIC STABILITY: FOOD INSECURITY: WITHIN THE PAST 12 MONTHS, THE FOOD YOU BOUGHT JUST DIDN'T LAST AND YOU DIDN'T HAVE MONEY TO GET MORE.: NEVER TRUE

## 2022-12-14 ASSESSMENT — SOCIAL DETERMINANTS OF HEALTH (SDOH): HOW HARD IS IT FOR YOU TO PAY FOR THE VERY BASICS LIKE FOOD, HOUSING, MEDICAL CARE, AND HEATING?: NOT VERY HARD

## 2022-12-14 NOTE — PROGRESS NOTES
Gypsy Kiser is a 76 y.o. female  . Subjective:      Saw code-laboration. Adjusted medication. Watching renal numbers. Considering pacemaker but has been doing better since adjustment of medication which was limiting the beta-blocker. We will follow closely with cardiology. If patient has any orthostatic type issues or dizziness that she is to let me know or cardiology. Has been having some anxiety in the afternoon and we discussed starting some BuSpar for this. Review of Systems   Constitutional:  Negative for activity change, appetite change, chills, diaphoresis, fatigue, fever and unexpected weight change. HENT:  Negative for congestion, dental problem, drooling, ear discharge, ear pain, facial swelling, hearing loss, mouth sores, nosebleeds, postnasal drip, rhinorrhea, sinus pressure, sneezing, sore throat, tinnitus, trouble swallowing and voice change. Eyes:  Negative for photophobia, pain, discharge, redness, itching and visual disturbance. Respiratory:  Negative for apnea, cough, choking, chest tightness, shortness of breath, wheezing and stridor. Cardiovascular:  Negative for chest pain, palpitations and leg swelling. Gastrointestinal:  Negative for abdominal distention, abdominal pain, anal bleeding, blood in stool, constipation, diarrhea, nausea, rectal pain and vomiting. Endocrine: Negative for cold intolerance, heat intolerance, polydipsia, polyphagia and polyuria. Genitourinary:  Negative for decreased urine volume, difficulty urinating, dyspareunia, dysuria, enuresis, flank pain, frequency, genital sores, hematuria, menstrual problem, pelvic pain, urgency, vaginal bleeding, vaginal discharge and vaginal pain. Musculoskeletal:  Negative for arthralgias, back pain, gait problem, joint swelling, myalgias, neck pain and neck stiffness. Skin:  Negative for color change, pallor, rash and wound.    Allergic/Immunologic: Negative for environmental allergies, food allergies and immunocompromised state. Neurological:  Negative for dizziness, tremors, seizures, syncope, facial asymmetry, speech difficulty, weakness, light-headedness, numbness and headaches. Hematological:  Negative for adenopathy. Does not bruise/bleed easily. Psychiatric/Behavioral:  Negative for agitation, behavioral problems, confusion, decreased concentration, dysphoric mood, hallucinations, self-injury, sleep disturbance and suicidal ideas. The patient is not nervous/anxious and is not hyperactive. Past Medical History:   Diagnosis Date    Arthritis     Asymptomatic PVCs 2012    pt felt flutter, no other sx, holter + pvc's, few runs of SVT   [facility] [place]    History of Holter monitoring     Hyperlipidemia     Macular degeneration     Nausea & vomiting     Pelvic prolapse     PONV (postoperative nausea and vomiting)     TIA (transient ischemic attack)        Social History     Socioeconomic History    Marital status:       Spouse name: Not on file    Number of children: 3    Years of education: Not on file    Highest education level: High school graduate   Occupational History    Occupation: retired   Tobacco Use    Smoking status: Former     Packs/day: 0.50     Types: Cigarettes     Quit date: 1994     Years since quittin.5    Smokeless tobacco: Never   Vaping Use    Vaping Use: Never used   Substance and Sexual Activity    Alcohol use: No     Comment: Coffee 1 cup a day     Drug use: No    Sexual activity: Not Currently     Partners: Male     Birth control/protection: Post-menopausal   Other Topics Concern    Not on file   Social History Narrative    Not on file     Social Determinants of Health     Financial Resource Strain: Low Risk     Difficulty of Paying Living Expenses: Not very hard   Food Insecurity: No Food Insecurity    Worried About Running Out of Food in the Last Year: Never true    Ran Out of Food in the Last Year: Never true   Transportation Needs: No Transportation Needs    Lack of Transportation (Medical): No    Lack of Transportation (Non-Medical): No   Physical Activity: Inactive    Days of Exercise per Week: 0 days    Minutes of Exercise per Session: 0 min   Stress: Stress Concern Present    Feeling of Stress : To some extent   Social Connections: Moderately Integrated    Frequency of Communication with Friends and Family: More than three times a week    Frequency of Social Gatherings with Friends and Family: More than three times a week    Attends Temple Services: More than 4 times per year    Active Member of [employer] or Organizations: Yes    Attends Club or Organization Meetings: More than 4 times per year    Marital Status:    Intimate Partner Violence: Not on file   Housing Stability: Low Risk     Unable to Pay for Housing in the Last Year: No    Number of Places Lived in the Last Year: 1    Unstable Housing in the Last Year: No       No family history on file.     Current Outpatient Medications on File Prior to Visit   Medication Sig Dispense Refill    torsemide (DEMADEX) 20 MG tablet Take 1 tablet by mouth daily 90 tablet 1    memantine (NAMENDA) 5 MG tablet Take 1 tablet by mouth 2 times daily 180 tablet 5    levothyroxine (SYNTHROID) 25 MCG tablet Take 1 tablet by mouth Daily 90 tablet 0    amiodarone (CORDARONE) 200 MG tablet TAKE 1 TABLET BY MOUTH EVERY DAY 30 tablet 2    folic acid (FOLVITE) 1 MG tablet Take 1 tablet by mouth daily 90 tablet 5    omeprazole (PRILOSEC) 20 MG delayed release capsule Take 1 capsule by mouth daily 90 capsule 3    atorvastatin (LIPITOR) 20 MG tablet TAKE ONE TABLET BY MOUTH EVERY EVENING 90 tablet 5    potassium chloride (K-TAB) 10 MEQ extended release tablet Take 1 tablet by mouth 2 times daily 180 tablet 5    lisinopril (PRINIVIL;ZESTRIL) 5 MG tablet Take 1 tablet by mouth daily 90 tablet 3    apixaban (ELIQUIS) 5 MG TABS tablet Take 1 tablet by mouth 2 times daily 180 tablet 5    donepezil (ARICEPT) 10 MG tablet Take 1 tablet by mouth nightly 90 tablet 5    sertraline (ZOLOFT) 50 MG tablet Take 1 tablet by mouth daily 30 tablet 3    Calcium Polycarbophil (FIBER-CAPS PO) Take 1 capsule by mouth at bedtime       No current facility-administered medications on file prior to visit. No Known Allergies    I have reviewedher allergies, medications, problem list, medical, social and family history and have updated as needed in the electronic medical record. Objective:     Physical Exam  Vitals and nursing note reviewed. Constitutional:       General: She is not in acute distress. Appearance: She is well-developed. She is not diaphoretic. HENT:      Head: Normocephalic and atraumatic. Right Ear: External ear normal.      Left Ear: External ear normal.      Nose: Nose normal.      Mouth/Throat:      Pharynx: No oropharyngeal exudate. Eyes:      General: No scleral icterus. Right eye: No discharge. Left eye: No discharge. Conjunctiva/sclera: Conjunctivae normal.      Pupils: Pupils are equal, round, and reactive to light. Neck:      Thyroid: No thyromegaly. Vascular: No JVD. Trachea: No tracheal deviation. Cardiovascular:      Rate and Rhythm: Normal rate and regular rhythm. Heart sounds: Normal heart sounds. No murmur heard. No friction rub. No gallop. Pulmonary:      Effort: Pulmonary effort is normal. No respiratory distress. Breath sounds: Normal breath sounds. No stridor. No wheezing or rales. Chest:      Chest wall: No tenderness. Abdominal:      General: Bowel sounds are normal. There is no distension. Palpations: Abdomen is soft. There is no mass. Tenderness: There is no abdominal tenderness. There is no guarding or rebound. Genitourinary:     Comments: Will follow with own gynecologist for gynecological and breast care. Musculoskeletal:         General: No tenderness. Normal range of motion. Cervical back: Normal range of motion and neck supple. Lymphadenopathy:      Cervical: No cervical adenopathy. Skin:     General: Skin is warm and dry. Coloration: Skin is not pale. Findings: No erythema or rash. Neurological:      Mental Status: She is alert and oriented to person, place, and time. Cranial Nerves: No cranial nerve deficit. Motor: No abnormal muscle tone. Coordination: Coordination normal.      Deep Tendon Reflexes: Reflexes are normal and symmetric. Reflexes normal.   Psychiatric:         Behavior: Behavior normal.         Thought Content: Thought content normal.         Judgment: Judgment normal.       Assessment / Plan:   Zafar Murrieta was seen today for 3 month follow-up. Diagnoses and all orders for this visit:    Atrial fibrillation with RVR ([facility] [address]  -     Basic Metabolic Panel; Future    Acquired hypothyroidism  -     Basic Metabolic Panel; Future  -     TSH; Future  -     T4, Free; Future    Mixed hyperlipidemia  -     Basic Metabolic Panel; Future  -     Lipid Panel; Future    Fatigue, unspecified type  -     Basic Metabolic Panel; Future  -     CBC with Auto Differential; Future  -     Comprehensive Metabolic Panel; Future    IFG (impaired fasting glucose)  -     Basic Metabolic Panel; Future  -     Hemoglobin A1C; Future    Creatinine elevation  -     Basic Metabolic Panel; Future    Anxiety  -     busPIRone (BUSPAR) 5 MG tablet; Take 1 tablet by mouth 2 times daily      Willfollow with own gynecologist for gynecological and breast care. Reviewed health maintenance report. Patient is aware of deficiencies and suggested preventative tests.

## 2022-12-18 ASSESSMENT — ENCOUNTER SYMPTOMS
APNEA: 0
COUGH: 0
COLOR CHANGE: 0
EYE DISCHARGE: 0
EYE PAIN: 0
BACK PAIN: 0
VOMITING: 0
RECTAL PAIN: 0
DIARRHEA: 0
VOICE CHANGE: 0
CONSTIPATION: 0
EYE ITCHING: 0
TROUBLE SWALLOWING: 0
CHOKING: 0
EYE REDNESS: 0
ABDOMINAL PAIN: 0
SINUS PRESSURE: 0
PHOTOPHOBIA: 0
SHORTNESS OF BREATH: 0
ABDOMINAL DISTENTION: 0
SORE THROAT: 0
RHINORRHEA: 0
WHEEZING: 0
FACIAL SWELLING: 0
ANAL BLEEDING: 0
BLOOD IN STOOL: 0
CHEST TIGHTNESS: 0
STRIDOR: 0
NAUSEA: 0

## 2023-01-04 DIAGNOSIS — I48.91 ATRIAL FIBRILLATION WITH RVR (HCC): ICD-10-CM

## 2023-01-04 DIAGNOSIS — F41.9 ANXIETY: ICD-10-CM

## 2023-01-04 RX ORDER — BUSPIRONE HYDROCHLORIDE 5 MG/1
TABLET ORAL
Qty: 180 TABLET | Refills: 3 | Status: SHIPPED | OUTPATIENT
Start: 2023-01-04

## 2023-01-04 RX ORDER — AMIODARONE HYDROCHLORIDE 200 MG/1
TABLET ORAL
Qty: 90 TABLET | Refills: 3 | Status: SHIPPED | OUTPATIENT
Start: 2023-01-04

## 2023-01-04 RX ORDER — DONEPEZIL HYDROCHLORIDE 10 MG/1
TABLET, FILM COATED ORAL
Qty: 90 TABLET | Refills: 5 | Status: SHIPPED | OUTPATIENT
Start: 2023-01-04

## 2023-01-17 DIAGNOSIS — E03.9 ACQUIRED HYPOTHYROIDISM: ICD-10-CM

## 2023-01-17 DIAGNOSIS — I48.91 ATRIAL FIBRILLATION WITH RVR (HCC): ICD-10-CM

## 2023-01-17 DIAGNOSIS — K21.9 GASTROESOPHAGEAL REFLUX DISEASE WITHOUT ESOPHAGITIS: Primary | ICD-10-CM

## 2023-01-17 RX ORDER — FAMOTIDINE 40 MG/1
40 TABLET, FILM COATED ORAL EVERY EVENING
Qty: 90 TABLET | Refills: 3 | Status: SHIPPED | OUTPATIENT
Start: 2023-01-17

## 2023-01-17 RX ORDER — LEVOTHYROXINE SODIUM 0.03 MG/1
TABLET ORAL
Qty: 90 TABLET | Refills: 3 | Status: SHIPPED
Start: 2023-01-17 | End: 2023-01-18

## 2023-01-17 RX ORDER — AMIODARONE HYDROCHLORIDE 200 MG/1
TABLET ORAL
Qty: 30 TABLET | Refills: 3 | Status: SHIPPED | OUTPATIENT
Start: 2023-01-17

## 2023-01-18 DIAGNOSIS — E03.9 ACQUIRED HYPOTHYROIDISM: ICD-10-CM

## 2023-01-18 DIAGNOSIS — E78.2 MIXED HYPERLIPIDEMIA: ICD-10-CM

## 2023-01-18 DIAGNOSIS — I10 PRIMARY HYPERTENSION: ICD-10-CM

## 2023-01-18 DIAGNOSIS — G45.9 TIA (TRANSIENT ISCHEMIC ATTACK): ICD-10-CM

## 2023-01-18 RX ORDER — ATORVASTATIN CALCIUM 20 MG/1
TABLET, FILM COATED ORAL
Qty: 90 TABLET | Refills: 5 | Status: SHIPPED | OUTPATIENT
Start: 2023-01-18

## 2023-01-18 RX ORDER — OMEPRAZOLE 20 MG/1
CAPSULE, DELAYED RELEASE ORAL
Qty: 90 CAPSULE | Refills: 5 | Status: SHIPPED | OUTPATIENT
Start: 2023-01-18

## 2023-01-18 RX ORDER — FOLIC ACID 1 MG/1
TABLET ORAL
Qty: 90 TABLET | Refills: 5 | Status: SHIPPED | OUTPATIENT
Start: 2023-01-18

## 2023-01-18 RX ORDER — LEVOTHYROXINE SODIUM 0.03 MG/1
TABLET ORAL
Qty: 90 TABLET | Refills: 3 | Status: SHIPPED | OUTPATIENT
Start: 2023-01-18

## 2023-01-18 RX ORDER — LISINOPRIL 5 MG/1
TABLET ORAL
Qty: 90 TABLET | Refills: 3 | Status: SHIPPED | OUTPATIENT
Start: 2023-01-18

## 2023-01-20 DIAGNOSIS — F41.9 ANXIETY: ICD-10-CM

## 2023-01-20 DIAGNOSIS — I48.91 ATRIAL FIBRILLATION WITH RVR (HCC): ICD-10-CM

## 2023-01-20 RX ORDER — AMIODARONE HYDROCHLORIDE 200 MG/1
TABLET ORAL
Qty: 30 TABLET | Refills: 3 | OUTPATIENT
Start: 2023-01-20

## 2023-01-20 RX ORDER — BUSPIRONE HYDROCHLORIDE 5 MG/1
TABLET ORAL
Qty: 180 TABLET | Refills: 3 | OUTPATIENT
Start: 2023-01-20

## 2023-01-20 RX ORDER — DONEPEZIL HYDROCHLORIDE 10 MG/1
TABLET, FILM COATED ORAL
Qty: 90 TABLET | Refills: 5 | OUTPATIENT
Start: 2023-01-20

## 2023-01-30 DIAGNOSIS — I48.91 ATRIAL FIBRILLATION WITH RVR (HCC): ICD-10-CM

## 2023-01-31 RX ORDER — POTASSIUM CHLORIDE 750 MG/1
10 TABLET, FILM COATED, EXTENDED RELEASE ORAL 2 TIMES DAILY
Qty: 180 TABLET | Refills: 5 | Status: SHIPPED | OUTPATIENT
Start: 2023-01-31

## 2023-01-31 RX ORDER — TORSEMIDE 20 MG/1
20 TABLET ORAL DAILY
Qty: 90 TABLET | Refills: 3 | Status: SHIPPED | OUTPATIENT
Start: 2023-01-31

## 2023-03-21 RX ORDER — IPRATROPIUM BROMIDE 42 UG/1
2 SPRAY, METERED NASAL 4 TIMES DAILY
Qty: 3 EACH | Refills: 3 | Status: SHIPPED | OUTPATIENT
Start: 2023-03-21

## 2023-04-05 DIAGNOSIS — R53.83 FATIGUE, UNSPECIFIED TYPE: ICD-10-CM

## 2023-04-05 DIAGNOSIS — R73.01 IFG (IMPAIRED FASTING GLUCOSE): ICD-10-CM

## 2023-04-05 DIAGNOSIS — E03.9 ACQUIRED HYPOTHYROIDISM: ICD-10-CM

## 2023-04-05 DIAGNOSIS — E78.2 MIXED HYPERLIPIDEMIA: ICD-10-CM

## 2023-04-05 LAB
ALBUMIN SERPL-MCNC: 4.3 G/DL (ref 3.5–5.2)
ALP SERPL-CCNC: 107 U/L (ref 35–104)
ALT SERPL-CCNC: 18 U/L (ref 0–32)
ANION GAP SERPL CALCULATED.3IONS-SCNC: 14 MMOL/L (ref 7–16)
AST SERPL-CCNC: 32 U/L (ref 0–31)
BASOPHILS # BLD: 0.12 E9/L (ref 0–0.2)
BASOPHILS NFR BLD: 1.3 % (ref 0–2)
BILIRUB SERPL-MCNC: 0.4 MG/DL (ref 0–1.2)
BUN SERPL-MCNC: 26 MG/DL (ref 6–23)
CALCIUM SERPL-MCNC: 9.2 MG/DL (ref 8.6–10.2)
CHLORIDE SERPL-SCNC: 99 MMOL/L (ref 98–107)
CHOLESTEROL, TOTAL: 199 MG/DL (ref 0–199)
CO2 SERPL-SCNC: 25 MMOL/L (ref 22–29)
CREAT SERPL-MCNC: 1.4 MG/DL (ref 0.5–1)
EOSINOPHIL # BLD: 0.2 E9/L (ref 0.05–0.5)
EOSINOPHIL NFR BLD: 2.1 % (ref 0–6)
ERYTHROCYTE [DISTWIDTH] IN BLOOD BY AUTOMATED COUNT: 14.2 FL (ref 11.5–15)
GLUCOSE SERPL-MCNC: 80 MG/DL (ref 74–99)
HBA1C MFR BLD: 5.4 % (ref 4–5.6)
HCT VFR BLD AUTO: 44.2 % (ref 34–48)
HDLC SERPL-MCNC: 78 MG/DL
HGB BLD-MCNC: 13.4 G/DL (ref 11.5–15.5)
IMM GRANULOCYTES # BLD: 0.05 E9/L
IMM GRANULOCYTES NFR BLD: 0.5 % (ref 0–5)
LDLC SERPL CALC-MCNC: 97 MG/DL (ref 0–99)
LYMPHOCYTES # BLD: 1.51 E9/L (ref 1.5–4)
LYMPHOCYTES NFR BLD: 15.7 % (ref 20–42)
MCH RBC QN AUTO: 30.5 PG (ref 26–35)
MCHC RBC AUTO-ENTMCNC: 30.3 % (ref 32–34.5)
MCV RBC AUTO: 100.5 FL (ref 80–99.9)
MONOCYTES # BLD: 0.78 E9/L (ref 0.1–0.95)
MONOCYTES NFR BLD: 8.1 % (ref 2–12)
NEUTROPHILS # BLD: 6.93 E9/L (ref 1.8–7.3)
NEUTS SEG NFR BLD: 72.3 % (ref 43–80)
PLATELET # BLD AUTO: 368 E9/L (ref 130–450)
PMV BLD AUTO: 11.1 FL (ref 7–12)
POTASSIUM SERPL-SCNC: 4.6 MMOL/L (ref 3.5–5)
PROT SERPL-MCNC: 7.1 G/DL (ref 6.4–8.3)
RBC # BLD AUTO: 4.4 E12/L (ref 3.5–5.5)
SODIUM SERPL-SCNC: 138 MMOL/L (ref 132–146)
T4 FREE SERPL-MCNC: 1.55 NG/DL (ref 0.93–1.7)
TRIGL SERPL-MCNC: 119 MG/DL (ref 0–149)
TSH SERPL-MCNC: 3.67 UIU/ML (ref 0.27–4.2)
VLDLC SERPL CALC-MCNC: 24 MG/DL
WBC # BLD: 9.6 E9/L (ref 4.5–11.5)

## 2023-05-23 ENCOUNTER — OFFICE VISIT (OUTPATIENT)
Dept: CARDIOLOGY CLINIC | Age: 78
End: 2023-05-23
Payer: MEDICARE

## 2023-05-23 VITALS
WEIGHT: 121 LBS | HEART RATE: 54 BPM | DIASTOLIC BLOOD PRESSURE: 68 MMHG | HEIGHT: 61 IN | SYSTOLIC BLOOD PRESSURE: 112 MMHG | RESPIRATION RATE: 16 BRPM | BODY MASS INDEX: 22.84 KG/M2

## 2023-05-23 DIAGNOSIS — I42.8 NICM (NONISCHEMIC CARDIOMYOPATHY) (HCC): ICD-10-CM

## 2023-05-23 DIAGNOSIS — R53.83 OTHER FATIGUE: ICD-10-CM

## 2023-05-23 DIAGNOSIS — I48.0 PAROXYSMAL ATRIAL FIBRILLATION (HCC): Primary | ICD-10-CM

## 2023-05-23 DIAGNOSIS — R00.1 SINUS BRADYCARDIA: ICD-10-CM

## 2023-05-23 PROCEDURE — G8420 CALC BMI NORM PARAMETERS: HCPCS | Performed by: INTERNAL MEDICINE

## 2023-05-23 PROCEDURE — 1036F TOBACCO NON-USER: CPT | Performed by: INTERNAL MEDICINE

## 2023-05-23 PROCEDURE — 1123F ACP DISCUSS/DSCN MKR DOCD: CPT | Performed by: INTERNAL MEDICINE

## 2023-05-23 PROCEDURE — G8427 DOCREV CUR MEDS BY ELIG CLIN: HCPCS | Performed by: INTERNAL MEDICINE

## 2023-05-23 PROCEDURE — G8399 PT W/DXA RESULTS DOCUMENT: HCPCS | Performed by: INTERNAL MEDICINE

## 2023-05-23 PROCEDURE — 99214 OFFICE O/P EST MOD 30 MIN: CPT | Performed by: INTERNAL MEDICINE

## 2023-05-23 PROCEDURE — 1090F PRES/ABSN URINE INCON ASSESS: CPT | Performed by: INTERNAL MEDICINE

## 2023-05-23 PROCEDURE — 93000 ELECTROCARDIOGRAM COMPLETE: CPT | Performed by: INTERNAL MEDICINE

## 2023-05-30 SDOH — ECONOMIC STABILITY: FOOD INSECURITY: WITHIN THE PAST 12 MONTHS, THE FOOD YOU BOUGHT JUST DIDN'T LAST AND YOU DIDN'T HAVE MONEY TO GET MORE.: NEVER TRUE

## 2023-05-30 SDOH — HEALTH STABILITY: PHYSICAL HEALTH: ON AVERAGE, HOW MANY MINUTES DO YOU ENGAGE IN EXERCISE AT THIS LEVEL?: 10 MIN

## 2023-05-30 SDOH — ECONOMIC STABILITY: FOOD INSECURITY: WITHIN THE PAST 12 MONTHS, YOU WORRIED THAT YOUR FOOD WOULD RUN OUT BEFORE YOU GOT MONEY TO BUY MORE.: NEVER TRUE

## 2023-05-30 SDOH — HEALTH STABILITY: PHYSICAL HEALTH: ON AVERAGE, HOW MANY DAYS PER WEEK DO YOU ENGAGE IN MODERATE TO STRENUOUS EXERCISE (LIKE A BRISK WALK)?: 5 DAYS

## 2023-05-30 SDOH — ECONOMIC STABILITY: TRANSPORTATION INSECURITY
IN THE PAST 12 MONTHS, HAS LACK OF TRANSPORTATION KEPT YOU FROM MEETINGS, WORK, OR FROM GETTING THINGS NEEDED FOR DAILY LIVING?: YES

## 2023-05-30 SDOH — ECONOMIC STABILITY: INCOME INSECURITY: HOW HARD IS IT FOR YOU TO PAY FOR THE VERY BASICS LIKE FOOD, HOUSING, MEDICAL CARE, AND HEATING?: NOT HARD AT ALL

## 2023-05-30 ASSESSMENT — PATIENT HEALTH QUESTIONNAIRE - PHQ9
SUM OF ALL RESPONSES TO PHQ9 QUESTIONS 1 & 2: 2
1. LITTLE INTEREST OR PLEASURE IN DOING THINGS: 1
2. FEELING DOWN, DEPRESSED OR HOPELESS: 1
SUM OF ALL RESPONSES TO PHQ QUESTIONS 1-9: 2

## 2023-05-30 ASSESSMENT — LIFESTYLE VARIABLES
HOW OFTEN DO YOU HAVE SIX OR MORE DRINKS ON ONE OCCASION: 1
HOW MANY STANDARD DRINKS CONTAINING ALCOHOL DO YOU HAVE ON A TYPICAL DAY: 0
HOW OFTEN DO YOU HAVE A DRINK CONTAINING ALCOHOL: 1
HOW OFTEN DO YOU HAVE A DRINK CONTAINING ALCOHOL: NEVER
HOW MANY STANDARD DRINKS CONTAINING ALCOHOL DO YOU HAVE ON A TYPICAL DAY: PATIENT DOES NOT DRINK

## 2023-05-31 ENCOUNTER — OFFICE VISIT (OUTPATIENT)
Dept: FAMILY MEDICINE CLINIC | Age: 78
End: 2023-05-31
Payer: MEDICARE

## 2023-05-31 VITALS
WEIGHT: 126 LBS | HEIGHT: 61 IN | BODY MASS INDEX: 23.79 KG/M2 | RESPIRATION RATE: 18 BRPM | HEART RATE: 54 BPM | SYSTOLIC BLOOD PRESSURE: 132 MMHG | OXYGEN SATURATION: 95 % | DIASTOLIC BLOOD PRESSURE: 74 MMHG

## 2023-05-31 DIAGNOSIS — N18.32 STAGE 3B CHRONIC KIDNEY DISEASE (HCC): ICD-10-CM

## 2023-05-31 DIAGNOSIS — D68.69 SECONDARY HYPERCOAGULABLE STATE (HCC): ICD-10-CM

## 2023-05-31 DIAGNOSIS — F03.A0 MILD DEMENTIA WITHOUT BEHAVIORAL DISTURBANCE, PSYCHOTIC DISTURBANCE, MOOD DISTURBANCE, OR ANXIETY, UNSPECIFIED DEMENTIA TYPE (HCC): ICD-10-CM

## 2023-05-31 DIAGNOSIS — I48.91 ATRIAL FIBRILLATION WITH RAPID VENTRICULAR RESPONSE (HCC): Primary | ICD-10-CM

## 2023-05-31 DIAGNOSIS — E78.2 MIXED HYPERLIPIDEMIA: ICD-10-CM

## 2023-05-31 DIAGNOSIS — I72.9 ANEURYSM (HCC): ICD-10-CM

## 2023-05-31 DIAGNOSIS — Z00.00 MEDICARE ANNUAL WELLNESS VISIT, SUBSEQUENT: ICD-10-CM

## 2023-05-31 DIAGNOSIS — G30.9 ALZHEIMER'S DISEASE, UNSPECIFIED (CODE) (HCC): ICD-10-CM

## 2023-05-31 PROBLEM — D68.59 THROMBOPHILIA (HCC): Status: RESOLVED | Noted: 2021-02-15 | Resolved: 2023-05-31

## 2023-05-31 LAB
ANION GAP SERPL CALCULATED.3IONS-SCNC: 12 MMOL/L (ref 7–16)
BUN SERPL-MCNC: 26 MG/DL (ref 6–23)
CALCIUM SERPL-MCNC: 9.1 MG/DL (ref 8.6–10.2)
CHLORIDE SERPL-SCNC: 102 MMOL/L (ref 98–107)
CO2 SERPL-SCNC: 24 MMOL/L (ref 22–29)
CREAT SERPL-MCNC: 1.3 MG/DL (ref 0.5–1)
GLUCOSE SERPL-MCNC: 96 MG/DL (ref 74–99)
POTASSIUM SERPL-SCNC: 4.8 MMOL/L (ref 3.5–5)
SODIUM SERPL-SCNC: 138 MMOL/L (ref 132–146)

## 2023-05-31 PROCEDURE — 1123F ACP DISCUSS/DSCN MKR DOCD: CPT | Performed by: FAMILY MEDICINE

## 2023-05-31 PROCEDURE — G0439 PPPS, SUBSEQ VISIT: HCPCS | Performed by: FAMILY MEDICINE

## 2023-06-07 NOTE — PROGRESS NOTES
Care Team:  [place] Rob DO as PCP - [place], DO as PCP - [facility] Provider  Cristina Olivarez MD as Consulting Physician (Cardiology)  Marilzu Schroeder MD as Consulting Physician (Cardiology)     Reviewed and updated this visit:  Tobacco  Allergies  Meds  Problems  Med Hx  Surg Hx  Soc Hx  Fam Hx           Will follow with own gynecologist for gynecological and breast care. reviewed health maintenance report. Patient is aware of deficiencies and suggested preventative tests.

## 2023-06-28 DIAGNOSIS — I48.0 PAROXYSMAL ATRIAL FIBRILLATION (HCC): ICD-10-CM

## 2023-06-28 DIAGNOSIS — R00.1 SINUS BRADYCARDIA: ICD-10-CM

## 2023-06-28 DIAGNOSIS — R53.83 OTHER FATIGUE: ICD-10-CM

## 2023-07-31 DIAGNOSIS — D68.59 THROMBOPHILIA (HCC): ICD-10-CM

## 2023-07-31 NOTE — TELEPHONE ENCOUNTER
Pt requesting a refill. And for it to be sent to [facility]. Please advise.   Last seen 5/31/2023  Next appt Visit date not found  Fax [phone]

## 2023-09-07 ENCOUNTER — OFFICE VISIT (OUTPATIENT)
Dept: CARDIOLOGY CLINIC | Age: 78
End: 2023-09-07

## 2023-09-07 VITALS
BODY MASS INDEX: 22.84 KG/M2 | WEIGHT: 121 LBS | DIASTOLIC BLOOD PRESSURE: 66 MMHG | HEART RATE: 55 BPM | RESPIRATION RATE: 16 BRPM | HEIGHT: 61 IN | SYSTOLIC BLOOD PRESSURE: 118 MMHG

## 2023-09-07 DIAGNOSIS — Z86.73 HISTORY OF TIA (TRANSIENT ISCHEMIC ATTACK): ICD-10-CM

## 2023-09-07 DIAGNOSIS — I38 VHD (VALVULAR HEART DISEASE): ICD-10-CM

## 2023-09-07 DIAGNOSIS — I42.8 NICM (NONISCHEMIC CARDIOMYOPATHY) (HCC): ICD-10-CM

## 2023-09-07 DIAGNOSIS — R00.1 SINUS BRADYCARDIA: ICD-10-CM

## 2023-09-07 DIAGNOSIS — I48.0 PAROXYSMAL ATRIAL FIBRILLATION (HCC): Primary | ICD-10-CM

## 2023-09-07 DIAGNOSIS — D68.59 THROMBOPHILIA (HCC): ICD-10-CM

## 2023-09-07 NOTE — PROGRESS NOTES
OFFICE VISIT     PRIMARY CARE PHYSICIAN:      Ta Chirinos DO       ALLERGIES / SENSITIVITIES:      No Known Allergies       REVIEWED MEDICATIONS:        Current Outpatient Medications:     apixaban (ELIQUIS) 5 MG TABS tablet, Take 1 tablet by mouth 2 times daily, Disp: 180 tablet, Rfl: 5    torsemide (DEMADEX) 20 MG tablet, Take 1 tablet by mouth daily, Disp: 90 tablet, Rfl: 3    ipratropium (ATROVENT) 0.06 % nasal spray, 2 sprays by Each Nostril route 4 times daily, Disp: 3 each, Rfl: 3    sertraline (ZOLOFT) 50 MG tablet, Take 1 tablet by mouth daily, Disp: 90 tablet, Rfl: 4    potassium chloride (K-TAB) 10 MEQ extended release tablet, Take 1 tablet by mouth 2 times daily, Disp: 180 tablet, Rfl: 5    levothyroxine (SYNTHROID) 25 MCG tablet, TAKE 1 TABLET EVERY DAY, Disp: 90 tablet, Rfl: 3    atorvastatin (LIPITOR) 20 MG tablet, TAKE 1 TABLET EVERY EVENING, Disp: 90 tablet, Rfl: 5    lisinopril (PRINIVIL;ZESTRIL) 5 MG tablet, TAKE 1 TABLET EVERY DAY, Disp: 90 tablet, Rfl: 3    folic acid (FOLVITE) 1 MG tablet, TAKE 1 TABLET EVERY DAY, Disp: 90 tablet, Rfl: 5    omeprazole (PRILOSEC) 20 MG delayed release capsule, TAKE 1 CAPSULE EVERY DAY, Disp: 90 capsule, Rfl: 5    donepezil (ARICEPT) 10 MG tablet, TAKE 1 TABLET EVERY NIGHT, Disp: 90 tablet, Rfl: 5    busPIRone (BUSPAR) 5 MG tablet, TAKE 1 TABLET TWICE DAILY, Disp: 180 tablet, Rfl: 3    memantine (NAMENDA) 5 MG tablet, Take 1 tablet by mouth 2 times daily, Disp: 180 tablet, Rfl: 5    Calcium Polycarbophil (FIBER-CAPS PO), Take 1 capsule by mouth at bedtime, Disp: , Rfl:     famotidine (PEPCID) 40 MG tablet, Take 1 tablet by mouth every evening (Patient not taking: Reported on 9/7/2023), Disp: 90 tablet, Rfl: 3      S: REASON FOR VISIT:       Chief Complaint   Patient presents with    Atrial Fibrillation     3 month           History of Present Illness:       Office Visit for follow up of bradycardia, A Fib              76 yr old Mrs Faye Bobby with Hx of A Fib,

## 2023-09-12 ENCOUNTER — PROCEDURE VISIT (OUTPATIENT)
Dept: PODIATRY | Age: 78
End: 2023-09-12
Payer: MEDICARE

## 2023-09-12 VITALS — HEIGHT: 61 IN | BODY MASS INDEX: 22.84 KG/M2 | WEIGHT: 121 LBS

## 2023-09-12 DIAGNOSIS — B35.1 ONYCHOMYCOSIS: Primary | ICD-10-CM

## 2023-09-12 DIAGNOSIS — R26.2 DIFFICULTY WALKING: ICD-10-CM

## 2023-09-12 DIAGNOSIS — I73.9 PVD (PERIPHERAL VASCULAR DISEASE) (HCC): ICD-10-CM

## 2023-09-12 DIAGNOSIS — M79.674 PAIN OF TOE OF RIGHT FOOT: ICD-10-CM

## 2023-09-12 DIAGNOSIS — M79.675 PAIN OF TOE OF LEFT FOOT: ICD-10-CM

## 2023-09-12 DIAGNOSIS — I87.2 VENOUS INSUFFICIENCY (CHRONIC) (PERIPHERAL): ICD-10-CM

## 2023-09-12 PROCEDURE — 11721 DEBRIDE NAIL 6 OR MORE: CPT | Performed by: PODIATRIST

## 2023-09-12 PROCEDURE — 99999 PR OFFICE/OUTPT VISIT,PROCEDURE ONLY: CPT | Performed by: PODIATRIST

## 2023-09-13 NOTE — PROGRESS NOTES
23     Ewell Aase    : 1945  Sex: female  Age: 76 y.o. Subjective: The patient is seen today for evaluation regarding foot evaluation and mycotic nail care. No other complaints noted.     Chief Complaint   Patient presents with    Nail Problem     Nail care       Current Medications:    Current Outpatient Medications:     apixaban (ELIQUIS) 2.5 MG TABS tablet, Take 1 tablet by mouth 2 times daily Start 2023, Disp: 180 tablet, Rfl: 3    torsemide (DEMADEX) 20 MG tablet, Take 1 tablet by mouth daily, Disp: 90 tablet, Rfl: 3    ipratropium (ATROVENT) 0.06 % nasal spray, 2 sprays by Each Nostril route 4 times daily, Disp: 3 each, Rfl: 3    sertraline (ZOLOFT) 50 MG tablet, Take 1 tablet by mouth daily, Disp: 90 tablet, Rfl: 4    potassium chloride (K-TAB) 10 MEQ extended release tablet, Take 1 tablet by mouth 2 times daily, Disp: 180 tablet, Rfl: 5    levothyroxine (SYNTHROID) 25 MCG tablet, TAKE 1 TABLET EVERY DAY, Disp: 90 tablet, Rfl: 3    atorvastatin (LIPITOR) 20 MG tablet, TAKE 1 TABLET EVERY EVENING, Disp: 90 tablet, Rfl: 5    lisinopril (PRINIVIL;ZESTRIL) 5 MG tablet, TAKE 1 TABLET EVERY DAY, Disp: 90 tablet, Rfl: 3    folic acid (FOLVITE) 1 MG tablet, TAKE 1 TABLET EVERY DAY, Disp: 90 tablet, Rfl: 5    omeprazole (PRILOSEC) 20 MG delayed release capsule, TAKE 1 CAPSULE EVERY DAY, Disp: 90 capsule, Rfl: 5    donepezil (ARICEPT) 10 MG tablet, TAKE 1 TABLET EVERY NIGHT, Disp: 90 tablet, Rfl: 5    busPIRone (BUSPAR) 5 MG tablet, TAKE 1 TABLET TWICE DAILY, Disp: 180 tablet, Rfl: 3    memantine (NAMENDA) 5 MG tablet, Take 1 tablet by mouth 2 times daily, Disp: 180 tablet, Rfl: 5    Calcium Polycarbophil (FIBER-CAPS PO), Take 1 capsule by mouth at bedtime, Disp: , Rfl:     famotidine (PEPCID) 40 MG tablet, Take 1 tablet by mouth every evening (Patient not taking: Reported on 2023), Disp: 90 tablet, Rfl: 3    Allergies:  No Known Allergies    Past Surgical History:   Procedure

## 2023-09-25 DIAGNOSIS — R79.89 CREATININE ELEVATION: ICD-10-CM

## 2023-09-25 DIAGNOSIS — R53.83 FATIGUE, UNSPECIFIED TYPE: ICD-10-CM

## 2023-09-25 DIAGNOSIS — I48.91 ATRIAL FIBRILLATION WITH RVR (HCC): ICD-10-CM

## 2023-09-25 DIAGNOSIS — E03.9 ACQUIRED HYPOTHYROIDISM: ICD-10-CM

## 2023-09-25 DIAGNOSIS — E78.2 MIXED HYPERLIPIDEMIA: ICD-10-CM

## 2023-09-25 DIAGNOSIS — R73.01 IFG (IMPAIRED FASTING GLUCOSE): ICD-10-CM

## 2023-09-25 LAB
ANION GAP SERPL CALCULATED.3IONS-SCNC: 11 MMOL/L (ref 7–16)
BUN BLDV-MCNC: 25 MG/DL (ref 6–23)
CALCIUM SERPL-MCNC: 9 MG/DL (ref 8.6–10.2)
CHLORIDE BLD-SCNC: 100 MMOL/L (ref 98–107)
CO2: 25 MMOL/L (ref 22–29)
CREAT SERPL-MCNC: 1.2 MG/DL (ref 0.5–1)
GFR SERPL CREATININE-BSD FRML MDRD: 48 ML/MIN/1.73M2
GLUCOSE BLD-MCNC: 81 MG/DL (ref 74–99)
POTASSIUM SERPL-SCNC: 4.5 MMOL/L (ref 3.5–5)
SODIUM BLD-SCNC: 136 MMOL/L (ref 132–146)

## 2023-10-16 ENCOUNTER — OFFICE VISIT (OUTPATIENT)
Dept: FAMILY MEDICINE CLINIC | Age: 78
End: 2023-10-16

## 2023-10-16 VITALS
HEIGHT: 61 IN | BODY MASS INDEX: 22.66 KG/M2 | WEIGHT: 120 LBS | RESPIRATION RATE: 18 BRPM | HEART RATE: 56 BPM | OXYGEN SATURATION: 93 % | DIASTOLIC BLOOD PRESSURE: 70 MMHG | SYSTOLIC BLOOD PRESSURE: 118 MMHG

## 2023-10-16 DIAGNOSIS — R73.01 IFG (IMPAIRED FASTING GLUCOSE): ICD-10-CM

## 2023-10-16 DIAGNOSIS — E53.8 FOLIC ACID DEFICIENCY: ICD-10-CM

## 2023-10-16 DIAGNOSIS — F41.9 ANXIETY: ICD-10-CM

## 2023-10-16 DIAGNOSIS — Z23 NEED FOR SHINGLES VACCINE: ICD-10-CM

## 2023-10-16 DIAGNOSIS — E78.2 MIXED HYPERLIPIDEMIA: ICD-10-CM

## 2023-10-16 DIAGNOSIS — M65.4 DE QUERVAIN'S TENOSYNOVITIS: ICD-10-CM

## 2023-10-16 DIAGNOSIS — N28.9 RENAL INSUFFICIENCY: ICD-10-CM

## 2023-10-16 DIAGNOSIS — I48.91 ATRIAL FIBRILLATION WITH RVR (HCC): Primary | ICD-10-CM

## 2023-10-16 DIAGNOSIS — R53.83 FATIGUE, UNSPECIFIED TYPE: ICD-10-CM

## 2023-10-16 DIAGNOSIS — S16.1XXD STRAIN OF NECK MUSCLE, SUBSEQUENT ENCOUNTER: ICD-10-CM

## 2023-10-16 RX ORDER — BUSPIRONE HYDROCHLORIDE 10 MG/1
10 TABLET ORAL 2 TIMES DAILY
Qty: 180 TABLET | Refills: 3
Start: 2023-10-16 | End: 2023-11-15

## 2023-10-16 RX ORDER — ZOSTER VACCINE RECOMBINANT, ADJUVANTED 50 MCG/0.5
0.5 KIT INTRAMUSCULAR SEE ADMIN INSTRUCTIONS
Qty: 0.5 ML | Refills: 0 | Status: SHIPPED | OUTPATIENT
Start: 2023-10-16 | End: 2024-04-13

## 2023-10-16 NOTE — PROGRESS NOTES
Angelia Acuna is a 76 y.o. female  . Subjective:      Discussed increasing buspirone and starting melatonin . Discussed last blood work. She actually feeling very well. Following up with cardiology. No trouble breathing. No orthopnea that she was having before. Review of Systems   Constitutional:  Negative for activity change, appetite change, chills, diaphoresis, fatigue, fever and unexpected weight change. HENT:  Negative for congestion, dental problem, drooling, ear discharge, ear pain, facial swelling, hearing loss, mouth sores, nosebleeds, postnasal drip, rhinorrhea, sinus pressure, sneezing, sore throat, tinnitus, trouble swallowing and voice change. Eyes:  Negative for photophobia, pain, discharge, redness, itching and visual disturbance. Respiratory:  Negative for apnea, cough, choking, chest tightness, shortness of breath, wheezing and stridor. Cardiovascular:  Negative for chest pain, palpitations and leg swelling. Gastrointestinal:  Negative for abdominal distention, abdominal pain, anal bleeding, blood in stool, constipation, diarrhea, nausea, rectal pain and vomiting. Endocrine: Negative for cold intolerance, heat intolerance, polydipsia, polyphagia and polyuria. Genitourinary:  Negative for decreased urine volume, difficulty urinating, dyspareunia, dysuria, enuresis, flank pain, frequency, genital sores, hematuria, menstrual problem, pelvic pain, urgency, vaginal bleeding, vaginal discharge and vaginal pain. Musculoskeletal:  Negative for arthralgias, back pain, gait problem, joint swelling, myalgias, neck pain and neck stiffness. Skin:  Negative for color change, pallor, rash and wound. Allergic/Immunologic: Negative for environmental allergies, food allergies and immunocompromised state. Neurological:  Negative for dizziness, tremors, seizures, syncope, facial asymmetry, speech difficulty, weakness, light-headedness, numbness and headaches.    Hematological:

## 2023-10-22 ASSESSMENT — ENCOUNTER SYMPTOMS
VOMITING: 0
CHOKING: 0
NAUSEA: 0
SORE THROAT: 0
COLOR CHANGE: 0
COUGH: 0
VOICE CHANGE: 0
BACK PAIN: 0
PHOTOPHOBIA: 0
RECTAL PAIN: 0
SINUS PRESSURE: 0
ANAL BLEEDING: 0
ABDOMINAL PAIN: 0
DIARRHEA: 0
EYE DISCHARGE: 0
CHEST TIGHTNESS: 0
ABDOMINAL DISTENTION: 0
EYE REDNESS: 0
WHEEZING: 0
EYE PAIN: 0
SHORTNESS OF BREATH: 0
STRIDOR: 0
RHINORRHEA: 0
FACIAL SWELLING: 0
CONSTIPATION: 0
BLOOD IN STOOL: 0
APNEA: 0
TROUBLE SWALLOWING: 0
EYE ITCHING: 0

## 2023-11-02 ENCOUNTER — TELEPHONE (OUTPATIENT)
Dept: FAMILY MEDICINE CLINIC | Age: 78
End: 2023-11-02

## 2023-11-02 DIAGNOSIS — U07.1 COVID: Primary | ICD-10-CM

## 2023-11-02 RX ORDER — DILTIAZEM HYDROCHLORIDE 60 MG/1
2 TABLET, FILM COATED ORAL 2 TIMES DAILY
Qty: 10.2 G | Refills: 3 | Status: SHIPPED | OUTPATIENT
Start: 2023-11-02

## 2023-11-02 RX ORDER — BENZONATATE 200 MG/1
200 CAPSULE ORAL 3 TIMES DAILY PRN
Qty: 30 CAPSULE | Refills: 0 | Status: SHIPPED | OUTPATIENT
Start: 2023-11-02 | End: 2023-11-12

## 2023-11-02 NOTE — TELEPHONE ENCOUNTER
Patient tested positive a week ago. She seems to be getting better but she is C/O  coughing. Would like Rx  please advise.   Last seen 10/16/2023  Next appt Visit date not found  Carlin Smith

## 2023-11-07 ENCOUNTER — HOSPITAL ENCOUNTER (EMERGENCY)
Age: 78
Discharge: HOME | End: 2023-11-07
Attending: EMERGENCY MEDICINE
Payer: MEDICARE

## 2023-11-07 VITALS
HEIGHT: 61 IN | OXYGEN SATURATION: 95 % | BODY MASS INDEX: 23.6 KG/M2 | RESPIRATION RATE: 18 BRPM | DIASTOLIC BLOOD PRESSURE: 70 MMHG | WEIGHT: 125 LBS | SYSTOLIC BLOOD PRESSURE: 126 MMHG | HEART RATE: 60 BPM | TEMPERATURE: 97.9 F

## 2023-11-07 DIAGNOSIS — R63.0 DECREASED APPETITE: Primary | ICD-10-CM

## 2023-11-07 DIAGNOSIS — R53.83 OTHER FATIGUE: ICD-10-CM

## 2023-11-07 DIAGNOSIS — U07.1 COVID: ICD-10-CM

## 2023-11-07 LAB
ALBUMIN SERPL-MCNC: 3.7 G/DL (ref 3.5–5.2)
ALP SERPL-CCNC: 69 U/L (ref 35–104)
ALT SERPL-CCNC: 17 U/L (ref 0–32)
ANION GAP SERPL CALCULATED.3IONS-SCNC: 13 MMOL/L (ref 7–16)
AST SERPL-CCNC: 42 U/L (ref 0–31)
BASOPHILS # BLD: 0.03 K/UL (ref 0–0.2)
BASOPHILS NFR BLD: 1 % (ref 0–2)
BILIRUB SERPL-MCNC: 0.4 MG/DL (ref 0–1.2)
BILIRUB UR QL STRIP: NEGATIVE
BUN SERPL-MCNC: 34 MG/DL (ref 6–23)
CALCIUM SERPL-MCNC: 8.6 MG/DL (ref 8.6–10.2)
CHLORIDE SERPL-SCNC: 98 MMOL/L (ref 98–107)
CHP ED QC CHECK: YES
CLARITY UR: CLEAR
CO2 SERPL-SCNC: 22 MMOL/L (ref 22–29)
COLOR UR: YELLOW
CREAT SERPL-MCNC: 1.3 MG/DL (ref 0.5–1)
EOSINOPHIL # BLD: 0.04 K/UL (ref 0.05–0.5)
EOSINOPHILS RELATIVE PERCENT: 1 % (ref 0–6)
ERYTHROCYTE [DISTWIDTH] IN BLOOD BY AUTOMATED COUNT: 13.5 % (ref 11.5–15)
GFR SERPL CREATININE-BSD FRML MDRD: 42 ML/MIN/1.73M2
GLUCOSE BLD-MCNC: 75 MG/DL
GLUCOSE SERPL-MCNC: 75 MG/DL (ref 74–99)
GLUCOSE UR STRIP-MCNC: NEGATIVE MG/DL
HCT VFR BLD AUTO: 39 % (ref 34–48)
HGB BLD-MCNC: 13.2 G/DL (ref 11.5–15.5)
HGB UR QL STRIP.AUTO: NEGATIVE
IMM GRANULOCYTES # BLD AUTO: 0.08 K/UL (ref 0–0.58)
IMM GRANULOCYTES NFR BLD: 1 % (ref 0–5)
KETONES UR STRIP-MCNC: NEGATIVE MG/DL
LEUKOCYTE ESTERASE UR QL STRIP: NEGATIVE
LYMPHOCYTES NFR BLD: 0.82 K/UL (ref 1.5–4)
LYMPHOCYTES RELATIVE PERCENT: 14 % (ref 20–42)
MCH RBC QN AUTO: 30.6 PG (ref 26–35)
MCHC RBC AUTO-ENTMCNC: 33.8 G/DL (ref 32–34.5)
MCV RBC AUTO: 90.5 FL (ref 80–99.9)
MONOCYTES NFR BLD: 0.69 K/UL (ref 0.1–0.95)
MONOCYTES NFR BLD: 12 % (ref 2–12)
NEUTROPHILS NFR BLD: 72 % (ref 43–80)
NEUTS SEG NFR BLD: 4.17 K/UL (ref 1.8–7.3)
NITRITE UR QL STRIP: NEGATIVE
PH UR STRIP: 5.5 [PH] (ref 5–9)
PLATELET # BLD AUTO: 302 K/UL (ref 130–450)
PMV BLD AUTO: 11.6 FL (ref 7–12)
POTASSIUM SERPL-SCNC: 4 MMOL/L (ref 3.5–5)
POTASSIUM SERPL-SCNC: 5.7 MMOL/L (ref 3.5–5)
PROT SERPL-MCNC: 7.1 G/DL (ref 6.4–8.3)
PROT UR STRIP-MCNC: NEGATIVE MG/DL
RBC # BLD AUTO: 4.31 M/UL (ref 3.5–5.5)
RBC #/AREA URNS HPF: ABNORMAL /HPF
SODIUM SERPL-SCNC: 133 MMOL/L (ref 132–146)
SP GR UR STRIP: <1.005 (ref 1–1.03)
UROBILINOGEN UR STRIP-ACNC: 0.2 EU/DL (ref 0–1)
WBC #/AREA URNS HPF: ABNORMAL /HPF
WBC OTHER # BLD: 5.8 K/UL (ref 4.5–11.5)

## 2023-11-07 PROCEDURE — 96361 HYDRATE IV INFUSION ADD-ON: CPT

## 2023-11-07 PROCEDURE — 81001 URINALYSIS AUTO W/SCOPE: CPT

## 2023-11-07 PROCEDURE — 99284 EMERGENCY DEPT VISIT MOD MDM: CPT

## 2023-11-07 PROCEDURE — 96360 HYDRATION IV INFUSION INIT: CPT

## 2023-11-07 PROCEDURE — 80053 COMPREHEN METABOLIC PANEL: CPT

## 2023-11-07 PROCEDURE — 2580000003 HC RX 258

## 2023-11-07 PROCEDURE — 85025 COMPLETE CBC W/AUTO DIFF WBC: CPT

## 2023-11-07 PROCEDURE — 84132 ASSAY OF SERUM POTASSIUM: CPT

## 2023-11-07 PROCEDURE — 6370000000 HC RX 637 (ALT 250 FOR IP)

## 2023-11-07 RX ORDER — ONDANSETRON 4 MG/1
4 TABLET, ORALLY DISINTEGRATING ORAL ONCE
Status: COMPLETED | OUTPATIENT
Start: 2023-11-07 | End: 2023-11-07

## 2023-11-07 RX ORDER — ONDANSETRON 4 MG/1
4 TABLET, ORALLY DISINTEGRATING ORAL 3 TIMES DAILY PRN
Qty: 21 TABLET | Refills: 0 | Status: SHIPPED | OUTPATIENT
Start: 2023-11-07

## 2023-11-07 RX ORDER — 0.9 % SODIUM CHLORIDE 0.9 %
1000 INTRAVENOUS SOLUTION INTRAVENOUS ONCE
Status: COMPLETED | OUTPATIENT
Start: 2023-11-07 | End: 2023-11-07

## 2023-11-07 RX ADMIN — SODIUM CHLORIDE 1000 ML: 9 INJECTION, SOLUTION INTRAVENOUS at 08:27

## 2023-11-07 RX ADMIN — ONDANSETRON 4 MG: 4 TABLET, ORALLY DISINTEGRATING ORAL at 10:58

## 2023-11-07 ASSESSMENT — PAIN - FUNCTIONAL ASSESSMENT: PAIN_FUNCTIONAL_ASSESSMENT: NONE - DENIES PAIN

## 2023-11-07 ASSESSMENT — LIFESTYLE VARIABLES
HOW MANY STANDARD DRINKS CONTAINING ALCOHOL DO YOU HAVE ON A TYPICAL DAY: PATIENT DOES NOT DRINK
HOW OFTEN DO YOU HAVE A DRINK CONTAINING ALCOHOL: NEVER

## 2023-11-14 ENCOUNTER — PROCEDURE VISIT (OUTPATIENT)
Dept: PODIATRY | Age: 78
End: 2023-11-14
Payer: MEDICARE

## 2023-11-14 VITALS — WEIGHT: 125 LBS | BODY MASS INDEX: 23.6 KG/M2 | HEIGHT: 61 IN

## 2023-11-14 DIAGNOSIS — I87.2 VENOUS INSUFFICIENCY (CHRONIC) (PERIPHERAL): ICD-10-CM

## 2023-11-14 DIAGNOSIS — R26.2 DIFFICULTY WALKING: ICD-10-CM

## 2023-11-14 DIAGNOSIS — B35.1 ONYCHOMYCOSIS: Primary | ICD-10-CM

## 2023-11-14 DIAGNOSIS — I73.9 PVD (PERIPHERAL VASCULAR DISEASE) (HCC): ICD-10-CM

## 2023-11-14 DIAGNOSIS — M79.675 PAIN OF TOE OF LEFT FOOT: ICD-10-CM

## 2023-11-14 DIAGNOSIS — M79.674 PAIN OF TOE OF RIGHT FOOT: ICD-10-CM

## 2023-11-14 PROCEDURE — 11721 DEBRIDE NAIL 6 OR MORE: CPT | Performed by: PODIATRIST

## 2023-11-14 PROCEDURE — 99999 PR OFFICE/OUTPT VISIT,PROCEDURE ONLY: CPT | Performed by: PODIATRIST

## 2023-11-14 NOTE — PROGRESS NOTES
Patient in today for nail care. Patient does not have any complaints of pain at this time.  Patient's PCP is Agnes Medrano,  date of last ov 10/16/23          Fredrich Nyhan, LPN

## 2023-11-15 NOTE — PROGRESS NOTES
23     Onel Anaya    : 1945  Sex: female  Age: 76 y.o. Subjective: The patient is seen today for evaluation regarding foot evaluation and mycotic nail care. No other complaints noted.     Chief Complaint   Patient presents with    Nail Problem     Nail care       Current Medications:    Current Outpatient Medications:     ondansetron (ZOFRAN-ODT) 4 MG disintegrating tablet, Take 1 tablet by mouth 3 times daily as needed for Nausea or Vomiting, Disp: 21 tablet, Rfl: 0    SYMBICORT 80-4.5 MCG/ACT AERO, Inhale 2 puffs into the lungs 2 times daily, Disp: 10.2 g, Rfl: 3    diclofenac sodium (VOLTAREN) 1 % GEL, Apply 2 g topically 4 times daily, Disp: 150 g, Rfl: 3    busPIRone (BUSPAR) 10 MG tablet, Take 1 tablet by mouth 2 times daily, Disp: 180 tablet, Rfl: 3    zoster recombinant adjuvanted vaccine (SHINGRIX) 50 MCG/0.5ML SUSR injection, Inject 0.5 mLs into the muscle See Admin Instructions 1 dose now and repeat in 2-6 months, Disp: 0.5 mL, Rfl: 0    apixaban (ELIQUIS) 2.5 MG TABS tablet, Take 1 tablet by mouth 2 times daily Start 2023, Disp: 180 tablet, Rfl: 3    torsemide (DEMADEX) 20 MG tablet, Take 1 tablet by mouth daily, Disp: 90 tablet, Rfl: 3    ipratropium (ATROVENT) 0.06 % nasal spray, 2 sprays by Each Nostril route 4 times daily, Disp: 3 each, Rfl: 3    sertraline (ZOLOFT) 50 MG tablet, Take 1 tablet by mouth daily, Disp: 90 tablet, Rfl: 4    potassium chloride (K-TAB) 10 MEQ extended release tablet, Take 1 tablet by mouth 2 times daily, Disp: 180 tablet, Rfl: 5    levothyroxine (SYNTHROID) 25 MCG tablet, TAKE 1 TABLET EVERY DAY, Disp: 90 tablet, Rfl: 3    atorvastatin (LIPITOR) 20 MG tablet, TAKE 1 TABLET EVERY EVENING, Disp: 90 tablet, Rfl: 5    lisinopril (PRINIVIL;ZESTRIL) 5 MG tablet, TAKE 1 TABLET EVERY DAY, Disp: 90 tablet, Rfl: 3    folic acid (FOLVITE) 1 MG tablet, TAKE 1 TABLET EVERY DAY, Disp: 90 tablet, Rfl: 5    omeprazole (PRILOSEC) 20 MG delayed release

## 2023-12-14 ENCOUNTER — PATIENT MESSAGE (OUTPATIENT)
Dept: FAMILY MEDICINE CLINIC | Age: 78
End: 2023-12-14

## 2023-12-14 DIAGNOSIS — F41.9 ANXIETY: ICD-10-CM

## 2023-12-14 RX ORDER — BUSPIRONE HYDROCHLORIDE 10 MG/1
10 TABLET ORAL 2 TIMES DAILY
Qty: 180 TABLET | Refills: 3 | Status: SHIPPED | OUTPATIENT
Start: 2023-12-14 | End: 2024-01-13

## 2023-12-18 DIAGNOSIS — E78.2 MIXED HYPERLIPIDEMIA: ICD-10-CM

## 2023-12-18 DIAGNOSIS — N28.9 RENAL INSUFFICIENCY: ICD-10-CM

## 2023-12-18 DIAGNOSIS — E53.8 FOLIC ACID DEFICIENCY: ICD-10-CM

## 2023-12-18 DIAGNOSIS — R53.83 FATIGUE, UNSPECIFIED TYPE: ICD-10-CM

## 2023-12-18 LAB
ALBUMIN SERPL-MCNC: 3.8 G/DL (ref 3.5–5.2)
ALP BLD-CCNC: 130 U/L (ref 35–104)
ALT SERPL-CCNC: 12 U/L (ref 0–32)
ANION GAP SERPL CALCULATED.3IONS-SCNC: 18 MMOL/L (ref 7–16)
AST SERPL-CCNC: 22 U/L (ref 0–31)
BASOPHILS ABSOLUTE: 0.12 K/UL (ref 0–0.2)
BASOPHILS RELATIVE PERCENT: 1 % (ref 0–2)
BILIRUB SERPL-MCNC: 0.2 MG/DL (ref 0–1.2)
BUN BLDV-MCNC: 22 MG/DL (ref 6–23)
CALCIUM SERPL-MCNC: 8.9 MG/DL (ref 8.6–10.2)
CHLORIDE BLD-SCNC: 100 MMOL/L (ref 98–107)
CHOLESTEROL: 162 MG/DL
CO2: 21 MMOL/L (ref 22–29)
CREAT SERPL-MCNC: 1 MG/DL (ref 0.5–1)
EOSINOPHILS ABSOLUTE: 0.31 K/UL (ref 0.05–0.5)
EOSINOPHILS RELATIVE PERCENT: 4 % (ref 0–6)
FOLATE: >20 NG/ML (ref 4.8–24.2)
GFR SERPL CREATININE-BSD FRML MDRD: 54 ML/MIN/1.73M2
GLUCOSE BLD-MCNC: 101 MG/DL (ref 74–99)
HCT VFR BLD CALC: 38.6 % (ref 34–48)
HDLC SERPL-MCNC: 55 MG/DL
HEMOGLOBIN: 12.6 G/DL (ref 11.5–15.5)
IMMATURE GRANULOCYTES: 0 % (ref 0–5)
LDL CHOLESTEROL: 76 MG/DL
LYMPHOCYTES ABSOLUTE: 1.34 K/UL (ref 1.5–4)
LYMPHOCYTES RELATIVE PERCENT: 16 % (ref 20–42)
MCH RBC QN AUTO: 31 PG (ref 26–35)
MCHC RBC AUTO-ENTMCNC: 32.6 G/DL (ref 32–34.5)
MCV RBC AUTO: 95.1 FL (ref 80–99.9)
MONOCYTES ABSOLUTE: 0.96 K/UL (ref 0.1–0.95)
MONOCYTES RELATIVE PERCENT: 11 % (ref 2–12)
NEUTROPHILS ABSOLUTE: 5.83 K/UL (ref 1.8–7.3)
NEUTROPHILS RELATIVE PERCENT: 68 % (ref 43–80)
PDW BLD-RTO: 14.6 % (ref 11.5–15)
PLATELET # BLD: 375 K/UL (ref 130–450)
PMV BLD AUTO: 11.7 FL (ref 7–12)
POTASSIUM SERPL-SCNC: 3.8 MMOL/L (ref 3.5–5)
RBC # BLD: 4.06 M/UL (ref 3.5–5.5)
SODIUM BLD-SCNC: 139 MMOL/L (ref 132–146)
T4 FREE: 1.3 NG/DL (ref 0.9–1.7)
TOTAL PROTEIN: 7.1 G/DL (ref 6.4–8.3)
TRIGL SERPL-MCNC: 157 MG/DL
TSH SERPL DL<=0.05 MIU/L-ACNC: 1.63 UIU/ML (ref 0.27–4.2)
VITAMIN B-12: 486 PG/ML (ref 211–946)
VLDLC SERPL CALC-MCNC: 31 MG/DL
WBC # BLD: 8.6 K/UL (ref 4.5–11.5)

## 2023-12-25 DIAGNOSIS — F03.A0 MILD DEMENTIA (HCC): ICD-10-CM

## 2023-12-26 DIAGNOSIS — F03.A0 MILD DEMENTIA (HCC): ICD-10-CM

## 2023-12-26 RX ORDER — MEMANTINE HYDROCHLORIDE 5 MG/1
5 TABLET ORAL 2 TIMES DAILY
Qty: 180 TABLET | Refills: 0 | OUTPATIENT
Start: 2023-12-26

## 2023-12-27 RX ORDER — MEMANTINE HYDROCHLORIDE 5 MG/1
5 TABLET ORAL 2 TIMES DAILY
Qty: 180 TABLET | Refills: 3 | Status: SHIPPED | OUTPATIENT
Start: 2023-12-27

## 2023-12-27 RX ORDER — MEMANTINE HYDROCHLORIDE 5 MG/1
5 TABLET ORAL 2 TIMES DAILY
Qty: 180 TABLET | Refills: 5 | Status: SHIPPED | OUTPATIENT
Start: 2023-12-27

## 2024-01-16 ENCOUNTER — PROCEDURE VISIT (OUTPATIENT)
Dept: PODIATRY | Age: 79
End: 2024-01-16
Payer: MEDICARE

## 2024-01-16 VITALS — BODY MASS INDEX: 23.6 KG/M2 | HEIGHT: 61 IN | WEIGHT: 125 LBS

## 2024-01-16 DIAGNOSIS — I87.2 VENOUS INSUFFICIENCY (CHRONIC) (PERIPHERAL): ICD-10-CM

## 2024-01-16 DIAGNOSIS — B35.1 ONYCHOMYCOSIS: Primary | ICD-10-CM

## 2024-01-16 DIAGNOSIS — M79.674 PAIN OF TOE OF RIGHT FOOT: ICD-10-CM

## 2024-01-16 DIAGNOSIS — I73.9 PVD (PERIPHERAL VASCULAR DISEASE) (HCC): ICD-10-CM

## 2024-01-16 DIAGNOSIS — M79.675 PAIN OF TOE OF LEFT FOOT: ICD-10-CM

## 2024-01-16 DIAGNOSIS — R26.2 DIFFICULTY WALKING: ICD-10-CM

## 2024-01-16 PROCEDURE — 99999 PR OFFICE/OUTPT VISIT,PROCEDURE ONLY: CPT | Performed by: PODIATRIST

## 2024-01-16 PROCEDURE — 11721 DEBRIDE NAIL 6 OR MORE: CPT | Performed by: PODIATRIST

## 2024-01-16 NOTE — PROGRESS NOTES
Patient in today for nail care. Patient does not have any complaints of pain at this time. Patient's PCP is James Clayton DO date of last ov 10/16/23          Katelynn Saul LPN

## 2024-01-16 NOTE — PROGRESS NOTES
24     Alanna Tamez    : 1945  Sex: female  Age: 78 y.o.    Subjective:  The patient is seen today for evaluation regarding foot evaluation and mycotic nail care.  No other complaints noted.    Chief Complaint   Patient presents with    Nail Problem     Nail care       Current Medications:    Current Outpatient Medications:     memantine (NAMENDA) 5 MG tablet, Take 1 tablet by mouth 2 times daily, Disp: 180 tablet, Rfl: 5    memantine (NAMENDA) 5 MG tablet, TAKE 1 TABLET BY MOUTH TWO TIMES A DAY, Disp: 180 tablet, Rfl: 3    ondansetron (ZOFRAN-ODT) 4 MG disintegrating tablet, Take 1 tablet by mouth 3 times daily as needed for Nausea or Vomiting, Disp: 21 tablet, Rfl: 0    SYMBICORT 80-4.5 MCG/ACT AERO, Inhale 2 puffs into the lungs 2 times daily, Disp: 10.2 g, Rfl: 3    diclofenac sodium (VOLTAREN) 1 % GEL, Apply 2 g topically 4 times daily, Disp: 150 g, Rfl: 3    zoster recombinant adjuvanted vaccine (SHINGRIX) 50 MCG/0.5ML SUSR injection, Inject 0.5 mLs into the muscle See Admin Instructions 1 dose now and repeat in 2-6 months, Disp: 0.5 mL, Rfl: 0    apixaban (ELIQUIS) 2.5 MG TABS tablet, Take 1 tablet by mouth 2 times daily Start 2023, Disp: 180 tablet, Rfl: 3    torsemide (DEMADEX) 20 MG tablet, Take 1 tablet by mouth daily, Disp: 90 tablet, Rfl: 3    ipratropium (ATROVENT) 0.06 % nasal spray, 2 sprays by Each Nostril route 4 times daily, Disp: 3 each, Rfl: 3    sertraline (ZOLOFT) 50 MG tablet, Take 1 tablet by mouth daily, Disp: 90 tablet, Rfl: 4    potassium chloride (K-TAB) 10 MEQ extended release tablet, Take 1 tablet by mouth 2 times daily, Disp: 180 tablet, Rfl: 5    levothyroxine (SYNTHROID) 25 MCG tablet, TAKE 1 TABLET EVERY DAY, Disp: 90 tablet, Rfl: 3    atorvastatin (LIPITOR) 20 MG tablet, TAKE 1 TABLET EVERY EVENING, Disp: 90 tablet, Rfl: 5    lisinopril (PRINIVIL;ZESTRIL) 5 MG tablet, TAKE 1 TABLET EVERY DAY, Disp: 90 tablet, Rfl: 3    folic acid (FOLVITE) 1 MG tablet,

## 2024-02-22 DIAGNOSIS — E78.2 MIXED HYPERLIPIDEMIA: ICD-10-CM

## 2024-02-22 DIAGNOSIS — I48.91 ATRIAL FIBRILLATION WITH RVR (HCC): ICD-10-CM

## 2024-02-22 DIAGNOSIS — G45.9 TIA (TRANSIENT ISCHEMIC ATTACK): ICD-10-CM

## 2024-02-22 RX ORDER — ATORVASTATIN CALCIUM 20 MG/1
TABLET, FILM COATED ORAL
Qty: 90 TABLET | Refills: 5 | Status: SHIPPED | OUTPATIENT
Start: 2024-02-22

## 2024-02-22 RX ORDER — FOLIC ACID 1 MG/1
1000 TABLET ORAL DAILY
Qty: 90 TABLET | Refills: 5 | Status: SHIPPED | OUTPATIENT
Start: 2024-02-22

## 2024-02-22 RX ORDER — DONEPEZIL HYDROCHLORIDE 10 MG/1
10 TABLET, FILM COATED ORAL NIGHTLY
Qty: 90 TABLET | Refills: 5 | Status: SHIPPED | OUTPATIENT
Start: 2024-02-22

## 2024-02-22 RX ORDER — POTASSIUM CHLORIDE 750 MG/1
10 TABLET, FILM COATED, EXTENDED RELEASE ORAL 2 TIMES DAILY
Qty: 180 TABLET | Refills: 3 | Status: SHIPPED | OUTPATIENT
Start: 2024-02-22

## 2024-03-12 DIAGNOSIS — E03.9 ACQUIRED HYPOTHYROIDISM: ICD-10-CM

## 2024-03-12 DIAGNOSIS — I10 PRIMARY HYPERTENSION: ICD-10-CM

## 2024-03-12 RX ORDER — LISINOPRIL 5 MG/1
TABLET ORAL
Qty: 90 TABLET | Refills: 3 | Status: SHIPPED | OUTPATIENT
Start: 2024-03-12

## 2024-03-12 RX ORDER — OMEPRAZOLE 20 MG/1
CAPSULE, DELAYED RELEASE ORAL
Qty: 90 CAPSULE | Refills: 3 | Status: SHIPPED | OUTPATIENT
Start: 2024-03-12

## 2024-03-12 RX ORDER — TORSEMIDE 20 MG/1
20 TABLET ORAL DAILY
Qty: 90 TABLET | Refills: 3 | Status: SHIPPED | OUTPATIENT
Start: 2024-03-12

## 2024-03-12 RX ORDER — LEVOTHYROXINE SODIUM 0.03 MG/1
TABLET ORAL
Qty: 90 TABLET | Refills: 3 | Status: SHIPPED | OUTPATIENT
Start: 2024-03-12

## 2024-03-19 ENCOUNTER — PROCEDURE VISIT (OUTPATIENT)
Dept: PODIATRY | Age: 79
End: 2024-03-19
Payer: MEDICARE

## 2024-03-19 VITALS — HEIGHT: 61 IN | WEIGHT: 125 LBS | BODY MASS INDEX: 23.6 KG/M2

## 2024-03-19 DIAGNOSIS — I87.2 VENOUS INSUFFICIENCY (CHRONIC) (PERIPHERAL): ICD-10-CM

## 2024-03-19 DIAGNOSIS — M79.675 PAIN OF TOE OF LEFT FOOT: ICD-10-CM

## 2024-03-19 DIAGNOSIS — M79.674 PAIN OF TOE OF RIGHT FOOT: ICD-10-CM

## 2024-03-19 DIAGNOSIS — I73.9 PVD (PERIPHERAL VASCULAR DISEASE) (HCC): ICD-10-CM

## 2024-03-19 DIAGNOSIS — B35.1 ONYCHOMYCOSIS: Primary | ICD-10-CM

## 2024-03-19 DIAGNOSIS — R26.2 DIFFICULTY WALKING: ICD-10-CM

## 2024-03-19 PROCEDURE — 99999 PR OFFICE/OUTPT VISIT,PROCEDURE ONLY: CPT | Performed by: PODIATRIST

## 2024-03-19 PROCEDURE — 11721 DEBRIDE NAIL 6 OR MORE: CPT | Performed by: PODIATRIST

## 2024-03-19 NOTE — PROGRESS NOTES
Patient in today for nail care. Patient does not have any complaints of pain at this time. Patient's PCP is James Clayton DO date of last ov 10/16/23          Katelynn Saul LPN     
apixaban (ELIQUIS) 2.5 MG TABS tablet, Take 1 tablet by mouth 2 times daily Start 11/28/2023, Disp: 180 tablet, Rfl: 3    ipratropium (ATROVENT) 0.06 % nasal spray, 2 sprays by Each Nostril route 4 times daily, Disp: 3 each, Rfl: 3    Calcium Polycarbophil (FIBER-CAPS PO), Take 1 capsule by mouth at bedtime, Disp: , Rfl:     Allergies:  No Known Allergies    Past Surgical History:   Procedure Laterality Date    COLONOSCOPY  2012    ENDOSCOPY, COLON, DIAGNOSTIC      EYE SURGERY      macular hole    HERNIA REPAIR      inguinal    HYSTERECTOMY (CERVIX STATUS UNKNOWN)      OTHER SURGICAL HISTORY  sept 2013    ant elevatetransobturator sling rectocele and cystocele enterocele    SKIN BIOPSY      arms    TRANSESOPHAGEAL ECHOCARDIOGRAM N/A 10/20/2020    TRANSESOPHAGEAL ECHOCARDIOGRAM WITH BUBBLE STUDY performed by Coleman Hoang MD at [facility] ENDOSCOPY     Past Medical History:   Diagnosis Date    ADHD (attention deficit hyperactivity disorder)     Anxiety     Arthritis     Asymptomatic PVCs 04/2012    pt felt flutter, no other sx, holter + pvc's, few runs of SVT   [place]    Depression     History of Holter monitoring     Hyperlipidemia     Hypothyroidism     Macular degeneration     Nausea & vomiting     Osteoarthritis     Pelvic prolapse     PONV (postoperative nausea and vomiting)     TIA (transient ischemic attack)        Vitals:    03/19/24 1350   Weight: 56.7 kg (125 lb)   Height: 1.549 m (5' 0.98\")       Exam:  Pedal pulses diminished to palpation bilateral foot.  At this time the nail/s 1, 2, 5 right foot and nail/s 1, 2, 5 left foot are noted to be thickened, dystrophic and discolored with subungual debris present.  Tenderness noted to palpation.  Diminished hair growth is noted to both lower extremities.  Edema noted with both varicosities and stasis skin changes present bilaterally.  Coolness is noted to the digital regions to palpation.  Capillary fill time delayed digital areas bilateral foot.  No heel

## 2024-03-25 ENCOUNTER — OFFICE VISIT (OUTPATIENT)
Dept: CARDIOLOGY CLINIC | Age: 79
End: 2024-03-25

## 2024-03-25 VITALS
RESPIRATION RATE: 16 BRPM | BODY MASS INDEX: 22.66 KG/M2 | SYSTOLIC BLOOD PRESSURE: 102 MMHG | HEIGHT: 61 IN | DIASTOLIC BLOOD PRESSURE: 60 MMHG | WEIGHT: 120 LBS | HEART RATE: 55 BPM

## 2024-03-25 DIAGNOSIS — R00.1 SINUS BRADYCARDIA: ICD-10-CM

## 2024-03-25 DIAGNOSIS — I48.0 PAROXYSMAL ATRIAL FIBRILLATION (HCC): Primary | ICD-10-CM

## 2024-03-25 DIAGNOSIS — I42.8 NICM (NONISCHEMIC CARDIOMYOPATHY) (HCC): ICD-10-CM

## 2024-03-25 NOTE — PROGRESS NOTES
OFFICE VISIT     PRIMARY CARE PHYSICIAN:      James Clayton DO       ALLERGIES / SENSITIVITIES:      No Known Allergies       REVIEWED MEDICATIONS:        Current Outpatient Medications:     torsemide (DEMADEX) 20 MG tablet, TAKE 1 TABLET EVERY DAY, Disp: 90 tablet, Rfl: 3    lisinopril (PRINIVIL;ZESTRIL) 5 MG tablet, TAKE 1 TABLET EVERY DAY, Disp: 90 tablet, Rfl: 3    levothyroxine (SYNTHROID) 25 MCG tablet, TAKE 1 TABLET EVERY DAY, Disp: 90 tablet, Rfl: 3    omeprazole (PRILOSEC) 20 MG delayed release capsule, TAKE 1 CAPSULE EVERY DAY, Disp: 90 capsule, Rfl: 3    donepezil (ARICEPT) 10 MG tablet, Take 1 tablet by mouth nightly, Disp: 90 tablet, Rfl: 5    atorvastatin (LIPITOR) 20 MG tablet, TAKE 1 TABLET EVERY EVENING, Disp: 90 tablet, Rfl: 5    folic acid (FOLVITE) 1 MG tablet, Take 1 tablet by mouth daily, Disp: 90 tablet, Rfl: 5    sertraline (ZOLOFT) 50 MG tablet, TAKE 1 TABLET EVERY DAY, Disp: 90 tablet, Rfl: 3    potassium chloride (KLOR-CON) 10 MEQ extended release tablet, TAKE 1 TABLET TWICE DAILY, Disp: 180 tablet, Rfl: 3    memantine (NAMENDA) 5 MG tablet, Take 1 tablet by mouth 2 times daily, Disp: 180 tablet, Rfl: 5    memantine (NAMENDA) 5 MG tablet, TAKE 1 TABLET BY MOUTH TWO TIMES A DAY, Disp: 180 tablet, Rfl: 3    diclofenac sodium (VOLTAREN) 1 % GEL, Apply 2 g topically 4 times daily, Disp: 150 g, Rfl: 3    apixaban (ELIQUIS) 2.5 MG TABS tablet, Take 1 tablet by mouth 2 times daily Start 11/28/2023, Disp: 180 tablet, Rfl: 3    ipratropium (ATROVENT) 0.06 % nasal spray, 2 sprays by Each Nostril route 4 times daily, Disp: 3 each, Rfl: 3    Calcium Polycarbophil (FIBER-CAPS PO), Take 1 capsule by mouth at bedtime, Disp: , Rfl:     ondansetron (ZOFRAN-ODT) 4 MG disintegrating tablet, Take 1 tablet by mouth 3 times daily as needed for Nausea or Vomiting (Patient not taking: Reported on 3/25/2024), Disp: 21 tablet, Rfl: 0    SYMBICORT 80-4.5 MCG/ACT AERO, Inhale 2 puffs into the lungs 2 times daily

## 2024-04-09 ASSESSMENT — PATIENT HEALTH QUESTIONNAIRE - PHQ9
SUM OF ALL RESPONSES TO PHQ9 QUESTIONS 1 & 2: 6
10. IF YOU CHECKED OFF ANY PROBLEMS, HOW DIFFICULT HAVE THESE PROBLEMS MADE IT FOR YOU TO DO YOUR WORK, TAKE CARE OF THINGS AT HOME, OR GET ALONG WITH OTHER PEOPLE: SOMEWHAT DIFFICULT
6. FEELING BAD ABOUT YOURSELF - OR THAT YOU ARE A FAILURE OR HAVE LET YOURSELF OR YOUR FAMILY DOWN: NEARLY EVERY DAY
1. LITTLE INTEREST OR PLEASURE IN DOING THINGS: NEARLY EVERY DAY
3. TROUBLE FALLING OR STAYING ASLEEP: NEARLY EVERY DAY
SUM OF ALL RESPONSES TO PHQ QUESTIONS 1-9: 20
9. THOUGHTS THAT YOU WOULD BE BETTER OFF DEAD, OR OF HURTING YOURSELF: NOT AT ALL
3. TROUBLE FALLING OR STAYING ASLEEP: NEARLY EVERY DAY
SUM OF ALL RESPONSES TO PHQ9 QUESTIONS 1 & 2: 6
7. TROUBLE CONCENTRATING ON THINGS, SUCH AS READING THE NEWSPAPER OR WATCHING TELEVISION: MORE THAN HALF THE DAYS
10. IF YOU CHECKED OFF ANY PROBLEMS, HOW DIFFICULT HAVE THESE PROBLEMS MADE IT FOR YOU TO DO YOUR WORK, TAKE CARE OF THINGS AT HOME, OR GET ALONG WITH OTHER PEOPLE: SOMEWHAT DIFFICULT
9. THOUGHTS THAT YOU WOULD BE BETTER OFF DEAD, OR OF HURTING YOURSELF: NOT AT ALL
4. FEELING TIRED OR HAVING LITTLE ENERGY: NEARLY EVERY DAY
SUM OF ALL RESPONSES TO PHQ QUESTIONS 1-9: 20
1. LITTLE INTEREST OR PLEASURE IN DOING THINGS: NEARLY EVERY DAY
4. FEELING TIRED OR HAVING LITTLE ENERGY: NEARLY EVERY DAY
7. TROUBLE CONCENTRATING ON THINGS, SUCH AS READING THE NEWSPAPER OR WATCHING TELEVISION: MORE THAN HALF THE DAYS
SUM OF ALL RESPONSES TO PHQ QUESTIONS 1-9: 20
SUM OF ALL RESPONSES TO PHQ QUESTIONS 1-9: 20
8. MOVING OR SPEAKING SO SLOWLY THAT OTHER PEOPLE COULD HAVE NOTICED. OR THE OPPOSITE, BEING SO FIGETY OR RESTLESS THAT YOU HAVE BEEN MOVING AROUND A LOT MORE THAN USUAL: NEARLY EVERY DAY
8. MOVING OR SPEAKING SO SLOWLY THAT OTHER PEOPLE COULD HAVE NOTICED. OR THE OPPOSITE - BEING SO FIDGETY OR RESTLESS THAT YOU HAVE BEEN MOVING AROUND A LOT MORE THAN USUAL: NEARLY EVERY DAY
6. FEELING BAD ABOUT YOURSELF - OR THAT YOU ARE A FAILURE OR HAVE LET YOURSELF OR YOUR FAMILY DOWN: NEARLY EVERY DAY
2. FEELING DOWN, DEPRESSED OR HOPELESS: NEARLY EVERY DAY
5. POOR APPETITE OR OVEREATING: NOT AT ALL
5. POOR APPETITE OR OVEREATING: NOT AT ALL
SUM OF ALL RESPONSES TO PHQ QUESTIONS 1-9: 20
2. FEELING DOWN, DEPRESSED OR HOPELESS: NEARLY EVERY DAY

## 2024-04-09 ASSESSMENT — COLUMBIA-SUICIDE SEVERITY RATING SCALE - C-SSRS
1. IN THE PAST MONTH, HAVE YOU WISHED YOU WERE DEAD OR WISHED YOU COULD GO TO SLEEP AND NOT WAKE UP?: NO
6. IN YOUR LIFETIME, HAVE YOU EVER DONE ANYTHING, STARTED TO DO ANYTHING, OR PREPARED TO DO ANYTHING TO END YOUR LIFE?: NO
2. IN THE PAST MONTH, HAVE YOU ACTUALLY HAD ANY THOUGHTS OF KILLING YOURSELF?: NO

## 2024-04-11 ENCOUNTER — OFFICE VISIT (OUTPATIENT)
Dept: FAMILY MEDICINE CLINIC | Age: 79
End: 2024-04-11
Payer: MEDICARE

## 2024-04-11 VITALS
WEIGHT: 117 LBS | HEART RATE: 60 BPM | DIASTOLIC BLOOD PRESSURE: 76 MMHG | SYSTOLIC BLOOD PRESSURE: 128 MMHG | RESPIRATION RATE: 18 BRPM | BODY MASS INDEX: 22.09 KG/M2 | HEIGHT: 61 IN

## 2024-04-11 DIAGNOSIS — R73.01 IFG (IMPAIRED FASTING GLUCOSE): ICD-10-CM

## 2024-04-11 DIAGNOSIS — I50.33 ACUTE ON CHRONIC DIASTOLIC (CONGESTIVE) HEART FAILURE (HCC): ICD-10-CM

## 2024-04-11 DIAGNOSIS — E53.8 FOLIC ACID DEFICIENCY: ICD-10-CM

## 2024-04-11 DIAGNOSIS — I48.91 ATRIAL FIBRILLATION WITH RVR (HCC): ICD-10-CM

## 2024-04-11 DIAGNOSIS — G30.1 MILD LATE ONSET ALZHEIMER'S DEMENTIA WITHOUT BEHAVIORAL DISTURBANCE, PSYCHOTIC DISTURBANCE, MOOD DISTURBANCE, OR ANXIETY (HCC): ICD-10-CM

## 2024-04-11 DIAGNOSIS — G30.9 ALZHEIMER'S DISEASE, UNSPECIFIED (CODE) (HCC): ICD-10-CM

## 2024-04-11 DIAGNOSIS — N18.32 STAGE 3B CHRONIC KIDNEY DISEASE (HCC): ICD-10-CM

## 2024-04-11 DIAGNOSIS — E67.8 HYPERVITAMINOSIS, B COMPLEX: ICD-10-CM

## 2024-04-11 DIAGNOSIS — R53.82 CHRONIC FATIGUE: ICD-10-CM

## 2024-04-11 DIAGNOSIS — F02.A0 MILD LATE ONSET ALZHEIMER'S DEMENTIA WITHOUT BEHAVIORAL DISTURBANCE, PSYCHOTIC DISTURBANCE, MOOD DISTURBANCE, OR ANXIETY (HCC): ICD-10-CM

## 2024-04-11 DIAGNOSIS — R53.83 FATIGUE, UNSPECIFIED TYPE: ICD-10-CM

## 2024-04-11 DIAGNOSIS — E55.9 VITAMIN D DEFICIENCY: Primary | ICD-10-CM

## 2024-04-11 DIAGNOSIS — E78.2 MIXED HYPERLIPIDEMIA: ICD-10-CM

## 2024-04-11 DIAGNOSIS — F34.1 DYSTHYMIA: ICD-10-CM

## 2024-04-11 PROBLEM — I72.9 ANEURYSM (HCC): Status: RESOLVED | Noted: 2022-01-04 | Resolved: 2024-04-11

## 2024-04-11 PROBLEM — D68.69 SECONDARY HYPERCOAGULABLE STATE (HCC): Status: RESOLVED | Noted: 2023-05-31 | Resolved: 2024-04-11

## 2024-04-11 PROCEDURE — G8399 PT W/DXA RESULTS DOCUMENT: HCPCS | Performed by: FAMILY MEDICINE

## 2024-04-11 PROCEDURE — G8427 DOCREV CUR MEDS BY ELIG CLIN: HCPCS | Performed by: FAMILY MEDICINE

## 2024-04-11 PROCEDURE — G8420 CALC BMI NORM PARAMETERS: HCPCS | Performed by: FAMILY MEDICINE

## 2024-04-11 PROCEDURE — 1123F ACP DISCUSS/DSCN MKR DOCD: CPT | Performed by: FAMILY MEDICINE

## 2024-04-11 PROCEDURE — 1036F TOBACCO NON-USER: CPT | Performed by: FAMILY MEDICINE

## 2024-04-11 PROCEDURE — 99214 OFFICE O/P EST MOD 30 MIN: CPT | Performed by: FAMILY MEDICINE

## 2024-04-11 PROCEDURE — 1090F PRES/ABSN URINE INCON ASSESS: CPT | Performed by: FAMILY MEDICINE

## 2024-04-11 RX ORDER — DESVENLAFAXINE SUCCINATE 50 MG/1
50 TABLET, EXTENDED RELEASE ORAL DAILY
Qty: 90 TABLET | Refills: 3 | Status: SHIPPED | OUTPATIENT
Start: 2024-04-11

## 2024-04-12 DIAGNOSIS — E78.2 MIXED HYPERLIPIDEMIA: ICD-10-CM

## 2024-04-12 DIAGNOSIS — E53.8 FOLIC ACID DEFICIENCY: ICD-10-CM

## 2024-04-12 DIAGNOSIS — E67.8 HYPERVITAMINOSIS, B COMPLEX: ICD-10-CM

## 2024-04-12 DIAGNOSIS — R53.83 FATIGUE, UNSPECIFIED TYPE: ICD-10-CM

## 2024-04-12 DIAGNOSIS — R53.82 CHRONIC FATIGUE: ICD-10-CM

## 2024-04-12 DIAGNOSIS — E55.9 VITAMIN D DEFICIENCY: ICD-10-CM

## 2024-04-12 DIAGNOSIS — R73.01 IFG (IMPAIRED FASTING GLUCOSE): ICD-10-CM

## 2024-04-12 LAB
ALBUMIN SERPL-MCNC: 4.2 G/DL (ref 3.5–5.2)
ALP BLD-CCNC: 107 U/L (ref 35–104)
ALT SERPL-CCNC: 13 U/L (ref 0–32)
ANION GAP SERPL CALCULATED.3IONS-SCNC: 17 MMOL/L (ref 7–16)
AST SERPL-CCNC: 26 U/L (ref 0–31)
BASOPHILS ABSOLUTE: 0.13 K/UL (ref 0–0.2)
BASOPHILS RELATIVE PERCENT: 2 % (ref 0–2)
BILIRUB SERPL-MCNC: 0.3 MG/DL (ref 0–1.2)
BUN BLDV-MCNC: 28 MG/DL (ref 6–23)
CALCIUM SERPL-MCNC: 9 MG/DL (ref 8.6–10.2)
CHLORIDE BLD-SCNC: 99 MMOL/L (ref 98–107)
CHOLESTEROL: 167 MG/DL
CO2: 20 MMOL/L (ref 22–29)
CREAT SERPL-MCNC: 1.1 MG/DL (ref 0.5–1)
EOSINOPHILS ABSOLUTE: 0.32 K/UL (ref 0.05–0.5)
EOSINOPHILS RELATIVE PERCENT: 4 % (ref 0–6)
FOLATE: >20 NG/ML (ref 4.8–24.2)
GFR SERPL CREATININE-BSD FRML MDRD: 51 ML/MIN/1.73M2
GLUCOSE BLD-MCNC: 81 MG/DL (ref 74–99)
HBA1C MFR BLD: 5.7 % (ref 4–5.6)
HCT VFR BLD CALC: 40.1 % (ref 34–48)
HDLC SERPL-MCNC: 52 MG/DL
HEMOGLOBIN: 12.8 G/DL (ref 11.5–15.5)
IMMATURE GRANULOCYTES %: 0 % (ref 0–5)
IMMATURE GRANULOCYTES ABSOLUTE: 0.03 K/UL (ref 0–0.58)
LDL CHOLESTEROL: 87 MG/DL
LYMPHOCYTES ABSOLUTE: 1.42 K/UL (ref 1.5–4)
LYMPHOCYTES RELATIVE PERCENT: 16 % (ref 20–42)
MCH RBC QN AUTO: 30.5 PG (ref 26–35)
MCHC RBC AUTO-ENTMCNC: 31.9 G/DL (ref 32–34.5)
MCV RBC AUTO: 95.5 FL (ref 80–99.9)
MONOCYTES ABSOLUTE: 0.67 K/UL (ref 0.1–0.95)
MONOCYTES RELATIVE PERCENT: 8 % (ref 2–12)
NEUTROPHILS ABSOLUTE: 6.16 K/UL (ref 1.8–7.3)
NEUTROPHILS RELATIVE PERCENT: 71 % (ref 43–80)
PDW BLD-RTO: 14 % (ref 11.5–15)
PLATELET # BLD: 392 K/UL (ref 130–450)
PMV BLD AUTO: 11.5 FL (ref 7–12)
POTASSIUM SERPL-SCNC: 4.9 MMOL/L (ref 3.5–5)
RBC # BLD: 4.2 M/UL (ref 3.5–5.5)
SODIUM BLD-SCNC: 136 MMOL/L (ref 132–146)
T3 FREE: 2.79 PG/ML (ref 2–4.4)
T4 FREE: 1.3 NG/DL (ref 0.9–1.7)
TOTAL PROTEIN: 7.4 G/DL (ref 6.4–8.3)
TRIGL SERPL-MCNC: 142 MG/DL
TSH SERPL DL<=0.05 MIU/L-ACNC: 1.87 UIU/ML (ref 0.27–4.2)
VITAMIN B-12: 469 PG/ML (ref 211–946)
VITAMIN D 25-HYDROXY: 14.1 NG/ML (ref 30–100)
VLDLC SERPL CALC-MCNC: 28 MG/DL
WBC # BLD: 8.7 K/UL (ref 4.5–11.5)

## 2024-04-15 ASSESSMENT — ENCOUNTER SYMPTOMS
SHORTNESS OF BREATH: 0
PHOTOPHOBIA: 0
WHEEZING: 0
COUGH: 0
DIARRHEA: 0
SORE THROAT: 0
VOMITING: 0
EYE REDNESS: 0
EYE PAIN: 0
ABDOMINAL PAIN: 0
ANAL BLEEDING: 0
CHEST TIGHTNESS: 0
VOICE CHANGE: 0
APNEA: 0
ABDOMINAL DISTENTION: 0
STRIDOR: 0
CONSTIPATION: 0
NAUSEA: 0
RECTAL PAIN: 0
FACIAL SWELLING: 0
SINUS PRESSURE: 0
EYE ITCHING: 0
COLOR CHANGE: 0
RHINORRHEA: 0
CHOKING: 0
BACK PAIN: 0
EYE DISCHARGE: 0
TROUBLE SWALLOWING: 0
BLOOD IN STOOL: 0

## 2024-04-15 NOTE — PROGRESS NOTES
Alanna Tamez is a 78 y.o. female  .  Subjective:      Doing well overall.  Patient states she has been feeling kind of down for the last 3 months.  We had her on sertraline does not seem like it is working as well as it did at the first.  Complains of decreased energy.  Was recently seen by cardiology and doing very well from that standpoint.  Blood pressures been well-controlled no orthostatic symptoms no dizziness.  Will repeat blood work.  Memory has been pretty stable without much worsening.        Review of Systems   Constitutional:  Negative for activity change, appetite change, chills, diaphoresis, fatigue, fever and unexpected weight change.   HENT:  Negative for congestion, dental problem, drooling, ear discharge, ear pain, facial swelling, hearing loss, mouth sores, nosebleeds, postnasal drip, rhinorrhea, sinus pressure, sneezing, sore throat, tinnitus, trouble swallowing and voice change.    Eyes:  Negative for photophobia, pain, discharge, redness, itching and visual disturbance.   Respiratory:  Negative for apnea, cough, choking, chest tightness, shortness of breath, wheezing and stridor.    Cardiovascular:  Negative for chest pain, palpitations and leg swelling.   Gastrointestinal:  Negative for abdominal distention, abdominal pain, anal bleeding, blood in stool, constipation, diarrhea, nausea, rectal pain and vomiting.   Endocrine: Negative for cold intolerance, heat intolerance, polydipsia, polyphagia and polyuria.   Genitourinary:  Negative for decreased urine volume, difficulty urinating, dyspareunia, dysuria, enuresis, flank pain, frequency, genital sores, hematuria, menstrual problem, pelvic pain, urgency, vaginal bleeding, vaginal discharge and vaginal pain.   Musculoskeletal:  Negative for arthralgias, back pain, gait problem, joint swelling, myalgias, neck pain and neck stiffness.   Skin:  Negative for color change, pallor, rash and wound.   Allergic/Immunologic: Negative for

## 2024-04-16 LAB — VITAMIN B6: 106.9 NMOL/L (ref 20–125)

## 2024-04-18 LAB — VITAMIN B1 WHOLE BLOOD: 173 NMOL/L (ref 70–180)

## 2024-05-29 ENCOUNTER — PROCEDURE VISIT (OUTPATIENT)
Dept: PODIATRY | Age: 79
End: 2024-05-29
Payer: MEDICARE

## 2024-05-29 VITALS — BODY MASS INDEX: 22.09 KG/M2 | WEIGHT: 117 LBS | HEIGHT: 61 IN

## 2024-05-29 DIAGNOSIS — R26.2 DIFFICULTY WALKING: ICD-10-CM

## 2024-05-29 DIAGNOSIS — B35.1 ONYCHOMYCOSIS: Primary | ICD-10-CM

## 2024-05-29 DIAGNOSIS — I87.2 VENOUS INSUFFICIENCY (CHRONIC) (PERIPHERAL): ICD-10-CM

## 2024-05-29 DIAGNOSIS — M79.675 PAIN OF TOE OF LEFT FOOT: ICD-10-CM

## 2024-05-29 DIAGNOSIS — M79.674 PAIN OF TOE OF RIGHT FOOT: ICD-10-CM

## 2024-05-29 DIAGNOSIS — I73.9 PVD (PERIPHERAL VASCULAR DISEASE) (HCC): ICD-10-CM

## 2024-05-29 PROCEDURE — 11721 DEBRIDE NAIL 6 OR MORE: CPT | Performed by: PODIATRIST

## 2024-05-29 PROCEDURE — 99999 PR OFFICE/OUTPT VISIT,PROCEDURE ONLY: CPT | Performed by: PODIATRIST

## 2024-05-29 NOTE — PROGRESS NOTES
Patient in today for nail care. Patient does not have any complaints of pain at this time. Patient's PCP is James Clayton DO date of last ov 4/11/24          Katelynn Saul LPN     
insufficiency (chronic) (peripheral)    Difficulty walking        1. Evaluation and Management  2. Manual and electrical debridement of the mycotic nails was performed for thickness and length to prevent injection and/or ulceration.  3. Discussed additional venous insufficiency lower extremity care techniques with patient today.  4. We did discuss importance of shoe gear and foot inspection to prevent potential issues.  5. We will see the patient back at a later date for continued podiatric management and care.  Patient was advised to call the office with any questions or concerns prior to their next appointment if needed.        Seen By:    Travon Villa Jr, DPM    Electronically signed by Travon Villa Jr, DPM on 5/29/2024 at 2:40 PM      This note was created using voice recognition software.  The note was reviewed however may contain grammatical errors.

## 2024-08-06 ENCOUNTER — PROCEDURE VISIT (OUTPATIENT)
Dept: PODIATRY | Age: 79
End: 2024-08-06
Payer: MEDICARE

## 2024-08-06 VITALS — WEIGHT: 117 LBS | BODY MASS INDEX: 22.97 KG/M2 | HEIGHT: 60 IN

## 2024-08-06 DIAGNOSIS — M79.675 PAIN OF TOE OF LEFT FOOT: ICD-10-CM

## 2024-08-06 DIAGNOSIS — I73.9 PVD (PERIPHERAL VASCULAR DISEASE) (HCC): ICD-10-CM

## 2024-08-06 DIAGNOSIS — R26.2 DIFFICULTY WALKING: ICD-10-CM

## 2024-08-06 DIAGNOSIS — M79.674 PAIN OF TOE OF RIGHT FOOT: ICD-10-CM

## 2024-08-06 DIAGNOSIS — B35.1 ONYCHOMYCOSIS: Primary | ICD-10-CM

## 2024-08-06 DIAGNOSIS — I87.2 VENOUS INSUFFICIENCY (CHRONIC) (PERIPHERAL): ICD-10-CM

## 2024-08-06 PROCEDURE — 99999 PR OFFICE/OUTPT VISIT,PROCEDURE ONLY: CPT | Performed by: PODIATRIST

## 2024-08-06 PROCEDURE — 11721 DEBRIDE NAIL 6 OR MORE: CPT | Performed by: PODIATRIST

## 2024-08-06 NOTE — PROGRESS NOTES
Patient here for nail care. Patient is having left foot pain. James Clayton DO   Electronically signed by Radha Monzon LPN on 8/6/2024 at 10:22 AM

## 2024-08-06 NOTE — PROGRESS NOTES
24     Alanna Tamez    : 1945  Sex: female  Age: 78 y.o.    Subjective:  The patient is seen today for evaluation regarding foot evaluation and mycotic nail care.  No other complaints noted.    Chief Complaint   Patient presents with    Nail Problem    Foot Pain     Nail care, left foot pain       Current Medications:    Current Outpatient Medications:     desvenlafaxine succinate (PRISTIQ) 50 MG TB24 extended release tablet, Take 1 tablet by mouth daily, Disp: 90 tablet, Rfl: 3    torsemide (DEMADEX) 20 MG tablet, TAKE 1 TABLET EVERY DAY, Disp: 90 tablet, Rfl: 3    lisinopril (PRINIVIL;ZESTRIL) 5 MG tablet, TAKE 1 TABLET EVERY DAY, Disp: 90 tablet, Rfl: 3    levothyroxine (SYNTHROID) 25 MCG tablet, TAKE 1 TABLET EVERY DAY, Disp: 90 tablet, Rfl: 3    omeprazole (PRILOSEC) 20 MG delayed release capsule, TAKE 1 CAPSULE EVERY DAY, Disp: 90 capsule, Rfl: 3    donepezil (ARICEPT) 10 MG tablet, Take 1 tablet by mouth nightly, Disp: 90 tablet, Rfl: 5    atorvastatin (LIPITOR) 20 MG tablet, TAKE 1 TABLET EVERY EVENING, Disp: 90 tablet, Rfl: 5    folic acid (FOLVITE) 1 MG tablet, Take 1 tablet by mouth daily, Disp: 90 tablet, Rfl: 5    potassium chloride (KLOR-CON) 10 MEQ extended release tablet, TAKE 1 TABLET TWICE DAILY, Disp: 180 tablet, Rfl: 3    memantine (NAMENDA) 5 MG tablet, Take 1 tablet by mouth 2 times daily, Disp: 180 tablet, Rfl: 5    memantine (NAMENDA) 5 MG tablet, TAKE 1 TABLET BY MOUTH TWO TIMES A DAY, Disp: 180 tablet, Rfl: 3    diclofenac sodium (VOLTAREN) 1 % GEL, Apply 2 g topically 4 times daily, Disp: 150 g, Rfl: 3    apixaban (ELIQUIS) 2.5 MG TABS tablet, Take 1 tablet by mouth 2 times daily Start 2023, Disp: 180 tablet, Rfl: 3    ipratropium (ATROVENT) 0.06 % nasal spray, 2 sprays by Each Nostril route 4 times daily, Disp: 3 each, Rfl: 3    Calcium Polycarbophil (FIBER-CAPS PO), Take 1 capsule by mouth at bedtime, Disp: , Rfl:     Allergies:  No Known Allergies    Past

## 2024-09-09 ENCOUNTER — OFFICE VISIT (OUTPATIENT)
Dept: CARDIOLOGY CLINIC | Age: 79
End: 2024-09-09
Payer: MEDICARE

## 2024-09-09 VITALS
WEIGHT: 121 LBS | BODY MASS INDEX: 23.75 KG/M2 | RESPIRATION RATE: 18 BRPM | SYSTOLIC BLOOD PRESSURE: 102 MMHG | HEART RATE: 63 BPM | HEIGHT: 60 IN | DIASTOLIC BLOOD PRESSURE: 58 MMHG

## 2024-09-09 DIAGNOSIS — I38 VHD (VALVULAR HEART DISEASE): ICD-10-CM

## 2024-09-09 DIAGNOSIS — D68.59 THROMBOPHILIA (HCC): ICD-10-CM

## 2024-09-09 DIAGNOSIS — I48.0 PAROXYSMAL ATRIAL FIBRILLATION (HCC): Primary | ICD-10-CM

## 2024-09-09 DIAGNOSIS — I42.8 NICM (NONISCHEMIC CARDIOMYOPATHY) (HCC): ICD-10-CM

## 2024-09-09 PROCEDURE — 1123F ACP DISCUSS/DSCN MKR DOCD: CPT | Performed by: INTERNAL MEDICINE

## 2024-09-09 PROCEDURE — G8420 CALC BMI NORM PARAMETERS: HCPCS | Performed by: INTERNAL MEDICINE

## 2024-09-09 PROCEDURE — G8399 PT W/DXA RESULTS DOCUMENT: HCPCS | Performed by: INTERNAL MEDICINE

## 2024-09-09 PROCEDURE — 99214 OFFICE O/P EST MOD 30 MIN: CPT | Performed by: INTERNAL MEDICINE

## 2024-09-09 PROCEDURE — 93000 ELECTROCARDIOGRAM COMPLETE: CPT | Performed by: INTERNAL MEDICINE

## 2024-09-09 PROCEDURE — 1036F TOBACCO NON-USER: CPT | Performed by: INTERNAL MEDICINE

## 2024-09-09 PROCEDURE — G8427 DOCREV CUR MEDS BY ELIG CLIN: HCPCS | Performed by: INTERNAL MEDICINE

## 2024-09-09 PROCEDURE — 1090F PRES/ABSN URINE INCON ASSESS: CPT | Performed by: INTERNAL MEDICINE

## 2024-09-09 RX ORDER — BUSPIRONE HYDROCHLORIDE 10 MG/1
10 TABLET ORAL 2 TIMES DAILY
COMMUNITY
Start: 2024-09-02

## 2024-09-09 RX ORDER — ACETAMINOPHEN 160 MG
TABLET,DISINTEGRATING ORAL DAILY
COMMUNITY

## 2024-10-01 DIAGNOSIS — N28.9 RENAL INSUFFICIENCY: ICD-10-CM

## 2024-10-01 LAB
ANION GAP SERPL CALCULATED.3IONS-SCNC: 13 MMOL/L (ref 7–16)
BUN BLDV-MCNC: 28 MG/DL (ref 6–23)
CALCIUM SERPL-MCNC: 9.2 MG/DL (ref 8.6–10.2)
CHLORIDE BLD-SCNC: 99 MMOL/L (ref 98–107)
CO2: 25 MMOL/L (ref 22–29)
CREAT SERPL-MCNC: 1.2 MG/DL (ref 0.5–1)
GFR, ESTIMATED: 48 ML/MIN/1.73M2
GLUCOSE BLD-MCNC: 88 MG/DL (ref 74–99)
POTASSIUM SERPL-SCNC: 5.3 MMOL/L (ref 3.5–5)
SODIUM BLD-SCNC: 137 MMOL/L (ref 132–146)

## 2024-10-12 SDOH — ECONOMIC STABILITY: FOOD INSECURITY: WITHIN THE PAST 12 MONTHS, THE FOOD YOU BOUGHT JUST DIDN'T LAST AND YOU DIDN'T HAVE MONEY TO GET MORE.: NEVER TRUE

## 2024-10-12 SDOH — ECONOMIC STABILITY: INCOME INSECURITY: HOW HARD IS IT FOR YOU TO PAY FOR THE VERY BASICS LIKE FOOD, HOUSING, MEDICAL CARE, AND HEATING?: PATIENT DECLINED

## 2024-10-12 SDOH — ECONOMIC STABILITY: FOOD INSECURITY: WITHIN THE PAST 12 MONTHS, YOU WORRIED THAT YOUR FOOD WOULD RUN OUT BEFORE YOU GOT MONEY TO BUY MORE.: NEVER TRUE

## 2024-10-12 ASSESSMENT — PATIENT HEALTH QUESTIONNAIRE - PHQ9
10. IF YOU CHECKED OFF ANY PROBLEMS, HOW DIFFICULT HAVE THESE PROBLEMS MADE IT FOR YOU TO DO YOUR WORK, TAKE CARE OF THINGS AT HOME, OR GET ALONG WITH OTHER PEOPLE: NOT DIFFICULT AT ALL
6. FEELING BAD ABOUT YOURSELF - OR THAT YOU ARE A FAILURE OR HAVE LET YOURSELF OR YOUR FAMILY DOWN: NOT AT ALL
5. POOR APPETITE OR OVEREATING: SEVERAL DAYS
3. TROUBLE FALLING OR STAYING ASLEEP: SEVERAL DAYS
7. TROUBLE CONCENTRATING ON THINGS, SUCH AS READING THE NEWSPAPER OR WATCHING TELEVISION: NOT AT ALL
1. LITTLE INTEREST OR PLEASURE IN DOING THINGS: SEVERAL DAYS
SUM OF ALL RESPONSES TO PHQ9 QUESTIONS 1 & 2: 2
SUM OF ALL RESPONSES TO PHQ QUESTIONS 1-9: 5
8. MOVING OR SPEAKING SO SLOWLY THAT OTHER PEOPLE COULD HAVE NOTICED. OR THE OPPOSITE, BEING SO FIGETY OR RESTLESS THAT YOU HAVE BEEN MOVING AROUND A LOT MORE THAN USUAL: NOT AT ALL
SUM OF ALL RESPONSES TO PHQ QUESTIONS 1-9: 5
4. FEELING TIRED OR HAVING LITTLE ENERGY: SEVERAL DAYS
9. THOUGHTS THAT YOU WOULD BE BETTER OFF DEAD, OR OF HURTING YOURSELF: NOT AT ALL
SUM OF ALL RESPONSES TO PHQ QUESTIONS 1-9: 5
SUM OF ALL RESPONSES TO PHQ QUESTIONS 1-9: 5
2. FEELING DOWN, DEPRESSED OR HOPELESS: SEVERAL DAYS

## 2024-10-12 ASSESSMENT — LIFESTYLE VARIABLES
HOW MANY STANDARD DRINKS CONTAINING ALCOHOL DO YOU HAVE ON A TYPICAL DAY: PATIENT DOES NOT DRINK
HOW OFTEN DO YOU HAVE A DRINK CONTAINING ALCOHOL: 1
HOW OFTEN DO YOU HAVE SIX OR MORE DRINKS ON ONE OCCASION: 1
HOW OFTEN DO YOU HAVE A DRINK CONTAINING ALCOHOL: NEVER
HOW MANY STANDARD DRINKS CONTAINING ALCOHOL DO YOU HAVE ON A TYPICAL DAY: 0

## 2024-10-15 ENCOUNTER — PROCEDURE VISIT (OUTPATIENT)
Dept: PODIATRY | Age: 79
End: 2024-10-15
Payer: MEDICARE

## 2024-10-15 ENCOUNTER — OFFICE VISIT (OUTPATIENT)
Dept: FAMILY MEDICINE CLINIC | Age: 79
End: 2024-10-15

## 2024-10-15 VITALS
WEIGHT: 117 LBS | HEIGHT: 60 IN | BODY MASS INDEX: 22.97 KG/M2 | DIASTOLIC BLOOD PRESSURE: 74 MMHG | HEART RATE: 76 BPM | RESPIRATION RATE: 18 BRPM | SYSTOLIC BLOOD PRESSURE: 122 MMHG

## 2024-10-15 VITALS — BODY MASS INDEX: 23.75 KG/M2 | HEIGHT: 60 IN | WEIGHT: 121 LBS

## 2024-10-15 DIAGNOSIS — M79.674 PAIN OF TOE OF RIGHT FOOT: ICD-10-CM

## 2024-10-15 DIAGNOSIS — I48.91 ATRIAL FIBRILLATION WITH RVR (HCC): ICD-10-CM

## 2024-10-15 DIAGNOSIS — R53.82 CHRONIC FATIGUE: ICD-10-CM

## 2024-10-15 DIAGNOSIS — E87.5 HYPERKALEMIA: ICD-10-CM

## 2024-10-15 DIAGNOSIS — M15.0 PRIMARY OSTEOARTHRITIS INVOLVING MULTIPLE JOINTS: ICD-10-CM

## 2024-10-15 DIAGNOSIS — R73.01 IFG (IMPAIRED FASTING GLUCOSE): ICD-10-CM

## 2024-10-15 DIAGNOSIS — M79.675 PAIN OF TOE OF LEFT FOOT: ICD-10-CM

## 2024-10-15 DIAGNOSIS — B35.1 ONYCHOMYCOSIS: Primary | ICD-10-CM

## 2024-10-15 DIAGNOSIS — R26.2 DIFFICULTY WALKING: ICD-10-CM

## 2024-10-15 DIAGNOSIS — I73.9 PVD (PERIPHERAL VASCULAR DISEASE) (HCC): ICD-10-CM

## 2024-10-15 DIAGNOSIS — I87.2 VENOUS INSUFFICIENCY (CHRONIC) (PERIPHERAL): ICD-10-CM

## 2024-10-15 DIAGNOSIS — E55.9 VITAMIN D DEFICIENCY: ICD-10-CM

## 2024-10-15 DIAGNOSIS — R53.83 FATIGUE, UNSPECIFIED TYPE: ICD-10-CM

## 2024-10-15 DIAGNOSIS — E78.2 MIXED HYPERLIPIDEMIA: ICD-10-CM

## 2024-10-15 DIAGNOSIS — E03.9 ACQUIRED HYPOTHYROIDISM: Primary | ICD-10-CM

## 2024-10-15 DIAGNOSIS — Z00.00 MEDICARE ANNUAL WELLNESS VISIT, SUBSEQUENT: ICD-10-CM

## 2024-10-15 DIAGNOSIS — Z23 NEED FOR SHINGLES VACCINE: ICD-10-CM

## 2024-10-15 PROCEDURE — 11721 DEBRIDE NAIL 6 OR MORE: CPT | Performed by: PODIATRIST

## 2024-10-15 RX ORDER — OSELTAMIVIR PHOSPHATE 75 MG/1
75 CAPSULE ORAL 2 TIMES DAILY
Qty: 10 CAPSULE | Refills: 0 | Status: SHIPPED | OUTPATIENT
Start: 2024-10-15 | End: 2024-10-20

## 2024-10-15 RX ORDER — ZOSTER VACCINE RECOMBINANT, ADJUVANTED 50 MCG/0.5
0.5 KIT INTRAMUSCULAR SEE ADMIN INSTRUCTIONS
Qty: 0.5 ML | Refills: 0 | Status: SHIPPED | OUTPATIENT
Start: 2024-10-15 | End: 2025-04-13

## 2024-10-15 NOTE — PROGRESS NOTES
Paternal Grandmother        Current Outpatient Medications on File Prior to Visit   Medication Sig Dispense Refill    busPIRone (BUSPAR) 10 MG tablet Take 1 tablet by mouth 2 times daily      Cholecalciferol (VITAMIN D3) 50 MCG (2000 UT) CAPS Take by mouth daily      melatonin 5 MG TABS tablet Take 1 tablet by mouth daily      apixaban (ELIQUIS) 2.5 MG TABS tablet Take 1 tablet by mouth 2 times daily Start 11/28/2023 180 tablet 3    desvenlafaxine succinate (PRISTIQ) 50 MG TB24 extended release tablet Take 1 tablet by mouth daily 90 tablet 3    torsemide (DEMADEX) 20 MG tablet TAKE 1 TABLET EVERY DAY 90 tablet 3    lisinopril (PRINIVIL;ZESTRIL) 5 MG tablet TAKE 1 TABLET EVERY DAY 90 tablet 3    levothyroxine (SYNTHROID) 25 MCG tablet TAKE 1 TABLET EVERY DAY 90 tablet 3    omeprazole (PRILOSEC) 20 MG delayed release capsule TAKE 1 CAPSULE EVERY DAY 90 capsule 3    donepezil (ARICEPT) 10 MG tablet Take 1 tablet by mouth nightly 90 tablet 5    atorvastatin (LIPITOR) 20 MG tablet TAKE 1 TABLET EVERY EVENING 90 tablet 5    folic acid (FOLVITE) 1 MG tablet Take 1 tablet by mouth daily 90 tablet 5    potassium chloride (KLOR-CON) 10 MEQ extended release tablet TAKE 1 TABLET TWICE DAILY 180 tablet 3    memantine (NAMENDA) 5 MG tablet Take 1 tablet by mouth 2 times daily 180 tablet 5    diclofenac sodium (VOLTAREN) 1 % GEL Apply 2 g topically 4 times daily 150 g 3    ipratropium (ATROVENT) 0.06 % nasal spray 2 sprays by Each Nostril route 4 times daily 3 each 3    Calcium Polycarbophil (FIBER-CAPS PO) Take 1 capsule by mouth at bedtime       No current facility-administered medications on file prior to visit.       No Known Allergies    I have reviewed his allergies, medications, problem list, medical,social and family history and have updated as needed in the electronic medical record.    Objective:     Physical Exam  Vitals and nursing note reviewed.   Constitutional:       General: She is not in acute distress.

## 2024-10-15 NOTE — PROGRESS NOTES
Patient here for nail care. Patient has no new concerns at this time. James Clayton DO last visit 4/11/2024   Electronically signed by Radha Monzon LPN on 10/15/2024 at 10:42 AM

## 2024-10-15 NOTE — PROGRESS NOTES
10/15/24     Alanna Tamez    : 1945  Sex: female  Age: 78 y.o.    Subjective:  The patient is seen today for evaluation regarding foot evaluation and mycotic nail care.  No other complaints noted.    Chief Complaint   Patient presents with    Nail Problem     Nail care       Current Medications:    Current Outpatient Medications:     busPIRone (BUSPAR) 10 MG tablet, Take 1 tablet by mouth 2 times daily, Disp: , Rfl:     Cholecalciferol (VITAMIN D3) 50 MCG (2000) CAPS, Take by mouth daily, Disp: , Rfl:     melatonin 5 MG TABS tablet, Take 1 tablet by mouth daily, Disp: , Rfl:     apixaban (ELIQUIS) 2.5 MG TABS tablet, Take 1 tablet by mouth 2 times daily Start 2023, Disp: 180 tablet, Rfl: 3    desvenlafaxine succinate (PRISTIQ) 50 MG TB24 extended release tablet, Take 1 tablet by mouth daily, Disp: 90 tablet, Rfl: 3    torsemide (DEMADEX) 20 MG tablet, TAKE 1 TABLET EVERY DAY, Disp: 90 tablet, Rfl: 3    lisinopril (PRINIVIL;ZESTRIL) 5 MG tablet, TAKE 1 TABLET EVERY DAY, Disp: 90 tablet, Rfl: 3    levothyroxine (SYNTHROID) 25 MCG tablet, TAKE 1 TABLET EVERY DAY, Disp: 90 tablet, Rfl: 3    omeprazole (PRILOSEC) 20 MG delayed release capsule, TAKE 1 CAPSULE EVERY DAY, Disp: 90 capsule, Rfl: 3    donepezil (ARICEPT) 10 MG tablet, Take 1 tablet by mouth nightly, Disp: 90 tablet, Rfl: 5    atorvastatin (LIPITOR) 20 MG tablet, TAKE 1 TABLET EVERY EVENING, Disp: 90 tablet, Rfl: 5    folic acid (FOLVITE) 1 MG tablet, Take 1 tablet by mouth daily, Disp: 90 tablet, Rfl: 5    potassium chloride (KLOR-CON) 10 MEQ extended release tablet, TAKE 1 TABLET TWICE DAILY, Disp: 180 tablet, Rfl: 3    memantine (NAMENDA) 5 MG tablet, Take 1 tablet by mouth 2 times daily, Disp: 180 tablet, Rfl: 5    diclofenac sodium (VOLTAREN) 1 % GEL, Apply 2 g topically 4 times daily, Disp: 150 g, Rfl: 3    ipratropium (ATROVENT) 0.06 % nasal spray, 2 sprays by Each Nostril route 4 times daily, Disp: 3 each, Rfl: 3    Calcium

## 2024-11-12 DIAGNOSIS — R53.82 CHRONIC FATIGUE: ICD-10-CM

## 2024-11-12 DIAGNOSIS — E55.9 VITAMIN D DEFICIENCY: ICD-10-CM

## 2024-11-12 DIAGNOSIS — E87.5 HYPERKALEMIA: ICD-10-CM

## 2024-11-12 DIAGNOSIS — E03.9 ACQUIRED HYPOTHYROIDISM: ICD-10-CM

## 2024-11-12 DIAGNOSIS — R73.01 IFG (IMPAIRED FASTING GLUCOSE): ICD-10-CM

## 2024-11-12 LAB
ALBUMIN: 4.2 G/DL (ref 3.5–5.2)
ALP BLD-CCNC: 103 U/L (ref 35–104)
ALT SERPL-CCNC: 13 U/L (ref 0–32)
ANION GAP SERPL CALCULATED.3IONS-SCNC: 16 MMOL/L (ref 7–16)
AST SERPL-CCNC: 24 U/L (ref 0–31)
BASOPHILS ABSOLUTE: 0.08 K/UL (ref 0–0.2)
BASOPHILS RELATIVE PERCENT: 1 % (ref 0–2)
BILIRUB SERPL-MCNC: 0.4 MG/DL (ref 0–1.2)
BUN BLDV-MCNC: 24 MG/DL (ref 6–23)
CALCIUM SERPL-MCNC: 9.4 MG/DL (ref 8.6–10.2)
CHLORIDE BLD-SCNC: 99 MMOL/L (ref 98–107)
CO2: 23 MMOL/L (ref 22–29)
CREAT SERPL-MCNC: 1.2 MG/DL (ref 0.5–1)
EOSINOPHILS ABSOLUTE: 0.24 K/UL (ref 0.05–0.5)
EOSINOPHILS RELATIVE PERCENT: 3 % (ref 0–6)
GFR, ESTIMATED: 46 ML/MIN/1.73M2
GLUCOSE BLD-MCNC: 81 MG/DL (ref 74–99)
HBA1C MFR BLD: 5.9 % (ref 4–5.6)
HCT VFR BLD CALC: 41.1 % (ref 34–48)
HEMOGLOBIN: 12.7 G/DL (ref 11.5–15.5)
IMMATURE GRANULOCYTES %: 0 % (ref 0–5)
IMMATURE GRANULOCYTES ABSOLUTE: 0.04 K/UL (ref 0–0.58)
LYMPHOCYTES ABSOLUTE: 1.43 K/UL (ref 1.5–4)
LYMPHOCYTES RELATIVE PERCENT: 16 % (ref 20–42)
MAGNESIUM: 2.3 MG/DL (ref 1.6–2.6)
MCH RBC QN AUTO: 29.9 PG (ref 26–35)
MCHC RBC AUTO-ENTMCNC: 30.9 G/DL (ref 32–34.5)
MCV RBC AUTO: 96.7 FL (ref 80–99.9)
MONOCYTES ABSOLUTE: 0.82 K/UL (ref 0.1–0.95)
MONOCYTES RELATIVE PERCENT: 9 % (ref 2–12)
NEUTROPHILS ABSOLUTE: 6.33 K/UL (ref 1.8–7.3)
NEUTROPHILS RELATIVE PERCENT: 71 % (ref 43–80)
PDW BLD-RTO: 13.6 % (ref 11.5–15)
PLATELET # BLD: 386 K/UL (ref 130–450)
PMV BLD AUTO: 11.1 FL (ref 7–12)
POTASSIUM SERPL-SCNC: 5 MMOL/L (ref 3.5–5)
RBC # BLD: 4.25 M/UL (ref 3.5–5.5)
SODIUM BLD-SCNC: 138 MMOL/L (ref 132–146)
T4 FREE: 1.5 NG/DL (ref 0.9–1.7)
TOTAL PROTEIN: 7.6 G/DL (ref 6.4–8.3)
TSH SERPL DL<=0.05 MIU/L-ACNC: 1.25 UIU/ML (ref 0.27–4.2)
VITAMIN D 25-HYDROXY: 34.3 NG/ML (ref 30–100)
WBC # BLD: 8.9 K/UL (ref 4.5–11.5)

## 2024-11-18 RX ORDER — BUSPIRONE HYDROCHLORIDE 10 MG/1
10 TABLET ORAL 2 TIMES DAILY
Qty: 180 TABLET | Refills: 3 | OUTPATIENT
Start: 2024-11-18

## 2024-11-19 RX ORDER — BUSPIRONE HYDROCHLORIDE 10 MG/1
10 TABLET ORAL 2 TIMES DAILY
Qty: 180 TABLET | Refills: 3 | Status: SHIPPED | OUTPATIENT
Start: 2024-11-19

## 2025-01-02 ENCOUNTER — PROCEDURE VISIT (OUTPATIENT)
Dept: PODIATRY | Age: 80
End: 2025-01-02
Payer: MEDICARE

## 2025-01-02 VITALS
WEIGHT: 120 LBS | OXYGEN SATURATION: 98 % | BODY MASS INDEX: 23.56 KG/M2 | SYSTOLIC BLOOD PRESSURE: 115 MMHG | HEIGHT: 60 IN | TEMPERATURE: 98.6 F | DIASTOLIC BLOOD PRESSURE: 68 MMHG | HEART RATE: 70 BPM

## 2025-01-02 DIAGNOSIS — B35.1 ONYCHOMYCOSIS: Primary | ICD-10-CM

## 2025-01-02 DIAGNOSIS — I87.2 VENOUS INSUFFICIENCY (CHRONIC) (PERIPHERAL): ICD-10-CM

## 2025-01-02 DIAGNOSIS — R26.2 DIFFICULTY WALKING: ICD-10-CM

## 2025-01-02 DIAGNOSIS — M79.675 PAIN OF TOE OF LEFT FOOT: ICD-10-CM

## 2025-01-02 DIAGNOSIS — I73.9 PVD (PERIPHERAL VASCULAR DISEASE) (HCC): ICD-10-CM

## 2025-01-02 DIAGNOSIS — M79.674 PAIN OF TOE OF RIGHT FOOT: ICD-10-CM

## 2025-01-02 PROCEDURE — 99999 PR OFFICE/OUTPT VISIT,PROCEDURE ONLY: CPT | Performed by: PODIATRIST

## 2025-01-02 PROCEDURE — 11721 DEBRIDE NAIL 6 OR MORE: CPT | Performed by: PODIATRIST

## 2025-01-02 NOTE — PROGRESS NOTES
Patient in today for nail care. Patient does not have any complaints of pain at this time. Patient's PCP is James Clayton DO date of last ov 10/15/24          Katelynn Saul LPN     
heel fissuring or macerations of the web spaces.  No plantar calluses and/or ulcerative areas are noted.  Patient is having difficulty with gait/walking.             Plan Per Assessment  Alanna \"Jan\" was seen today for nail problem.    Diagnoses and all orders for this visit:    Onychomycosis    Pain of toe of right foot    Pain of toe of left foot    PVD (peripheral vascular disease) (HCC)    Venous insufficiency (chronic) (peripheral)    Difficulty walking        1. Evaluation and Management  2. Manual and electrical debridement of the mycotic nails was performed for thickness and length to prevent injection and/or ulceration.  3. Discussed additional venous insufficiency lower extremity care techniques with patient today.  4. We did discuss importance of shoe gear and foot inspection to prevent potential issues.  5. We will see the patient back at a later date for continued podiatric management and care.  Patient was advised to call the office with any questions or concerns prior to their next appointment if needed.        Seen By:    Travon Villa Jr, DPM    Electronically signed by Travon Villa Jr, DPM on 1/2/2025 at 2:03 PM      This note was created using voice recognition software.  The note was reviewed however may contain grammatical errors.

## 2025-01-06 DIAGNOSIS — F03.A0 MILD DEMENTIA (HCC): ICD-10-CM

## 2025-01-06 RX ORDER — MEMANTINE HYDROCHLORIDE 5 MG/1
5 TABLET ORAL 2 TIMES DAILY
Qty: 180 TABLET | Refills: 5 | Status: SHIPPED | OUTPATIENT
Start: 2025-01-06

## 2025-01-16 ENCOUNTER — OFFICE VISIT (OUTPATIENT)
Dept: FAMILY MEDICINE CLINIC | Age: 80
End: 2025-01-16

## 2025-01-16 VITALS
SYSTOLIC BLOOD PRESSURE: 128 MMHG | BODY MASS INDEX: 23.56 KG/M2 | HEART RATE: 74 BPM | RESPIRATION RATE: 18 BRPM | WEIGHT: 120 LBS | HEIGHT: 60 IN | DIASTOLIC BLOOD PRESSURE: 76 MMHG

## 2025-01-16 DIAGNOSIS — I50.33 ACUTE ON CHRONIC DIASTOLIC (CONGESTIVE) HEART FAILURE (HCC): ICD-10-CM

## 2025-01-16 DIAGNOSIS — N18.32 STAGE 3B CHRONIC KIDNEY DISEASE (HCC): ICD-10-CM

## 2025-01-16 DIAGNOSIS — G25.81 RESTLESS LEG: Primary | ICD-10-CM

## 2025-01-16 DIAGNOSIS — E78.00 PURE HYPERCHOLESTEROLEMIA: ICD-10-CM

## 2025-01-16 DIAGNOSIS — E03.9 ACQUIRED HYPOTHYROIDISM: ICD-10-CM

## 2025-01-16 DIAGNOSIS — I48.91 ATRIAL FIBRILLATION WITH RVR (HCC): ICD-10-CM

## 2025-01-16 DIAGNOSIS — R53.82 CHRONIC FATIGUE: ICD-10-CM

## 2025-01-16 DIAGNOSIS — R73.01 IFG (IMPAIRED FASTING GLUCOSE): ICD-10-CM

## 2025-01-16 DIAGNOSIS — G30.9 ALZHEIMER'S DISEASE, UNSPECIFIED (CODE) (HCC): ICD-10-CM

## 2025-01-16 RX ORDER — PRAMIPEXOLE DIHYDROCHLORIDE 0.12 MG/1
0.12 TABLET ORAL NIGHTLY
Qty: 90 TABLET | Refills: 3 | Status: SHIPPED | OUTPATIENT
Start: 2025-01-16

## 2025-01-16 SDOH — ECONOMIC STABILITY: FOOD INSECURITY: WITHIN THE PAST 12 MONTHS, THE FOOD YOU BOUGHT JUST DIDN'T LAST AND YOU DIDN'T HAVE MONEY TO GET MORE.: PATIENT DECLINED

## 2025-01-16 SDOH — ECONOMIC STABILITY: FOOD INSECURITY: WITHIN THE PAST 12 MONTHS, YOU WORRIED THAT YOUR FOOD WOULD RUN OUT BEFORE YOU GOT MONEY TO BUY MORE.: PATIENT DECLINED

## 2025-01-16 ASSESSMENT — PATIENT HEALTH QUESTIONNAIRE - PHQ9
SUM OF ALL RESPONSES TO PHQ QUESTIONS 1-9: 0
6. FEELING BAD ABOUT YOURSELF - OR THAT YOU ARE A FAILURE OR HAVE LET YOURSELF OR YOUR FAMILY DOWN: NOT AT ALL
SUM OF ALL RESPONSES TO PHQ QUESTIONS 1-9: 0
2. FEELING DOWN, DEPRESSED OR HOPELESS: NOT AT ALL
3. TROUBLE FALLING OR STAYING ASLEEP: NOT AT ALL
8. MOVING OR SPEAKING SO SLOWLY THAT OTHER PEOPLE COULD HAVE NOTICED. OR THE OPPOSITE, BEING SO FIGETY OR RESTLESS THAT YOU HAVE BEEN MOVING AROUND A LOT MORE THAN USUAL: NOT AT ALL
SUM OF ALL RESPONSES TO PHQ9 QUESTIONS 1 & 2: 0
7. TROUBLE CONCENTRATING ON THINGS, SUCH AS READING THE NEWSPAPER OR WATCHING TELEVISION: NOT AT ALL
1. LITTLE INTEREST OR PLEASURE IN DOING THINGS: NOT AT ALL
9. THOUGHTS THAT YOU WOULD BE BETTER OFF DEAD, OR OF HURTING YOURSELF: NOT AT ALL
10. IF YOU CHECKED OFF ANY PROBLEMS, HOW DIFFICULT HAVE THESE PROBLEMS MADE IT FOR YOU TO DO YOUR WORK, TAKE CARE OF THINGS AT HOME, OR GET ALONG WITH OTHER PEOPLE: NOT DIFFICULT AT ALL
4. FEELING TIRED OR HAVING LITTLE ENERGY: NOT AT ALL
5. POOR APPETITE OR OVEREATING: NOT AT ALL

## 2025-01-18 ASSESSMENT — ENCOUNTER SYMPTOMS
COUGH: 0
PHOTOPHOBIA: 0
BACK PAIN: 1
STRIDOR: 0
SINUS PRESSURE: 0
SORE THROAT: 0
FACIAL SWELLING: 0
EYE PAIN: 0
EYE REDNESS: 0
WHEEZING: 0
TROUBLE SWALLOWING: 0
ABDOMINAL DISTENTION: 0
CHEST TIGHTNESS: 0
SHORTNESS OF BREATH: 0
VOMITING: 0
NAUSEA: 0
VOICE CHANGE: 0
ANAL BLEEDING: 0
DIARRHEA: 0
APNEA: 0
RECTAL PAIN: 0
RHINORRHEA: 0
CONSTIPATION: 0
ABDOMINAL PAIN: 0
BLOOD IN STOOL: 0
CHOKING: 0
EYE DISCHARGE: 0
COLOR CHANGE: 0
EYE ITCHING: 0

## 2025-02-09 DIAGNOSIS — I48.91 ATRIAL FIBRILLATION WITH RVR (HCC): ICD-10-CM

## 2025-02-09 DIAGNOSIS — F34.1 DYSTHYMIA: ICD-10-CM

## 2025-02-09 DIAGNOSIS — I10 PRIMARY HYPERTENSION: ICD-10-CM

## 2025-02-09 DIAGNOSIS — E03.9 ACQUIRED HYPOTHYROIDISM: ICD-10-CM

## 2025-02-12 RX ORDER — DESVENLAFAXINE 50 MG/1
50 TABLET, FILM COATED, EXTENDED RELEASE ORAL DAILY
Qty: 90 TABLET | Refills: 3 | Status: SHIPPED | OUTPATIENT
Start: 2025-02-12

## 2025-02-12 RX ORDER — POTASSIUM CHLORIDE 750 MG/1
10 TABLET, EXTENDED RELEASE ORAL 2 TIMES DAILY
Qty: 180 TABLET | Refills: 3 | Status: SHIPPED | OUTPATIENT
Start: 2025-02-12

## 2025-02-12 RX ORDER — LEVOTHYROXINE SODIUM 25 UG/1
TABLET ORAL
Qty: 90 TABLET | Refills: 3 | Status: SHIPPED | OUTPATIENT
Start: 2025-02-12

## 2025-02-12 RX ORDER — TORSEMIDE 20 MG/1
20 TABLET ORAL DAILY
Qty: 90 TABLET | Refills: 3 | Status: SHIPPED | OUTPATIENT
Start: 2025-02-12

## 2025-02-12 RX ORDER — LISINOPRIL 5 MG/1
TABLET ORAL
Qty: 90 TABLET | Refills: 3 | Status: SHIPPED | OUTPATIENT
Start: 2025-02-12

## 2025-03-18 ENCOUNTER — PROCEDURE VISIT (OUTPATIENT)
Dept: PODIATRY | Age: 80
End: 2025-03-18
Payer: MEDICARE

## 2025-03-18 VITALS — BODY MASS INDEX: 23.56 KG/M2 | WEIGHT: 120 LBS | HEIGHT: 60 IN

## 2025-03-18 DIAGNOSIS — I73.9 PVD (PERIPHERAL VASCULAR DISEASE): ICD-10-CM

## 2025-03-18 DIAGNOSIS — M79.674 PAIN OF TOE OF RIGHT FOOT: ICD-10-CM

## 2025-03-18 DIAGNOSIS — M79.675 PAIN OF TOE OF LEFT FOOT: ICD-10-CM

## 2025-03-18 DIAGNOSIS — R26.2 DIFFICULTY WALKING: ICD-10-CM

## 2025-03-18 DIAGNOSIS — B35.1 ONYCHOMYCOSIS: Primary | ICD-10-CM

## 2025-03-18 DIAGNOSIS — I87.2 VENOUS INSUFFICIENCY (CHRONIC) (PERIPHERAL): ICD-10-CM

## 2025-03-18 PROCEDURE — 99999 PR OFFICE/OUTPT VISIT,PROCEDURE ONLY: CPT | Performed by: PODIATRIST

## 2025-03-18 PROCEDURE — 11721 DEBRIDE NAIL 6 OR MORE: CPT | Performed by: PODIATRIST

## 2025-03-18 NOTE — PATIENT INSTRUCTIONS
As of 3/31/25, Dr. Villa will be welcoming patients at our new location.    [facility]  [address] [place] [address], [address]  [place], [place]  [address]  Ph:  [phone]  Fax:  [phone]

## 2025-03-18 NOTE — PROGRESS NOTES
3/18/25     Alanna Tamez    : 1945  Sex: female  Age: 79 y.o.    Subjective:  The patient is seen today for evaluation regarding foot evaluation and mycotic nail care.  No other complaints noted.    Chief Complaint   Patient presents with    Nail Problem     Nail care       Current Medications:    Current Outpatient Medications:     torsemide (DEMADEX) 20 MG tablet, TAKE 1 TABLET EVERY DAY, Disp: 90 tablet, Rfl: 3    potassium chloride (KLOR-CON) 10 MEQ extended release tablet, TAKE 1 TABLET TWICE DAILY, Disp: 180 tablet, Rfl: 3    desvenlafaxine succinate (PRISTIQ) 50 MG TB24 extended release tablet, TAKE 1 TABLET EVERY DAY, Disp: 90 tablet, Rfl: 3    omeprazole (PRILOSEC) 20 MG delayed release capsule, TAKE 1 CAPSULE EVERY DAY, Disp: 90 capsule, Rfl: 3    lisinopril (PRINIVIL;ZESTRIL) 5 MG tablet, TAKE 1 TABLET EVERY DAY, Disp: 90 tablet, Rfl: 3    levothyroxine (SYNTHROID) 25 MCG tablet, TAKE 1 TABLET EVERY DAY, Disp: 90 tablet, Rfl: 3    pramipexole (MIRAPEX) 0.125 MG tablet, Take 1 tablet by mouth nightly, Disp: 90 tablet, Rfl: 3    memantine (NAMENDA) 5 MG tablet, Take 1 tablet by mouth 2 times daily, Disp: 180 tablet, Rfl: 5    busPIRone (BUSPAR) 10 MG tablet, Take 1 tablet by mouth 2 times daily, Disp: 180 tablet, Rfl: 3    zoster recombinant adjuvanted vaccine (SHINGRIX) 50 MCG/0.5ML SUSR injection, Inject 0.5 mLs into the muscle See Admin Instructions 1 dose now and repeat in 2-6 months, Disp: 0.5 mL, Rfl: 0    Cholecalciferol (VITAMIN D3) 50 MCG ( UT) CAPS, Take by mouth daily, Disp: , Rfl:     melatonin 5 MG TABS tablet, Take 1 tablet by mouth daily, Disp: , Rfl:     apixaban (ELIQUIS) 2.5 MG TABS tablet, Take 1 tablet by mouth 2 times daily Start 2023, Disp: 180 tablet, Rfl: 3    donepezil (ARICEPT) 10 MG tablet, Take 1 tablet by mouth nightly, Disp: 90 tablet, Rfl: 5    atorvastatin (LIPITOR) 20 MG tablet, TAKE 1 TABLET EVERY EVENING, Disp: 90 tablet, Rfl: 5    folic acid

## 2025-03-18 NOTE — PROGRESS NOTES
Patient in today for nail care. Patient does not have any complaints of pain at this time. Patient's PCP is James Clayton DO date of last ov 1/16/25          Katelynn Saul LPN

## 2025-04-29 DIAGNOSIS — R53.82 CHRONIC FATIGUE: ICD-10-CM

## 2025-04-29 DIAGNOSIS — R73.01 IFG (IMPAIRED FASTING GLUCOSE): ICD-10-CM

## 2025-04-29 DIAGNOSIS — E78.00 PURE HYPERCHOLESTEROLEMIA: ICD-10-CM

## 2025-04-29 DIAGNOSIS — E03.9 ACQUIRED HYPOTHYROIDISM: ICD-10-CM

## 2025-04-29 LAB
ALBUMIN: 4.1 G/DL (ref 3.5–5.2)
ALP BLD-CCNC: 82 U/L (ref 35–104)
ALT SERPL-CCNC: 13 U/L (ref 0–35)
ANION GAP SERPL CALCULATED.3IONS-SCNC: 12 MMOL/L (ref 7–16)
AST SERPL-CCNC: 31 U/L (ref 0–35)
BASOPHILS ABSOLUTE: 0.05 K/UL (ref 0–0.2)
BASOPHILS RELATIVE PERCENT: 1 % (ref 0–2)
BILIRUB SERPL-MCNC: 0.3 MG/DL (ref 0–1.2)
BUN BLDV-MCNC: 19 MG/DL (ref 8–23)
CALCIUM SERPL-MCNC: 9.3 MG/DL (ref 8.8–10.2)
CHLORIDE BLD-SCNC: 100 MMOL/L (ref 98–107)
CHOLESTEROL, TOTAL: 181 MG/DL
CO2: 25 MMOL/L (ref 22–29)
CREAT SERPL-MCNC: 1.1 MG/DL (ref 0.5–1)
EOSINOPHILS ABSOLUTE: 0.19 K/UL (ref 0.05–0.5)
EOSINOPHILS RELATIVE PERCENT: 2 % (ref 0–6)
GFR, ESTIMATED: 51 ML/MIN/1.73M2
GLUCOSE BLD-MCNC: 86 MG/DL (ref 74–99)
HBA1C MFR BLD: 6.3 % (ref 4–5.6)
HCT VFR BLD CALC: 40.9 % (ref 34–48)
HDLC SERPL-MCNC: 51 MG/DL
HEMOGLOBIN: 13 G/DL (ref 11.5–15.5)
IMMATURE GRANULOCYTES %: 0 % (ref 0–5)
IMMATURE GRANULOCYTES ABSOLUTE: 0.03 K/UL (ref 0–0.58)
LDL CHOLESTEROL: 102 MG/DL
LYMPHOCYTES ABSOLUTE: 1.16 K/UL (ref 1.5–4)
LYMPHOCYTES RELATIVE PERCENT: 15 % (ref 20–42)
MCH RBC QN AUTO: 30.2 PG (ref 26–35)
MCHC RBC AUTO-ENTMCNC: 31.8 G/DL (ref 32–34.5)
MCV RBC AUTO: 94.9 FL (ref 80–99.9)
MONOCYTES ABSOLUTE: 0.71 K/UL (ref 0.1–0.95)
MONOCYTES RELATIVE PERCENT: 9 % (ref 2–12)
NEUTROPHILS ABSOLUTE: 5.62 K/UL (ref 1.8–7.3)
NEUTROPHILS RELATIVE PERCENT: 73 % (ref 43–80)
PDW BLD-RTO: 13.5 % (ref 11.5–15)
PLATELET # BLD: 379 K/UL (ref 130–450)
PMV BLD AUTO: 11.3 FL (ref 7–12)
POTASSIUM SERPL-SCNC: 4.4 MMOL/L (ref 3.5–5.1)
RBC # BLD: 4.31 M/UL (ref 3.5–5.5)
SODIUM BLD-SCNC: 136 MMOL/L (ref 136–145)
T4 FREE: 1.4 NG/DL (ref 0.9–1.7)
TOTAL PROTEIN: 7.4 G/DL (ref 6.4–8.3)
TRIGL SERPL-MCNC: 139 MG/DL
TSH SERPL DL<=0.05 MIU/L-ACNC: 0.95 UIU/ML (ref 0.27–4.2)
VLDLC SERPL CALC-MCNC: 28 MG/DL
WBC # BLD: 7.8 K/UL (ref 4.5–11.5)

## 2025-05-13 ENCOUNTER — OFFICE VISIT (OUTPATIENT)
Dept: FAMILY MEDICINE CLINIC | Age: 80
End: 2025-05-13

## 2025-05-13 VITALS
DIASTOLIC BLOOD PRESSURE: 74 MMHG | HEART RATE: 67 BPM | HEIGHT: 60 IN | BODY MASS INDEX: 23.36 KG/M2 | WEIGHT: 119 LBS | SYSTOLIC BLOOD PRESSURE: 118 MMHG | OXYGEN SATURATION: 95 % | RESPIRATION RATE: 16 BRPM

## 2025-05-13 DIAGNOSIS — F34.1 DYSTHYMIA: ICD-10-CM

## 2025-05-13 DIAGNOSIS — E78.00 PURE HYPERCHOLESTEROLEMIA: ICD-10-CM

## 2025-05-13 DIAGNOSIS — R53.83 FATIGUE, UNSPECIFIED TYPE: ICD-10-CM

## 2025-05-13 DIAGNOSIS — F51.01 PRIMARY INSOMNIA: Primary | ICD-10-CM

## 2025-05-13 DIAGNOSIS — E03.9 ACQUIRED HYPOTHYROIDISM: ICD-10-CM

## 2025-05-13 DIAGNOSIS — E53.8 FOLIC ACID DEFICIENCY: ICD-10-CM

## 2025-05-13 DIAGNOSIS — E78.2 MIXED HYPERLIPIDEMIA: ICD-10-CM

## 2025-05-13 RX ORDER — TRAZODONE HYDROCHLORIDE 50 MG/1
50 TABLET ORAL NIGHTLY PRN
Qty: 30 TABLET | Refills: 4 | Status: SHIPPED | OUTPATIENT
Start: 2025-05-13

## 2025-05-13 NOTE — PROGRESS NOTES
File Prior to Visit   Medication Sig Dispense Refill    torsemide (DEMADEX) 20 MG tablet TAKE 1 TABLET EVERY DAY 90 tablet 3    potassium chloride (KLOR-CON) 10 MEQ extended release tablet TAKE 1 TABLET TWICE DAILY 180 tablet 3    desvenlafaxine succinate (PRISTIQ) 50 MG TB24 extended release tablet TAKE 1 TABLET EVERY DAY 90 tablet 3    omeprazole (PRILOSEC) 20 MG delayed release capsule TAKE 1 CAPSULE EVERY DAY 90 capsule 3    lisinopril (PRINIVIL;ZESTRIL) 5 MG tablet TAKE 1 TABLET EVERY DAY 90 tablet 3    levothyroxine (SYNTHROID) 25 MCG tablet TAKE 1 TABLET EVERY DAY 90 tablet 3    pramipexole (MIRAPEX) 0.125 MG tablet Take 1 tablet by mouth nightly 90 tablet 3    memantine (NAMENDA) 5 MG tablet Take 1 tablet by mouth 2 times daily 180 tablet 5    busPIRone (BUSPAR) 10 MG tablet Take 1 tablet by mouth 2 times daily 180 tablet 3    Cholecalciferol (VITAMIN D3) 50 MCG (2000 UT) CAPS Take by mouth daily      melatonin 5 MG TABS tablet Take 1 tablet by mouth daily      apixaban (ELIQUIS) 2.5 MG TABS tablet Take 1 tablet by mouth 2 times daily Start 11/28/2023 180 tablet 3    donepezil (ARICEPT) 10 MG tablet Take 1 tablet by mouth nightly 90 tablet 5    atorvastatin (LIPITOR) 20 MG tablet TAKE 1 TABLET EVERY EVENING 90 tablet 5    folic acid (FOLVITE) 1 MG tablet Take 1 tablet by mouth daily 90 tablet 5    diclofenac sodium (VOLTAREN) 1 % GEL Apply 2 g topically 4 times daily 150 g 3    ipratropium (ATROVENT) 0.06 % nasal spray 2 sprays by Each Nostril route 4 times daily 3 each 3    Calcium Polycarbophil (FIBER-CAPS PO) Take 1 capsule by mouth at bedtime       No current facility-administered medications on file prior to visit.       No Known Allergies    I have reviewedher allergies, medications, problem list, medical, social and family history and have updated as needed in the electronic medical record.    Objective:     Physical Exam  Vitals and nursing note reviewed.   Constitutional:       General: She is not

## 2025-05-16 ASSESSMENT — ENCOUNTER SYMPTOMS
COLOR CHANGE: 0
VOMITING: 0
STRIDOR: 0
CONSTIPATION: 0
WHEEZING: 0
DIARRHEA: 0
CHEST TIGHTNESS: 0
ABDOMINAL DISTENTION: 0
FACIAL SWELLING: 0
BACK PAIN: 0
EYE PAIN: 0
TROUBLE SWALLOWING: 0
PHOTOPHOBIA: 0
SORE THROAT: 0
EYE DISCHARGE: 0
CHOKING: 0
ANAL BLEEDING: 0
ABDOMINAL PAIN: 0
APNEA: 0
NAUSEA: 0
EYE ITCHING: 0
COUGH: 0
EYE REDNESS: 0
SHORTNESS OF BREATH: 0
RECTAL PAIN: 0
RHINORRHEA: 0
BLOOD IN STOOL: 0
VOICE CHANGE: 0
SINUS PRESSURE: 0

## 2025-05-20 DIAGNOSIS — E78.2 MIXED HYPERLIPIDEMIA: ICD-10-CM

## 2025-05-20 DIAGNOSIS — G45.9 TIA (TRANSIENT ISCHEMIC ATTACK): ICD-10-CM

## 2025-05-21 RX ORDER — ATORVASTATIN CALCIUM 20 MG/1
TABLET, FILM COATED ORAL
Qty: 90 TABLET | Refills: 5 | Status: SHIPPED | OUTPATIENT
Start: 2025-05-21

## 2025-05-21 RX ORDER — FOLIC ACID 1 MG/1
1000 TABLET ORAL DAILY
Qty: 90 TABLET | Refills: 5 | Status: SHIPPED | OUTPATIENT
Start: 2025-05-21

## 2025-05-21 RX ORDER — DONEPEZIL HYDROCHLORIDE 10 MG/1
10 TABLET, FILM COATED ORAL NIGHTLY
Qty: 90 TABLET | Refills: 5 | Status: SHIPPED | OUTPATIENT
Start: 2025-05-21

## 2025-05-22 ENCOUNTER — PROCEDURE VISIT (OUTPATIENT)
Dept: PODIATRY | Age: 80
End: 2025-05-22

## 2025-05-22 VITALS — HEIGHT: 60 IN | BODY MASS INDEX: 23.36 KG/M2 | WEIGHT: 119 LBS

## 2025-05-22 DIAGNOSIS — B35.1 ONYCHOMYCOSIS: Primary | ICD-10-CM

## 2025-05-22 DIAGNOSIS — M79.674 PAIN OF TOE OF RIGHT FOOT: ICD-10-CM

## 2025-05-22 DIAGNOSIS — I73.9 PVD (PERIPHERAL VASCULAR DISEASE): ICD-10-CM

## 2025-05-22 DIAGNOSIS — M79.675 PAIN OF TOE OF LEFT FOOT: ICD-10-CM

## 2025-05-22 DIAGNOSIS — R26.2 DIFFICULTY WALKING: ICD-10-CM

## 2025-05-22 DIAGNOSIS — I87.2 VENOUS INSUFFICIENCY (CHRONIC) (PERIPHERAL): ICD-10-CM

## 2025-05-22 NOTE — PROGRESS NOTES
25     Alanna Tamez    : 1945  Sex: female  Age: 79 y.o.    Subjective:  The patient is seen today for evaluation regarding foot evaluation and mycotic nail care.  No other complaints noted.    Chief Complaint   Patient presents with    Nail Problem     Nail care       Current Medications:    Current Outpatient Medications:     donepezil (ARICEPT) 10 MG tablet, Take 1 tablet by mouth nightly, Disp: 90 tablet, Rfl: 5    atorvastatin (LIPITOR) 20 MG tablet, TAKE 1 TABLET EVERY EVENING, Disp: 90 tablet, Rfl: 5    folic acid (FOLVITE) 1 MG tablet, Take 1 tablet by mouth daily, Disp: 90 tablet, Rfl: 5    traZODone (DESYREL) 50 MG tablet, Take 1 tablet by mouth nightly as needed for Sleep, Disp: 30 tablet, Rfl: 4    torsemide (DEMADEX) 20 MG tablet, TAKE 1 TABLET EVERY DAY, Disp: 90 tablet, Rfl: 3    potassium chloride (KLOR-CON) 10 MEQ extended release tablet, TAKE 1 TABLET TWICE DAILY, Disp: 180 tablet, Rfl: 3    desvenlafaxine succinate (PRISTIQ) 50 MG TB24 extended release tablet, TAKE 1 TABLET EVERY DAY, Disp: 90 tablet, Rfl: 3    omeprazole (PRILOSEC) 20 MG delayed release capsule, TAKE 1 CAPSULE EVERY DAY, Disp: 90 capsule, Rfl: 3    lisinopril (PRINIVIL;ZESTRIL) 5 MG tablet, TAKE 1 TABLET EVERY DAY, Disp: 90 tablet, Rfl: 3    levothyroxine (SYNTHROID) 25 MCG tablet, TAKE 1 TABLET EVERY DAY, Disp: 90 tablet, Rfl: 3    pramipexole (MIRAPEX) 0.125 MG tablet, Take 1 tablet by mouth nightly, Disp: 90 tablet, Rfl: 3    memantine (NAMENDA) 5 MG tablet, Take 1 tablet by mouth 2 times daily, Disp: 180 tablet, Rfl: 5    busPIRone (BUSPAR) 10 MG tablet, Take 1 tablet by mouth 2 times daily, Disp: 180 tablet, Rfl: 3    Cholecalciferol (VITAMIN D3) 50 MCG (2000) CAPS, Take by mouth daily, Disp: , Rfl:     melatonin 5 MG TABS tablet, Take 1 tablet by mouth daily, Disp: , Rfl:     apixaban (ELIQUIS) 2.5 MG TABS tablet, Take 1 tablet by mouth 2 times daily Start 2023, Disp: 180 tablet, Rfl: 3

## 2025-05-22 NOTE — PROGRESS NOTES
Patient in today for nail care. Patient does not have any complaints of pain at this time. Patient's PCP is James Clayton DO date of last ov 5/13/25          Katelynn Saul LPN

## 2025-06-05 ENCOUNTER — TELEPHONE (OUTPATIENT)
Dept: CARDIOLOGY CLINIC | Age: 80
End: 2025-06-05

## 2025-06-06 ENCOUNTER — OFFICE VISIT (OUTPATIENT)
Dept: CARDIOLOGY CLINIC | Age: 80
End: 2025-06-06
Payer: MEDICARE

## 2025-06-06 VITALS
SYSTOLIC BLOOD PRESSURE: 122 MMHG | BODY MASS INDEX: 23.87 KG/M2 | HEART RATE: 61 BPM | HEIGHT: 60 IN | DIASTOLIC BLOOD PRESSURE: 56 MMHG | WEIGHT: 121.6 LBS | RESPIRATION RATE: 18 BRPM

## 2025-06-06 DIAGNOSIS — I48.0 PAROXYSMAL ATRIAL FIBRILLATION (HCC): Primary | ICD-10-CM

## 2025-06-06 DIAGNOSIS — Z86.79 HISTORY OF SINUS BRADYCARDIA: ICD-10-CM

## 2025-06-06 DIAGNOSIS — Z86.73 HISTORY OF TIA (TRANSIENT ISCHEMIC ATTACK): ICD-10-CM

## 2025-06-06 DIAGNOSIS — I42.9 CARDIOMYOPATHY, UNSPECIFIED TYPE (HCC): ICD-10-CM

## 2025-06-06 DIAGNOSIS — I38 VHD (VALVULAR HEART DISEASE): ICD-10-CM

## 2025-06-06 PROCEDURE — 1036F TOBACCO NON-USER: CPT | Performed by: INTERNAL MEDICINE

## 2025-06-06 PROCEDURE — 99213 OFFICE O/P EST LOW 20 MIN: CPT | Performed by: INTERNAL MEDICINE

## 2025-06-06 PROCEDURE — 1123F ACP DISCUSS/DSCN MKR DOCD: CPT | Performed by: INTERNAL MEDICINE

## 2025-06-06 PROCEDURE — G8427 DOCREV CUR MEDS BY ELIG CLIN: HCPCS | Performed by: INTERNAL MEDICINE

## 2025-06-06 PROCEDURE — 1160F RVW MEDS BY RX/DR IN RCRD: CPT | Performed by: INTERNAL MEDICINE

## 2025-06-06 PROCEDURE — 1090F PRES/ABSN URINE INCON ASSESS: CPT | Performed by: INTERNAL MEDICINE

## 2025-06-06 PROCEDURE — 1159F MED LIST DOCD IN RCRD: CPT | Performed by: INTERNAL MEDICINE

## 2025-06-06 PROCEDURE — G8420 CALC BMI NORM PARAMETERS: HCPCS | Performed by: INTERNAL MEDICINE

## 2025-06-06 PROCEDURE — 93000 ELECTROCARDIOGRAM COMPLETE: CPT | Performed by: INTERNAL MEDICINE

## 2025-06-06 PROCEDURE — G8399 PT W/DXA RESULTS DOCUMENT: HCPCS | Performed by: INTERNAL MEDICINE

## 2025-06-06 NOTE — PROGRESS NOTES
ventricle global systolic function is low normal .   No evidence of thrombus within left atrium/ left atrial appendage.   Emptying velocity is low at 19 cms/s.   No evidence of thrombus or mass in the right atrium.   Mild to moderate mitral regurgitation is present.   Mild-to-moderate aortic regurgitation is noted.   Mild to moderate tricuspid regurgitation.   Mild-moderate focal atherosclerosis in the descending aorta.                Stress test 12/23/2021  Impression       The myocardial perfusion imaging was normal.       Overall left ventricular systolic function was normal without regional   wall motion abnormalities.       Low risk study.         Event monitor- 10/8/21 -11/6/2021- PVCs and PACs                  Labs:  Reviewed as available     Lab Results   Component Value Date    WBC 7.8 04/29/2025    HGB 13.0 04/29/2025    HCT 40.9 04/29/2025    MCV 94.9 04/29/2025     04/29/2025         Lab Results   Component Value Date/Time     04/29/2025 07:33 AM    K 4.4 04/29/2025 07:33 AM    K 5.1 01/26/2022 01:45 PM     04/29/2025 07:33 AM    CO2 25 04/29/2025 07:33 AM    BUN 19 04/29/2025 07:33 AM    CREATININE 1.1 04/29/2025 07:33 AM    GLUCOSE 86 04/29/2025 07:33 AM    GLUCOSE 75 04/10/2012 01:03 PM    CALCIUM 9.3 04/29/2025 07:33 AM    LABGLOM 51 04/29/2025 07:33 AM    LABGLOM 51 04/12/2024 08:31 AM          Lab Results   Component Value Date    TSH 0.95 04/29/2025         Lab Results   Component Value Date    CHOL 181 04/29/2025    TRIG 139 04/29/2025    HDL 51 04/29/2025     (H) 04/29/2025    VLDL 28 04/29/2025               ASSESSMENT / PLAN:    Alanna \"Jan\" was seen today for atrial fibrillation.    Diagnoses and all orders for this visit:        Paroxysmal atrial fibrillation (HCC)  - Dx 12/23/2021 - High [id number] VASc score-On adjusted dose Eliquis for OAC. (Wt <60kg, age 78)  -     EKG 12 lead  -     Echo (TTE) Complete; Future  -     perflutren lipid microspheres (DEFINITY)

## 2025-06-06 NOTE — PATIENT INSTRUCTIONS
Call with Echo report  Call for chest pain, dizzy, or short of breath  Once you move to [place], you new family doctor will refer you to local cardiologist-his office can call our office to get your reports

## 2025-06-10 DIAGNOSIS — F51.01 PRIMARY INSOMNIA: ICD-10-CM

## 2025-06-10 DIAGNOSIS — F34.1 DYSTHYMIA: ICD-10-CM

## 2025-06-10 RX ORDER — TRAZODONE HYDROCHLORIDE 50 MG/1
75 TABLET ORAL NIGHTLY PRN
Qty: 180 TABLET | Refills: 3 | Status: SHIPPED | OUTPATIENT
Start: 2025-06-10

## 2025-07-09 ENCOUNTER — OFFICE VISIT (OUTPATIENT)
Dept: CARDIOLOGY CLINIC | Age: 80
End: 2025-07-09
Payer: MEDICARE

## 2025-07-09 ENCOUNTER — HOSPITAL ENCOUNTER (OUTPATIENT)
Dept: CARDIOLOGY | Age: 80
Discharge: HOME | End: 2025-07-11
Attending: INTERNAL MEDICINE
Payer: MEDICARE

## 2025-07-09 ENCOUNTER — APPOINTMENT (OUTPATIENT)
Dept: GENERAL RADIOLOGY | Age: 80
DRG: 308 | End: 2025-07-09
Attending: INTERNAL MEDICINE
Payer: MEDICARE

## 2025-07-09 ENCOUNTER — HOSPITAL ENCOUNTER (INPATIENT)
Age: 80
LOS: 4 days | Discharge: HOME | DRG: 308 | End: 2025-07-13
Admitting: INTERNAL MEDICINE
Payer: MEDICARE

## 2025-07-09 VITALS — WEIGHT: 121 LBS | HEIGHT: 60 IN | BODY MASS INDEX: 23.75 KG/M2

## 2025-07-09 VITALS
DIASTOLIC BLOOD PRESSURE: 64 MMHG | HEART RATE: 154 BPM | WEIGHT: 121.8 LBS | RESPIRATION RATE: 18 BRPM | BODY MASS INDEX: 23.91 KG/M2 | SYSTOLIC BLOOD PRESSURE: 100 MMHG | HEIGHT: 60 IN

## 2025-07-09 DIAGNOSIS — I95.0 IDIOPATHIC HYPOTENSION: ICD-10-CM

## 2025-07-09 DIAGNOSIS — I48.19 PERSISTENT ATRIAL FIBRILLATION (HCC): ICD-10-CM

## 2025-07-09 DIAGNOSIS — I38 VHD (VALVULAR HEART DISEASE): ICD-10-CM

## 2025-07-09 DIAGNOSIS — I48.0 PAROXYSMAL ATRIAL FIBRILLATION (HCC): ICD-10-CM

## 2025-07-09 DIAGNOSIS — R06.02 SHORTNESS OF BREATH: ICD-10-CM

## 2025-07-09 DIAGNOSIS — I42.0 DILATED CARDIOMYOPATHY (HCC): ICD-10-CM

## 2025-07-09 DIAGNOSIS — Z86.79 HISTORY OF SINUS BRADYCARDIA: ICD-10-CM

## 2025-07-09 DIAGNOSIS — I42.9 CARDIOMYOPATHY, UNSPECIFIED TYPE (HCC): ICD-10-CM

## 2025-07-09 DIAGNOSIS — I48.0 PAROXYSMAL ATRIAL FIBRILLATION WITH RVR (HCC): Primary | ICD-10-CM

## 2025-07-09 DIAGNOSIS — I48.91 ATRIAL FIBRILLATION, UNSPECIFIED TYPE (HCC): Primary | ICD-10-CM

## 2025-07-09 DIAGNOSIS — I50.9 ACUTE CONGESTIVE HEART FAILURE, UNSPECIFIED HEART FAILURE TYPE (HCC): ICD-10-CM

## 2025-07-09 LAB
ALBUMIN SERPL-MCNC: 3.8 G/DL (ref 3.5–5.2)
ALP SERPL-CCNC: 85 U/L (ref 35–104)
ALT SERPL-CCNC: 13 U/L (ref 0–35)
ANION GAP SERPL CALCULATED.3IONS-SCNC: 14 MMOL/L (ref 7–16)
AST SERPL-CCNC: 25 U/L (ref 0–35)
BASOPHILS # BLD: 0.07 K/UL (ref 0–0.2)
BASOPHILS NFR BLD: 1 % (ref 0–2)
BILIRUB SERPL-MCNC: 0.4 MG/DL (ref 0–1.2)
BNP SERPL-MCNC: ABNORMAL PG/ML (ref 0–450)
BUN SERPL-MCNC: 39 MG/DL (ref 8–23)
CALCIUM SERPL-MCNC: 8.9 MG/DL (ref 8.8–10.2)
CHLORIDE SERPL-SCNC: 97 MMOL/L (ref 98–107)
CO2 SERPL-SCNC: 19 MMOL/L (ref 22–29)
CREAT SERPL-MCNC: 1.5 MG/DL (ref 0.5–1)
EKG ATRIAL RATE: 131 BPM
EKG Q-T INTERVAL: 312 MS
EKG QRS DURATION: 92 MS
EKG QTC CALCULATION (BAZETT): 479 MS
EKG R AXIS: -2 DEGREES
EKG T AXIS: 96 DEGREES
EKG VENTRICULAR RATE: 142 BPM
EOSINOPHIL # BLD: 0.15 K/UL (ref 0.05–0.5)
EOSINOPHILS RELATIVE PERCENT: 2 % (ref 0–6)
ERYTHROCYTE [DISTWIDTH] IN BLOOD BY AUTOMATED COUNT: 14.5 % (ref 11.5–15)
GFR, ESTIMATED: 36 ML/MIN/1.73M2
GLUCOSE SERPL-MCNC: 84 MG/DL (ref 74–99)
HCT VFR BLD AUTO: 36.5 % (ref 34–48)
HGB BLD-MCNC: 12 G/DL (ref 11.5–15.5)
IMM GRANULOCYTES # BLD AUTO: 0.04 K/UL (ref 0–0.58)
IMM GRANULOCYTES NFR BLD: 0 % (ref 0–5)
LYMPHOCYTES NFR BLD: 1.29 K/UL (ref 1.5–4)
LYMPHOCYTES RELATIVE PERCENT: 13 % (ref 20–42)
MCH RBC QN AUTO: 29.8 PG (ref 26–35)
MCHC RBC AUTO-ENTMCNC: 32.9 G/DL (ref 32–34.5)
MCV RBC AUTO: 90.6 FL (ref 80–99.9)
MONOCYTES NFR BLD: 0.9 K/UL (ref 0.1–0.95)
MONOCYTES NFR BLD: 9 % (ref 2–12)
NEUTROPHILS NFR BLD: 75 % (ref 43–80)
NEUTS SEG NFR BLD: 7.37 K/UL (ref 1.8–7.3)
PLATELET # BLD AUTO: 376 K/UL (ref 130–450)
PMV BLD AUTO: 11.3 FL (ref 7–12)
POTASSIUM SERPL-SCNC: 4.5 MMOL/L (ref 3.5–5.1)
PROT SERPL-MCNC: 7.5 G/DL (ref 6.4–8.3)
RBC # BLD AUTO: 4.03 M/UL (ref 3.5–5.5)
SODIUM SERPL-SCNC: 130 MMOL/L (ref 136–145)
TROPONIN I SERPL HS-MCNC: 17 NG/L (ref 0–14)
TROPONIN I SERPL HS-MCNC: 18 NG/L (ref 0–14)
TROPONIN I SERPL HS-MCNC: NORMAL NG/L (ref 0–14)
WBC OTHER # BLD: 9.8 K/UL (ref 4.5–11.5)

## 2025-07-09 PROCEDURE — 85025 COMPLETE CBC W/AUTO DIFF WBC: CPT

## 2025-07-09 PROCEDURE — 1159F MED LIST DOCD IN RCRD: CPT | Performed by: INTERNAL MEDICINE

## 2025-07-09 PROCEDURE — 6370000000 HC RX 637 (ALT 250 FOR IP)

## 2025-07-09 PROCEDURE — 84484 ASSAY OF TROPONIN QUANT: CPT

## 2025-07-09 PROCEDURE — 93005 ELECTROCARDIOGRAM TRACING: CPT

## 2025-07-09 PROCEDURE — 71045 X-RAY EXAM CHEST 1 VIEW: CPT

## 2025-07-09 PROCEDURE — 93000 ELECTROCARDIOGRAM COMPLETE: CPT | Performed by: INTERNAL MEDICINE

## 2025-07-09 PROCEDURE — 96374 THER/PROPH/DIAG INJ IV PUSH: CPT

## 2025-07-09 PROCEDURE — 99285 EMERGENCY DEPT VISIT HI MDM: CPT

## 2025-07-09 PROCEDURE — G8427 DOCREV CUR MEDS BY ELIG CLIN: HCPCS | Performed by: INTERNAL MEDICINE

## 2025-07-09 PROCEDURE — 2060000000 HC ICU INTERMEDIATE R&B

## 2025-07-09 PROCEDURE — 83880 ASSAY OF NATRIURETIC PEPTIDE: CPT

## 2025-07-09 PROCEDURE — 93010 ELECTROCARDIOGRAM REPORT: CPT | Performed by: INTERNAL MEDICINE

## 2025-07-09 PROCEDURE — 2580000003 HC RX 258

## 2025-07-09 PROCEDURE — 6360000002 HC RX W HCPCS

## 2025-07-09 PROCEDURE — 99214 OFFICE O/P EST MOD 30 MIN: CPT | Performed by: INTERNAL MEDICINE

## 2025-07-09 PROCEDURE — 80053 COMPREHEN METABOLIC PANEL: CPT

## 2025-07-09 PROCEDURE — 1090F PRES/ABSN URINE INCON ASSESS: CPT | Performed by: INTERNAL MEDICINE

## 2025-07-09 PROCEDURE — 1123F ACP DISCUSS/DSCN MKR DOCD: CPT | Performed by: INTERNAL MEDICINE

## 2025-07-09 PROCEDURE — 2500000003 HC RX 250 WO HCPCS

## 2025-07-09 PROCEDURE — G8399 PT W/DXA RESULTS DOCUMENT: HCPCS | Performed by: INTERNAL MEDICINE

## 2025-07-09 PROCEDURE — 1036F TOBACCO NON-USER: CPT | Performed by: INTERNAL MEDICINE

## 2025-07-09 PROCEDURE — 93306 TTE W/DOPPLER COMPLETE: CPT

## 2025-07-09 PROCEDURE — G8420 CALC BMI NORM PARAMETERS: HCPCS | Performed by: INTERNAL MEDICINE

## 2025-07-09 RX ORDER — ACETAMINOPHEN 325 MG/1
650 TABLET ORAL EVERY 6 HOURS PRN
Status: DISCONTINUED | OUTPATIENT
Start: 2025-07-09 | End: 2025-07-13 | Stop reason: HOSPADM

## 2025-07-09 RX ORDER — ATORVASTATIN CALCIUM 20 MG/1
20 TABLET, FILM COATED ORAL NIGHTLY
Status: DISCONTINUED | OUTPATIENT
Start: 2025-07-09 | End: 2025-07-13 | Stop reason: HOSPADM

## 2025-07-09 RX ORDER — DILTIAZEM HYDROCHLORIDE 5 MG/ML
5 INJECTION INTRAVENOUS ONCE
Status: COMPLETED | OUTPATIENT
Start: 2025-07-09 | End: 2025-07-09

## 2025-07-09 RX ORDER — DONEPEZIL HYDROCHLORIDE 5 MG/1
10 TABLET, FILM COATED ORAL NIGHTLY
Status: DISCONTINUED | OUTPATIENT
Start: 2025-07-09 | End: 2025-07-13 | Stop reason: HOSPADM

## 2025-07-09 RX ORDER — LEVOTHYROXINE SODIUM 25 UG/1
25 TABLET ORAL DAILY
Status: DISCONTINUED | OUTPATIENT
Start: 2025-07-10 | End: 2025-07-13 | Stop reason: HOSPADM

## 2025-07-09 RX ORDER — 0.9 % SODIUM CHLORIDE 0.9 %
500 INTRAVENOUS SOLUTION INTRAVENOUS ONCE
Status: COMPLETED | OUTPATIENT
Start: 2025-07-09 | End: 2025-07-09

## 2025-07-09 RX ORDER — DIPHENHYDRAMINE HCL 25 MG
25 TABLET ORAL NIGHTLY PRN
Status: DISCONTINUED | OUTPATIENT
Start: 2025-07-09 | End: 2025-07-13 | Stop reason: HOSPADM

## 2025-07-09 RX ORDER — BUSPIRONE HYDROCHLORIDE 10 MG/1
10 TABLET ORAL 2 TIMES DAILY
Status: DISCONTINUED | OUTPATIENT
Start: 2025-07-09 | End: 2025-07-13 | Stop reason: HOSPADM

## 2025-07-09 RX ORDER — TRAZODONE HYDROCHLORIDE 50 MG/1
75 TABLET ORAL NIGHTLY PRN
Status: DISCONTINUED | OUTPATIENT
Start: 2025-07-09 | End: 2025-07-13 | Stop reason: HOSPADM

## 2025-07-09 RX ORDER — TORSEMIDE 20 MG/1
20 TABLET ORAL DAILY
Status: DISCONTINUED | OUTPATIENT
Start: 2025-07-10 | End: 2025-07-11

## 2025-07-09 RX ORDER — DIGOXIN 0.25 MG/ML
250 INJECTION INTRAMUSCULAR; INTRAVENOUS ONCE
Status: COMPLETED | OUTPATIENT
Start: 2025-07-09 | End: 2025-07-09

## 2025-07-09 RX ORDER — PROCHLORPERAZINE EDISYLATE 5 MG/ML
10 INJECTION INTRAMUSCULAR; INTRAVENOUS EVERY 6 HOURS PRN
Status: DISCONTINUED | OUTPATIENT
Start: 2025-07-09 | End: 2025-07-13 | Stop reason: HOSPADM

## 2025-07-09 RX ORDER — PRAMIPEXOLE DIHYDROCHLORIDE 0.25 MG/1
0.12 TABLET ORAL NIGHTLY
Status: DISCONTINUED | OUTPATIENT
Start: 2025-07-09 | End: 2025-07-13 | Stop reason: HOSPADM

## 2025-07-09 RX ORDER — MECOBALAMIN 5000 MCG
5 TABLET,DISINTEGRATING ORAL NIGHTLY PRN
Status: DISCONTINUED | OUTPATIENT
Start: 2025-07-09 | End: 2025-07-13 | Stop reason: HOSPADM

## 2025-07-09 RX ORDER — PANTOPRAZOLE SODIUM 40 MG/1
40 TABLET, DELAYED RELEASE ORAL
Status: DISCONTINUED | OUTPATIENT
Start: 2025-07-10 | End: 2025-07-13 | Stop reason: HOSPADM

## 2025-07-09 RX ORDER — IPRATROPIUM BROMIDE 42 UG/1
2 SPRAY, METERED NASAL 4 TIMES DAILY PRN
Status: DISCONTINUED | OUTPATIENT
Start: 2025-07-09 | End: 2025-07-09 | Stop reason: CLARIF

## 2025-07-09 RX ORDER — POTASSIUM CHLORIDE 750 MG/1
10 TABLET, EXTENDED RELEASE ORAL 2 TIMES DAILY
Status: DISCONTINUED | OUTPATIENT
Start: 2025-07-09 | End: 2025-07-12

## 2025-07-09 RX ORDER — LISINOPRIL 5 MG/1
5 TABLET ORAL DAILY
Status: DISCONTINUED | OUTPATIENT
Start: 2025-07-10 | End: 2025-07-13 | Stop reason: HOSPADM

## 2025-07-09 RX ORDER — MEMANTINE HYDROCHLORIDE 5 MG/1
5 TABLET ORAL 2 TIMES DAILY
Status: DISCONTINUED | OUTPATIENT
Start: 2025-07-09 | End: 2025-07-13 | Stop reason: HOSPADM

## 2025-07-09 RX ORDER — VITAMIN B COMPLEX
2000 TABLET ORAL DAILY
Status: DISCONTINUED | OUTPATIENT
Start: 2025-07-10 | End: 2025-07-13 | Stop reason: HOSPADM

## 2025-07-09 RX ORDER — FOLIC ACID 1 MG/1
1000 TABLET ORAL DAILY
Status: DISCONTINUED | OUTPATIENT
Start: 2025-07-10 | End: 2025-07-13 | Stop reason: HOSPADM

## 2025-07-09 RX ADMIN — SODIUM CHLORIDE 500 ML: 0.9 INJECTION, SOLUTION INTRAVENOUS at 13:03

## 2025-07-09 RX ADMIN — DILTIAZEM HYDROCHLORIDE 2.5 MG/HR: 5 INJECTION INTRAVENOUS at 20:36

## 2025-07-09 RX ADMIN — MEMANTINE 5 MG: 5 TABLET ORAL at 23:04

## 2025-07-09 RX ADMIN — DIGOXIN 250 MCG: 0.25 INJECTION INTRAMUSCULAR; INTRAVENOUS at 13:03

## 2025-07-09 RX ADMIN — APIXABAN 2.5 MG: 2.5 TABLET, FILM COATED ORAL at 23:04

## 2025-07-09 RX ADMIN — Medication 5 MG: at 23:04

## 2025-07-09 RX ADMIN — ATORVASTATIN CALCIUM 20 MG: 20 TABLET, FILM COATED ORAL at 23:04

## 2025-07-09 RX ADMIN — BUSPIRONE HYDROCHLORIDE 10 MG: 10 TABLET ORAL at 23:04

## 2025-07-09 RX ADMIN — DILTIAZEM HYDROCHLORIDE 5 MG: 5 INJECTION, SOLUTION INTRAVENOUS at 20:24

## 2025-07-09 RX ADMIN — DONEPEZIL HYDROCHLORIDE 10 MG: 5 TABLET, FILM COATED ORAL at 23:04

## 2025-07-09 RX ADMIN — TRAZODONE HYDROCHLORIDE 75 MG: 50 TABLET ORAL at 23:04

## 2025-07-09 ASSESSMENT — PAIN - FUNCTIONAL ASSESSMENT: PAIN_FUNCTIONAL_ASSESSMENT: NONE - DENIES PAIN

## 2025-07-09 NOTE — ED PROVIDER NOTES
[facility]  EMERGENCY DEPARTMENT ENCOUNTER        Pt Name: Alanna Tamez  MRN: [id number]  Birthdate 1945  Date of evaluation: 7/9/2025  Provider: Travon Wong DO  PCP: James Clayton DO  Note Started: 12:38 PM EDT 7/9/25    CHIEF COMPLAINT       Chief Complaint   Patient presents with    Tachycardia     Sent from  Office for tachycardia       HISTORY OF PRESENT ILLNESS: 1 or more Elements   History From: Patient and daughter            Alanna Tamez is a 79 y.o. female who presents to the emergency department for evaluation of atrial fibrillation with RVR.  Patient has known history.  Patient states that she occasionally feels palpitations.  Patient states she has had this for approximately 5 days.  Patient is on Eliquis and compliant.  Patient denies any fevers or chills or nausea and vomiting.  Patient states she is stressed because she is moving.  Patient denies any decreased p.o. intake.  Patient denies any nausea or vomiting or shortness of breath.  Patient states her only symptom is occasional palpitations.  Patient denies any dizziness or falls.    Review of Systems   Constitutional:  Negative for appetite change, chills, fatigue and fever.   HENT:  Negative for congestion, rhinorrhea and sore throat.    Eyes:  Negative for photophobia and visual disturbance.   Respiratory:  Negative for cough, chest tightness and shortness of breath.    Cardiovascular:  Positive for palpitations. Negative for chest pain.   Gastrointestinal:  Negative for abdominal pain, diarrhea, nausea and vomiting.   Endocrine: Negative for polydipsia and polyuria.   Genitourinary:  Negative for dysuria.   Musculoskeletal:  Negative for back pain and neck pain.   Skin:  Negative for rash.   Neurological:  Negative for dizziness and headaches.         Nursing Notes were all reviewed and agreed with or any disagreements were addressed in the HPI.    REVIEW OF SYSTEMS :      Positives and Pertinent negatives as

## 2025-07-09 NOTE — PROGRESS NOTES
OFFICE VISIT     PRIMARY CARE PHYSICIAN:      James Clayton DO         REVIEWED MEDICATIONS:       Meds:  reviewed.      S: REASON FOR VISIT:       Chief Complaint   Patient presents with    Atrial Fibrillation     Patient has complaints of palpitations, sob          History of Present Illness:      History of Present Illness     Office Visit for A Fib RVR              79 yr old Mrs Tamez with Hx of A Fib, VHD, bradycardia came for Out-pt Echo today, noted to have high heart rates during Echo and bought to OV for evaluation to decide if she needs to go to ER  C/o \"heart racing on and off foe last 5 days - Home BP readings (on her daughter's phone) sometimes low SBP 80-100s  She is under stress, pacing and moving to [place] next week t olive with her daughter.  Denies dizzy or syncope.    No hospitalizations or surgeries since last visit   Compliant with all medications   Faizan any exertional chest pain or short of breath   Active at home             Past Medical History:   Diagnosis Date    ADHD (attention deficit hyperactivity disorder)     Anxiety     Arthritis     Asymptomatic PVCs 04/2012    pt felt flutter, no other sx, holter + pvc's, few runs of SVT   [facility]    Depression     History of Holter monitoring     Hyperlipidemia     Hypothyroidism     Macular degeneration     Nausea & vomiting     Osteoarthritis     Pelvic prolapse     PONV (postoperative nausea and vomiting)     TIA (transient ischemic attack)             Past Surgical History:   Procedure Laterality Date    COLONOSCOPY  2012    ENDOSCOPY, COLON, DIAGNOSTIC      EYE SURGERY      macular hole    HERNIA REPAIR      inguinal    HYSTERECTOMY (CERVIX STATUS UNKNOWN)      OTHER SURGICAL HISTORY  sept 2013    ant elevatetransobturator sling rectocele and cystocele enterocele    SKIN BIOPSY      arms    TRANSESOPHAGEAL ECHOCARDIOGRAM N/A 10/20/2020    TRANSESOPHAGEAL ECHOCARDIOGRAM WITH BUBBLE STUDY performed by Coleman Hoang MD at [facility]

## 2025-07-10 LAB
25(OH)D3 SERPL-MCNC: 31.8 NG/ML (ref 30–100)
ALBUMIN SERPL-MCNC: 3.3 G/DL (ref 3.5–5.2)
ALP SERPL-CCNC: 73 U/L (ref 35–104)
ALT SERPL-CCNC: 10 U/L (ref 0–35)
ANION GAP SERPL CALCULATED.3IONS-SCNC: 11 MMOL/L (ref 7–16)
AST SERPL-CCNC: 20 U/L (ref 0–35)
BASOPHILS # BLD: 0.07 K/UL (ref 0–0.2)
BASOPHILS NFR BLD: 1 % (ref 0–2)
BILIRUB SERPL-MCNC: 0.4 MG/DL (ref 0–1.2)
BUN SERPL-MCNC: 25 MG/DL (ref 8–23)
CALCIUM SERPL-MCNC: 8.3 MG/DL (ref 8.8–10.2)
CHLORIDE SERPL-SCNC: 102 MMOL/L (ref 98–107)
CHOLEST SERPL-MCNC: 127 MG/DL
CO2 SERPL-SCNC: 21 MMOL/L (ref 22–29)
CREAT SERPL-MCNC: 1.2 MG/DL (ref 0.5–1)
ECHO AO ASC DIAM: 2.6 CM
ECHO AO ASCENDING AORTA INDEX: 1.72 CM/M2
ECHO AV AREA PEAK VELOCITY: 1.2 CM2
ECHO AV AREA VTI: 1.1 CM2
ECHO AV AREA/BSA PEAK VELOCITY: 0.8 CM2/M2
ECHO AV AREA/BSA VTI: 0.7 CM2/M2
ECHO AV CUSP MM: 1.4 CM
ECHO AV MEAN GRADIENT: 7 MMHG
ECHO AV MEAN VELOCITY: 1.2 M/S
ECHO AV PEAK GRADIENT: 14 MMHG
ECHO AV PEAK VELOCITY: 1.8 M/S
ECHO AV VELOCITY RATIO: 0.39
ECHO AV VTI: 27.7 CM
ECHO BSA: 1.52 M2
ECHO EST RA PRESSURE: 3 MMHG
ECHO LA DIAMETER INDEX: 3.38 CM/M2
ECHO LA DIAMETER: 5.1 CM
ECHO LA VOL A-L A2C: 53 ML (ref 22–52)
ECHO LA VOL A-L A4C: 118 ML (ref 22–52)
ECHO LA VOL MOD A2C: 51 ML (ref 22–52)
ECHO LA VOL MOD A4C: 109 ML (ref 22–52)
ECHO LA VOLUME AREA LENGTH: 95 ML
ECHO LA VOLUME INDEX A-L A2C: 35 ML/M2 (ref 16–34)
ECHO LA VOLUME INDEX A-L A4C: 78 ML/M2 (ref 16–34)
ECHO LA VOLUME INDEX AREA LENGTH: 63 ML/M2 (ref 16–34)
ECHO LA VOLUME INDEX MOD A2C: 34 ML/M2 (ref 16–34)
ECHO LA VOLUME INDEX MOD A4C: 72 ML/M2 (ref 16–34)
ECHO LV EF PHYSICIAN: 40 %
ECHO LV EJECTION FRACTION A4C: 36 %
ECHO LV FRACTIONAL SHORTENING: 18 % (ref 28–44)
ECHO LV INTERNAL DIMENSION DIASTOLE INDEX: 3.31 CM/M2
ECHO LV INTERNAL DIMENSION DIASTOLIC: 5 CM (ref 3.9–5.3)
ECHO LV INTERNAL DIMENSION SYSTOLIC INDEX: 2.72 CM/M2
ECHO LV INTERNAL DIMENSION SYSTOLIC: 4.1 CM
ECHO LV ISOVOLUMETRIC RELAXATION TIME (IVRT): 79.6 MS
ECHO LV IVSD: 1 CM (ref 0.6–0.9)
ECHO LV IVSS: 1.3 CM
ECHO LV MASS 2D: 169.9 G (ref 67–162)
ECHO LV MASS INDEX 2D: 112.5 G/M2 (ref 43–95)
ECHO LV POSTERIOR WALL DIASTOLIC: 0.9 CM (ref 0.6–0.9)
ECHO LV POSTERIOR WALL SYSTOLIC: 1.2 CM
ECHO LV RELATIVE WALL THICKNESS RATIO: 0.36
ECHO LVOT AREA: 3.1 CM2
ECHO LVOT AV VTI INDEX: 0.35
ECHO LVOT DIAM: 2 CM
ECHO LVOT MEAN GRADIENT: 1 MMHG
ECHO LVOT PEAK GRADIENT: 2 MMHG
ECHO LVOT PEAK VELOCITY: 0.7 M/S
ECHO LVOT STROKE VOLUME INDEX: 20.4 ML/M2
ECHO LVOT SV: 30.8 ML
ECHO LVOT VTI: 9.8 CM
ECHO MV AREA PHT: 4.2 CM2
ECHO MV AREA VTI: 2 CM2
ECHO MV E DECELERATION TIME (DT): 150.4 MS
ECHO MV EROA PISA: 0.2 CM2
ECHO MV LVOT VTI INDEX: 1.56
ECHO MV MAX VELOCITY: 1.2 M/S
ECHO MV MEAN GRADIENT: 3 MMHG
ECHO MV MEAN VELOCITY: 0.8 M/S
ECHO MV PEAK GRADIENT: 6 MMHG
ECHO MV PRESSURE HALF TIME (PHT): 52.9 MS
ECHO MV REGURGITANT ALIASING (NYQUIST) VELOCITY: 19 CM/S
ECHO MV REGURGITANT RADIUS PISA: 0.97 CM
ECHO MV REGURGITANT VELOCITY PISA: 5.1 M/S
ECHO MV REGURGITANT VOLUME PISA: 32.16 ML
ECHO MV REGURGITANT VTIA: 146.1 CM
ECHO MV VTI: 15.3 CM
ECHO PV MAX VELOCITY: 1 M/S
ECHO PV MEAN GRADIENT: 3 MMHG
ECHO PV MEAN VELOCITY: 0.8 M/S
ECHO PV PEAK GRADIENT: 4 MMHG
ECHO PV VTI: 18.1 CM
ECHO RIGHT VENTRICULAR SYSTOLIC PRESSURE (RVSP): 38 MMHG
ECHO RV INTERNAL DIMENSION: 2.7 CM
ECHO TV REGURGITANT MAX VELOCITY: 2.95 M/S
ECHO TV REGURGITANT PEAK GRADIENT: 35 MMHG
EOSINOPHIL # BLD: 0.09 K/UL (ref 0.05–0.5)
EOSINOPHILS RELATIVE PERCENT: 1 % (ref 0–6)
ERYTHROCYTE [DISTWIDTH] IN BLOOD BY AUTOMATED COUNT: 14.4 % (ref 11.5–15)
FOLATE SERPL-MCNC: 17.9 NG/ML (ref 4.6–34.8)
GFR, ESTIMATED: 46 ML/MIN/1.73M2
GLUCOSE SERPL-MCNC: 102 MG/DL (ref 74–99)
HCT VFR BLD AUTO: 33.8 % (ref 34–48)
HDLC SERPL-MCNC: 42 MG/DL
HGB BLD-MCNC: 11.2 G/DL (ref 11.5–15.5)
IMM GRANULOCYTES # BLD AUTO: 0.06 K/UL (ref 0–0.58)
IMM GRANULOCYTES NFR BLD: 1 % (ref 0–5)
IRON SATN MFR SERPL: 11 % (ref 15–50)
IRON SERPL-MCNC: 20 UG/DL (ref 37–145)
LDLC SERPL CALC-MCNC: 71 MG/DL
LYMPHOCYTES NFR BLD: 0.83 K/UL (ref 1.5–4)
LYMPHOCYTES RELATIVE PERCENT: 7 % (ref 20–42)
MAGNESIUM SERPL-MCNC: 2.1 MG/DL (ref 1.6–2.4)
MCH RBC QN AUTO: 30.4 PG (ref 26–35)
MCHC RBC AUTO-ENTMCNC: 33.1 G/DL (ref 32–34.5)
MCV RBC AUTO: 91.8 FL (ref 80–99.9)
MONOCYTES NFR BLD: 0.91 K/UL (ref 0.1–0.95)
MONOCYTES NFR BLD: 8 % (ref 2–12)
NEUTROPHILS NFR BLD: 83 % (ref 43–80)
NEUTS SEG NFR BLD: 9.7 K/UL (ref 1.8–7.3)
PHOSPHATE SERPL-MCNC: 3 MG/DL (ref 2.5–4.5)
PLATELET # BLD AUTO: 360 K/UL (ref 130–450)
PMV BLD AUTO: 11.4 FL (ref 7–12)
POTASSIUM SERPL-SCNC: 4.3 MMOL/L (ref 3.5–5.1)
PROT SERPL-MCNC: 6.5 G/DL (ref 6.4–8.3)
RBC # BLD AUTO: 3.68 M/UL (ref 3.5–5.5)
SODIUM SERPL-SCNC: 134 MMOL/L (ref 136–145)
T4 FREE SERPL-MCNC: 1.6 NG/DL (ref 0.9–1.7)
TIBC SERPL-MCNC: 185 UG/DL (ref 250–450)
TRIGL SERPL-MCNC: 72 MG/DL
TROPONIN I SERPL HS-MCNC: 20 NG/L (ref 0–14)
TSH SERPL DL<=0.05 MIU/L-ACNC: 0.9 UIU/ML (ref 0.27–4.2)
VIT B12 SERPL-MCNC: 353 PG/ML (ref 232–1245)
VLDLC SERPL CALC-MCNC: 14 MG/DL
WBC OTHER # BLD: 11.7 K/UL (ref 4.5–11.5)

## 2025-07-10 PROCEDURE — 6370000000 HC RX 637 (ALT 250 FOR IP): Performed by: INTERNAL MEDICINE

## 2025-07-10 PROCEDURE — 84439 ASSAY OF FREE THYROXINE: CPT

## 2025-07-10 PROCEDURE — 93005 ELECTROCARDIOGRAM TRACING: CPT

## 2025-07-10 PROCEDURE — 83540 ASSAY OF IRON: CPT

## 2025-07-10 PROCEDURE — 80061 LIPID PANEL: CPT

## 2025-07-10 PROCEDURE — 83735 ASSAY OF MAGNESIUM: CPT

## 2025-07-10 PROCEDURE — 2060000000 HC ICU INTERMEDIATE R&B

## 2025-07-10 PROCEDURE — 84484 ASSAY OF TROPONIN QUANT: CPT

## 2025-07-10 PROCEDURE — 6360000002 HC RX W HCPCS

## 2025-07-10 PROCEDURE — 84443 ASSAY THYROID STIM HORMONE: CPT

## 2025-07-10 PROCEDURE — 83550 IRON BINDING TEST: CPT

## 2025-07-10 PROCEDURE — APPSS180 APP SPLIT SHARED TIME > 60 MINUTES: Performed by: NURSE PRACTITIONER

## 2025-07-10 PROCEDURE — 93306 TTE W/DOPPLER COMPLETE: CPT | Performed by: INTERNAL MEDICINE

## 2025-07-10 PROCEDURE — 36415 COLL VENOUS BLD VENIPUNCTURE: CPT

## 2025-07-10 PROCEDURE — 80053 COMPREHEN METABOLIC PANEL: CPT

## 2025-07-10 PROCEDURE — 2580000003 HC RX 258

## 2025-07-10 PROCEDURE — 82306 VITAMIN D 25 HYDROXY: CPT

## 2025-07-10 PROCEDURE — 82746 ASSAY OF FOLIC ACID SERUM: CPT

## 2025-07-10 PROCEDURE — 82607 VITAMIN B-12: CPT

## 2025-07-10 PROCEDURE — 6370000000 HC RX 637 (ALT 250 FOR IP)

## 2025-07-10 PROCEDURE — 99223 1ST HOSP IP/OBS HIGH 75: CPT | Performed by: INTERNAL MEDICINE

## 2025-07-10 PROCEDURE — 84100 ASSAY OF PHOSPHORUS: CPT

## 2025-07-10 PROCEDURE — 85025 COMPLETE CBC W/AUTO DIFF WBC: CPT

## 2025-07-10 RX ORDER — METOPROLOL SUCCINATE 25 MG/1
25 TABLET, EXTENDED RELEASE ORAL DAILY
Status: DISCONTINUED | OUTPATIENT
Start: 2025-07-10 | End: 2025-07-12

## 2025-07-10 RX ADMIN — MEMANTINE 5 MG: 5 TABLET ORAL at 21:49

## 2025-07-10 RX ADMIN — APIXABAN 2.5 MG: 2.5 TABLET, FILM COATED ORAL at 09:11

## 2025-07-10 RX ADMIN — BUSPIRONE HYDROCHLORIDE 10 MG: 10 TABLET ORAL at 09:11

## 2025-07-10 RX ADMIN — METOPROLOL SUCCINATE 25 MG: 25 TABLET, EXTENDED RELEASE ORAL at 12:04

## 2025-07-10 RX ADMIN — POTASSIUM CHLORIDE 10 MEQ: 750 TABLET, EXTENDED RELEASE ORAL at 21:49

## 2025-07-10 RX ADMIN — PANTOPRAZOLE SODIUM 40 MG: 40 TABLET, DELAYED RELEASE ORAL at 06:35

## 2025-07-10 RX ADMIN — FOLIC ACID 1000 MCG: 1 TABLET ORAL at 09:11

## 2025-07-10 RX ADMIN — APIXABAN 2.5 MG: 2.5 TABLET, FILM COATED ORAL at 21:50

## 2025-07-10 RX ADMIN — ATORVASTATIN CALCIUM 20 MG: 20 TABLET, FILM COATED ORAL at 21:50

## 2025-07-10 RX ADMIN — Medication 2000 UNITS: at 09:11

## 2025-07-10 RX ADMIN — MEMANTINE 5 MG: 5 TABLET ORAL at 09:11

## 2025-07-10 RX ADMIN — LISINOPRIL 5 MG: 5 TABLET ORAL at 09:11

## 2025-07-10 RX ADMIN — POTASSIUM CHLORIDE 10 MEQ: 750 TABLET, EXTENDED RELEASE ORAL at 09:11

## 2025-07-10 RX ADMIN — LEVOTHYROXINE SODIUM 25 MCG: 0.03 TABLET ORAL at 06:35

## 2025-07-10 RX ADMIN — DONEPEZIL HYDROCHLORIDE 10 MG: 5 TABLET, FILM COATED ORAL at 21:49

## 2025-07-10 RX ADMIN — BUSPIRONE HYDROCHLORIDE 10 MG: 10 TABLET ORAL at 21:50

## 2025-07-10 RX ADMIN — DILTIAZEM HYDROCHLORIDE 15 MG/HR: 5 INJECTION INTRAVENOUS at 07:34

## 2025-07-10 NOTE — H&P
4 Eyes Skin Assessment     NAME:  Alanna Tamez  YOB: 1945  MEDICAL RECORD NUMBER:  [id number]    The patient is being assessed for  Admission    I agree that at least one RN has performed a thorough Head to Toe Skin Assessment on the patient. ALL assessment sites listed below have been assessed.      Areas assessed by both nurses:    Head, Face, Ears, Shoulders, Back, Chest, Arms, Elbows, Hands, Sacrum. Buttock, Coccyx, Ischium, Legs. Feet and Heels, and Under Medical Devices         Does the Patient have a Wound? No noted wound(s)       David Prevention initiated by RN: No  Wound Care Orders initiated by RN: No    Pressure Injury (Stage 1,2,3,4, Unstageable, DTI, NWPT, and Complex wounds) if present, place Wound referral order by RN under : No    New Ostomies, if present place, Ostomy referral order under : No     Nurse 1 eSignature: Electronically signed by Davi Aguilar RN on 7/10/25 at 1:13 AM EDT    **SHARE this note so that the co-signing nurse can place an eSignature**    Nurse 2 eSignature: Electronically signed by Ree Huang RN on 7/10/25 at 1:14 AM EDT    
   Sexual activity: Not Currently     Partners: Male     Birth control/protection: Post-menopausal   Other Topics Concern    Not on file   Social History Narrative    Not on file     Social Drivers of Health     Financial Resource Strain: Patient Declined (10/12/2024)    Overall Financial Resource Strain (CARDIA)     Difficulty of Paying Living Expenses: Patient declined   Food Insecurity: Patient Declined (1/16/2025)    Hunger Vital Sign     Worried About Running Out of Food in the Last Year: Patient declined     Ran Out of Food in the Last Year: Patient declined   Transportation Needs: Patient Declined (1/16/2025)    PRAPARE - Transportation     Lack of Transportation (Medical): Patient declined     Lack of Transportation (Non-Medical): Patient declined   Physical Activity: Unknown (10/12/2024)    Exercise Vital Sign     Days of Exercise per Week: Patient declined     Minutes of Exercise per Session: Not on file   Stress: Stress Concern Present (3/9/2022)    English [place] [employer] - Occupational Stress Questionnaire     Feeling of Stress : To some extent   Social Connections: Moderately Integrated (3/9/2022)    Social Connection and Isolation Panel [NHANES]     Frequency of Communication with Friends and Family: More than three times a week     Frequency of Social Gatherings with Friends and Family: More than three times a week     Attends Shinto Services: More than 4 times per year     Active Member of Clubs or Organizations: Yes     Attends Club or Organization Meetings: More than 4 times per year     Marital Status:    Intimate Partner Violence: Not on file   Housing Stability: Patient Declined (1/16/2025)    Housing Stability Vital Sign     Unable to Pay for Housing in the Last Year: Patient declined     Number of Times Moved in the Last Year: 0     Homeless in the Last Year: Patient declined       ROS:  General:   Denies chills, fatigue, fever, malaise, night sweats or weight

## 2025-07-10 NOTE — PLAN OF CARE
Problem: Chronic Conditions and Co-morbidities  Goal: Patient's chronic conditions and co-morbidity symptoms are monitored and maintained or improved  7/10/2025 1400 by Eunice Brito RN  Outcome: Progressing  7/10/2025 0641 by Davi Aguilar RN  Outcome: Progressing     Problem: Discharge Planning  Goal: Discharge to home or other facility with appropriate resources  7/10/2025 1400 by Eunice Brito RN  Outcome: Progressing  7/10/2025 0641 by Davi Aguilar RN  Outcome: Progressing     Problem: Safety - Adult  Goal: Free from fall injury  7/10/2025 1400 by Eunice Brito RN  Outcome: Progressing  Flowsheets (Taken 7/10/2025 0815)  Free From Fall Injury: Instruct family/caregiver on patient safety  7/10/2025 0641 by Davi Aguilar RN  Outcome: Progressing     Problem: Skin/Tissue Integrity  Goal: Skin integrity remains intact  Description: 1.  Monitor for areas of redness and/or skin breakdown  2.  Assess vascular access sites hourly  3.  Every 4-6 hours minimum:  Change oxygen saturation probe site  4.  Every 4-6 hours:  If on nasal continuous positive airway pressure, respiratory therapy assess nares and determine need for appliance change or resting period  7/10/2025 1400 by Eunice Brito RN  Outcome: Progressing  Flowsheets (Taken 7/10/2025 0815)  Skin Integrity Remains Intact: Monitor for areas of redness and/or skin breakdown  7/10/2025 0641 by Davi Aguilar RN  Outcome: Progressing

## 2025-07-10 NOTE — CONSULTS
normal.   No comparison study available in the local [facility].    10/8/2020 - 11/6/2020 mobile cardiac telemetry (interpreting physician Dr. Hoang)  No A-fib  Sinus rhythm  Frequent PVCs and SVT ectopy    12/26/2021 Lexiscan (Dr. Gustafson):  Gated SPECT left ventricular ejection fraction was calculated to be  53%, with normal wall motion.  IMPRESSION:  The myocardial perfusion imaging was normal.  Overall left ventricular systolic function was normal without regional wall motion abnormalities.   Low risk study.    1/31/2022 TTE (Dr. Hoang):  Summary   No thrombus in left atrium or left atrial appendage.   Normal left ventricular chamber size.   Moderately reduced LV function, EF about 35%.   Left atrium is enlarged.   Interatrial septum intact.   Normal right ventricle structure and function.   Right atrium is enlarged.   There is moderate mitral regurgitation.   No mitral valve prolapse.   There is mild to moderate aortic regurgitation.   There is mild tricuspid regurgitation.   Normal aortic root size.   No evidence of pericardial effusion.   No intra cardiac mass.  Compared to prior TTE echo from 1/28/22 and 10/2021/    2/22/2022 MARYLOU    Summary   Ejection fraction is visually estimated at 40-45%.   Atrial fibrillation affects the accuracy of interpretation.   Right ventricle global systolic function is low normal .   No evidence of thrombus within left atrium/ left atrial appendage.   Emptying velocity is low at 19 cms/s.   No evidence of thrombus or mass in the right atrium.   Mild to moderate mitral regurgitation is present.   Mild-to-moderate aortic regurgitation is noted.   Mild to moderate tricuspid regurgitation.   Mild-moderate focal atherosclerosis in the descending aorta.    Past Surgical History:    Past Surgical History:   Procedure Laterality Date    COLONOSCOPY  2012    ENDOSCOPY, COLON, DIAGNOSTIC      EYE SURGERY      macular hole    HERNIA REPAIR      inguinal    HYSTERECTOMY (CERVIX

## 2025-07-11 ENCOUNTER — HOSPITAL ENCOUNTER (INPATIENT)
Age: 80
Discharge: HOME | DRG: 308 | End: 2025-07-13
Attending: INTERNAL MEDICINE
Payer: MEDICARE

## 2025-07-11 ENCOUNTER — ANESTHESIA (OUTPATIENT)
Age: 80
End: 2025-07-11
Payer: MEDICARE

## 2025-07-11 ENCOUNTER — ANESTHESIA EVENT (OUTPATIENT)
Age: 80
End: 2025-07-11
Payer: MEDICARE

## 2025-07-11 ENCOUNTER — APPOINTMENT (OUTPATIENT)
Dept: NUCLEAR MEDICINE | Age: 80
DRG: 308 | End: 2025-07-11
Attending: INTERNAL MEDICINE
Payer: MEDICARE

## 2025-07-11 VITALS
HEART RATE: 67 BPM | RESPIRATION RATE: 28 BRPM | TEMPERATURE: 98.5 F | SYSTOLIC BLOOD PRESSURE: 107 MMHG | DIASTOLIC BLOOD PRESSURE: 56 MMHG | OXYGEN SATURATION: 94 %

## 2025-07-11 LAB
ALBUMIN SERPL-MCNC: 3.3 G/DL (ref 3.5–5.2)
ALP SERPL-CCNC: 75 U/L (ref 35–104)
ALT SERPL-CCNC: 10 U/L (ref 0–35)
ANION GAP SERPL CALCULATED.3IONS-SCNC: 11 MMOL/L (ref 7–16)
AST SERPL-CCNC: 20 U/L (ref 0–35)
BASOPHILS # BLD: 0.07 K/UL (ref 0–0.2)
BASOPHILS NFR BLD: 1 % (ref 0–2)
BILIRUB SERPL-MCNC: 0.4 MG/DL (ref 0–1.2)
BUN SERPL-MCNC: 22 MG/DL (ref 8–23)
CALCIUM SERPL-MCNC: 8.4 MG/DL (ref 8.8–10.2)
CHLORIDE SERPL-SCNC: 104 MMOL/L (ref 98–107)
CO2 SERPL-SCNC: 21 MMOL/L (ref 22–29)
CREAT SERPL-MCNC: 1.1 MG/DL (ref 0.5–1)
ECHO BSA: 1.52 M2
ECHO EST RA PRESSURE: 8 MMHG
ECHO LV EF PHYSICIAN: 45 %
ECHO MV EROA PISA: 0.2 CM2
ECHO MV MAX VELOCITY: 1.4 M/S
ECHO MV MEAN GRADIENT: 3 MMHG
ECHO MV MEAN VELOCITY: 0.8 M/S
ECHO MV PEAK GRADIENT: 7 MMHG
ECHO MV REGURGITANT ALIASING (NYQUIST) VELOCITY: 39 CM/S
ECHO MV REGURGITANT RADIUS PISA: 0.63 CM
ECHO MV REGURGITANT VELOCITY PISA: 5.2 M/S
ECHO MV REGURGITANT VOLUME PISA: 27.65 ML
ECHO MV REGURGITANT VTIA: 147.9 CM
ECHO MV VTI: 13.6 CM
ECHO RIGHT VENTRICULAR SYSTOLIC PRESSURE (RVSP): 37 MMHG
ECHO TV REGURGITANT MAX VELOCITY: 2.7 M/S
EKG ATRIAL RATE: 277 BPM
EKG ATRIAL RATE: 59 BPM
EKG P AXIS: 68 DEGREES
EKG P-R INTERVAL: 168 MS
EKG Q-T INTERVAL: 374 MS
EKG Q-T INTERVAL: 408 MS
EKG QRS DURATION: 88 MS
EKG QRS DURATION: 92 MS
EKG QTC CALCULATION (BAZETT): 403 MS
EKG QTC CALCULATION (BAZETT): 415 MS
EKG R AXIS: 27 DEGREES
EKG R AXIS: 28 DEGREES
EKG T AXIS: 46 DEGREES
EKG T AXIS: 67 DEGREES
EKG VENTRICULAR RATE: 59 BPM
EKG VENTRICULAR RATE: 74 BPM
EOSINOPHIL # BLD: 0.19 K/UL (ref 0.05–0.5)
EOSINOPHILS RELATIVE PERCENT: 2 % (ref 0–6)
ERYTHROCYTE [DISTWIDTH] IN BLOOD BY AUTOMATED COUNT: 14.5 % (ref 11.5–15)
GFR, ESTIMATED: 50 ML/MIN/1.73M2
GLUCOSE SERPL-MCNC: 95 MG/DL (ref 74–99)
HCT VFR BLD AUTO: 35.4 % (ref 34–48)
HGB BLD-MCNC: 11.5 G/DL (ref 11.5–15.5)
IMM GRANULOCYTES # BLD AUTO: 0.05 K/UL (ref 0–0.58)
IMM GRANULOCYTES NFR BLD: 0 % (ref 0–5)
LYMPHOCYTES NFR BLD: 1.01 K/UL (ref 1.5–4)
LYMPHOCYTES RELATIVE PERCENT: 9 % (ref 20–42)
MAGNESIUM SERPL-MCNC: 2.2 MG/DL (ref 1.6–2.4)
MCH RBC QN AUTO: 30.2 PG (ref 26–35)
MCHC RBC AUTO-ENTMCNC: 32.5 G/DL (ref 32–34.5)
MCV RBC AUTO: 92.9 FL (ref 80–99.9)
MONOCYTES NFR BLD: 1.01 K/UL (ref 0.1–0.95)
MONOCYTES NFR BLD: 9 % (ref 2–12)
NEUTROPHILS NFR BLD: 79 % (ref 43–80)
NEUTS SEG NFR BLD: 8.83 K/UL (ref 1.8–7.3)
PHOSPHATE SERPL-MCNC: 2.7 MG/DL (ref 2.5–4.5)
PLATELET # BLD AUTO: 353 K/UL (ref 130–450)
PMV BLD AUTO: 11 FL (ref 7–12)
POTASSIUM SERPL-SCNC: 4.7 MMOL/L (ref 3.5–5.1)
PROT SERPL-MCNC: 6.8 G/DL (ref 6.4–8.3)
RBC # BLD AUTO: 3.81 M/UL (ref 3.5–5.5)
SODIUM SERPL-SCNC: 136 MMOL/L (ref 136–145)
WBC OTHER # BLD: 11.2 K/UL (ref 4.5–11.5)

## 2025-07-11 PROCEDURE — 80053 COMPREHEN METABOLIC PANEL: CPT

## 2025-07-11 PROCEDURE — 3700000001 HC ADD 15 MINUTES (ANESTHESIA)

## 2025-07-11 PROCEDURE — 6370000000 HC RX 637 (ALT 250 FOR IP): Performed by: INTERNAL MEDICINE

## 2025-07-11 PROCEDURE — 7100000001 HC PACU RECOVERY - ADDTL 15 MIN

## 2025-07-11 PROCEDURE — 84100 ASSAY OF PHOSPHORUS: CPT

## 2025-07-11 PROCEDURE — 93010 ELECTROCARDIOGRAM REPORT: CPT | Performed by: INTERNAL MEDICINE

## 2025-07-11 PROCEDURE — 3700000000 HC ANESTHESIA ATTENDED CARE

## 2025-07-11 PROCEDURE — 85025 COMPLETE CBC W/AUTO DIFF WBC: CPT

## 2025-07-11 PROCEDURE — 7100000000 HC PACU RECOVERY - FIRST 15 MIN

## 2025-07-11 PROCEDURE — 6360000002 HC RX W HCPCS: Performed by: NURSE ANESTHETIST, CERTIFIED REGISTERED

## 2025-07-11 PROCEDURE — 5A2204Z RESTORATION OF CARDIAC RHYTHM, SINGLE: ICD-10-PCS | Performed by: INTERNAL MEDICINE

## 2025-07-11 PROCEDURE — 2500000003 HC RX 250 WO HCPCS: Performed by: NURSE ANESTHETIST, CERTIFIED REGISTERED

## 2025-07-11 PROCEDURE — 93320 DOPPLER ECHO COMPLETE: CPT | Performed by: INTERNAL MEDICINE

## 2025-07-11 PROCEDURE — 92960 CARDIOVERSION ELECTRIC EXT: CPT | Performed by: INTERNAL MEDICINE

## 2025-07-11 PROCEDURE — 2060000000 HC ICU INTERMEDIATE R&B

## 2025-07-11 PROCEDURE — 36415 COLL VENOUS BLD VENIPUNCTURE: CPT

## 2025-07-11 PROCEDURE — 93005 ELECTROCARDIOGRAM TRACING: CPT | Performed by: INTERNAL MEDICINE

## 2025-07-11 PROCEDURE — 93325 DOPPLER ECHO COLOR FLOW MAPG: CPT | Performed by: INTERNAL MEDICINE

## 2025-07-11 PROCEDURE — 92960 CARDIOVERSION ELECTRIC EXT: CPT

## 2025-07-11 PROCEDURE — B24BZZ4 ULTRASONOGRAPHY OF HEART WITH AORTA, TRANSESOPHAGEAL: ICD-10-PCS | Performed by: INTERNAL MEDICINE

## 2025-07-11 PROCEDURE — 83735 ASSAY OF MAGNESIUM: CPT

## 2025-07-11 PROCEDURE — 93312 ECHO TRANSESOPHAGEAL: CPT | Performed by: INTERNAL MEDICINE

## 2025-07-11 PROCEDURE — 2580000003 HC RX 258: Performed by: NURSE ANESTHETIST, CERTIFIED REGISTERED

## 2025-07-11 PROCEDURE — 6370000000 HC RX 637 (ALT 250 FOR IP)

## 2025-07-11 PROCEDURE — 99233 SBSQ HOSP IP/OBS HIGH 50: CPT | Performed by: INTERNAL MEDICINE

## 2025-07-11 RX ORDER — IPRATROPIUM BROMIDE AND ALBUTEROL SULFATE 2.5; .5 MG/3ML; MG/3ML
1 SOLUTION RESPIRATORY (INHALATION)
Status: CANCELLED | OUTPATIENT
Start: 2025-07-11

## 2025-07-11 RX ORDER — EPHEDRINE SULFATE/0.9% NACL/PF 25 MG/5 ML
SYRINGE (ML) INTRAVENOUS
Status: DISCONTINUED | OUTPATIENT
Start: 2025-07-11 | End: 2025-07-11 | Stop reason: SDUPTHER

## 2025-07-11 RX ORDER — MIDAZOLAM HYDROCHLORIDE 1 MG/ML
2 INJECTION, SOLUTION INTRAMUSCULAR; INTRAVENOUS
Status: CANCELLED | OUTPATIENT
Start: 2025-07-11

## 2025-07-11 RX ORDER — HYDRALAZINE HYDROCHLORIDE 20 MG/ML
10 INJECTION INTRAMUSCULAR; INTRAVENOUS
Status: CANCELLED | OUTPATIENT
Start: 2025-07-11

## 2025-07-11 RX ORDER — LABETALOL HYDROCHLORIDE 5 MG/ML
10 INJECTION, SOLUTION INTRAVENOUS
Status: CANCELLED | OUTPATIENT
Start: 2025-07-11

## 2025-07-11 RX ORDER — FENTANYL CITRATE 0.05 MG/ML
25 INJECTION, SOLUTION INTRAMUSCULAR; INTRAVENOUS EVERY 5 MIN PRN
Refills: 0 | Status: CANCELLED | OUTPATIENT
Start: 2025-07-11

## 2025-07-11 RX ORDER — PROPOFOL 10 MG/ML
INJECTION, EMULSION INTRAVENOUS
Status: DISCONTINUED | OUTPATIENT
Start: 2025-07-11 | End: 2025-07-11 | Stop reason: SDUPTHER

## 2025-07-11 RX ORDER — PROCHLORPERAZINE EDISYLATE 5 MG/ML
5 INJECTION INTRAMUSCULAR; INTRAVENOUS
Status: CANCELLED | OUTPATIENT
Start: 2025-07-11

## 2025-07-11 RX ORDER — DROPERIDOL 2.5 MG/ML
0.62 INJECTION, SOLUTION INTRAMUSCULAR; INTRAVENOUS
Status: CANCELLED | OUTPATIENT
Start: 2025-07-11

## 2025-07-11 RX ORDER — DIPHENHYDRAMINE HYDROCHLORIDE 50 MG/ML
12.5 INJECTION, SOLUTION INTRAMUSCULAR; INTRAVENOUS
Status: CANCELLED | OUTPATIENT
Start: 2025-07-11

## 2025-07-11 RX ORDER — ETOMIDATE 2 MG/ML
INJECTION INTRAVENOUS
Status: DISCONTINUED | OUTPATIENT
Start: 2025-07-11 | End: 2025-07-11 | Stop reason: SDUPTHER

## 2025-07-11 RX ORDER — SODIUM CHLORIDE 0.9 % (FLUSH) 0.9 %
5-40 SYRINGE (ML) INJECTION PRN
Status: CANCELLED | OUTPATIENT
Start: 2025-07-11

## 2025-07-11 RX ORDER — EPHEDRINE SULFATE/0.9% NACL/PF 25 MG/5 ML
SYRINGE (ML) INTRAVENOUS
Status: COMPLETED
Start: 2025-07-11 | End: 2025-07-11

## 2025-07-11 RX ORDER — SODIUM CHLORIDE 9 MG/ML
INJECTION, SOLUTION INTRAVENOUS PRN
Status: CANCELLED | OUTPATIENT
Start: 2025-07-11

## 2025-07-11 RX ORDER — SODIUM CHLORIDE 0.9 % (FLUSH) 0.9 %
5-40 SYRINGE (ML) INJECTION EVERY 12 HOURS SCHEDULED
Status: CANCELLED | OUTPATIENT
Start: 2025-07-11

## 2025-07-11 RX ORDER — SODIUM CHLORIDE 9 MG/ML
INJECTION, SOLUTION INTRAVENOUS
Status: DISCONTINUED | OUTPATIENT
Start: 2025-07-11 | End: 2025-07-11 | Stop reason: SDUPTHER

## 2025-07-11 RX ORDER — TORSEMIDE 20 MG/1
20 TABLET ORAL DAILY
Status: DISCONTINUED | OUTPATIENT
Start: 2025-07-11 | End: 2025-07-13

## 2025-07-11 RX ADMIN — EPHEDRINE SULFATE 5 MG: 5 INJECTION INTRAVENOUS at 11:39

## 2025-07-11 RX ADMIN — PHENYLEPHRINE HYDROCHLORIDE 100 MCG: 10 INJECTION INTRAVENOUS at 11:18

## 2025-07-11 RX ADMIN — MEMANTINE 5 MG: 5 TABLET ORAL at 09:05

## 2025-07-11 RX ADMIN — BUSPIRONE HYDROCHLORIDE 10 MG: 10 TABLET ORAL at 09:04

## 2025-07-11 RX ADMIN — LEVOTHYROXINE SODIUM 25 MCG: 0.03 TABLET ORAL at 09:04

## 2025-07-11 RX ADMIN — PHENYLEPHRINE HYDROCHLORIDE 50 MCG: 10 INJECTION INTRAVENOUS at 11:31

## 2025-07-11 RX ADMIN — PANTOPRAZOLE SODIUM 40 MG: 40 TABLET, DELAYED RELEASE ORAL at 09:04

## 2025-07-11 RX ADMIN — EPHEDRINE SULFATE 7.5 MG: 5 INJECTION INTRAVENOUS at 11:46

## 2025-07-11 RX ADMIN — PROPOFOL 20 MG: 10 INJECTION, EMULSION INTRAVENOUS at 11:14

## 2025-07-11 RX ADMIN — EPHEDRINE SULFATE 10 MG: 5 INJECTION INTRAVENOUS at 11:42

## 2025-07-11 RX ADMIN — PHENYLEPHRINE HYDROCHLORIDE 50 MCG: 10 INJECTION INTRAVENOUS at 11:26

## 2025-07-11 RX ADMIN — APIXABAN 2.5 MG: 2.5 TABLET, FILM COATED ORAL at 20:57

## 2025-07-11 RX ADMIN — ETOMIDATE 2 MG: 2 INJECTION, SOLUTION INTRAVENOUS at 11:17

## 2025-07-11 RX ADMIN — DONEPEZIL HYDROCHLORIDE 10 MG: 5 TABLET, FILM COATED ORAL at 20:57

## 2025-07-11 RX ADMIN — BUSPIRONE HYDROCHLORIDE 10 MG: 10 TABLET ORAL at 20:57

## 2025-07-11 RX ADMIN — Medication 2000 UNITS: at 09:04

## 2025-07-11 RX ADMIN — ETOMIDATE 4 MG: 2 INJECTION, SOLUTION INTRAVENOUS at 11:14

## 2025-07-11 RX ADMIN — ETOMIDATE 2 MG: 2 INJECTION, SOLUTION INTRAVENOUS at 11:19

## 2025-07-11 RX ADMIN — POTASSIUM CHLORIDE 10 MEQ: 750 TABLET, EXTENDED RELEASE ORAL at 09:04

## 2025-07-11 RX ADMIN — PRAMIPEXOLE DIHYDROCHLORIDE 0.12 MG: 0.25 TABLET ORAL at 20:58

## 2025-07-11 RX ADMIN — SODIUM CHLORIDE: 9 INJECTION, SOLUTION INTRAVENOUS at 11:13

## 2025-07-11 RX ADMIN — ETOMIDATE 1 MG: 2 INJECTION, SOLUTION INTRAVENOUS at 11:22

## 2025-07-11 RX ADMIN — LISINOPRIL 5 MG: 5 TABLET ORAL at 09:05

## 2025-07-11 RX ADMIN — POTASSIUM CHLORIDE 10 MEQ: 750 TABLET, EXTENDED RELEASE ORAL at 20:57

## 2025-07-11 RX ADMIN — APIXABAN 2.5 MG: 2.5 TABLET, FILM COATED ORAL at 09:04

## 2025-07-11 RX ADMIN — EPHEDRINE SULFATE 2.5 MG: 5 INJECTION INTRAVENOUS at 11:37

## 2025-07-11 RX ADMIN — FOLIC ACID 1000 MCG: 1 TABLET ORAL at 09:05

## 2025-07-11 RX ADMIN — ETOMIDATE 1 MG: 2 INJECTION, SOLUTION INTRAVENOUS at 11:23

## 2025-07-11 RX ADMIN — MEMANTINE 5 MG: 5 TABLET ORAL at 20:57

## 2025-07-11 RX ADMIN — ATORVASTATIN CALCIUM 20 MG: 20 TABLET, FILM COATED ORAL at 20:57

## 2025-07-11 RX ADMIN — METOPROLOL SUCCINATE 25 MG: 25 TABLET, EXTENDED RELEASE ORAL at 09:04

## 2025-07-11 NOTE — PLAN OF CARE
Problem: Chronic Conditions and Co-morbidities  Goal: Patient's chronic conditions and co-morbidity symptoms are monitored and maintained or improved  7/11/2025 1638 by Sander Jeff RN  Outcome: Progressing  7/11/2025 0320 by Davi Aguilar RN  Outcome: Progressing     Problem: Discharge Planning  Goal: Discharge to home or other facility with appropriate resources  7/11/2025 1638 by Sander Jeff RN  Outcome: Progressing  7/11/2025 0320 by Davi Aguilar RN  Outcome: Progressing     Problem: Safety - Adult  Goal: Free from fall injury  7/11/2025 1638 by Sander Jeff RN  Outcome: Progressing  7/11/2025 0320 by Davi Aguilar RN  Outcome: Progressing     Problem: Skin/Tissue Integrity  Goal: Skin integrity remains intact  Description: 1.  Monitor for areas of redness and/or skin breakdown  2.  Assess vascular access sites hourly  3.  Every 4-6 hours minimum:  Change oxygen saturation probe site  4.  Every 4-6 hours:  If on nasal continuous positive airway pressure, respiratory therapy assess nares and determine need for appliance change or resting period  7/11/2025 0320 by Davi Aguilar RN  Outcome: Progressing

## 2025-07-11 NOTE — ANESTHESIA PRE PROCEDURE
TABS tablet Take 1 tablet by mouth 2 times daily Start 11/28/2023 9/9/24   Coleman Hoang MD   diclofenac sodium (VOLTAREN) 1 % GEL Apply 2 g topically 4 times daily 10/16/23   James Clayton DO   ipratropium (ATROVENT) 0.06 % nasal spray 2 sprays by Each Nostril route 4 times daily 3/21/23   James Clayton DO   Calcium Polycarbophil (FIBER-CAPS PO) Take 1 capsule by mouth at bedtime  Patient not taking: Reported on 7/9/2025    Provider, MD Jer       Current medications:    No current facility-administered medications for this encounter.     No current outpatient medications on file.     Facility-Administered Medications Ordered in Other Encounters   Medication Dose Route Frequency Provider Last Rate Last Admin    metoprolol succinate (TOPROL XL) extended release tablet 25 mg  25 mg Oral Daily Quintin Gustafson DO   25 mg at 07/11/25 0904    pantoprazole (PROTONIX) tablet 40 mg  40 mg Oral QAM AC Ree Hawthorne APRN - CNP   40 mg at 07/11/25 0904    acetaminophen (TYLENOL) tablet 650 mg  650 mg Oral Q6H PRN Ree Hawthorne APRN - CNP        prochlorperazine (COMPAZINE) injection 10 mg  10 mg IntraVENous Q6H PRN Ree Hawthorne APRN - CNP        melatonin disintegrating tablet 5 mg  5 mg Oral Nightly PRN Ree Hawthorne APRN - CNP   5 mg at 07/09/25 2304    dilTIAZem 125 mg in sodium chloride 0.9 % 125 mL infusion  2.5-15 mg/hr IntraVENous Continuous Ree Hawthorne APRN - CNP   Stopped at 07/10/25 1736    diphenhydrAMINE (BENADRYL) tablet 25 mg  25 mg Oral Nightly PRN Ree Hawthorne APRN - CNP        apixaban (ELIQUIS) tablet 2.5 mg  2.5 mg Oral BID Ree Hawthorne APRN - CNP   2.5 mg at 07/11/25 0904    atorvastatin (LIPITOR) tablet 20 mg  20 mg Oral Nightly Ree Hawthorne APRN - CNP   20 mg at 07/10/25 2150    busPIRone (BUSPAR) tablet 10 mg  10 mg Oral BID Ree Hawthorne APRN - CNP   10 mg at 07/11/25 0904    Vitamin D (CHOLECALCIFEROL) tablet 2,000 Units  2,000 Units Oral Daily 
         Endo/Other:    (+) hypothyroidism, blood dyscrasia (Rx Apixaban): anticoagulation therapy, arthritis: OA..                  ROS comment: Persistent atrial fibrillation (HCC) [I48.19] Abdominal: normal exam            Vascular:          Other Findings:             Anesthesia Plan      MAC     ASA 4       Induction: intravenous.      Anesthetic plan and risks discussed with patient.      Plan discussed with CRNA.              DOS STAFF ADDENDUM:    Pt seen and examined, chart reviewed (including anesthesia, drug and allergy history).       Anesthetic plan, risks, benefits, alternatives, and personnel involved discussed with patient.  Patient verbalized an understanding and agrees to proceed.  Plan discussed with care team members and agreed upon.    Francois Ponce DO  Staff Anesthesiologist  10:57 AM

## 2025-07-11 NOTE — ANESTHESIA POSTPROCEDURE EVALUATION
Department of Anesthesiology  Postprocedure Note    Patient: Alanna Tamez  MRN: [id number]  YOB: 1945  Date of evaluation: 7/11/2025    Procedure Summary       Date: 07/11/25 Room / Location: [facility] PACU; [facility]    Anesthesia Start: 1113 Anesthesia Stop: 1146    Procedure: MARYLOU W/ POSSIBLE CARDIOVERSION (PRN CONTRAST/BUBBLE/3D) Diagnosis: Persistent atrial fibrillation (HCC)    Scheduled Providers: Nubia Dominguez APRN - CRNA Responsible Provider: Francois Ponce DO    Anesthesia Type: MAC ASA Status: 4            Anesthesia Type: No value filed.    Jaxon Phase I: Jaxon Score: 8    Jaxon Phase II:      Anesthesia Post Evaluation    Patient location during evaluation: PACU  Patient participation: complete - patient participated  Level of consciousness: awake and alert  Pain score: 0  Airway patency: patent  Nausea & Vomiting: no nausea and no vomiting  Cardiovascular status: blood pressure returned to baseline and hemodynamically stable  Respiratory status: acceptable  Hydration status: stable  Pain management: adequate    No notable events documented.

## 2025-07-11 NOTE — PLAN OF CARE
Problem: Chronic Conditions and Co-morbidities  Goal: Patient's chronic conditions and co-morbidity symptoms are monitored and maintained or improved  7/11/2025 0320 by Davi Aguilar RN  Outcome: Progressing  7/10/2025 1400 by Eunice Brito RN  Outcome: Progressing     Problem: Discharge Planning  Goal: Discharge to home or other facility with appropriate resources  7/11/2025 0320 by Davi Aguilar RN  Outcome: Progressing  7/10/2025 1400 by Eunice Brito RN  Outcome: Progressing     Problem: Safety - Adult  Goal: Free from fall injury  7/11/2025 0320 by Davi Aguilar RN  Outcome: Progressing  7/10/2025 1400 by Eunice Brito RN  Outcome: Progressing  Flowsheets (Taken 7/10/2025 0815)  Free From Fall Injury: Instruct family/caregiver on patient safety     Problem: Skin/Tissue Integrity  Goal: Skin integrity remains intact  Description: 1.  Monitor for areas of redness and/or skin breakdown  2.  Assess vascular access sites hourly  3.  Every 4-6 hours minimum:  Change oxygen saturation probe site  4.  Every 4-6 hours:  If on nasal continuous positive airway pressure, respiratory therapy assess nares and determine need for appliance change or resting period  7/11/2025 0320 by Davi Aguilar RN  Outcome: Progressing  7/10/2025 1400 by Eunice Brito RN  Outcome: Progressing  Flowsheets (Taken 7/10/2025 0815)  Skin Integrity Remains Intact: Monitor for areas of redness and/or skin breakdown

## 2025-07-11 NOTE — ACP (ADVANCE CARE PLANNING)
Advance Care Planning   Healthcare Decision Maker:    Primary Decision Maker: Vasile MartinesinTariqLaw - [phone]    Click here to complete Healthcare Decision Makers including selection of the Healthcare Decision Maker Relationship (ie \"Primary\").  Today we documented Decision Maker(s) consistent with ACP documents on file.    Papoinmohamud is current primary care DM, initally 1st alternate on hcpoa documents on file. Pt did state plan to move to [place] with Amber/Dtr in the near future & that new hcpoa documents have been completed to appoint Amber, but have not yet provided updated documents. Electronically signed by MERCEDES Nino on 7/11/2025 at 3:08 PM

## 2025-07-11 NOTE — CARE COORDINATION
Sw/Care Manager Note: Pt admitted for AFIB; cardioversion received today. Eliquis pta. Met with pt at bedside for coordination of care. Pt A&O, lives alone but Amber/Dtr is in from [place] & will be staying with her until she moves permanently to [place]. Pt stated anticipation of d/c tomorrow once stress test is completed & if results are negative. Pt on room. Has a cane/walker, declined need to use at this time. No hhc/saravanan hx reported and pt declined anticipating any home going needs d/t family support. Dr. Alatorre is pcp and [facility] [place] is pharmacy for immediate med needs; uses a [employer] company for routine meds. Dtr to provide transport home. Electronically signed by MERCEDES Nino on 7/11/2025 at 3:17 PM

## 2025-07-11 NOTE — PROGRESS NOTES
Patient brought to PACU for scheduled MARYLOU/Cardioversion with Dr. Gustafson. Patient placed on monitors and oxygen; consents verified.  1030: Pre procedure vitals- BP 96/84, , 100% on 6L, RR 20  1113: Time out   MARYLOU performed  1122: Patient shocked at 200Js  1123: Patient shocked at 360Js  Patient converted to SB  EKG obtained, daughter updated.

## 2025-07-12 ENCOUNTER — APPOINTMENT (OUTPATIENT)
Dept: GENERAL RADIOLOGY | Age: 80
DRG: 308 | End: 2025-07-12
Attending: INTERNAL MEDICINE
Payer: MEDICARE

## 2025-07-12 ENCOUNTER — APPOINTMENT (OUTPATIENT)
Age: 80
DRG: 308 | End: 2025-07-12
Attending: INTERNAL MEDICINE
Payer: MEDICARE

## 2025-07-12 ENCOUNTER — APPOINTMENT (OUTPATIENT)
Dept: NUCLEAR MEDICINE | Age: 80
DRG: 308 | End: 2025-07-12
Attending: INTERNAL MEDICINE
Payer: MEDICARE

## 2025-07-12 LAB
ALBUMIN SERPL-MCNC: 3.2 G/DL (ref 3.5–5.2)
ALP SERPL-CCNC: 82 U/L (ref 35–104)
ALT SERPL-CCNC: 19 U/L (ref 0–35)
ANION GAP SERPL CALCULATED.3IONS-SCNC: 11 MMOL/L (ref 7–16)
AST SERPL-CCNC: 27 U/L (ref 0–35)
BASOPHILS # BLD: 0.06 K/UL (ref 0–0.2)
BASOPHILS NFR BLD: 1 % (ref 0–2)
BILIRUB SERPL-MCNC: 0.3 MG/DL (ref 0–1.2)
BUN SERPL-MCNC: 28 MG/DL (ref 8–23)
CALCIUM SERPL-MCNC: 8.5 MG/DL (ref 8.8–10.2)
CHLORIDE SERPL-SCNC: 103 MMOL/L (ref 98–107)
CO2 SERPL-SCNC: 18 MMOL/L (ref 22–29)
CREAT SERPL-MCNC: 1.2 MG/DL (ref 0.5–1)
ECHO BSA: 1.52 M2
EOSINOPHIL # BLD: 0.15 K/UL (ref 0.05–0.5)
EOSINOPHILS RELATIVE PERCENT: 1 % (ref 0–6)
ERYTHROCYTE [DISTWIDTH] IN BLOOD BY AUTOMATED COUNT: 14.6 % (ref 11.5–15)
GFR, ESTIMATED: 44 ML/MIN/1.73M2
GLUCOSE SERPL-MCNC: 93 MG/DL (ref 74–99)
HCT VFR BLD AUTO: 32.2 % (ref 34–48)
HGB BLD-MCNC: 10.4 G/DL (ref 11.5–15.5)
IMM GRANULOCYTES # BLD AUTO: 0.05 K/UL (ref 0–0.58)
IMM GRANULOCYTES NFR BLD: 0 % (ref 0–5)
LYMPHOCYTES NFR BLD: 0.92 K/UL (ref 1.5–4)
LYMPHOCYTES RELATIVE PERCENT: 8 % (ref 20–42)
MAGNESIUM SERPL-MCNC: 2.3 MG/DL (ref 1.6–2.4)
MCH RBC QN AUTO: 29.9 PG (ref 26–35)
MCHC RBC AUTO-ENTMCNC: 32.3 G/DL (ref 32–34.5)
MCV RBC AUTO: 92.5 FL (ref 80–99.9)
MONOCYTES NFR BLD: 0.93 K/UL (ref 0.1–0.95)
MONOCYTES NFR BLD: 8 % (ref 2–12)
NEUTROPHILS NFR BLD: 82 % (ref 43–80)
NEUTS SEG NFR BLD: 9.23 K/UL (ref 1.8–7.3)
NUC STRESS EJECTION FRACTION: 67 %
PHOSPHATE SERPL-MCNC: 3 MG/DL (ref 2.5–4.5)
PLATELET # BLD AUTO: 332 K/UL (ref 130–450)
PMV BLD AUTO: 11 FL (ref 7–12)
POTASSIUM SERPL-SCNC: 5.3 MMOL/L (ref 3.5–5.1)
PROT SERPL-MCNC: 6.3 G/DL (ref 6.4–8.3)
RBC # BLD AUTO: 3.48 M/UL (ref 3.5–5.5)
SODIUM SERPL-SCNC: 132 MMOL/L (ref 136–145)
STRESS BASELINE DIAS BP: 77 MMHG
STRESS BASELINE HR: 61 BPM
STRESS BASELINE SYS BP: 167 MMHG
STRESS O2 SAT REST: 97 %
STRESS STAGE 1 BP: NORMAL MMHG
STRESS STAGE 1 COMMENTS: NORMAL
STRESS STAGE 1 DURATION: 1 MIN:SEC
STRESS STAGE 1 HR: 65 BPM
STRESS STAGE RECOVERY 1 BP: NORMAL MMHG
STRESS STAGE RECOVERY 1 DURATION: 1 MIN:SEC
STRESS STAGE RECOVERY 1 HR: 70 BPM
STRESS STAGE RECOVERY 2 BP: NORMAL MMHG
STRESS STAGE RECOVERY 2 DURATION: 2 MIN:SEC
STRESS STAGE RECOVERY 2 HR: 70 BPM
STRESS STAGE RECOVERY 3 BP: NORMAL MMHG
STRESS STAGE RECOVERY 3 DURATION: 3 MIN:SEC
STRESS STAGE RECOVERY 3 HR: 70 BPM
STRESS STAGE RECOVERY 4 BP: NORMAL MMHG
STRESS STAGE RECOVERY 4 DURATION: 4 MIN:SEC
STRESS STAGE RECOVERY 4 HR: 71 BPM
STRESS TARGET HR: 139 BPM
TID: 1.27
WBC OTHER # BLD: 11.3 K/UL (ref 4.5–11.5)

## 2025-07-12 PROCEDURE — 71046 X-RAY EXAM CHEST 2 VIEWS: CPT

## 2025-07-12 PROCEDURE — 2060000000 HC ICU INTERMEDIATE R&B

## 2025-07-12 PROCEDURE — 6370000000 HC RX 637 (ALT 250 FOR IP)

## 2025-07-12 PROCEDURE — 36415 COLL VENOUS BLD VENIPUNCTURE: CPT

## 2025-07-12 PROCEDURE — 3430000000 HC RX DIAGNOSTIC RADIOPHARMACEUTICAL: Performed by: RADIOLOGY

## 2025-07-12 PROCEDURE — 93016 CV STRESS TEST SUPVJ ONLY: CPT | Performed by: INTERNAL MEDICINE

## 2025-07-12 PROCEDURE — A9500 TC99M SESTAMIBI: HCPCS | Performed by: RADIOLOGY

## 2025-07-12 PROCEDURE — 80053 COMPREHEN METABOLIC PANEL: CPT

## 2025-07-12 PROCEDURE — 93018 CV STRESS TEST I&R ONLY: CPT | Performed by: INTERNAL MEDICINE

## 2025-07-12 PROCEDURE — 6360000002 HC RX W HCPCS: Performed by: INTERNAL MEDICINE

## 2025-07-12 PROCEDURE — 6370000000 HC RX 637 (ALT 250 FOR IP): Performed by: INTERNAL MEDICINE

## 2025-07-12 PROCEDURE — 93017 CV STRESS TEST TRACING ONLY: CPT

## 2025-07-12 PROCEDURE — 85025 COMPLETE CBC W/AUTO DIFF WBC: CPT

## 2025-07-12 PROCEDURE — 78452 HT MUSCLE IMAGE SPECT MULT: CPT | Performed by: INTERNAL MEDICINE

## 2025-07-12 PROCEDURE — 2580000003 HC RX 258: Performed by: INTERNAL MEDICINE

## 2025-07-12 PROCEDURE — 99291 CRITICAL CARE FIRST HOUR: CPT | Performed by: INTERNAL MEDICINE

## 2025-07-12 PROCEDURE — 6360000002 HC RX W HCPCS

## 2025-07-12 PROCEDURE — 78452 HT MUSCLE IMAGE SPECT MULT: CPT

## 2025-07-12 PROCEDURE — 93005 ELECTROCARDIOGRAM TRACING: CPT

## 2025-07-12 PROCEDURE — 84100 ASSAY OF PHOSPHORUS: CPT

## 2025-07-12 PROCEDURE — 99233 SBSQ HOSP IP/OBS HIGH 50: CPT | Performed by: INTERNAL MEDICINE

## 2025-07-12 PROCEDURE — 83735 ASSAY OF MAGNESIUM: CPT

## 2025-07-12 RX ORDER — ONDANSETRON 2 MG/ML
4 INJECTION INTRAMUSCULAR; INTRAVENOUS EVERY 6 HOURS PRN
Status: DISCONTINUED | OUTPATIENT
Start: 2025-07-12 | End: 2025-07-13 | Stop reason: HOSPADM

## 2025-07-12 RX ORDER — METOPROLOL SUCCINATE 25 MG/1
25 TABLET, EXTENDED RELEASE ORAL 2 TIMES DAILY
Status: DISCONTINUED | OUTPATIENT
Start: 2025-07-12 | End: 2025-07-13 | Stop reason: HOSPADM

## 2025-07-12 RX ORDER — METOPROLOL TARTRATE 1 MG/ML
INJECTION, SOLUTION INTRAVENOUS
Status: COMPLETED
Start: 2025-07-12 | End: 2025-07-12

## 2025-07-12 RX ORDER — METOPROLOL TARTRATE 1 MG/ML
5 INJECTION, SOLUTION INTRAVENOUS ONCE
Status: COMPLETED | OUTPATIENT
Start: 2025-07-12 | End: 2025-07-12

## 2025-07-12 RX ORDER — DIGOXIN 0.25 MG/ML
INJECTION INTRAMUSCULAR; INTRAVENOUS
Status: COMPLETED
Start: 2025-07-12 | End: 2025-07-12

## 2025-07-12 RX ORDER — TETRAKIS(2-METHOXYISOBUTYLISOCYANIDE)COPPER(I) TETRAFLUOROBORATE 1 MG/ML
30 INJECTION, POWDER, LYOPHILIZED, FOR SOLUTION INTRAVENOUS
Status: COMPLETED | OUTPATIENT
Start: 2025-07-12 | End: 2025-07-12

## 2025-07-12 RX ORDER — AMIODARONE HYDROCHLORIDE 200 MG/1
200 TABLET ORAL DAILY
Status: DISCONTINUED | OUTPATIENT
Start: 2025-07-12 | End: 2025-07-13

## 2025-07-12 RX ORDER — DIGOXIN 0.25 MG/ML
250 INJECTION INTRAMUSCULAR; INTRAVENOUS ONCE
Status: COMPLETED | OUTPATIENT
Start: 2025-07-12 | End: 2025-07-12

## 2025-07-12 RX ORDER — TETRAKIS(2-METHOXYISOBUTYLISOCYANIDE)COPPER(I) TETRAFLUOROBORATE 1 MG/ML
10 INJECTION, POWDER, LYOPHILIZED, FOR SOLUTION INTRAVENOUS
Status: COMPLETED | OUTPATIENT
Start: 2025-07-12 | End: 2025-07-12

## 2025-07-12 RX ORDER — DIGOXIN 0.25 MG/ML
250 INJECTION INTRAMUSCULAR; INTRAVENOUS EVERY 6 HOURS
Status: COMPLETED | OUTPATIENT
Start: 2025-07-12 | End: 2025-07-12

## 2025-07-12 RX ORDER — REGADENOSON 0.08 MG/ML
0.4 INJECTION, SOLUTION INTRAVENOUS
Status: COMPLETED | OUTPATIENT
Start: 2025-07-12 | End: 2025-07-12

## 2025-07-12 RX ADMIN — BUSPIRONE HYDROCHLORIDE 10 MG: 10 TABLET ORAL at 20:11

## 2025-07-12 RX ADMIN — APIXABAN 2.5 MG: 2.5 TABLET, FILM COATED ORAL at 11:06

## 2025-07-12 RX ADMIN — PANTOPRAZOLE SODIUM 40 MG: 40 TABLET, DELAYED RELEASE ORAL at 06:11

## 2025-07-12 RX ADMIN — LISINOPRIL 5 MG: 5 TABLET ORAL at 11:06

## 2025-07-12 RX ADMIN — PRAMIPEXOLE DIHYDROCHLORIDE 0.12 MG: 0.25 TABLET ORAL at 20:11

## 2025-07-12 RX ADMIN — Medication 2000 UNITS: at 11:06

## 2025-07-12 RX ADMIN — BUSPIRONE HYDROCHLORIDE 10 MG: 10 TABLET ORAL at 11:06

## 2025-07-12 RX ADMIN — DIGOXIN 250 MCG: 0.25 INJECTION INTRAMUSCULAR; INTRAVENOUS at 20:11

## 2025-07-12 RX ADMIN — METOPROLOL TARTRATE 5 MG: 1 INJECTION, SOLUTION INTRAVENOUS at 13:20

## 2025-07-12 RX ADMIN — METOPROLOL SUCCINATE 25 MG: 25 TABLET, EXTENDED RELEASE ORAL at 11:05

## 2025-07-12 RX ADMIN — MEMANTINE 5 MG: 5 TABLET ORAL at 20:10

## 2025-07-12 RX ADMIN — ATORVASTATIN CALCIUM 20 MG: 20 TABLET, FILM COATED ORAL at 20:11

## 2025-07-12 RX ADMIN — Medication 10 MILLICURIE: at 07:42

## 2025-07-12 RX ADMIN — Medication 30 MILLICURIE: at 09:25

## 2025-07-12 RX ADMIN — AMIODARONE HYDROCHLORIDE 150 MG: 50 INJECTION, SOLUTION INTRAVENOUS at 12:51

## 2025-07-12 RX ADMIN — FOLIC ACID 1000 MCG: 1 TABLET ORAL at 11:05

## 2025-07-12 RX ADMIN — MEMANTINE 5 MG: 5 TABLET ORAL at 11:06

## 2025-07-12 RX ADMIN — ONDANSETRON 4 MG: 2 INJECTION, SOLUTION INTRAMUSCULAR; INTRAVENOUS at 14:05

## 2025-07-12 RX ADMIN — APIXABAN 2.5 MG: 2.5 TABLET, FILM COATED ORAL at 20:15

## 2025-07-12 RX ADMIN — LEVOTHYROXINE SODIUM 25 MCG: 0.03 TABLET ORAL at 06:11

## 2025-07-12 RX ADMIN — AMIODARONE HYDROCHLORIDE 200 MG: 200 TABLET ORAL at 11:05

## 2025-07-12 RX ADMIN — REGADENOSON 0.4 MG: 0.08 INJECTION, SOLUTION INTRAVENOUS at 09:25

## 2025-07-12 RX ADMIN — METOPROLOL TARTRATE 5 MG: 5 INJECTION, SOLUTION INTRAVENOUS at 13:20

## 2025-07-12 RX ADMIN — METOPROLOL SUCCINATE 25 MG: 25 TABLET, EXTENDED RELEASE ORAL at 20:10

## 2025-07-12 RX ADMIN — DIGOXIN 250 MCG: 0.25 INJECTION INTRAMUSCULAR; INTRAVENOUS at 15:54

## 2025-07-12 RX ADMIN — DONEPEZIL HYDROCHLORIDE 10 MG: 5 TABLET, FILM COATED ORAL at 20:10

## 2025-07-12 RX ADMIN — DIGOXIN 250 MCG: 250 INJECTION, SOLUTION INTRAMUSCULAR; INTRAVENOUS; PARENTERAL at 13:20

## 2025-07-12 RX ADMIN — DIGOXIN 250 MCG: 0.25 INJECTION INTRAMUSCULAR; INTRAVENOUS at 13:20

## 2025-07-12 ASSESSMENT — PAIN SCALES - GENERAL: PAINLEVEL_OUTOF10: 0

## 2025-07-12 NOTE — PLAN OF CARE
Problem: Chronic Conditions and Co-morbidities  Goal: Patient's chronic conditions and co-morbidity symptoms are monitored and maintained or improved  7/12/2025 0156 by Sandhya Keen RN  Outcome: Progressing  7/11/2025 1638 by Sander Jeff RN  Outcome: Progressing     Problem: Discharge Planning  Goal: Discharge to home or other facility with appropriate resources  7/12/2025 0156 by Sandhya Keen RN  Outcome: Progressing  7/11/2025 1638 by Sander Jeff RN  Outcome: Progressing     Problem: Safety - Adult  Goal: Free from fall injury  7/12/2025 0156 by Sandhya Keen RN  Outcome: Progressing  7/11/2025 1638 by Sander Jeff RN  Outcome: Progressing     Problem: Skin/Tissue Integrity  Goal: Skin integrity remains intact  Description: 1.  Monitor for areas of redness and/or skin breakdown  2.  Assess vascular access sites hourly  3.  Every 4-6 hours minimum:  Change oxygen saturation probe site  4.  Every 4-6 hours:  If on nasal continuous positive airway pressure, respiratory therapy assess nares and determine need for appliance change or resting period  Outcome: Progressing     Problem: ABCDS Injury Assessment  Goal: Absence of physical injury  Outcome: Progressing

## 2025-07-12 NOTE — PROCEDURES
PROCEDURE NOTE  Date: 7/12/2025   Name: Alanna Tamez  YOB: 1945    Procedures:     Lexiscan Nuclear Stress Test:    Cardiologist: Dr. Julian Bhatia    Baseline EKG; normal sinus rhythm with occasional PAC, T wave changes suggestive Anturol ischemia, abnormal EKG.    Indications for study: Chest pain    1. No chest pain  2. No new arrhythmias  3. No EKG changes suggestive of stress induced ischemia  4. Nuclear images pending    Julian Bhatia MD., FACC.   [facility]

## 2025-07-12 NOTE — SIGNIFICANT EVENT
[facility] Hospitalist    Hospitalist RRT/Code Blue Note    Subjective:    Called to bedside and patient was just started on amiodarone drip she felt like she was going to pass out and sweaty.  RRT was called and then was canceled because they thought that she was choking in the beginning and she was doing well in that aspect.  Patient was found to be in A-fib with RVR with a heart rate in the 130s to 140s.       [Held by provider] amiodarone  200 mg Oral Daily    [Held by provider] torsemide  20 mg Oral Daily    metoprolol succinate  25 mg Oral Daily    pantoprazole  40 mg Oral QAM AC    apixaban  2.5 mg Oral BID    atorvastatin  20 mg Oral Nightly    busPIRone  10 mg Oral BID    Vitamin D  2,000 Units Oral Daily    donepezil  10 mg Oral Nightly    folic acid  1,000 mcg Oral Daily    levothyroxine  25 mcg Oral Daily    lisinopril  5 mg Oral Daily    memantine  5 mg Oral BID    pramipexole  0.125 mg Oral Nightly     ondansetron, 4 mg, Q6H PRN  acetaminophen, 650 mg, Q6H PRN  prochlorperazine, 10 mg, Q6H PRN  melatonin, 5 mg, Nightly PRN  diphenhydrAMINE, 25 mg, Nightly PRN  diclofenac sodium, 2 g, 4x Daily PRN  traZODone, 75 mg, Nightly PRN         Objective:    /86   Pulse (!) 144   Temp 97.9 °F (36.6 °C) (Oral)   Resp 18   Ht 1.524 m (5')   Wt 54.4 kg (120 lb)   SpO2 94%   BMI 23.44 kg/m²   General Appearance: alert and oriented to person, place and time,  sweating   Head: normocephalic and atraumatic  Eyes: pupils equal, round, and reactive to light, extraocular eye movements intact, conjunctivae normal  Pulmonary/Chest: clear to auscultation bilaterally- no wheezes,   Cardiovascular: irregularly irregular and tachycardic   Abdomen: soft, non-tender, non-distended, normal bowel sounds, no masses or organomegaly  Extremities: no cyanosis, clubbing or edema      Recent Labs     07/10/25  0756 07/11/25  0740 07/12/25  0717   * 136 132*   K 4.3 4.7 5.3*    104 103   CO2 21*

## 2025-07-12 NOTE — PLAN OF CARE
Problem: Chronic Conditions and Co-morbidities  Goal: Patient's chronic conditions and co-morbidity symptoms are monitored and maintained or improved  7/12/2025 1036 by Jesús Pedersen RN  Outcome: Progressing  Flowsheets (Taken 7/12/2025 0730)  Care Plan - Patient's Chronic Conditions and Co-Morbidity Symptoms are Monitored and Maintained or Improved: Monitor and assess patient's chronic conditions and comorbid symptoms for stability, deterioration, or improvement  7/12/2025 0156 by Sandhya Keen RN  Outcome: Progressing     Problem: Discharge Planning  Goal: Discharge to home or other facility with appropriate resources  7/12/2025 1036 by Jesús Pedersen RN  Outcome: Progressing  Flowsheets (Taken 7/12/2025 0730)  Discharge to home or other facility with appropriate resources: Identify barriers to discharge with patient and caregiver  7/12/2025 0156 by Sandhya Keen RN  Outcome: Progressing     Problem: Safety - Adult  Goal: Free from fall injury  7/12/2025 1036 by Jesús Pedersen RN  Outcome: Progressing  Flowsheets (Taken 7/12/2025 0730)  Free From Fall Injury: Instruct family/caregiver on patient safety  7/12/2025 0156 by Sandhya Keen RN  Outcome: Progressing     Problem: Skin/Tissue Integrity  Goal: Skin integrity remains intact  Description: 1.  Monitor for areas of redness and/or skin breakdown  2.  Assess vascular access sites hourly  3.  Every 4-6 hours minimum:  Change oxygen saturation probe site  4.  Every 4-6 hours:  If on nasal continuous positive airway pressure, respiratory therapy assess nares and determine need for appliance change or resting period  7/12/2025 1036 by Jesús Pedersen RN  Outcome: Progressing  Flowsheets (Taken 7/12/2025 0730)  Skin Integrity Remains Intact: Monitor for areas of redness and/or skin breakdown  7/12/2025 0156 by Snadhya Keen RN  Outcome: Progressing     Problem: ABCDS Injury Assessment  Goal: Absence of physical injury  7/12/2025 1036

## 2025-07-13 VITALS
WEIGHT: 120 LBS | RESPIRATION RATE: 18 BRPM | SYSTOLIC BLOOD PRESSURE: 119 MMHG | DIASTOLIC BLOOD PRESSURE: 70 MMHG | TEMPERATURE: 97 F | HEART RATE: 81 BPM | OXYGEN SATURATION: 95 % | HEIGHT: 60 IN | BODY MASS INDEX: 23.56 KG/M2

## 2025-07-13 LAB
ANION GAP SERPL CALCULATED.3IONS-SCNC: 11 MMOL/L (ref 7–16)
BUN SERPL-MCNC: 18 MG/DL (ref 8–23)
CALCIUM SERPL-MCNC: 8.8 MG/DL (ref 8.8–10.2)
CHLORIDE SERPL-SCNC: 105 MMOL/L (ref 98–107)
CO2 SERPL-SCNC: 22 MMOL/L (ref 22–29)
CREAT SERPL-MCNC: 1 MG/DL (ref 0.5–1)
EKG ATRIAL RATE: 67 BPM
EKG P AXIS: 84 DEGREES
EKG P-R INTERVAL: 158 MS
EKG Q-T INTERVAL: 438 MS
EKG QRS DURATION: 92 MS
EKG QTC CALCULATION (BAZETT): 462 MS
EKG R AXIS: 16 DEGREES
EKG T AXIS: 16 DEGREES
EKG VENTRICULAR RATE: 67 BPM
GFR, ESTIMATED: 59 ML/MIN/1.73M2
GLUCOSE SERPL-MCNC: 108 MG/DL (ref 74–99)
POTASSIUM SERPL-SCNC: 4.7 MMOL/L (ref 3.5–5.1)
SODIUM SERPL-SCNC: 137 MMOL/L (ref 136–145)

## 2025-07-13 PROCEDURE — 6370000000 HC RX 637 (ALT 250 FOR IP): Performed by: INTERNAL MEDICINE

## 2025-07-13 PROCEDURE — 6370000000 HC RX 637 (ALT 250 FOR IP)

## 2025-07-13 PROCEDURE — 36415 COLL VENOUS BLD VENIPUNCTURE: CPT

## 2025-07-13 PROCEDURE — 6360000002 HC RX W HCPCS: Performed by: INTERNAL MEDICINE

## 2025-07-13 PROCEDURE — 99233 SBSQ HOSP IP/OBS HIGH 50: CPT | Performed by: INTERNAL MEDICINE

## 2025-07-13 PROCEDURE — 80048 BASIC METABOLIC PNL TOTAL CA: CPT

## 2025-07-13 RX ORDER — DIGOXIN 0.06 MG/1
62.5 TABLET ORAL DAILY
Qty: 30 TABLET | Refills: 0 | Status: SHIPPED | OUTPATIENT
Start: 2025-07-14

## 2025-07-13 RX ORDER — TORSEMIDE 20 MG/1
20 TABLET ORAL DAILY PRN
Status: DISCONTINUED | OUTPATIENT
Start: 2025-07-13 | End: 2025-07-13 | Stop reason: HOSPADM

## 2025-07-13 RX ORDER — METOPROLOL SUCCINATE 25 MG/1
25 TABLET, EXTENDED RELEASE ORAL 2 TIMES DAILY
Qty: 60 TABLET | Refills: 0 | Status: SHIPPED | OUTPATIENT
Start: 2025-07-13

## 2025-07-13 RX ORDER — TORSEMIDE 20 MG/1
20 TABLET ORAL DAILY PRN
COMMUNITY
Start: 2025-07-13

## 2025-07-13 RX ORDER — DIGOXIN 125 MCG
62.5 TABLET ORAL DAILY
Status: DISCONTINUED | OUTPATIENT
Start: 2025-07-13 | End: 2025-07-13 | Stop reason: HOSPADM

## 2025-07-13 RX ADMIN — APIXABAN 2.5 MG: 2.5 TABLET, FILM COATED ORAL at 08:18

## 2025-07-13 RX ADMIN — DIGOXIN 62.5 MCG: 125 TABLET ORAL at 12:39

## 2025-07-13 RX ADMIN — ONDANSETRON 4 MG: 2 INJECTION, SOLUTION INTRAMUSCULAR; INTRAVENOUS at 00:42

## 2025-07-13 RX ADMIN — MEMANTINE 5 MG: 5 TABLET ORAL at 08:19

## 2025-07-13 RX ADMIN — Medication 2000 UNITS: at 08:19

## 2025-07-13 RX ADMIN — LISINOPRIL 5 MG: 5 TABLET ORAL at 08:18

## 2025-07-13 RX ADMIN — LEVOTHYROXINE SODIUM 25 MCG: 0.03 TABLET ORAL at 05:56

## 2025-07-13 RX ADMIN — BUSPIRONE HYDROCHLORIDE 10 MG: 10 TABLET ORAL at 08:19

## 2025-07-13 RX ADMIN — PANTOPRAZOLE SODIUM 40 MG: 40 TABLET, DELAYED RELEASE ORAL at 05:56

## 2025-07-13 RX ADMIN — METOPROLOL SUCCINATE 25 MG: 25 TABLET, EXTENDED RELEASE ORAL at 08:18

## 2025-07-13 RX ADMIN — FOLIC ACID 1000 MCG: 1 TABLET ORAL at 08:19

## 2025-07-13 NOTE — PLAN OF CARE
Problem: Chronic Conditions and Co-morbidities  Goal: Patient's chronic conditions and co-morbidity symptoms are monitored and maintained or improved  7/13/2025 0925 by Jesús Pedersen RN  Outcome: Progressing  Flowsheets (Taken 7/13/2025 0800)  Care Plan - Patient's Chronic Conditions and Co-Morbidity Symptoms are Monitored and Maintained or Improved: Monitor and assess patient's chronic conditions and comorbid symptoms for stability, deterioration, or improvement  7/12/2025 2203 by Sameera Rodríguez RN  Outcome: Progressing     Problem: Discharge Planning  Goal: Discharge to home or other facility with appropriate resources  7/13/2025 0925 by Jesús Pedersen RN  Outcome: Progressing  Flowsheets (Taken 7/13/2025 0800)  Discharge to home or other facility with appropriate resources: Identify barriers to discharge with patient and caregiver  7/12/2025 2203 by Sameera Rodríguez RN  Outcome: Progressing     Problem: Safety - Adult  Goal: Free from fall injury  7/13/2025 0925 by Jesús Pedersen RN  Outcome: Progressing  Flowsheets (Taken 7/13/2025 0800)  Free From Fall Injury: Instruct family/caregiver on patient safety  7/12/2025 2203 by Sameera Rodríguez RN  Outcome: Progressing     Problem: Skin/Tissue Integrity  Goal: Skin integrity remains intact  Description: 1.  Monitor for areas of redness and/or skin breakdown  2.  Assess vascular access sites hourly  3.  Every 4-6 hours minimum:  Change oxygen saturation probe site  4.  Every 4-6 hours:  If on nasal continuous positive airway pressure, respiratory therapy assess nares and determine need for appliance change or resting period  7/13/2025 0925 by Jesús Pedersen RN  Outcome: Progressing  Flowsheets (Taken 7/13/2025 0800)  Skin Integrity Remains Intact: Monitor for areas of redness and/or skin breakdown  7/12/2025 2203 by Sameera Rodríguez RN  Outcome: Progressing     Problem: ABCDS Injury Assessment  Goal: Absence of physical injury  7/13/2025 0925 by

## 2025-07-14 ENCOUNTER — CARE COORDINATION (OUTPATIENT)
Dept: CARE COORDINATION | Age: 80
End: 2025-07-14

## 2025-07-14 NOTE — DISCHARGE SUMMARY
[place] [id number]                            DISCHARGE SUMMARY      PATIENT NAME: LORNA RODRIGUEZ            : 1945  MED REC NO: [id number]                        ROOM: 0537  ACCOUNT NO: [id number]                       ADMIT DATE: 2025  PROVIDER: Froilan Montanez DO      ADMITTING DIAGNOSIS:  Cardiac dysrhythmia.    FINAL DISCHARGE DIAGNOSIS:  Atrial fibrillation with rapid ventricular response with direct current cardioversion on  with conversion to normal sinus rhythm, but the patient reverted back to atrial fibrillation after a pharmacological stress test.    SECONDARY DISCHARGE DIAGNOSES:  Heart failure with reduced ejection fraction; history of left atrial appendage thrombus, 2021; acute on chronic kidney disease, stage 3A; gastroesophageal reflux disease; primary hypothyroidism; essential hypertension; hyperlipidemia; history of transient ischemic attack; vitamin D deficiency.    COMPLICATIONS:  There were no complications.    OPERATION PERFORMED:  Direct current cardioversion on the .    CONSULTATIONS OBTAINED:  With Dr. Bhatia from Cardiology along with Dr. Gustafson.    ADDITIONAL ADMITTING PHYSICIAN:  Dr. Vizcaino.    CHIEF COMPLAINT AND HISTORY OF CHIEF COMPLAINT:  This is an 79-year-old white female who was admitted to [facility].  The patient presented to the hospital on 2025.  The patient presented to the hospital with ongoing palpitation for the past week.  The patient saw her cardiologist today, at which time, she was getting an echocardiogram.  The patient had palpitations at this time.  She appeared to be in atrial fibrillation.  She had an EF of 40% to 45%.  She was admitted to the hospital at this time.    PAST MEDICAL HISTORY:  Positive for usual childhood diseases, history of ADHD, anxiety, arthritis, asymptomatic PVCs, history of hyperlipidemia, primary

## 2025-07-14 NOTE — CARE COORDINATION
Care Transitions Note    Initial Call - Call within 2 business days of discharge: Yes    Patient Current Location:  Home: [address] [place] [id number]    Care Transition Nurse contacted the family, JAYDEN Burnette, (PHI form verified; on file) by telephone to perform post hospital discharge assessment, verified name and  as identifiers.  Provided introduction to self, and explanation of the Care Transition Nurse role.    Patient: Alanna Taemz    Patient : 1945   MRN: [id number]    Reason for Admission: AFib  Discharge Date: 25  RURS: Readmission Risk Score: 16.6      Last Discharge Facility       Date Complaint Diagnosis Description Type Department Provider    25 Tachycardia Atrial fibrillation, unspecified type (HCC) ... ED to Hosp-Admission (Discharged) (ADMITTED) [facility] Froilan Reardon DO; Renzo Wong...            Was this an external facility discharge? No    Additional needs identified to be addressed with provider   No needs identified             Method of communication with provider: none.    Patients top risk factors for readmission: functional cognitive ability and medical condition-AFib, CHF, diverticulitis, alzheimer's    Interventions to address risk factors:   Review of patient management of conditions/medications.    Care Summary Note:   -Patient admitted to [facility] on 25 with symptoms of palpitations, increased HR above 140 while at cardiology office.  Successful cardioversion done on 25.  See hospital discharge summary for further details.   -Vasile reports his mother in law is \"doing okay\" other than being \"marino weak.\"  -Patient's JAYDEN denies any needs at this time.  -CTN to sign off as patient is moving out of state tomorrow.    Care Transition Nurse reviewed discharge instructions with family. The family was given an opportunity to ask questions; no further questions or concerns at this time.  The family verbalized understanding.   Were discharge instructions

## 2025-07-18 NOTE — PROGRESS NOTES
INPATIENT CARDIOLOGY FOLLOW-UP    Name: Alanna Tamez    Age: 79 y.o.    Date of Admission: 7/9/2025 11:28 AM    Date of Service: 7/12/2025    Chief Complaint: Follow-up for AF    Interim History:  No new overnight cardiac complaints.  Patient underwent cardioversion on 7/11/2025 and remains in sinus rhythm.  Currently with no complaints of CP, SOB, palpitations, dizziness, or lightheadedness. SR on telemetry.    Review of Systems:   Cardiac: As per HPI  General: No fever, chills  Pulmonary: As per HPI  HEENT: No visual disturbances, difficult swallowing  GI: No nausea, vomiting  Endocrine: No thyroid disease or DM  Musculoskeletal: BYRNE x 4, no focal motor deficits  Skin: Intact, no rashes  Neuro/Psych: No headache or seizures    Problem List:  Patient Active Problem List   Diagnosis    Pelvic prolapse    Hyperlipidemia    GERD (gastroesophageal reflux disease)    PUD (peptic ulcer disease)    Urinary incontinence    Osteoarthritis    Palpitations    Precordial pain    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    Pain of right foot    Atrial fibrillation with RVR (HCC)    Atrial fibrillation (HCC)    Hyperkalemia    Diverticulitis with microperforation    Sigmoid diverticulitis    PAF (paroxysmal atrial fibrillation) ([facility])    Acute decompensated heart failure (HCC)    NICM (nonischemic cardiomyopathy) ([facility])    Chronic renal disease, stage III ([facility]) [156360]    Chronic systolic (congestive) heart failure    Alzheimer's disease, unspecified       Allergies:  No Known Allergies    Current Medications:  Current Facility-Administered Medications   Medication Dose Route Frequency Provider Last Rate Last Admin    regadenoson (LEXISCAN) injection 0.4 mg  0.4 mg IntraVENous ONCE PRN Quintin Gustafson DO        technetium sestamibi (CARDIOLITE) injection 30 millicurie  30 millicurie IntraVENous ONCE PRN Javi Elliott MD        [Held by provider] torsemide (DEMADEX) tablet 20 mg  20 mg Oral Daily Quintin Gustafson DO        
    INPATIENT CARDIOLOGY FOLLOW-UP    Name: Alanna Tamez    Age: 79 y.o.    Date of Admission: 7/9/2025 11:28 AM    Date of Service: 7/13/2025    Chief Complaint: Follow-up for AF    Interim History:  No new overnight cardiac complaints.  Patient underwent cardioversion on 7/11/2025 and remains in sinus rhythm, converted back to atrial fibrillation with RVR on 7/12/2025 afternoon.  Patient received 2 dose of digoxin and now the rate is in the 80s and 90s.  Patient is feeling good.  Currently with no complaints of CP, SOB, palpitations, dizziness, or lightheadedness.  Atrial fibrillation on telemetry.    Review of Systems:   Cardiac: As per HPI  General: No fever, chills  Pulmonary: As per HPI  HEENT: No visual disturbances, difficult swallowing  GI: No nausea, vomiting  Endocrine: No thyroid disease or DM  Musculoskeletal: BYRNE x 4, no focal motor deficits  Skin: Intact, no rashes  Neuro/Psych: No headache or seizures    Problem List:  Patient Active Problem List   Diagnosis    Pelvic prolapse    Hyperlipidemia    GERD (gastroesophageal reflux disease)    PUD (peptic ulcer disease)    Urinary incontinence    Osteoarthritis    Palpitations    Precordial pain    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    Pain of right foot    Atrial fibrillation with RVR (HCC)    Atrial fibrillation (HCC)    Hyperkalemia    Diverticulitis with microperforation    Sigmoid diverticulitis    PAF (paroxysmal atrial fibrillation) ([facility])    Acute decompensated heart failure ([facility])    NICM (nonischemic cardiomyopathy) ([facility])    Chronic renal disease, stage III ([facility]) [039617]    Chronic systolic (congestive) heart failure    Alzheimer's disease, unspecified       Allergies:  Allergies   Allergen Reactions    Amiodarone Hcl Shortness Of Breath       Current Medications:  Current Facility-Administered Medications   Medication Dose Route Frequency Provider Last Rate Last Admin    [Held by provider] amiodarone (CORDARONE) tablet 200 mg  200 mg 
  Physician Progress Note      PATIENT:               LORNA RODRIGUEZ  [facility] #:                  [id number]  :                       1945  ADMIT DATE:       2025 11:28 AM  DISCH DATE:        2025 3:43 PM  RESPONDING  PROVIDER #:        Froilan Montanez DO          QUERY TEXT:    Congestive Heart Failure is documented in the medical record per  HP by   Froilan Montanez DO.  Please document the type and acuity:    The clinical indicators include:  -Hx- CKD, HTN, TIA, cardia dysrhythmia, afib  -Clinical Indicators- HP- PN- \" AFIB RVR with status post DC   cardioversion on July 10 with conversion to sinus rhythm. HFrEF exacerbation.\"   (Froilan Montanez DO) DC summary- \"Heart failure with reduced ejection   fraction. She had an EF of 40% to 45%.\" Finding at that time did show   borderline evidence of transient ischemic dilatation with no evidence of a   stress-induced ischemia.  The patient underwent a nuclear stress test on .   At this time, post stress test, the patient's EF was 67%.  The patient did   convert back into atrial fibrillation.  Dr. Bhatia at this time d  -Tx- DCC, consults, monitoring, labs, discharged on Lanoxin 62.5 mcg daily,   metoprolol succinate 25 b.i.d., po Demadex per home med list  Options provided:  -- Acute on Chronic Systolic CHF/HFrEF  -- Acute Systolic CHF/HFrEF  -- Chronic Systolic CHF/HFrEF  -- Other - I will add my own diagnosis  -- Disagree - Not applicable / Not valid  -- Disagree - Clinically unable to determine / Unknown  -- Refer to Clinical Documentation Reviewer    PROVIDER RESPONSE TEXT:    This patient is in acute on chronic systolic CHF/HFrEF.    Query created by: Argentina Gutierres on 2025 6:44 AM      Electronically signed by:  Froilan Montanez DO 2025 1:35 PM          
Internal Medicine Consult Note    [facility]    Froilan Montanez D.O., BERTHA.                    Smith Vizcaino D.O., PATY Teran D.O.     Jacky Easno, MSN, APRN-CNP  James Mancia, MSN, APRN-CNP  Ree Hawthorne, MSN. APRN-NP-C  Terrie Irwin MSN, APRN, ACNP-AG     Primary Care Physician: James Clayton DO   Admitting Physician:  Froilan Montanez DO  Admission date and time: 7/9/2025 11:28 AM    Room:  [address] [place] [address]  Admitting diagnosis: Atrial fibrillation with rapid ventricular response (HCC) [I48.91]  Atrial fibrillation, unspecified type (HCC) [I48.91]    Patient Name: Alanna Tamez  MRN: [id number]    Date of Service: 7/12/2025     Subjective:  Alanna is a 79 y.o. female who was seen and examined today,7/12/2025, at the bedside.  Patient resting comfortably at the present time.  He remains in sinus rhythm with a rate of 78.  She currently does not require use of any oxygen.  Patient is inquiring about discharge.  Will wait for results of stress test before discharge.  Daughter  present during my examination.    Review of System:   Constitutional:   Denies fever or chills, weight loss or gain, positive fatigue  HEENT:   Denies ear pain, sore throat, sinus or eye problems.  Cardiovascular:   Denies any chest pain or palpitations.    Respiratory:   Denies shortness of breath, coughing, sputum production, hemoptysis, or wheezing.  Gastrointestinal:   Denies nausea, vomiting, diarrhea, or constipation.  Denies any abdominal pain.  Genitourinary:    Denies any urgency, frequency, hematuria. Voiding  without difficulty.  Extremities:   Denies lower extremity swelling, edema or cyanosis.   Neurology:    Denies any headache or focal neurological deficits, Denies generalized weakness or memory difficulty.   Psch:   Denies being anxious or depressed.  Musculoskeletal:    Denies  myalgias, joint complaints or back pain.   Integumentary: 
Internal Medicine Consult Note    [facility]    Froilan Montanez D.O., BERTHA.                    Smith Vizcaino D.O., PATY Teran D.O.     Jacky Eason, MSN, APRN-CNP  James Mancia, MSN, APRN-CNP  Ree Hawthorne, MSN. APRN-NP-C  Terrie Irwin MSN, APRN, ACNP-AG     Primary Care Physician: James Clayton DO   Admitting Physician:  Froilan Montanez DO  Admission date and time: 7/9/2025 11:28 AM    Room:  [address] [place] [address]  Admitting diagnosis: Atrial fibrillation with rapid ventricular response (HCC) [I48.91]  Atrial fibrillation, unspecified type (HCC) [I48.91]    Patient Name: Alanna Tamez  MRN: [id number]    Date of Service: 7/10/2025     Subjective:  Alanna is a 79 y.o. female who was seen and examined today,7/10/2025, at the bedside.  Patient is resting comfortably in bed.  She remains in atrial fibrillation with a rate of 102.  She currently has a Cardizem infusion running at 15 mg/h.  She states she is feeling a lot better today.  Patient is scheduled for a MARYLOU with possible cardioversion today.    No family present during my examination.    Review of System:   Constitutional:   Denies fever or chills, weight loss or gain, positive fatigue  HEENT:   Denies ear pain, sore throat, sinus or eye problems.  Cardiovascular:   Denies any chest pain or palpitations.  Reports irregular heartbeats  Respiratory:   Denies shortness of breath, coughing, sputum production, hemoptysis, or wheezing.  Gastrointestinal:   Denies nausea, vomiting, diarrhea, or constipation.  Denies any abdominal pain.  Genitourinary:    Denies any urgency, frequency, hematuria. Voiding  without difficulty.  Extremities:   Denies lower extremity swelling, edema or cyanosis.   Neurology:    Denies any headache or focal neurological deficits, Denies generalized weakness or memory difficulty.   Psch:   Denies being anxious or depressed.  Musculoskeletal:    Denies  
Internal Medicine Consult Note    [facility]    Froilan Montanez D.O., PATY Vizcaino D.O., PATY Teran D.O.     Jacky Eason, MSN, APRN-CNP  James Mancia, MSN, APRN-CNP  Ree Hawthorne, MSN. APRN-NP-C  Terrie Irwin MSN, APRN, ACNP-AG     Primary Care Physician: James Clayton DO   Admitting Physician:  Froilan Montanez DO  Admission date and time: 7/9/2025 11:28 AM    Room:  [address] [place] [address]  Admitting diagnosis: Atrial fibrillation with rapid ventricular response (HCC) [I48.91]  Atrial fibrillation, unspecified type (HCC) [I48.91]    Patient Name: Alanna Tamez  MRN: [id number]    Date of Service: 7/11/2025     Subjective:  Alanna is a 79 y.o. female who was seen and examined today,7/11/2025, at the bedside.  Patient resting comfortably at the present time.  Underwent DC cardioversion x 2 by Dr. Gustafson with conversion to sinus rhythm.  Plan for possible stress test tomorrow.  Patient denies chest pain or shortness of breath    Daughter  present during my examination.    Review of System:   Constitutional:   Denies fever or chills, weight loss or gain, positive fatigue  HEENT:   Denies ear pain, sore throat, sinus or eye problems.  Cardiovascular:   Denies any chest pain or palpitations.    Respiratory:   Denies shortness of breath, coughing, sputum production, hemoptysis, or wheezing.  Gastrointestinal:   Denies nausea, vomiting, diarrhea, or constipation.  Denies any abdominal pain.  Genitourinary:    Denies any urgency, frequency, hematuria. Voiding  without difficulty.  Extremities:   Denies lower extremity swelling, edema or cyanosis.   Neurology:    Denies any headache or focal neurological deficits, Denies generalized weakness or memory difficulty.   Psch:   Denies being anxious or depressed.  Musculoskeletal:    Denies  myalgias, joint complaints or back pain.   Integumentary:   Denies any rashes, 
Pharmacy Note    This patient was ordered ipratropium (ATROVENT) 0.06 % nasal spray . Per the Pharmacy & Therapeutics Committee, this medication is non-formulary and not stocked by pharmacy for the reason indicated below. The medication can be reordered at discharge.     Medications in which risks outweigh benefits during hospitalization:           -  oral bisphosphonates         -  raloxifene (Evista)        -  SGLT2 inhibitors (ordered in the hospital for an indication other than heart failure or chronic kidney disease)    Medications that lack necessity during an acute hospital stay:        -  nasal antihistamines        -  nasal ipratropium 0.03% and 0.06%        -  nasal miacalcin        -  acyclovir topical cream/ointment orders for herpes labialis (cold sores)   
Pt. Starting on metoprolol PO. Dr. Gustafson would like cardizem gtt. Titrated down once metoprolol given.  
Spiritual Health History and Assessment/Progress Note  [facility]    Spiritual/Emotional Needs,  ,  ,      Name: Alanna Tamez MRN: [id number]    Age: 79 y.o.     Sex: female   Language: English   Mandaen: Non-Congregational   Atrial fibrillation (HCC)     Date: 7/12/2025                           Spiritual Assessment began in [facility]ICU        Referral/Consult From: Rounding   Encounter Overview/Reason: Spiritual/Emotional Needs  Service Provided For: Patient and family together    Zuleima, Belief, Meaning:   Patient has beliefs or practices that help with coping during difficult times  Family/Friends have beliefs or practices that help with coping during difficult times      Importance and Influence:  Patient has spiritual/personal beliefs that influence decisions regarding their health  Family/Friends have spiritual/personal beliefs that influence decisions regarding the patient's health    Community:  Patient feels well-supported. Support system includes: Spouse/Partner  Family/Friends feel well-supported. Support system includes: Spouse/Partner    Assessment and Plan of Care:     Patient Interventions include: Facilitated expression of thoughts and feelings and Affirmed coping skills/support systems  Family/Friends Interventions include: Facilitated expression of thoughts and feelings and Affirmed coping skills/support systems    Patient Plan of Care: Spiritual Care available upon further referral  Family/Friends Plan of Care: Spiritual Care available upon further referral    Electronically signed by Chaplain Olimpia on 7/12/2025 at 3:08 PM    
tricuspid regurgitation.   Normal aortic root size.   No evidence of pericardial effusion.   Pericardium appears normal.   No comparison study available in the local [facility].     10/8/2020 - 11/6/2020 mobile cardiac telemetry (interpreting physician Dr. Hoang)  No A-fib  Sinus rhythm  Frequent PVCs and SVT ectopy     12/26/2021 Lexiscan (Dr. Gustafson):  Gated SPECT left ventricular ejection fraction was calculated to be  53%, with normal wall motion.  IMPRESSION:  The myocardial perfusion imaging was normal.  Overall left ventricular systolic function was normal without regional wall motion abnormalities.   Low risk study.     1/31/2022 TTE (Dr. Hoang):  Summary   No thrombus in left atrium or left atrial appendage.   Normal left ventricular chamber size.   Moderately reduced LV function, EF about 35%.   Left atrium is enlarged.   Interatrial septum intact.   Normal right ventricle structure and function.   Right atrium is enlarged.   There is moderate mitral regurgitation.   No mitral valve prolapse.   There is mild to moderate aortic regurgitation.   There is mild tricuspid regurgitation.   Normal aortic root size.   No evidence of pericardial effusion.   No intra cardiac mass.  Compared to prior TTE echo from 1/28/22 and 10/2021/     2/22/2022 MARYLOU    Summary   Ejection fraction is visually estimated at 40-45%.   Atrial fibrillation affects the accuracy of interpretation.   Right ventricle global systolic function is low normal .   No evidence of thrombus within left atrium/ left atrial appendage.   Emptying velocity is low at 19 cms/s.   No evidence of thrombus or mass in the right atrium.   Mild to moderate mitral regurgitation is present.   Mild-to-moderate aortic regurgitation is noted.   Mild to moderate tricuspid regurgitation.   Mild-moderate focal atherosclerosis in the descending aorta.    ASSESSMENT AND PLAN:  Patient Active Problem List   Diagnosis    Pelvic prolapse    Hyperlipidemia    GERD

## (undated) DEVICE — GOWN,SIRUS,NONRNF,SETINSLV,XL,20/CS: Brand: MEDLINE

## (undated) DEVICE — PEN: MARKING STD 100/CS: Brand: MEDICAL ACTION INDUSTRIES